# Patient Record
Sex: FEMALE | Race: OTHER | HISPANIC OR LATINO
[De-identification: names, ages, dates, MRNs, and addresses within clinical notes are randomized per-mention and may not be internally consistent; named-entity substitution may affect disease eponyms.]

---

## 2017-01-13 ENCOUNTER — TRANSCRIPTION ENCOUNTER (OUTPATIENT)
Age: 42
End: 2017-01-13

## 2017-03-01 ENCOUNTER — EMERGENCY (EMERGENCY)
Facility: HOSPITAL | Age: 42
LOS: 0 days | Discharge: HOME | End: 2017-03-02
Admitting: INTERNAL MEDICINE

## 2017-05-18 ENCOUNTER — TRANSCRIPTION ENCOUNTER (OUTPATIENT)
Age: 42
End: 2017-05-18
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
## 2017-06-27 DIAGNOSIS — Z87.891 PERSONAL HISTORY OF NICOTINE DEPENDENCE: ICD-10-CM

## 2017-06-27 DIAGNOSIS — H57.12 OCULAR PAIN, LEFT EYE: ICD-10-CM

## 2017-06-27 DIAGNOSIS — I10 ESSENTIAL (PRIMARY) HYPERTENSION: ICD-10-CM
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
## 2017-06-27 DIAGNOSIS — Z88.0 ALLERGY STATUS TO PENICILLIN: ICD-10-CM

## 2017-06-27 DIAGNOSIS — Z98.890 OTHER SPECIFIED POSTPROCEDURAL STATES: ICD-10-CM

## 2017-06-27 DIAGNOSIS — S05.02XA INJURY OF CONJUNCTIVA AND CORNEAL ABRASION WITHOUT FOREIGN BODY, LEFT EYE, INITIAL ENCOUNTER: ICD-10-CM

## 2017-10-17 ENCOUNTER — TRANSCRIPTION ENCOUNTER (OUTPATIENT)
Age: 42
End: 2017-10-17

## 2018-03-14 ENCOUNTER — APPOINTMENT (OUTPATIENT)
Dept: OBGYN | Facility: CLINIC | Age: 43
End: 2018-03-14

## 2018-03-14 PROBLEM — Z00.00 ENCOUNTER FOR PREVENTIVE HEALTH EXAMINATION: Status: ACTIVE | Noted: 2018-03-14

## 2018-06-09 ENCOUNTER — TRANSCRIPTION ENCOUNTER (OUTPATIENT)
Age: 43
End: 2018-06-09

## 2018-06-18 ENCOUNTER — OUTPATIENT (OUTPATIENT)
Dept: OUTPATIENT SERVICES | Facility: HOSPITAL | Age: 43
LOS: 1 days | Discharge: HOME | End: 2018-06-18

## 2018-06-18 DIAGNOSIS — G47.33 OBSTRUCTIVE SLEEP APNEA (ADULT) (PEDIATRIC): ICD-10-CM

## 2018-06-18 DIAGNOSIS — E66.01 MORBID (SEVERE) OBESITY DUE TO EXCESS CALORIES: ICD-10-CM

## 2019-02-24 ENCOUNTER — TRANSCRIPTION ENCOUNTER (OUTPATIENT)
Age: 44
End: 2019-02-24

## 2019-04-25 ENCOUNTER — TRANSCRIPTION ENCOUNTER (OUTPATIENT)
Age: 44
End: 2019-04-25

## 2019-11-12 ENCOUNTER — TRANSCRIPTION ENCOUNTER (OUTPATIENT)
Age: 44
End: 2019-11-12

## 2020-04-22 ENCOUNTER — TRANSCRIPTION ENCOUNTER (OUTPATIENT)
Age: 45
End: 2020-04-22

## 2020-08-19 ENCOUNTER — TRANSCRIPTION ENCOUNTER (OUTPATIENT)
Age: 45
End: 2020-08-19

## 2020-09-27 ENCOUNTER — EMERGENCY (EMERGENCY)
Facility: HOSPITAL | Age: 45
LOS: 0 days | Discharge: HOME | End: 2020-09-27
Attending: STUDENT IN AN ORGANIZED HEALTH CARE EDUCATION/TRAINING PROGRAM | Admitting: STUDENT IN AN ORGANIZED HEALTH CARE EDUCATION/TRAINING PROGRAM
Payer: COMMERCIAL

## 2020-09-27 VITALS
WEIGHT: 141.98 LBS | TEMPERATURE: 99 F | DIASTOLIC BLOOD PRESSURE: 76 MMHG | HEART RATE: 115 BPM | SYSTOLIC BLOOD PRESSURE: 133 MMHG | RESPIRATION RATE: 20 BRPM | OXYGEN SATURATION: 96 %

## 2020-09-27 DIAGNOSIS — M54.2 CERVICALGIA: ICD-10-CM

## 2020-09-27 DIAGNOSIS — M54.6 PAIN IN THORACIC SPINE: ICD-10-CM

## 2020-09-27 DIAGNOSIS — F07.81 POSTCONCUSSIONAL SYNDROME: ICD-10-CM

## 2020-09-27 PROCEDURE — 72125 CT NECK SPINE W/O DYE: CPT | Mod: 26

## 2020-09-27 PROCEDURE — 70450 CT HEAD/BRAIN W/O DYE: CPT | Mod: 26

## 2020-09-27 PROCEDURE — 99285 EMERGENCY DEPT VISIT HI MDM: CPT

## 2020-09-27 RX ORDER — OXYCODONE AND ACETAMINOPHEN 5; 325 MG/1; MG/1
1 TABLET ORAL ONCE
Refills: 0 | Status: DISCONTINUED | OUTPATIENT
Start: 2020-09-27 | End: 2020-09-27

## 2020-09-27 RX ORDER — TIZANIDINE 4 MG/1
1 TABLET ORAL
Qty: 15 | Refills: 0
Start: 2020-09-27 | End: 2020-10-01

## 2020-09-27 RX ADMIN — OXYCODONE AND ACETAMINOPHEN 1 TABLET(S): 5; 325 TABLET ORAL at 13:14

## 2020-09-27 NOTE — ED PROVIDER NOTE - PHYSICAL EXAMINATION
CONST: Well appearing in NAD  EYES: PERRL, EOMI, Sclera and conjunctiva clear.   ENT: TM's clear B/L without drainage. Oropharynx normal appearing, no erythema or exudates. Uvula midline.  NECK: Non-tender, no meningeal signs  CARD: Normal S1 S2; Normal rate and rhythm  RESP: Equal BS B/L, No wheezes, rhonchi or rales. No distress  GI: Soft, non-tender, non-distended.  MS: Normal ROM in all extremities. No midline spinal tenderness.  SKIN: Warm, dry, no acute rashes. Good turgor. Stapled lac to right lower occipital scalp with 16 staples in place. No infx. Edges approximated well  NEURO: A&Ox3, No focal deficits. Strength 5/5 with no sensory deficits. Steady gait

## 2020-09-27 NOTE — ED PROVIDER NOTE - CARE PROVIDER_API CALL
Kierra Burrows  [facility] PROF-OFFICE STAFF  [address] [place] [address]  Phone: [phone]  Fax: [phone]  Follow Up Time: 1-3 Days

## 2020-09-27 NOTE — ED PROVIDER NOTE - OBJECTIVE STATEMENT
Pt had mechanical fall 2 days ago. Slipped on wet floor. Struck back of head and sustained scalp lac. ?LOC. Went to  and was stapled. Since then she has NA, neck pain, dizziness. Denies vomit, blood thinner use, weakness, numbness, visual changes. Pt wants 2nd opinion on the wound care

## 2020-09-27 NOTE — ED PROVIDER NOTE - CLINICAL SUMMARY MEDICAL DECISION MAKING FREE TEXT BOX
cth and ctcs negative. staples appropriate. likely post concussive syndrome. discussed outpatient management options, f/u and return precautions.

## 2020-09-27 NOTE — ED ADULT NURSE NOTE - OBJECTIVE STATEMENT
Slipped on wet floor. Struck back of head and sustained scalp lac. ?LOC. Went to  and was stapled. Since then she has NA, neck pain, dizziness. Denies vomit, blood thinner use, weakness, numbness, visual changes.

## 2020-09-27 NOTE — ED PROVIDER NOTE - NS ED ROS FT
CONST: No fever, chills or bodyaches  EYES: No pain, redness, drainage or visual changes.  ENT: No ear pain or discharge, nasal discharge or congestion. No sore throat  CARD: No chest pain, palpitations  RESP: No SOB, cough, hemoptysis. No hx of asthma or COPD  GI: No abdominal pain, N/V/D  MS: No joint pain, back pain or extremity pain/injury  SKIN: No rashes  NEURO: No, paresthesias or LOC

## 2020-09-27 NOTE — ED PROVIDER NOTE - PATIENT PORTAL LINK FT
You can access the FollowMyHealth Patient Portal offered by [facility] by registering at the following website: [email or web address]. By joining [employer]’s FollowMyHealth portal, you will also be able to view your health information using other applications (apps) compatible with our system.

## 2020-09-27 NOTE — ED PROVIDER NOTE - ATTENDING CONTRIBUTION TO CARE
44 f  pt had slip and fall 2days ago. pt had lac to back of head. pt was unsure if she had loc. pt states she went to  for eval and had staples placed. since then, pt has had mild headache and dizziness. no numbness/weakness. no AC or antiplt    vss  gen- NAD, aaox3  HCAT- 2cm occipital lac, c/d/i  card-rrr  lungs-ctab, no wheezing or rhonchi  abd-sntnd, no guarding or rebound  neuro- full str/sensation, cn ii-xii grossly intact, normal coordination and gait  Spine- paracerivcal tenderness on R, no midline ttp    given loc and sx, will get cth, ctcs, supportive care

## 2020-10-07 ENCOUNTER — TRANSCRIPTION ENCOUNTER (OUTPATIENT)
Age: 45
End: 2020-10-07

## 2020-12-09 ENCOUNTER — TRANSCRIPTION ENCOUNTER (OUTPATIENT)
Age: 45
End: 2020-12-09

## 2020-12-13 ENCOUNTER — TRANSCRIPTION ENCOUNTER (OUTPATIENT)
Age: 45
End: 2020-12-13

## 2020-12-20 ENCOUNTER — TRANSCRIPTION ENCOUNTER (OUTPATIENT)
Age: 45
End: 2020-12-20

## 2021-06-02 ENCOUNTER — TRANSCRIPTION ENCOUNTER (OUTPATIENT)
Age: 46
End: 2021-06-02

## 2021-06-04 ENCOUNTER — TRANSCRIPTION ENCOUNTER (OUTPATIENT)
Age: 46
End: 2021-06-04

## 2021-09-01 ENCOUNTER — EMERGENCY (EMERGENCY)
Facility: HOSPITAL | Age: 46
LOS: 0 days | Discharge: HOME | End: 2021-09-01
Attending: EMERGENCY MEDICINE | Admitting: EMERGENCY MEDICINE
Payer: COMMERCIAL

## 2021-09-01 VITALS
WEIGHT: 141.98 LBS | RESPIRATION RATE: 16 BRPM | HEART RATE: 86 BPM | SYSTOLIC BLOOD PRESSURE: 143 MMHG | TEMPERATURE: 97 F | OXYGEN SATURATION: 97 % | DIASTOLIC BLOOD PRESSURE: 88 MMHG | HEIGHT: 66 IN

## 2021-09-01 VITALS
OXYGEN SATURATION: 99 % | RESPIRATION RATE: 18 BRPM | DIASTOLIC BLOOD PRESSURE: 74 MMHG | HEART RATE: 100 BPM | TEMPERATURE: 96 F | SYSTOLIC BLOOD PRESSURE: 123 MMHG

## 2021-09-01 DIAGNOSIS — F32.9 MAJOR DEPRESSIVE DISORDER, SINGLE EPISODE, UNSPECIFIED: ICD-10-CM

## 2021-09-01 DIAGNOSIS — Z79.899 OTHER LONG TERM (CURRENT) DRUG THERAPY: ICD-10-CM

## 2021-09-01 DIAGNOSIS — F41.9 ANXIETY DISORDER, UNSPECIFIED: ICD-10-CM

## 2021-09-01 DIAGNOSIS — Z88.0 ALLERGY STATUS TO PENICILLIN: ICD-10-CM

## 2021-09-01 LAB
ALBUMIN SERPL ELPH-MCNC: 4.5 G/DL — SIGNIFICANT CHANGE UP (ref 3.5–5.2)
ALP SERPL-CCNC: 86 U/L — SIGNIFICANT CHANGE UP (ref 30–115)
ALT FLD-CCNC: 12 U/L — SIGNIFICANT CHANGE UP (ref 0–41)
ANION GAP SERPL CALC-SCNC: 13 MMOL/L — SIGNIFICANT CHANGE UP (ref 7–14)
APAP SERPL-MCNC: <5 UG/ML — LOW (ref 10–30)
AST SERPL-CCNC: 24 U/L — SIGNIFICANT CHANGE UP (ref 0–41)
BASOPHILS # BLD AUTO: 0.05 K/UL — SIGNIFICANT CHANGE UP (ref 0–0.2)
BASOPHILS NFR BLD AUTO: 0.7 % — SIGNIFICANT CHANGE UP (ref 0–1)
BILIRUB SERPL-MCNC: 0.4 MG/DL — SIGNIFICANT CHANGE UP (ref 0.2–1.2)
BUN SERPL-MCNC: 11 MG/DL — SIGNIFICANT CHANGE UP (ref 10–20)
CALCIUM SERPL-MCNC: 9.3 MG/DL — SIGNIFICANT CHANGE UP (ref 8.5–10.1)
CHLORIDE SERPL-SCNC: 103 MMOL/L — SIGNIFICANT CHANGE UP (ref 98–110)
CO2 SERPL-SCNC: 26 MMOL/L — SIGNIFICANT CHANGE UP (ref 17–32)
CREAT SERPL-MCNC: 0.7 MG/DL — SIGNIFICANT CHANGE UP (ref 0.7–1.5)
EOSINOPHIL # BLD AUTO: 0.08 K/UL — SIGNIFICANT CHANGE UP (ref 0–0.7)
EOSINOPHIL NFR BLD AUTO: 1.1 % — SIGNIFICANT CHANGE UP (ref 0–8)
ETHANOL SERPL-MCNC: <10 MG/DL — SIGNIFICANT CHANGE UP
GLUCOSE SERPL-MCNC: 81 MG/DL — SIGNIFICANT CHANGE UP (ref 70–99)
HCG SERPL-ACNC: 0.7 MIU/ML — SIGNIFICANT CHANGE UP
HCT VFR BLD CALC: 38.5 % — SIGNIFICANT CHANGE UP (ref 37–47)
HGB BLD-MCNC: 12.1 G/DL — SIGNIFICANT CHANGE UP (ref 12–16)
IMM GRANULOCYTES NFR BLD AUTO: 0.3 % — SIGNIFICANT CHANGE UP (ref 0.1–0.3)
LYMPHOCYTES # BLD AUTO: 2.06 K/UL — SIGNIFICANT CHANGE UP (ref 1.2–3.4)
LYMPHOCYTES # BLD AUTO: 29.5 % — SIGNIFICANT CHANGE UP (ref 20.5–51.1)
MCHC RBC-ENTMCNC: 27.6 PG — SIGNIFICANT CHANGE UP (ref 27–31)
MCHC RBC-ENTMCNC: 31.4 G/DL — LOW (ref 32–37)
MCV RBC AUTO: 87.7 FL — SIGNIFICANT CHANGE UP (ref 81–99)
MONOCYTES # BLD AUTO: 0.48 K/UL — SIGNIFICANT CHANGE UP (ref 0.1–0.6)
MONOCYTES NFR BLD AUTO: 6.9 % — SIGNIFICANT CHANGE UP (ref 1.7–9.3)
NEUTROPHILS # BLD AUTO: 4.3 K/UL — SIGNIFICANT CHANGE UP (ref 1.4–6.5)
NEUTROPHILS NFR BLD AUTO: 61.5 % — SIGNIFICANT CHANGE UP (ref 42.2–75.2)
NRBC # BLD: 0 /100 WBCS — SIGNIFICANT CHANGE UP (ref 0–0)
PLATELET # BLD AUTO: 258 K/UL — SIGNIFICANT CHANGE UP (ref 130–400)
POTASSIUM SERPL-MCNC: 3.9 MMOL/L — SIGNIFICANT CHANGE UP (ref 3.5–5)
POTASSIUM SERPL-SCNC: 3.9 MMOL/L — SIGNIFICANT CHANGE UP (ref 3.5–5)
PROT SERPL-MCNC: 6.9 G/DL — SIGNIFICANT CHANGE UP (ref 6–8)
RBC # BLD: 4.39 M/UL — SIGNIFICANT CHANGE UP (ref 4.2–5.4)
RBC # FLD: 16.8 % — HIGH (ref 11.5–14.5)
SALICYLATES SERPL-MCNC: <0.3 MG/DL — LOW (ref 4–30)
SODIUM SERPL-SCNC: 142 MMOL/L — SIGNIFICANT CHANGE UP (ref 135–146)
WBC # BLD: 6.99 K/UL — SIGNIFICANT CHANGE UP (ref 4.8–10.8)
WBC # FLD AUTO: 6.99 K/UL — SIGNIFICANT CHANGE UP (ref 4.8–10.8)

## 2021-09-01 PROCEDURE — 93010 ELECTROCARDIOGRAM REPORT: CPT

## 2021-09-01 PROCEDURE — 90792 PSYCH DIAG EVAL W/MED SRVCS: CPT | Mod: 95

## 2021-09-01 PROCEDURE — 99284 EMERGENCY DEPT VISIT MOD MDM: CPT

## 2021-09-01 RX ADMIN — Medication 1 MILLIGRAM(S): at 14:53

## 2021-09-01 NOTE — ED BEHAVIORAL HEALTH ASSESSMENT NOTE - SUMMARY
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  pt presents with depression and anxiety in the context of family losses in the past year, work stress, possible mild etoh abuse, and nonrepsonse to medication management by pmd. she is not suicidal in any way, and collateral has no safety concerns. she is appropriate for discharge and referral to [facility] psychiatric [facility], referral made.

## 2021-09-01 NOTE — ED BEHAVIORAL HEALTH ASSESSMENT NOTE - HPI (INCLUDE ILLNESS QUALITY, SEVERITY, DURATION, TIMING, CONTEXT, MODIFYING FACTORS, ASSOCIATED SIGNS AND SYMPTOMS)
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  pt reports she lost her mother and uncle this past year, and brother was diagnosed with cancer. she is working sixteen hour shifts at her job, and feels stressed out, with increased general anxiety, checking to see if things are on/off. she reports poor sleep, depressed mood, feelings of guilt/inadequacy. she has had "repetitive" thoughts which she explains are that "its your fault." she often cries and feels overwhelmed. today she spoke to her sister and "finally decided" she needed some help.   she has had no suicidal ideation whatsoever, and denies any suicidal ideation currently. is future oriented.   pt was started on zoloft 25mg 6 weeks ago by her pmd, she feels it hasn't helped and if anything has made her more anxious. she also is prescribed prn xanax but she says she is not taking it.   pt drinks twice a week and openly admits to self medicating her depression in this regard, has two beers and two shots (four drinks total) during these two sessions. smokes a pack a day, denies other subst use.   denies hi/ah/vh, denies manic or psychotic sx's.   pt is interested in oupatient referral to see psychiatrist. I spoke with pt's sister ron via [employer] on pt's phone (per their shared preference) who confirmed the above, and said she wanted her sister to get help but had no safety concerns or concerns about suciidality.

## 2021-09-01 NOTE — ED PROVIDER NOTE - CARE PROVIDER_API CALL
Napoleon Castellanos)  Psychiatry  [address] [place] [address]  Phone: [phone]  Fax: [phone]  Follow Up Time:     Dennis Mishra)  Psychiatry  [address] [place] [address]  Phone: [phone]  Fax: [phone]  Follow Up Time:

## 2021-09-01 NOTE — ED PROVIDER NOTE - CLINICAL SUMMARY MEDICAL DECISION MAKING FREE TEXT BOX
patient presents with increasing depression and anxiety she denies any suicidal plan, denies any homicidal ideations   we obtained labs ekg and have consulted telepsych who deems patient safe for discharge I will discharge at this time

## 2021-09-01 NOTE — ED ADULT NURSE NOTE - NSIMPLEMENTINTERV_GEN_ALL_ED
Implemented All Universal Safety Interventions:  [place] to call system. Call bell, personal items and telephone within reach. Instruct patient to call for assistance. Room bathroom lighting operational. Non-slip footwear when patient is off stretcher. Physically safe environment: no spills, clutter or unnecessary equipment. Stretcher in lowest position, wheels locked, appropriate side rails in place.

## 2021-09-01 NOTE — ED PROVIDER NOTE - PATIENT PORTAL LINK FT
You can access the FollowMyHealth Patient Portal offered by [facility] by registering at the following website: [email or web address]. By joining [facility]’s FollowMyHealth portal, you will also be able to view your health information using other applications (apps) compatible with our system. You can access the FollowMyHealth Patient Portal offered by [facility] by registering at the following website: [email or web address]. By joining [employer]’s FollowMyHealth portal, you will also be able to view your health information using other applications (apps) compatible with our system.

## 2021-09-01 NOTE — ED ADULT TRIAGE NOTE - CHIEF COMPLAINT QUOTE
Pt. state: "I feel hopeless. I am not suicidal. There's just a lot of things going on. I lost a couple of family members. I just want to talk to a psychiatrist."

## 2021-09-01 NOTE — ED BEHAVIORAL HEALTH ASSESSMENT NOTE - DESCRIPTION
none pt was cooperative and compliant. she initally was to be discharged, then became upset she had not received a referral, received ativan 1mg po, after which she was cooperative and calm, no agitation or lability whatsoever.     covid screener (pt/shirley): + vacc; no known travel/exp/ab test/pos ag     ISTOP:  08/14/2021	08/15/2021	alprazolam 0.5 mg tablet	120	30	Dangelo Merida MD	[id number]	[facility] #[id number] see hpi

## 2021-09-01 NOTE — ED BEHAVIORAL HEALTH ASSESSMENT NOTE - RISK ASSESSMENT
Low Acute Suicide Risk denies suicidal ideation, is future oriented, has support of family, cites responsibility to family as protective factor, is employed, help seeking, high functioning.

## 2021-09-01 NOTE — ED PROVIDER NOTE - NSFOLLOWUPINSTRUCTIONS_ED_ALL_ED_FT
- If you feel overwhelming hopelessness with thoughts of hurting yourself or others, please call 9-1-1 or go the ER immediately  - Please take your medication as prescribed  - Please follow up with your primary care provider

## 2021-09-01 NOTE — ED ADULT NURSE NOTE - NS_BH TRG QUESTION7_ED_ALL_ED
slipped out of chair sleeping  on xarelto  c/o hitting head Depression (without Suicidality or Psychosis)

## 2021-09-01 NOTE — ED PROVIDER NOTE - ATTENDING CONTRIBUTION TO CARE
I was present for and supervised the key and critical aspects of the procedures performed during the care of the patient.  Patient presents for evaluation of worsening depression which has been occurring over the past several weeks noting that her job and the death of juveple family members have been contributing she denies any specific homicidal or suicidal plan. but has been drinking more   on exam she is visible anxious, nc/at perrla eomi oropharynx clear cta b/l, rrr s1s2 noted abd-soft ext from with a normal neuro exam    a/p- we obtained labs ekg and have consulted telepsych I will continue to monitor at this time

## 2021-09-01 NOTE — ED ADULT TRIAGE NOTE - NS ED NURSE BANDS TYPE
HISTORY OF PRESENT ILLNESS: Mai Chowdhury is a 27 year old female who comes in today complaining of URI (cough, nasal drainage, pulled ribbed muscle with coughing, fever x 2 months  Room 9)  .  Patient resents with several week history of some cough congestion nasal discharge postnasal drainage. She also is noticed over the past week some discomfort in the left lower rib cage and chest wall. No injuries falls trauma. She trims it to coughing. No skin rashes. No abdominal complaints no urinary complaints. She is a nonsmoker.  I have reviewed the patient's medications and allergies, past medical, surgical, social and family history, updating these as appropriate.  See Histories section of the EMR for a display of this information...       REVIEW OF SYSTEMS:   Otherwise negative    PHYSICAL EXAMINATION:  Vitals:    03/27/17 1526   BP: 108/76   Pulse: 78   Resp: 12   Temp: 98.6 °F (37 °C)     Some nasal congestion no direct sinus sinus mucous members moist TMs are within normal limits lungs show some scattered rhonchi no wheezes rales or otherwise noted she does have some mild tennis palpation of the left lower lateral rib cage. Abdomen is otherwise soft and nontender. Back shows no CVA tenderness.  No results found for any previous visit.        ASSESSMENT/PLAN:  1. Bronchitis  Other symptomatically treatment recheck as needed  - cefdinir (OMNICEF) 300 MG capsule; Take 1 capsule by mouth 2 times daily.  Dispense: 20 capsule; Refill: 0    2. Sprain of ribs, initial encounter    - naproxen (NAPROSYN) 500 MG tablet; Take 1 tablet by mouth 2 times daily (with meals).  Dispense: 30 tablet; Refill: 0  - traMADOL (ULTRAM) 50 MG tablet; Take 1 tablet by mouth every 6 hours as needed for Pain.  Dispense: 30 tablet; Refill: 0        
Name band;

## 2021-09-02 LAB
COVID-19 SPIKE DOMAIN AB INTERP: POSITIVE
COVID-19 SPIKE DOMAIN ANTIBODY RESULT: 4.19 U/ML — HIGH
SARS-COV-2 IGG+IGM SERPL QL IA: 4.19 U/ML — HIGH
SARS-COV-2 IGG+IGM SERPL QL IA: POSITIVE

## 2021-09-16 PROBLEM — F32.9 MAJOR DEPRESSIVE DISORDER, SINGLE EPISODE, UNSPECIFIED: Chronic | Status: ACTIVE | Noted: 2021-09-01

## 2021-09-21 ENCOUNTER — NON-APPOINTMENT (OUTPATIENT)
Age: 46
End: 2021-09-21

## 2021-09-22 ENCOUNTER — APPOINTMENT (OUTPATIENT)
Dept: PSYCHIATRY | Facility: CLINIC | Age: 46
End: 2021-09-22

## 2021-09-22 ENCOUNTER — OUTPATIENT (OUTPATIENT)
Dept: OUTPATIENT SERVICES | Facility: HOSPITAL | Age: 46
LOS: 1 days | Discharge: HOME | End: 2021-09-22

## 2021-09-22 DIAGNOSIS — F41.1 GENERALIZED ANXIETY DISORDER: ICD-10-CM

## 2021-09-22 DIAGNOSIS — F32.9 MAJOR DEPRESSIVE DISORDER, SINGLE EPISODE, UNSPECIFIED: ICD-10-CM

## 2021-09-29 ENCOUNTER — APPOINTMENT (OUTPATIENT)
Dept: PSYCHIATRY | Facility: CLINIC | Age: 46
End: 2021-09-29

## 2021-09-29 ENCOUNTER — NON-APPOINTMENT (OUTPATIENT)
Age: 46
End: 2021-09-29

## 2021-09-29 ENCOUNTER — OUTPATIENT (OUTPATIENT)
Dept: OUTPATIENT SERVICES | Facility: HOSPITAL | Age: 46
LOS: 1 days | Discharge: HOME | End: 2021-09-29

## 2021-09-29 DIAGNOSIS — F41.1 GENERALIZED ANXIETY DISORDER: ICD-10-CM

## 2021-09-29 DIAGNOSIS — F32.9 MAJOR DEPRESSIVE DISORDER, SINGLE EPISODE, UNSPECIFIED: ICD-10-CM

## 2021-10-04 ENCOUNTER — OUTPATIENT (OUTPATIENT)
Dept: OUTPATIENT SERVICES | Facility: HOSPITAL | Age: 46
LOS: 1 days | Discharge: HOME | End: 2021-10-04

## 2021-10-04 ENCOUNTER — APPOINTMENT (OUTPATIENT)
Dept: PSYCHIATRY | Facility: CLINIC | Age: 46
End: 2021-10-04

## 2021-10-04 DIAGNOSIS — F32.9 MAJOR DEPRESSIVE DISORDER, SINGLE EPISODE, UNSPECIFIED: ICD-10-CM

## 2021-10-04 DIAGNOSIS — F41.1 GENERALIZED ANXIETY DISORDER: ICD-10-CM

## 2021-10-06 ENCOUNTER — APPOINTMENT (OUTPATIENT)
Dept: PSYCHIATRY | Facility: CLINIC | Age: 46
End: 2021-10-06

## 2021-10-13 ENCOUNTER — APPOINTMENT (OUTPATIENT)
Dept: PSYCHIATRY | Facility: CLINIC | Age: 46
End: 2021-10-13

## 2021-10-13 ENCOUNTER — OUTPATIENT (OUTPATIENT)
Dept: OUTPATIENT SERVICES | Facility: HOSPITAL | Age: 46
LOS: 1 days | Discharge: HOME | End: 2021-10-13

## 2021-10-13 ENCOUNTER — APPOINTMENT (OUTPATIENT)
Dept: PSYCHIATRY | Facility: CLINIC | Age: 46
End: 2021-10-13
Payer: COMMERCIAL

## 2021-10-13 DIAGNOSIS — F32.9 MAJOR DEPRESSIVE DISORDER, SINGLE EPISODE, UNSPECIFIED: ICD-10-CM

## 2021-10-13 DIAGNOSIS — F41.1 GENERALIZED ANXIETY DISORDER: ICD-10-CM

## 2021-10-13 PROCEDURE — 90792 PSYCH DIAG EVAL W/MED SRVCS: CPT | Mod: 95

## 2021-10-20 ENCOUNTER — APPOINTMENT (OUTPATIENT)
Dept: PSYCHIATRY | Facility: CLINIC | Age: 46
End: 2021-10-20

## 2021-10-20 ENCOUNTER — OUTPATIENT (OUTPATIENT)
Dept: OUTPATIENT SERVICES | Facility: HOSPITAL | Age: 46
LOS: 1 days | Discharge: HOME | End: 2021-10-20

## 2021-10-20 DIAGNOSIS — F41.1 GENERALIZED ANXIETY DISORDER: ICD-10-CM

## 2021-10-20 DIAGNOSIS — F32.9 MAJOR DEPRESSIVE DISORDER, SINGLE EPISODE, UNSPECIFIED: ICD-10-CM

## 2021-10-25 ENCOUNTER — APPOINTMENT (OUTPATIENT)
Dept: PSYCHIATRY | Facility: CLINIC | Age: 46
End: 2021-10-25

## 2021-10-25 ENCOUNTER — OUTPATIENT (OUTPATIENT)
Dept: OUTPATIENT SERVICES | Facility: HOSPITAL | Age: 46
LOS: 1 days | Discharge: HOME | End: 2021-10-25

## 2021-10-25 DIAGNOSIS — F32.9 MAJOR DEPRESSIVE DISORDER, SINGLE EPISODE, UNSPECIFIED: ICD-10-CM

## 2021-10-25 DIAGNOSIS — F41.1 GENERALIZED ANXIETY DISORDER: ICD-10-CM

## 2021-10-27 ENCOUNTER — APPOINTMENT (OUTPATIENT)
Dept: PSYCHIATRY | Facility: CLINIC | Age: 46
End: 2021-10-27

## 2021-10-27 ENCOUNTER — OUTPATIENT (OUTPATIENT)
Dept: OUTPATIENT SERVICES | Facility: HOSPITAL | Age: 46
LOS: 1 days | Discharge: HOME | End: 2021-10-27

## 2021-10-27 DIAGNOSIS — F41.1 GENERALIZED ANXIETY DISORDER: ICD-10-CM

## 2021-10-27 DIAGNOSIS — F32.9 MAJOR DEPRESSIVE DISORDER, SINGLE EPISODE, UNSPECIFIED: ICD-10-CM

## 2021-10-28 ENCOUNTER — EMERGENCY (EMERGENCY)
Facility: HOSPITAL | Age: 46
LOS: 0 days | Discharge: HOME | End: 2021-10-28
Attending: EMERGENCY MEDICINE | Admitting: EMERGENCY MEDICINE
Payer: OTHER MISCELLANEOUS

## 2021-10-28 VITALS
HEIGHT: 66 IN | RESPIRATION RATE: 17 BRPM | SYSTOLIC BLOOD PRESSURE: 114 MMHG | WEIGHT: 141.1 LBS | TEMPERATURE: 97 F | HEART RATE: 117 BPM | DIASTOLIC BLOOD PRESSURE: 69 MMHG | OXYGEN SATURATION: 100 %

## 2021-10-28 VITALS
OXYGEN SATURATION: 100 % | TEMPERATURE: 96 F | RESPIRATION RATE: 17 BRPM | SYSTOLIC BLOOD PRESSURE: 113 MMHG | HEART RATE: 100 BPM | DIASTOLIC BLOOD PRESSURE: 65 MMHG

## 2021-10-28 DIAGNOSIS — S09.93XA UNSPECIFIED INJURY OF FACE, INITIAL ENCOUNTER: ICD-10-CM

## 2021-10-28 DIAGNOSIS — R04.0 EPISTAXIS: ICD-10-CM

## 2021-10-28 DIAGNOSIS — S09.90XA UNSPECIFIED INJURY OF HEAD, INITIAL ENCOUNTER: ICD-10-CM

## 2021-10-28 DIAGNOSIS — F31.9 BIPOLAR DISORDER, UNSPECIFIED: ICD-10-CM
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
## 2021-10-28 DIAGNOSIS — Z88.0 ALLERGY STATUS TO PENICILLIN: ICD-10-CM
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
## 2021-10-28 DIAGNOSIS — F17.200 NICOTINE DEPENDENCE, UNSPECIFIED, UNCOMPLICATED: ICD-10-CM

## 2021-10-28 PROCEDURE — 99285 EMERGENCY DEPT VISIT HI MDM: CPT

## 2021-10-28 PROCEDURE — 70450 CT HEAD/BRAIN W/O DYE: CPT | Mod: 26,MA

## 2021-10-28 PROCEDURE — 70486 CT MAXILLOFACIAL W/O DYE: CPT | Mod: 26,MA

## 2021-10-28 PROCEDURE — 72125 CT NECK SPINE W/O DYE: CPT | Mod: 26,MA

## 2021-10-28 RX ORDER — IBUPROFEN 200 MG
1 TABLET ORAL
Qty: 21 | Refills: 0
Start: 2021-10-28 | End: 2021-11-03

## 2021-10-28 RX ORDER — ACETAMINOPHEN 500 MG
975 TABLET ORAL ONCE
Refills: 0 | Status: COMPLETED | OUTPATIENT
Start: 2021-10-28 | End: 2021-10-28

## 2021-10-28 RX ADMIN — Medication 975 MILLIGRAM(S): at 17:13
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
## 2021-10-28 NOTE — ED PROVIDER NOTE - ENMT, MLM
Airway patent, Nasal mucosa clear without any septal hematoma. Mouth with normal mucosa. Throat has no vesicles, no oropharyngeal exudates and uvula is midline.
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
## 2021-10-28 NOTE — ED PROVIDER NOTE - PATIENT PORTAL LINK FT
You can access the FollowMyHealth Patient Portal offered by [facility] by registering at the following website: [email or web address]. By joining [email or web address]’s FollowMyHealth portal, you will also be able to view your health information using other applications (apps) compatible with our system.
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
## 2021-10-28 NOTE — ED PROVIDER NOTE - NSFOLLOWUPINSTRUCTIONS_ED_ALL_ED_FT
Head Injury    WHAT YOU NEED TO KNOW:    A head injury can include your scalp, face, skull, or brain and range from mild to severe. Effects can appear immediately after the injury or develop later. The effects may last a short time or be permanent. Healthcare providers may want to check your recovery over time. Treatment may change as you recover or develop new health problems from the head injury.    DISCHARGE INSTRUCTIONS:    Call your local emergency number (911 in the ), or have someone else call if:   •You cannot be woken.      •You have a seizure.      •You stop responding to others or you faint.      •You have blurry or double vision.      •Your speech becomes slurred or confused.      •You have arm or leg weakness, loss of feeling, or new problems with coordination.      •Your pupils are larger than usual, or one pupil is a different size than the other.      •You have blood or clear fluid coming out of your ears or nose.      Seek care immediately if:   •You have repeated or forceful vomiting.      •You feel confused.      •Your headache gets worse or becomes severe.      •You or someone caring for you notices that you are harder to wake than usual.      Call your doctor if:   •Your symptoms last longer than 6 weeks after the injury.      •You have questions or concerns about your condition or care.      Medicines:   •Acetaminophen decreases pain and fever. It is available without a doctor's order. Ask how much to take and how often to take it. Follow directions. Read the labels of all other medicines you are using to see if they also contain acetaminophen, or ask your doctor or pharmacist. Acetaminophen can cause liver damage if not taken correctly. Do not use more than 4 grams (4,000 milligrams) total of acetaminophen in one day.       •Take your medicine as directed. Contact your healthcare provider if you think your medicine is not helping or if you have side effects. Tell him or her if you are allergic to any medicine. Keep a list of the medicines, vitamins, and herbs you take. Include the amounts, and when and why you take them. Bring the list or the pill bottles to follow-up visits. Carry your medicine list with you in case of an emergency.      Self-care:   •Rest or do quiet activities. Limit your time watching TV, using the computer, or doing tasks that require a lot of thinking. Slowly return to your normal activities as directed. Do not play sports or do activities that may cause you to get hit in the head. Ask your healthcare provider when you can return to sports.      •Apply ice on your head for 15 to 20 minutes every hour or as directed. Use an ice pack, or put crushed ice in a plastic bag. Cover it with a towel before you apply it to your skin. Ice helps prevent tissue damage and decreases swelling and pain.      •Have someone stay with you for 24 hours , or as directed. This person can monitor you for problems and call for help if needed. When you are awake, the person should ask you a few questions every few hours to see if you are thinking clearly. An example is to ask your name or address.      Prevent another head injury:   •Wear a helmet that fits properly. Do this when you play sports, or ride a bike, scooter, or skateboard. Helmets help decrease your risk for a serious head injury. Talk to your healthcare provider about other ways you can protect yourself if you play sports.      •Wear your seatbelt every time you are in a car. This helps lower your risk for a head injury if you are in a car accident.      Follow up with your doctor as directed: Write down your questions so you remember to ask them during your visits.       © Copyright [employer] 2021           back to top                          © Copyright [employer] 2021

## 2021-10-29 ENCOUNTER — INPATIENT (INPATIENT)
Facility: HOSPITAL | Age: 46
LOS: 6 days | Discharge: HOME | End: 2021-11-05
Attending: PSYCHIATRY & NEUROLOGY | Admitting: PSYCHIATRY & NEUROLOGY
Payer: COMMERCIAL

## 2021-10-29 VITALS
SYSTOLIC BLOOD PRESSURE: 122 MMHG | DIASTOLIC BLOOD PRESSURE: 68 MMHG | TEMPERATURE: 99 F | HEART RATE: 97 BPM | OXYGEN SATURATION: 98 % | RESPIRATION RATE: 18 BRPM

## 2021-10-29 DIAGNOSIS — F32.9 MAJOR DEPRESSIVE DISORDER, SINGLE EPISODE, UNSPECIFIED: ICD-10-CM

## 2021-10-29 DIAGNOSIS — F10.20 ALCOHOL DEPENDENCE, UNCOMPLICATED: ICD-10-CM

## 2021-10-29 LAB
ALBUMIN SERPL ELPH-MCNC: 4.6 G/DL — SIGNIFICANT CHANGE UP (ref 3.5–5.2)
ALP SERPL-CCNC: 95 U/L — SIGNIFICANT CHANGE UP (ref 30–115)
ALT FLD-CCNC: 10 U/L — SIGNIFICANT CHANGE UP (ref 0–41)
ANION GAP SERPL CALC-SCNC: 16 MMOL/L — HIGH (ref 7–14)
APAP SERPL-MCNC: <5 UG/ML — LOW (ref 10–30)
AST SERPL-CCNC: 21 U/L — SIGNIFICANT CHANGE UP (ref 0–41)
BASOPHILS # BLD AUTO: 0.05 K/UL — SIGNIFICANT CHANGE UP (ref 0–0.2)
BASOPHILS NFR BLD AUTO: 0.6 % — SIGNIFICANT CHANGE UP (ref 0–1)
BILIRUB SERPL-MCNC: 0.3 MG/DL — SIGNIFICANT CHANGE UP (ref 0.2–1.2)
BUN SERPL-MCNC: 7 MG/DL — LOW (ref 10–20)
CALCIUM SERPL-MCNC: 9.4 MG/DL — SIGNIFICANT CHANGE UP (ref 8.5–10.1)
CHLORIDE SERPL-SCNC: 102 MMOL/L — SIGNIFICANT CHANGE UP (ref 98–110)
CO2 SERPL-SCNC: 20 MMOL/L — SIGNIFICANT CHANGE UP (ref 17–32)
CREAT SERPL-MCNC: 0.8 MG/DL — SIGNIFICANT CHANGE UP (ref 0.7–1.5)
EOSINOPHIL # BLD AUTO: 0.11 K/UL — SIGNIFICANT CHANGE UP (ref 0–0.7)
EOSINOPHIL NFR BLD AUTO: 1.4 % — SIGNIFICANT CHANGE UP (ref 0–8)
ETHANOL SERPL-MCNC: 108 MG/DL — HIGH
GLUCOSE SERPL-MCNC: 67 MG/DL — LOW (ref 70–99)
HCG SERPL QL: NEGATIVE — SIGNIFICANT CHANGE UP
HCT VFR BLD CALC: 38.3 % — SIGNIFICANT CHANGE UP (ref 37–47)
HGB BLD-MCNC: 12.3 G/DL — SIGNIFICANT CHANGE UP (ref 12–16)
IMM GRANULOCYTES NFR BLD AUTO: 0.1 % — SIGNIFICANT CHANGE UP (ref 0.1–0.3)
LYMPHOCYTES # BLD AUTO: 2.54 K/UL — SIGNIFICANT CHANGE UP (ref 1.2–3.4)
LYMPHOCYTES # BLD AUTO: 32.5 % — SIGNIFICANT CHANGE UP (ref 20.5–51.1)
MCHC RBC-ENTMCNC: 26.7 PG — LOW (ref 27–31)
MCHC RBC-ENTMCNC: 32.1 G/DL — SIGNIFICANT CHANGE UP (ref 32–37)
MCV RBC AUTO: 83.1 FL — SIGNIFICANT CHANGE UP (ref 81–99)
MONOCYTES # BLD AUTO: 0.54 K/UL — SIGNIFICANT CHANGE UP (ref 0.1–0.6)
MONOCYTES NFR BLD AUTO: 6.9 % — SIGNIFICANT CHANGE UP (ref 1.7–9.3)
NEUTROPHILS # BLD AUTO: 4.57 K/UL — SIGNIFICANT CHANGE UP (ref 1.4–6.5)
NEUTROPHILS NFR BLD AUTO: 58.5 % — SIGNIFICANT CHANGE UP (ref 42.2–75.2)
NRBC # BLD: 0 /100 WBCS — SIGNIFICANT CHANGE UP (ref 0–0)
PLATELET # BLD AUTO: 267 K/UL — SIGNIFICANT CHANGE UP (ref 130–400)
POTASSIUM SERPL-MCNC: 3.7 MMOL/L — SIGNIFICANT CHANGE UP (ref 3.5–5)
POTASSIUM SERPL-SCNC: 3.7 MMOL/L — SIGNIFICANT CHANGE UP (ref 3.5–5)
PROT SERPL-MCNC: 7.1 G/DL — SIGNIFICANT CHANGE UP (ref 6–8)
RBC # BLD: 4.61 M/UL — SIGNIFICANT CHANGE UP (ref 4.2–5.4)
RBC # FLD: 16.5 % — HIGH (ref 11.5–14.5)
SALICYLATES SERPL-MCNC: <0.3 MG/DL — LOW (ref 4–30)
SARS-COV-2 RNA SPEC QL NAA+PROBE: SIGNIFICANT CHANGE UP
SODIUM SERPL-SCNC: 138 MMOL/L — SIGNIFICANT CHANGE UP (ref 135–146)
WBC # BLD: 7.82 K/UL — SIGNIFICANT CHANGE UP (ref 4.8–10.8)
WBC # FLD AUTO: 7.82 K/UL — SIGNIFICANT CHANGE UP (ref 4.8–10.8)

## 2021-10-29 PROCEDURE — 93010 ELECTROCARDIOGRAM REPORT: CPT

## 2021-10-29 PROCEDURE — 99285 EMERGENCY DEPT VISIT HI MDM: CPT

## 2021-10-29 RX ORDER — HYDROXYZINE HCL 10 MG
25 TABLET ORAL EVERY 6 HOURS
Refills: 0 | Status: DISCONTINUED | OUTPATIENT
Start: 2021-10-30 | End: 2021-11-02

## 2021-10-29 RX ORDER — CLONAZEPAM 1 MG
1 TABLET ORAL
Refills: 0 | Status: DISCONTINUED | OUTPATIENT
Start: 2021-10-30 | End: 2021-11-04

## 2021-10-29 RX ORDER — SERTRALINE 25 MG/1
50 TABLET, FILM COATED ORAL DAILY
Refills: 0 | Status: DISCONTINUED | OUTPATIENT
Start: 2021-10-30 | End: 2021-10-31

## 2021-10-29 RX ORDER — NICOTINE POLACRILEX 2 MG
1 GUM BUCCAL DAILY
Refills: 0 | Status: DISCONTINUED | OUTPATIENT
Start: 2021-10-30 | End: 2021-11-05

## 2021-10-29 RX ORDER — HALOPERIDOL DECANOATE 100 MG/ML
2 INJECTION INTRAMUSCULAR EVERY 6 HOURS
Refills: 0 | Status: DISCONTINUED | OUTPATIENT
Start: 2021-10-30 | End: 2021-11-05

## 2021-10-29 RX ORDER — QUETIAPINE FUMARATE 200 MG/1
50 TABLET, FILM COATED ORAL
Refills: 0 | Status: DISCONTINUED | OUTPATIENT
Start: 2021-10-30 | End: 2021-11-02

## 2021-10-29 RX ORDER — HALOPERIDOL DECANOATE 100 MG/ML
5 INJECTION INTRAMUSCULAR ONCE
Refills: 0 | Status: COMPLETED | OUTPATIENT
Start: 2021-10-29 | End: 2021-10-29

## 2021-10-29 RX ORDER — NICOTINE POLACRILEX 2 MG
4 GUM BUCCAL ONCE
Refills: 0 | Status: COMPLETED | OUTPATIENT
Start: 2021-10-29 | End: 2021-10-29

## 2021-10-29 RX ADMIN — Medication 4 MILLIGRAM(S): at 20:38

## 2021-10-29 RX ADMIN — HALOPERIDOL DECANOATE 5 MILLIGRAM(S): 100 INJECTION INTRAMUSCULAR at 18:58
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
## 2021-10-29 NOTE — ED ADULT NURSE NOTE - NSIMPLEMENTINTERV_GEN_ALL_ED
Implemented All Fall with Harm Risk Interventions:  [place] to call system. Call bell, personal items and telephone within reach. Instruct patient to call for assistance. Room bathroom lighting operational. Non-slip footwear when patient is off stretcher. Physically safe environment: no spills, clutter or unnecessary equipment. Stretcher in lowest position, wheels locked, appropriate side rails in place. Provide visual cue, wrist band, yellow gown, etc. Monitor gait and stability. Monitor for mental status changes and reorient to person, place, and time. Review medications for side effects contributing to fall risk. Reinforce activity limits and safety measures with patient and family. Provide visual clues: red socks.

## 2021-10-29 NOTE — ED BEHAVIORAL HEALTH ASSESSMENT NOTE - SUMMARY
Pt is a 44yo female with worsening symptoms of Depression, likely suicidal attempts (DUI and drug overdose) though pt in denial, worsening alcohol abuse. Her family members concerned about pt lying and trying to go home and repeating her suicidal behaviors. Pt works in mental health field, and has hx of benzo abuse. Worsening depression and alcohol abuse since COVID-19 pandemic in the context of loss of family members.

## 2021-10-29 NOTE — ED BEHAVIORAL HEALTH ASSESSMENT NOTE - DESCRIPTION
lives alone, works full time at [facility] [facility] [employer] residential group [place] uncooperative, denying her drinking problems and lying about her DUI, refusing to provide collateral information, insisting to leave.

## 2021-10-29 NOTE — ED BEHAVIORAL HEALTH ASSESSMENT NOTE - CURRENT PLAN:
PT ACUTE  Treatment Session          Pt seen on 12[facility] nursing unit.                                                Frequency Comments: IF: M-F    RECOMMENDATIONS FOR DISCHARGE:  Recommendation for Discharge: PT: Home;Home therapy (02/22/19 0730)                                                                                                                 Admitting complaint: Hypotension, unspecified hypotension type [I95.9]  Acute renal failure, unspecified acute renal failure type (CMS/HCC) [N17.9]                                     Precautions  Other Precautions: Fall risk (02/22/19 0730)  Precautions Comments: Fall risk (02/22/19 0730)    SUBJECTIVE: Patient's Personal Goal: get stronger (02/22/19 0730)  Subjective: Patient agreeable for therapy today.  Patient stated \"I feel good.\" (02/22/19 0730)  Subjective/Objective Comments: RN Chasity aware of therapy session.  Patient started session in bed and finished session in bed with alarm (02/22/19 0730)    OBJECTIVE:  Basic Lines: Capped IV;Telemetry (02/22/19 0730)  Safety Measures: Alarms (02/22/19 0730)    RN reported Mi Fall Scale Score: 60    ASSESSMENT:   Patient seen today for PT treatment session. Patient is progressing  towards functional mobility goals. Patient presents at supervision for bed mobility, close supervision with sit to stand transfers with ww, pivot transfers with ww with light Min Assist and ambulation with ww 350 feet with light Min Assist. Patient session limited due to generalized weakness and decreased activity tolerance. Recommendations for discharge home with home therapy. Patient benefited from skilled PT services to address balance deficits, safety deficits and generalized weakness.         Other Therapeutic Intervention: Collaborated with patient on importance of skilled PT services to progress bed mobility, balance, transfers and ambulation with ww (02/22/19 0730)     EDUCATION:   On this date, the patient was educated on  importance of skilled PT services to address balance deficits, safety deficits and generalized weakness.    The response to education was: Demonstrates understanding    PT Identified Barriers to Discharge: need for assist     PLAN:   Continue skilled PT, including the following Treatment/Interventions: Functional transfer training;Strengthening;Endurance training;Bed mobility;Gait training;Stairs retraining;Safety Education;Neuromuscular re-education (02/22/19 0730)   Frequency Comments: IF: M-F (02/22/19 0730)    Treatment Plan for Next Session: progress bed mobility, transfers, ambulation at WW, standing balance and exercise  Additional Plan Considerations: need to clarify/obtain more detailed PLOF and update goals as able.        RECOMMENDATIONS FOR DISCHARGE:  Recommendation for Discharge: PT: Home;Home therapy (02/22/19 0730)    PT/OT Mobility Equipment for Discharge: will monitor needs as D/C approaches.   (02/22/19 0730)  PT/OT ADL Equipment for Discharge: continue to assess (02/22/19 0730)     Assistance needed when returning home:   Not discussed at this time due to discharge plan/needs not established.  Will continue to address as hospital stay progresses.       ICU Mobility Assesment (PERME):       Last 24 hours of Functional Data  Bed Mobility   Bed Mobility  Rolling to the Right: Supervision (Supv) (02/22/19 0730)  Rolling to the Left: Supervision (Supv) (02/22/19 0730)  Supine to Sit: Supervision (Supv) (02/22/19 0730)  Sit to Supine: Supervision (Supv) (02/22/19 0730)  Bed Mobility Comments: Patient required supervision with bed mobility with cueing with direction and sequencing (02/22/19 0730)    Transfers  Transfers  Sit to Stand: Supervision (Supv) (02/22/19 0730)  Stand to Sit: Supervision (Supv) (02/22/19 0730)  Stand Pivot Transfers: Minimal Assist (Min) (02/22/19 0730)  Assistive Device/: 2-wheeled walker;1 Person;Gait Belt (02/22/19 0730)  Transfer Comments 1: Patient required  light Min Assist with pivot transfers with ww with cueing with safety and balance (02/22/19 0730)      Gait  Gait  Gait Assistance: Minimal Assist (Min) (02/22/19 0730)  Assistive Device/: 2-wheeled walker;1 Person;Gait Belt (02/22/19 0730)  Ambulation Distance (Feet): 350 Feet (02/22/19 0730)  Pattern: Shuffle (02/22/19 0730)  Ambulation Surface: Tile (02/22/19 0730)  Gait Comments 1: Patient required light Min Assist with ambulation with ww with cueing with erect posture and increased step length (02/22/19 0730),      Stairs  Stairs Mobility  Stairs Mobility Comments: deferred secondary to fatigue (02/22/19 0730)       Neuromuscular Re-education  Neuromuscular Re-education  Neuromuscular Re-education 1: Patient required light Min Assist with standing dynamic reaching balance tasks with ww with clothing management with cueing with safety and balance (02/22/19 0730)    Balance  Balance  Sitting - Static: Supversion (Supv) (02/22/19 0730)  Sitting - Dynamic: Supervision (Supv) (02/22/19 0730)  Standing - Static: Minimal Assist (Min) (02/22/19 0730)  Standing - Dynamic (eyes open): Minimal Assist (Min) (02/22/19 0730)  Balance Comments #1: Patient required light Min Assist with standing balance tasks with ww with cueing with safety and balance (02/22/19 0730)    Wheelchair Mobility       Patient's Personal Goal: get stronger (02/22/19 0730)    Therapy Goals:    Goals  Short Term Goals to Be Reviewed On: 02/20/19 (02/22/19 0730)  Short Term Goals = Discharge Goals: No (02/22/19 0730)  Goal Agreement: Patient agrees with goals and treatment plan (02/22/19 0730)  Bed Mobility Short Term Goal: sit<>supine min assist on flat bed.   (02/22/19 0730)  Bed Mobility Discharge Goal: sit<>supine modified independent on flat bed (02/22/19 0730)  Bed Mobility Discharge Goal Progress: Outcome not met, continue to monitor (02/22/19 0730)  Transfer Short Term Goal: sit<>stand, pivot with ww and mod assist (02/22/19  0730)  Transfer Discharge Goal: sit<>stand, pivot with A/D prn, modified independent (02/22/19 0730)  Transfer Discharge Goal Progress: Outcome not met, continue to monitor (02/22/19 0730)  Ambulation Short Term Goal: 50' with ww min assist (02/22/19 0730)  Ambulation Discharge Goal: 150' with A/D prn, modified independent (02/22/19 0730)  Ambulation Discharge Goal Progress: Outcome not met, continue to monitor (02/22/19 0730)  Stairs Discharge Goal: 7 steps with rail, modified independent.  (02/22/19 0730)        PT Time Spent: 39 minutes (02/22/19 0730)    See PT flowsheet for full details regarding the PT therapy provided.       None known

## 2021-10-29 NOTE — ED PROVIDER NOTE - OBJECTIVE STATEMENT
45 year old female with pmhx of bipolar disease and anxiety comes in for suicidal attempt. As per sister, pt recently received bad news regarding death of two family members and has not been herself since. Pt called out to sister and best friend regarding wanting to end her life and was found drinking alcohol and ingesting ativan. Pt left a suicidal note for sister. Here in the ED, pt is alert and orientedX3 and states that she is currently suicidal but wants to leave and smoke a cigarette. Pt has no other complaints at this time and denies fevers, chills, nausea, vomiting, diarrhea. Pt has no other complaints at this time

## 2021-10-29 NOTE — ED BEHAVIORAL HEALTH ASSESSMENT NOTE - RISK ASSESSMENT
High Acute Suicide Risk Pt lives alone, displaying withdrawn behaviors and suicidal behaviors with suicidal note. Pt presents high suicidal risk.

## 2021-10-29 NOTE — ED ADULT NURSE NOTE - OBJECTIVE STATEMENT
46 y/o female BIBA for suicidal ideation was arrested yesterday for DUI. Pt reportedly wrote suicide note as per EMS. Pt took 6 ativan pills and drank. denies HI/AH/VH. 1:1 IN PLACE.
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
## 2021-10-29 NOTE — CONSULT NOTE ADULT - ASSESSMENT
44 yo female with pmhx anxiety and BPD presenting s/p ingestion of lorazepam and ethanol. Suggestive of BZD/ethanol ingestion, currently no sedative/hypnotic toxidrome presentation noted in ED.    Recommend supportive care, and psych evaluation when available  Obtain EKG to assess Qtc prior to giving additional haloperidol for sedation if needed  Monitor for signs of ethanol/BZD withdrawal (tachycardia, tremors, hallucinations)    Marta Lombardi MD  Toxicology Fellow 44 yo female with pmhx anxiety and BPD presenting s/p ingestion of lorazepam and ethanol. Suggestive of BZD/ethanol ingestion, currently no sedative/hypnotic toxidrome presentation noted in ED.    Recommend supportive care, and psych evaluation when available  Obtain EKG to assess Qtc prior to giving additional haloperidol for sedation if needed  Monitor for signs of ethanol/BZD withdrawal (tachycardia, tremors, hallucinations)    Marta Lombardi MD  Toxicology Fellow    I personally discussed with ED team. I reviewed the med tox fellow’s note (as assigned above), and agree with the findings and plan except as documented in my note.  Ingestion of lorazepam and ethanol.  However, no sedative hypnotic toxidrome and patient actually required haloperidol for sedation to avoid eloping prior to completion of her eval.      -  Send tox labs to r/o co-ingestion  -  EKG to ensure normal QTC prior to further use of haloperidol  -  Once back to baseline, medically stable from med tox standpoint.    -- Please call with any further questions    Doe    [phone] (pager)

## 2021-10-29 NOTE — ED BEHAVIORAL HEALTH ASSESSMENT NOTE - REASON FOR REFERRAL
Refilled per protocol.     psychiatric evaluation for suicidal attempt referred by family members and close friends

## 2021-10-29 NOTE — ED BEHAVIORAL HEALTH ASSESSMENT NOTE - NSPRESENTSXS_PSY_ALL_CORE
refuses to engage in treatment/Depressed mood/Anhedonia/Impulsivity/Hopelessness or despair/Refusal or inability to complete safety plan

## 2021-10-29 NOTE — ED ADULT TRIAGE NOTE - CHIEF COMPLAINT QUOTE
pt here for suicidal ideation was arrested yesterday for DUI. Pt reportedly wrote suicide note as per EMS. Pt took 6 ativan pills and drank. denies HI/AH/VH. 1:1 IN PLACE.

## 2021-10-30 DIAGNOSIS — F10.20 ALCOHOL DEPENDENCE, UNCOMPLICATED: ICD-10-CM

## 2021-10-30 DIAGNOSIS — F32.2 MAJOR DEPRESSIVE DISORDER, SINGLE EPISODE, SEVERE WITHOUT PSYCHOTIC FEATURES: ICD-10-CM

## 2021-10-30 RX ORDER — INFLUENZA VIRUS VACCINE 15; 15; 15; 15 UG/.5ML; UG/.5ML; UG/.5ML; UG/.5ML
0.5 SUSPENSION INTRAMUSCULAR ONCE
Refills: 0 | Status: DISCONTINUED | OUTPATIENT
Start: 2021-10-30 | End: 2021-11-05

## 2021-10-30 RX ADMIN — Medication 1 MILLIGRAM(S): at 08:20

## 2021-10-30 RX ADMIN — SERTRALINE 50 MILLIGRAM(S): 25 TABLET, FILM COATED ORAL at 08:20

## 2021-10-30 RX ADMIN — Medication 1 MILLIGRAM(S): at 20:43

## 2021-10-30 RX ADMIN — QUETIAPINE FUMARATE 50 MILLIGRAM(S): 200 TABLET, FILM COATED ORAL at 08:20

## 2021-10-30 RX ADMIN — QUETIAPINE FUMARATE 50 MILLIGRAM(S): 200 TABLET, FILM COATED ORAL at 20:43

## 2021-10-30 NOTE — PATIENT PROFILE BEHAVIORAL HEALTH - NSDYSPHAGSECTTHREE_PSY_ALL_CORE
----- Message from Aurelio Pink sent at 3/12/2021  6:18 PM EST -----  Subject: Message to Provider    QUESTIONS  Information for Provider? Patient's daughter is Gabrielle Don and   would like a note from the doctor saying the Patient needs help because   she is consistently providing care for her. She has jury duty and they   require a note for her saying she takes care of her mother. ---------------------------------------------------------------------------  --------------  Latha Can INFO  What is the best way for the office to contact you? OK to leave message on   voicemail  Preferred Call Back Phone Number? [phone]  ---------------------------------------------------------------------------  --------------  SCRIPT ANSWERS  Relationship to Patient? Other  Representative Name? Daughter (Gabrielle Don)  Is the Representative on the appropriate HIPAA document in Epic?  Yes N/A

## 2021-10-30 NOTE — H&P ADULT - NSHPLABSRESULTS_GEN_ALL_CORE
12.3   7.82  )-----------( 267      ( 29 Oct 2021 18:10 )             38.3       10-29    138  |  102  |  7<L>  ----------------------------<  67<L>  3.7   |  20  |  0.8    Ca    9.4      29 Oct 2021 18:10    TPro  7.1  /  Alb  4.6  /  TBili  0.3  /  DBili  x   /  AST  21  /  ALT  10  /  AlkPhos  95  10-29        < from: CT Cervical Spine No Cont (10.28.21 @ 16:51) >    IMPRESSION:    CT HEAD:  No acute intracranial pathology.    CT FACIAL BONES:  No acute maxillofacial fracture or subluxation.    CT CERVICAL SPINE:  No acute cervical fracture or dislocation.    < end of copied text >                  Lactate Trend            CAPILLARY BLOOD GLUCOSE

## 2021-10-30 NOTE — PROGRESS NOTE BEHAVIORAL HEALTH - NSBHADDHXPSYCHFT_PSY_A_CORE
She endorsed current engagement in treatment at [facility] with Nikki Mathur LCSW (therapist) and psychiatrist whose name she could not recall (chart review from [facility] shows that she last saw psychiatrist Dr. Murdock on 10.13 and was prescribed wellbutrin 150 XL, Ativan 0.5mg PO BID PRN, and hydroxzyine 25mg to 50mg PO QHS PRN).
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
## 2021-10-30 NOTE — H&P ADULT - ASSESSMENT
45 YEAR OLD FEMALE ADMIT TO IPP UNIT FOR SUICIDAL IDEATION AND BIPOLAR DISORDER .  PLAN 1- SUICIDAL IDEATION PLAN BY DR HUI BURROWS            2- BIPOLAR DISORDER PLAN BY DR HUI BURROWS            3- SMOKING CESSATION CONTINUE NICOTINE PATCH
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
## 2021-10-30 NOTE — PROGRESS NOTE BEHAVIORAL HEALTH - NSBHADDHXSUBSTFT_PSY_A_CORE
Per collateral on intake she has concerning history of alcohol and bzd use prior to admission which was minimized on todays evaluation.

## 2021-10-30 NOTE — H&P ADULT - HISTORY OF PRESENT ILLNESS
45 YEAR OLD FEMALE WITH PMH OF BIPOLAR DISORDER AND ANXIETY COME TO THE ER FOR SUICIDAL ATTEMPT. AS PER PATIENT SISTER , THE PATIENT RECEIVED   BAD NEWS ABOUT DEATH IN THE FAMILY AND HAS NOT BEEN HER SELF SINCE THEN .PATIENT CALLED HER SISTER AND HER FRIEND REGARDING HER DESIRE TO END HER LIFE AND HER SISTER FOUND HER AT HOME DRINKING ALCOHOL AND TAKING ATIVAN AND BESIDE HER A SUICIDAL NOTE . PATIENT DENIES FEVER, CHILLS , N/V/D   OR ABDOMINAL PAIN .

## 2021-10-31 PROCEDURE — 99232 SBSQ HOSP IP/OBS MODERATE 35: CPT | Mod: GC

## 2021-10-31 RX ORDER — ESCITALOPRAM OXALATE 10 MG/1
5 TABLET, FILM COATED ORAL DAILY
Refills: 0 | Status: DISCONTINUED | OUTPATIENT
Start: 2021-11-01 | End: 2021-11-02

## 2021-10-31 RX ORDER — ACETAMINOPHEN 500 MG
650 TABLET ORAL EVERY 6 HOURS
Refills: 0 | Status: COMPLETED | OUTPATIENT
Start: 2021-10-31 | End: 2021-11-01

## 2021-10-31 RX ORDER — METHOCARBAMOL 500 MG/1
500 TABLET, FILM COATED ORAL
Refills: 0 | Status: COMPLETED | OUTPATIENT
Start: 2021-10-31 | End: 2021-11-01

## 2021-10-31 RX ADMIN — Medication 650 MILLIGRAM(S): at 23:42

## 2021-10-31 RX ADMIN — Medication 650 MILLIGRAM(S): at 20:00

## 2021-10-31 RX ADMIN — QUETIAPINE FUMARATE 50 MILLIGRAM(S): 200 TABLET, FILM COATED ORAL at 07:57

## 2021-10-31 RX ADMIN — Medication 30 MILLILITER(S): at 20:00

## 2021-10-31 RX ADMIN — SERTRALINE 50 MILLIGRAM(S): 25 TABLET, FILM COATED ORAL at 07:57

## 2021-10-31 RX ADMIN — Medication 1 MILLIGRAM(S): at 20:02

## 2021-10-31 RX ADMIN — Medication 1 PATCH: at 20:03

## 2021-10-31 RX ADMIN — QUETIAPINE FUMARATE 50 MILLIGRAM(S): 200 TABLET, FILM COATED ORAL at 20:03

## 2021-10-31 RX ADMIN — Medication 25 MILLIGRAM(S): at 20:02

## 2021-10-31 RX ADMIN — Medication 1 PATCH: at 08:00

## 2021-10-31 RX ADMIN — METHOCARBAMOL 500 MILLIGRAM(S): 500 TABLET, FILM COATED ORAL at 23:40

## 2021-10-31 RX ADMIN — Medication 1 MILLIGRAM(S): at 07:57

## 2021-10-31 NOTE — PROGRESS NOTE BEHAVIORAL HEALTH - NSBHFUPINTERVALHXFT_PSY_A_CORE
Pt seen and examined. Chart reviewed. Pt reports "I just want to smoke" stating that she wishes she could go outside for a cigarette and that the patch is not the same. She continues to report feeling depressed but denies any current SI/HI. States that she realizes that her family supports her more than she expected but still feels like she is a burden on them. States that she does not wish to be on zoloft as this has made her feel the "opposite of sad" in the past and would be willing to try a different SSRI. Discussed starting lexapro 5mg Qdaily. She is agreeable. She denies any alcohol withdrawal symptoms stating that she is not a heavy drinker.

## 2021-11-01 ENCOUNTER — APPOINTMENT (OUTPATIENT)
Dept: PSYCHIATRY | Facility: CLINIC | Age: 46
End: 2021-11-01

## 2021-11-01 LAB
BARBITURATES UR SCN-MCNC: NEGATIVE — SIGNIFICANT CHANGE UP
DRUG SCREEN 1, URINE RESULT: SIGNIFICANT CHANGE UP
METHADONE UR-MCNC: NEGATIVE — SIGNIFICANT CHANGE UP
PCP UR-MCNC: NEGATIVE — SIGNIFICANT CHANGE UP
PROPOXYPHENE QUALITATIVE URINE RESULT: NEGATIVE — SIGNIFICANT CHANGE UP
SARS-COV-2 RNA SPEC QL NAA+PROBE: SIGNIFICANT CHANGE UP
THC UR QL: NEGATIVE — SIGNIFICANT CHANGE UP

## 2021-11-01 PROCEDURE — 99451 NTRPROF PH1/NTRNET/EHR 5/>: CPT

## 2021-11-01 PROCEDURE — 99232 SBSQ HOSP IP/OBS MODERATE 35: CPT

## 2021-11-01 RX ORDER — BENZTROPINE MESYLATE 1 MG
1 TABLET ORAL EVERY 12 HOURS
Refills: 0 | Status: DISCONTINUED | OUTPATIENT
Start: 2021-11-01 | End: 2021-11-05

## 2021-11-01 RX ORDER — NICOTINE POLACRILEX 2 MG
2 GUM BUCCAL
Refills: 0 | Status: DISCONTINUED | OUTPATIENT
Start: 2021-11-01 | End: 2021-11-05

## 2021-11-01 RX ADMIN — METHOCARBAMOL 500 MILLIGRAM(S): 500 TABLET, FILM COATED ORAL at 20:04

## 2021-11-01 RX ADMIN — QUETIAPINE FUMARATE 50 MILLIGRAM(S): 200 TABLET, FILM COATED ORAL at 08:08

## 2021-11-01 RX ADMIN — Medication 1 PATCH: at 07:49

## 2021-11-01 RX ADMIN — Medication 1 MILLIGRAM(S): at 20:05

## 2021-11-01 RX ADMIN — Medication 1 PATCH: at 20:03

## 2021-11-01 RX ADMIN — Medication 1 MILLIGRAM(S): at 03:36

## 2021-11-01 RX ADMIN — Medication 1 MILLIGRAM(S): at 08:08

## 2021-11-01 RX ADMIN — Medication 650 MILLIGRAM(S): at 13:00

## 2021-11-01 RX ADMIN — Medication 650 MILLIGRAM(S): at 12:27

## 2021-11-01 RX ADMIN — Medication 1 MILLIGRAM(S): at 12:27

## 2021-11-01 RX ADMIN — ESCITALOPRAM OXALATE 5 MILLIGRAM(S): 10 TABLET, FILM COATED ORAL at 08:08

## 2021-11-01 RX ADMIN — QUETIAPINE FUMARATE 50 MILLIGRAM(S): 200 TABLET, FILM COATED ORAL at 20:04

## 2021-11-01 RX ADMIN — METHOCARBAMOL 500 MILLIGRAM(S): 500 TABLET, FILM COATED ORAL at 08:08

## 2021-11-01 RX ADMIN — Medication 1 PATCH: at 08:07

## 2021-11-01 NOTE — MEDICAL STUDENT PROGRESS NOTE(EDUCATION) - NS MD HP STUD ASPLAN ASSES FT
Elaine Vernon is a 45 year-old female admitted post suicidal attempt Summary: Elaine Vernon is a 45 year-old female admitted post suicidal attempt (DUI,Rx overdose) Summary: Elaine Vernon is a 45 year-old female admitted post suicidal attempt (DUI,Rx overdose) with progressively worsening symptoms of depression over the past few months with one prior ED visit for depression on 9/1/2021 was admitted to IPP for suicidal ideation on 10/30.     On evaluation, patient lacked insight regarding suicide attempt and endorsed feelings of apathy. At present patient is expressing desire to leave before her [birth year] and expresses a wish to get a cigarette. Denies hallucinations, si/hi. Due to recent suicide attempt, pt will be continued on ipp for further assessment and treatment.

## 2021-11-01 NOTE — MEDICAL STUDENT PROGRESS NOTE(EDUCATION) - NS MD HP STUD ASPLAN PLAN FT
Plan  - continue [id number] q daily.  - tylenol as needed for soreness and headache  - nicotine patches as needed
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
## 2021-11-02 LAB
A1C WITH ESTIMATED AVERAGE GLUCOSE RESULT: 5.4 % — SIGNIFICANT CHANGE UP (ref 4–5.6)
CHOLEST SERPL-MCNC: 171 MG/DL — SIGNIFICANT CHANGE UP
ESTIMATED AVERAGE GLUCOSE: 108 MG/DL — SIGNIFICANT CHANGE UP (ref 68–114)
HDLC SERPL-MCNC: 83 MG/DL — SIGNIFICANT CHANGE UP
LIPID PNL WITH DIRECT LDL SERPL: 79 MG/DL — SIGNIFICANT CHANGE UP
NON HDL CHOLESTEROL: 88 MG/DL — SIGNIFICANT CHANGE UP
TRIGL SERPL-MCNC: 69 MG/DL — SIGNIFICANT CHANGE UP
TSH SERPL-MCNC: 1.28 UIU/ML — SIGNIFICANT CHANGE UP (ref 0.27–4.2)

## 2021-11-02 PROCEDURE — 99231 SBSQ HOSP IP/OBS SF/LOW 25: CPT

## 2021-11-02 RX ORDER — ACETAMINOPHEN 500 MG
650 TABLET ORAL EVERY 6 HOURS
Refills: 0 | Status: DISCONTINUED | OUTPATIENT
Start: 2021-11-02 | End: 2021-11-05

## 2021-11-02 RX ORDER — HYDROXYZINE HCL 10 MG
50 TABLET ORAL EVERY 6 HOURS
Refills: 0 | Status: DISCONTINUED | OUTPATIENT
Start: 2021-11-02 | End: 2021-11-05

## 2021-11-02 RX ORDER — ESCITALOPRAM OXALATE 10 MG/1
10 TABLET, FILM COATED ORAL DAILY
Refills: 0 | Status: DISCONTINUED | OUTPATIENT
Start: 2021-11-02 | End: 2021-11-05

## 2021-11-02 RX ADMIN — Medication 1 MILLIGRAM(S): at 20:03

## 2021-11-02 RX ADMIN — Medication 2 MILLIGRAM(S): at 12:45

## 2021-11-02 RX ADMIN — Medication 25 MILLIGRAM(S): at 03:18

## 2021-11-02 RX ADMIN — Medication 1 PATCH: at 06:27

## 2021-11-02 RX ADMIN — Medication 50 MILLIGRAM(S): at 14:22

## 2021-11-02 RX ADMIN — ESCITALOPRAM OXALATE 5 MILLIGRAM(S): 10 TABLET, FILM COATED ORAL at 08:06

## 2021-11-02 RX ADMIN — Medication 1 MILLIGRAM(S): at 08:06

## 2021-11-02 RX ADMIN — Medication 650 MILLIGRAM(S): at 13:56

## 2021-11-02 RX ADMIN — QUETIAPINE FUMARATE 50 MILLIGRAM(S): 200 TABLET, FILM COATED ORAL at 08:06

## 2021-11-02 NOTE — PROGRESS NOTE BEHAVIORAL HEALTH - NSBHFUPINTERVALHXFT_PSY_A_CORE
Chart reviewed, patient seen and evaluated at bedside. No acute overnight events reported.     Encounter found patient to be sitting comfortably in bed, no acute distress. Patient reporting this morning that she is okay, reporting that last night she woke up at 3 am, took atarax but was unable to successfully go back to sleep. She states her mind was racing at the time about everything that has been going on but indicated it was not significantly distressful. Patient reported she feels minimally change since admission, reporting that she has been speaking to family and reports she is currently upset with them. Patient reported that her friends and family are blaming alcohol for her situation but patient reported she does not have an alcohol use problem as "I went months without drinking". Patient otherwise reports no acute distress, denied current SI/ HI, denied auditory or visual hallucinations.

## 2021-11-02 NOTE — MEDICAL STUDENT PROGRESS NOTE(EDUCATION) - SUBJECTIVE AND OBJECTIVE BOX
Elaine is 45 year old female on hospital day 3 currently feeling "tired" due to not having slept well overnight. On encounter, appeared sitting up comfortably in bed, not in any acute distress. Pt states last night her mind was racing and that she could not fall asleep and that the medication (atarax) was not helping her, although it did help the night before.     Patient states she spoke with her friend and her sister, both of whom she is annoyed due to their perception of her alcohol use. Pt states she does not "have an alcohol problem" and repeated that she goes months without drinking but admits that "when [she] drinks, [she] drinks" a lot, and that she may have been "self-medicating [her] feelings with alcohol." Despite their concern she does not believe she has been abusing alcohol.     Pt was recently started on lexapro 5mg, denies abdominal discomfort, changes in bowel movement.    Elaine is 45 year old female on hospital day 3 currently feeling "tired" due to not having slept well overnight. On encounter, appeared sitting up comfortably in bed, not in any acute distress. Pt states last night her mind was racing and that she could not fall asleep and that the medication (atarax) was not helping her, although it did help the night before.     Patient states she spoke with her friend and her sister, both of whom she is annoyed due to their perception of her alcohol use. Pt states she does not "have an alcohol problem" and repeated that she goes months without drinking but admits that "when [she] drinks, [she] drinks" a lot, and that she may have been "using alcohol as a band-aid." Despite their concern she does not believe she has been abusing alcohol. She also stated that a different male friend of hers had called asking about her which made her feel "loved" and "cared for" and that made her happy. She again expressed concern about still being admitted to [facility] on [facility] next [facility] and inquired about signing herself out. She also expressed a strong desire for a cigarette.     Pt was recently started on lexapro 5mg, denies abdominal discomfort, changes in bowel movement.    Elaine is 45 year old female on hospital day 3 currently feeling "tired" due to not having slept well overnight. On encounter, appeared sitting up comfortably in bed, not in any acute distress. Pt states last night her mind was racing and that she could not fall asleep and that the medication (atarax) was not helping her, although it did help the night before.     Patient states she spoke with her friend and her sister, both of whom she is annoyed due to their perception of her alcohol use. Pt states she does not "have an alcohol problem" and repeated that she goes months without drinking but admits that "when [she] drinks, [she] drinks" a lot, and that she may have been "using alcohol as a band-aid." Despite their concern she does not believe she has been abusing alcohol. She also stated that a different male friend of hers had called asking about her which made her feel "loved" and "cared for" and that made her happy. She again expressed concern about still being admitted to [facility] on her [facility] next Tuesday and inquired about signing herself out. She also expressed a strong desire for a cigarette.     In the interim, pt has been attending group sessions, one of which was regarding alcohol abuse. She stated she "got nothing out of" it as it "does not pertain to" her. She also expressed discontent regarding treatment here stating her stay here is "unhelpful." However, she expressed that she looks forward to seeing her dog again who is currently under the care of her niece, and is like a son to her.     Pt was recently started on lexapro 5mg, denies abdominal discomfort, changes in bowel movement.    Elaien is 45 year old female on hospital day 3 currently feeling "tired" due to not having slept well overnight. On encounter, appeared sitting up comfortably in bed, not in any acute distress. Pt states last night her mind was racing and that she could not fall asleep and that the medication (atarax) was not helping her, although it did help the night before.     Patient states she spoke with her friend and her sister, both of whom she is annoyed due to their perception of her alcohol use. Pt states she does not "have an alcohol problem" and repeated that she goes months without drinking but admits that "when [she] drinks, [she] drinks" a lot, and that she may have been "using alcohol as a band-aid." Despite their concern she does not believe she has been abusing alcohol. She also stated that a different male friend of hers had called asking about her which made her feel "loved" and "cared for" and that made her happy. In the interim, pt has been attending group sessions, one of which was regarding alcohol abuse. She stated she "got nothing out of" it as it "does not pertain to" her. She also expressed discontent regarding treatment here stating her stay here is "unhelpful." However, she expressed that she looks forward to seeing her dog again who is currently under the care of her niece, and is like a son to her.     She again expressed concern about still being admitted to [facility] on her birthday next Tuesday and inquired about signing herself out. She also expressed a strong desire for a cigarette.     Pt was recently started on lexapro 5mg, denies abdominal discomfort, changes in bowel movement.    Elaine is 45 year old female on hospital day 5 currently feeling "tired" due to not having slept well overnight. On encounter, appeared sitting up comfortably in bed, not in any acute distress. Pt states last night her mind was racing and that she could not fall asleep and that the medication (atarax) was not helping her, although it did help the night before.     Patient states she spoke with her friend and her sister, both of whom she is annoyed due to their perception of her alcohol use. Pt states she does not "have an alcohol problem" and repeated that she goes months without drinking but admits that "when [she] drinks, [she] drinks" a lot, and that she may have been "using alcohol as a band-aid." Despite their concern she does not believe she has been abusing alcohol. She also stated that a different male friend of hers had called asking about her which made her feel "loved" and "cared for" and that made her happy. In the interim, pt has been attending group sessions, one of which was regarding alcohol abuse. She stated she "got nothing out of" it as it "does not pertain to" her. She also expressed discontent regarding treatment here stating her stay here is "unhelpful." However, she expressed that she looks forward to seeing her dog again who is currently under the care of her niece, and is like a son to her.     She again expressed concern about still being admitted to [facility] on her [facility] next [facility] and inquired about signing herself out. She also expressed a strong desire for a cigarette.     Pt was recently started on lexapro 5mg, denies abdominal discomfort, changes in bowel movement.

## 2021-11-02 NOTE — MEDICAL STUDENT PROGRESS NOTE(EDUCATION) - NS MD HP STUD ASPLAN ASSES FT
Summary: Elaine Vernon is a 45 year-old female HD3 with worsening symptons of depression admitted for suicidal attempt (DUI, drug overdose) to [facility]. Family members express continued concern regarding potential alcohol abuse, worsening depression.    Pt has continued poor insight regarding suicidal attempt. Summary: Elaine Vernon is a 45 year-old female HD3 with worsening symptoms of depression admitted for suicidal attempt (DUI, drug overdose) to [facility]. Family members express continued concern regarding potential alcohol abuse, worsening depression.    Pt has continued poor insight regarding suicidal attempt stating that she did not understand why she was being kept here but stated that she now understands why she was admitted.

## 2021-11-02 NOTE — PROGRESS NOTE BEHAVIORAL HEALTH - NSBHCHARTREVIEWLAB_PSY_A_CORE FT
Lipid Profile in AM (11.02.21 @ 07:11)    Cholesterol, Serum: 171 mg/dL    Triglycerides, Serum: 69 mg/dL    HDL Cholesterol, Serum: 83 mg/dL    Non HDL Cholesterol: 88: Patients Atherosclerotic Cardiovascular Disease (ASCVD) Risk  Optimal Level (mg/dL)  LDL Cholesterol (LDL-C)  All Patients                                < 100  ASCVD at Very High Risk{1}    < 70  Non-HDL Cholesterol (Non-HDL-C)  All Patients                       < 130  ASCVD at Very High Risk{1}   < 100  Non-HDL-Cholesterol (Non-HDL-C) is also a key target for cardiovascular  risk reduction.  Consider Familial Hypercholesterolemia when: LDL-C > 190 mg/dL or  Non-HDL-C > 220 mg/dL.  LDL-C calculation using the Friedewald equation is not provided when  triglycerides > 400 mg/dL, in which case we recommend repeating the test  after fasting, if it was not done before.  When triglycerides >150 mg/dL, calculated LDL-C is provided but maystill  be inaccurate (particularly when LDL-C < 70 mg/dL). It can be  recalculated off-line using other equations (e.g. Thaddeus SS, Brianda MJ,  Zoltan GREEN, et al.DAYDAY 2013;310:2061 - 8).  {1} Stephane Clayton.,et al. "2019 AHA/ACC. . . guideline on the  management of blood cholesterol: a report of the American College of  Cardiology/American Heart Association Task Force on Clinical Practice  Guidelines." Circulation;139:e1082 - e1143.  These values apply only to persons 20 years and older.  Lipid Panel updated with new test, reference ranges and interpretive  comments effective 10-. mg/dL    LDL Cholesterol Calculated: 79 mg/dL

## 2021-11-03 PROCEDURE — 99231 SBSQ HOSP IP/OBS SF/LOW 25: CPT

## 2021-11-03 RX ADMIN — Medication 650 MILLIGRAM(S): at 01:49

## 2021-11-03 RX ADMIN — Medication 650 MILLIGRAM(S): at 10:33

## 2021-11-03 RX ADMIN — Medication 50 MILLIGRAM(S): at 20:59

## 2021-11-03 RX ADMIN — Medication 650 MILLIGRAM(S): at 11:06

## 2021-11-03 RX ADMIN — ESCITALOPRAM OXALATE 10 MILLIGRAM(S): 10 TABLET, FILM COATED ORAL at 08:09

## 2021-11-03 RX ADMIN — Medication 50 MILLIGRAM(S): at 01:49

## 2021-11-03 RX ADMIN — Medication 1 MILLIGRAM(S): at 08:09

## 2021-11-03 RX ADMIN — Medication 1 MILLIGRAM(S): at 20:05

## 2021-11-03 NOTE — PROGRESS NOTE BEHAVIORAL HEALTH - NSBHFUPINTERVALHXFT_PSY_A_CORE
Chart reviewed, patient seen and evaluated at bedside. No acute overnight events reported.     Encounter found patient to be clam, cooperative, no acute distress, more pleasant to speak to. Patient reporting that she got all her crying out yesterday and now feels an improvement in mood. She reports that she was able to sleep well last night too, which she reports also contribute to improved presentation. Patient reported that in her current state she needs to feel supported and not judged, therefore, she reports that at the present time she will try to build some distance between her and her sister as "my sister thinks that all her opinions are facts when that is not the case". Patient reports she loves her sister but at this present time, patient reports that the sister does not understand the whole story. Patient reported she wants to do things right this time (get the treatment she needs) and apologized "for yesterday". At this time patient denied si/hi, denied auditory or visual hallucinations. Chart reviewed, patient seen and evaluated at bedside. No acute overnight events reported.     Encounter found patient to be clam, cooperative, no acute distress, more pleasant to speak to. Patient reporting that she got all her crying out yesterday and now feels an improvement in mood. She reports that she was able to sleep well last night too, which she reports also contribute to improved presentation. Patient reported that in her current state she needs to feel supported and not judged, therefore, she reports that at the present time she will try to build some distance between her and her sister as "my sister thinks that all her opinions are facts when that is not the case". Patient reports she loves her sister but at this present time, patient reports that the sister does not understand the whole story. Patient reported she wants to do things right this time (get the treatment she needs) and apologized "for yesterday" when she feels she was rude to staff.  . At this time patient denied si/hi, denied auditory or visual hallucinations.

## 2021-11-03 NOTE — MEDICAL STUDENT PROGRESS NOTE(EDUCATION) - NS MD HP STUD ASPLAN PLAN FT
c/q lexapro 10mg  c/w atarax 50pm po q6 prn anxiety/insomnia  continued admission to [facility] for further risk assessment, safety, and stabilization

## 2021-11-03 NOTE — MEDICAL STUDENT PROGRESS NOTE(EDUCATION) - SUBJECTIVE AND OBJECTIVE BOX
Elaine Vernon is a 45 year old female on hospital day 6 admitted after suicide attempt currently "feeling better today" and reports that she slept much better last night. On encounter she appeared comfortably sitting up in bed, not in any acute distress. Her chief concern today is her sister, stating she is a source of stress for her at this time and "would like her removed from the contact list." The pt stated that her sister, despite being her "ride or die" currently made the patient feel frustrated in that her sister is "tough" on her.    Otherwise she stated she would like to "do this right" and do what she can to get out of here and to not have to come back.   Elaine Vernon is a 45 year old female on hospital day 6 admitted after suicide attempt currently "feeling much better today," that she "got all the crying out yesterday" and reports that she slept much better last night. On encounter she appeared comfortably sitting up in bed, not in any acute distress. Her chief concern today is her sister, stating she is a source of stress for her at this time and "would like her removed from the contact list." The pt stated that her sister, despite being her "ride or die" currently made the patient feel frustrated in that her sister is "tough" on her. Later in the day when asked if she would like visitors, she requested that her sister come and explained that despite her earlier request she would still like to see her sister and this "back and forth bickering" is normal for them.     Patient displayed significant improvement with insight regarding suicidal attempt and depressive symptoms stating that she has felt like this before and has been able to get herself out "out of this mindset" by engaging in arts and crafts, decorations, and making gifts for family members. However a few months ago, she stated she began to feel feelings of hopelessness and worthlessness, coupled with several significant life stressors including the loss of her uncle, her mother, the anniversary of her father's death, and her brother's cancer diagnosis. 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 She understood that she had limited choice in the matter but still explained that she felt very guilty about this.     Otherwise she stated she would like to "do this right" and do what she can to get out of here and to not have to come back. She stated today that she looks forward to getting her nails done when she gets discharged and looks forward to having a coffee the morning of her birthday with her dog at her parents grave site since they liked coffee a lot. 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

## 2021-11-03 NOTE — MEDICAL STUDENT PROGRESS NOTE(EDUCATION) - NS MD HP STUD ASPLAN ASSES FT
Elaine Vernon is a 45 year old female admitted to IPP post suicidal attempt(s) (DUI, drug overdose), initially apathetic and in denial with worsening alcohol abuse. She was admitted due to family member expressed concern regarding repeated suicidal behaviors. Pt has a past history of alcohol abuse, depression, and benzo abuse, worsened recently in the context of loss of several close family members.     On evaluation today, pt reported significant improvement of mood, sleep and displayed improvement in insight, is looking forward to several aspects of her life on discharge. Pt is also more willing to work with IPP team.

## 2021-11-04 PROBLEM — F31.9 BIPOLAR DISORDER, UNSPECIFIED: Chronic | Status: ACTIVE | Noted: 2021-10-30

## 2021-11-04 LAB — SARS-COV-2 RNA SPEC QL NAA+PROBE: SIGNIFICANT CHANGE UP

## 2021-11-04 PROCEDURE — 99231 SBSQ HOSP IP/OBS SF/LOW 25: CPT

## 2021-11-04 RX ORDER — SERTRALINE 25 MG/1
0 TABLET, FILM COATED ORAL
Qty: 0 | Refills: 0 | DISCHARGE

## 2021-11-04 RX ORDER — CLONAZEPAM 1 MG
1 TABLET ORAL
Refills: 0 | Status: DISCONTINUED | OUTPATIENT
Start: 2021-11-04 | End: 2021-11-05

## 2021-11-04 RX ADMIN — Medication 650 MILLIGRAM(S): at 09:40

## 2021-11-04 RX ADMIN — Medication 650 MILLIGRAM(S): at 22:00

## 2021-11-04 RX ADMIN — Medication 50 MILLIGRAM(S): at 21:00

## 2021-11-04 RX ADMIN — Medication 1 MILLIGRAM(S): at 08:05

## 2021-11-04 RX ADMIN — Medication 650 MILLIGRAM(S): at 21:00

## 2021-11-04 RX ADMIN — ESCITALOPRAM OXALATE 10 MILLIGRAM(S): 10 TABLET, FILM COATED ORAL at 08:05

## 2021-11-04 RX ADMIN — Medication 1 MILLIGRAM(S): at 20:16

## 2021-11-04 RX ADMIN — Medication 50 MILLIGRAM(S): at 03:29

## 2021-11-04 NOTE — DISCHARGE NOTE BEHAVIORAL HEALTH - NSBHDCAGENCY2FT_PSY_A_CORE
[facility] Outpatient Mental Health Department [facility]

## 2021-11-04 NOTE — PROGRESS NOTE BEHAVIORAL HEALTH - NSBHADMITDANGERSELF_PSY_A_CORE
suicidal behavior
unable to care for self
unable to care for self
suicidal behavior
unable to care for self
unable to care for self

## 2021-11-04 NOTE — PROGRESS NOTE BEHAVIORAL HEALTH - ABNORMAL MOVEMENTS
No abnormal movements/Other
No abnormal movements
No abnormal movements/Other

## 2021-11-04 NOTE — MEDICAL STUDENT PROGRESS NOTE(EDUCATION) - NS MD HP STUD ASPLAN PLAN FT
pt showing strong social support, help seeking and future oriented behaviors, and demonstrating significant improvement and is scheduled for discharged tomorrow 11/5/21.    continue lexapro 10mg qdaily  continue with klonopin 1mg po daily  prn atarax pt showing strong social support, help seeking and future oriented behaviors, and demonstrating significant improvement and is scheduled for discharged tomorrow 11/5/21.    continue lexapro 10mg qdaily  continue with klonopin 1mg po daily  c/w atarax 50mg po q6 prn anxiety/insomnia

## 2021-11-04 NOTE — DISCHARGE NOTE BEHAVIORAL HEALTH - MEDICATION SUMMARY - MEDICATIONS TO STOP TAKING
I will STOP taking the medications listed below when I get home from the hospital:    Zoloft    ibuprofen 800 mg oral tablet  -- 1 tab(s) by mouth every 8 hours, As Needed   -- Do not take this drug if you are pregnant.  It is very important that you take or use this exactly as directed.  Do not skip doses or discontinue unless directed by your doctor.  May cause drowsiness or dizziness.  Obtain medical advice before taking any non-prescription drugs as some may affect the action of this medication.  Take with food or milk.    Wellbutrin  mg/24 hours oral tablet, extended release  -- 1 tab(s) by mouth every 24 hours    Ativan 0.5 mg oral tablet  -- 1 tab(s) by mouth 2 times a day, As Needed for anxiety

## 2021-11-04 NOTE — PROGRESS NOTE BEHAVIORAL HEALTH - AXIS III
buises from [facility]

## 2021-11-04 NOTE — MEDICAL STUDENT PROGRESS NOTE(EDUCATION) - NS MD HP STUD ASPLAN ASSES FT
Elaine Vernon is a 45 year old female on hospital day 6 admitted to IPP after suicidal attemp(s) (DUI, drug overdose) initially apathetic with worsening depressive symptoms, episodes of alcohol abuse, and history of benzo abuse, admitted after family and friends expressed concern regarding repeated suicidal behaviors.     On evaluation today, pt reported significant improvement of mood and symptoms, displayed improvement in insight, and is looking forward to several aspects of her life on discharge. Elaine Vernon is a 45 year old female on hospital day 6 admitted to IPP after suicidal attemp(s) (DUI, drug overdose) initially apathetic with worsening depressive symptoms, episodes of alcohol abuse, and history of benzo abuse, admitted after family and friends expressed concern regarding repeated suicidal behaviors.     On evaluation today, pt reported significant improvement of mood and symptoms, displayed improvement in insight, and is looking forward to several aspects of her life on discharge/is future oriented

## 2021-11-04 NOTE — PROGRESS NOTE BEHAVIORAL HEALTH - LANGUAGE
No abnormalities noted
No abnormalities noted
Impaired naming
No abnormalities noted
No abnormalities noted
Impaired naming

## 2021-11-04 NOTE — PROGRESS NOTE BEHAVIORAL HEALTH - SUMMARY
Elaine Vernon is a 45-year-old, female, with worsening symptoms of Depression, likely suicidal attempts (DUI and drug overdose) though pt in denial, worsening alcohol abuse. She was admitted to IPP on 9.39 status after her family members expressed concerned that if discharged from ED she would try to go home and repeating her suicidal/dangerous behaviors. She has a history of benzo abuse and current worsening depression and alcohol abuse since COVID-19 pandemic in the context of the loss of family members.    On evaluation today, pt reporting improvement in mood and improvement in sleep with questionable improvement in insight. Patient currently attempting to create some distance between herself and one of major support characters (sister). However, patient more willing to work with team. Given limited insight, recent SA, attempts at limiting support and impulsive behaviors, patient warrants continued ipp for safety and stabilization.     Plan:   - c/w Lexapro 10mg Qdaily   - c/w Klonopin 1mg PO BID.   - c/w Atarax 50mg po q6 prn anxiety/ insomnia   - for acute agitation not amenable to de-escalation or verbal redirection, Haldol 2mg po q6 prn with escalation to IM if patient an immanent danger to self or others.
Elaine Vernon is a 45-year-old, female, with worsening symptoms of Depression, likely suicidal attempts (DUI and drug overdose) though pt in denial, worsening alcohol abuse. She was admitted to [facility] on 9.39 status after her family members expressed concerned that if discharged from ED she would try to go home and repeating her suicidal/dangerous behaviors. She has a history of benzo abuse and current worsening depression and alcohol abuse since COVID-19 pandemic in the context of the loss of family members.    On evaluation today, pt continues to endorse feeling depressed as well as excessive guilt of being a burden on her family. Denies current SI or HI.     Plan:   ***Continue Quetiapine 50mg PO BID for mood stabilization   ***Discontinue Sertraline 50mg PO Qdaily - was taking bupropion at home (seizure risk?)- Pt states she did not like the way zoloft has made her feel in the past but does not elaborate. She is willing to start lexapro 5mg Qdaily. risks vs benefits explained.   ***Continue Klonopin 1mg PO BID
Elaine Vernon is a 45-year-old, female, with worsening symptoms of Depression, likely suicidal attempts (DUI and drug overdose) though pt in denial, worsening alcohol abuse. She was admitted to IPP on 9.39 status after her family members expressed concerned that if discharged from ED she would try to go home and repeating her suicidal/dangerous behaviors. She has a history of benzo abuse and current worsening depression and alcohol abuse since COVID-19 pandemic in the context of the loss of family members.    On evaluation today, pt reporting improvement in mood, indicating improved insight, future oriented and able to safety plan and safety contract. Patient requested to see sister in person now that visiting hours have opened, indicating help seeking and social support. Given recent SA, impulsive behaviors, patient warrants continued ipp for safety and stabilization, however, patient demonstrating significant improvement and is scheduled for discharge tomorrow 11/5/21.     Plan:   - c/w Lexapro 10mg Qdaily   - c/w Klonopin 1mg PO BID.   - c/w Atarax 50mg po q6 prn anxiety/ insomnia   - for acute agitation not amenable to de-escalation or verbal redirection, Haldol 2mg po q6 prn with escalation to IM if patient an immanent danger to self or others.
Elaine Vernon is a 45-year-old, female, with worsening symptoms of Depression, likely suicidal attempts (DUI and drug overdose) though pt in denial, worsening alcohol abuse. She was admitted to IPP on 9.39 status after her family members expressed concerned that if discharged from ED she would try to go home and repeating her suicidal/dangerous behaviors. She has a history of benzo abuse and current worsening depression and alcohol abuse since COVID-19 pandemic in the context of the loss of family members.    On evaluation today, pt continues to be with limited to poor insight as to other contributing factors that may have lead to current presentation. Patient reporting no significant improvement since stay and has not yet demonstrated improvement in function. Given limited insight, recent SA and impulsive behaviors, patient warrants continued ipp for safety and stabilization.     Plan:   - d/c Quetiapine due to limited use at this time  - increase Lexapro to Lexapro 10mg Qdaily   - Continue Klonopin 1mg PO BID.   - c/w Atarax 50mg po q6 prn anxiety/ insomnia   - for acute agitation not amenable to de-escalation or verbal redirection, Haldol 2mg po q6 prn with escalation to IM if patient an immanent danger to self or others.
Elaine Vernon is a 45-year-old, female, with worsening symptoms of Depression, likely suicidal attempts (DUI and drug overdose) though pt in denial, worsening alcohol abuse. She was admitted to IPP on 9.39 status after her family members expressed concerned that if discharged from ED she would try to go home and repeating her suicidal/dangerous behaviors. She has a history of benzo abuse and current worsening depression and alcohol abuse since COVID-19 pandemic in the context of the loss of family members.    On evaluation today, pt minimizing situation that led to presentation and with poor insight, reporting that she was not suicidal and simply "didn't care" if she woke up or not. Currently patient is requesting expedition of her stay and denied si/hi, denied auditory or visual hallucinations. Given her acute SA, persistent stressors and impulsivity, patient warrants continued ipp for safety and stabilization.     Plan:   - c/w Quetiapine 50mg PO BID for mood stabilization   - c/w lexapro 5mg Qdaily   - Continue Klonopin 1mg PO BID.   - for acute agitation not amenable to de-escalation or verbal redirection, Haldol 2mg po q6 prn along with ativan 1mg po q6 prn with escalation to IM if patient an immanent danger to self or others.
Elaine Vernon is a 45-year-old, female, with worsening symptoms of Depression, likely suicidal attempts (DUI and drug overdose) though pt in denial, worsening alcohol abuse. She was admitted to [facility] on 9.39 status after her family members expressed concerned that if discharged from ED she would try to go home and repeating her suicidal/dangerous behaviors. She has a history of benzo abuse and current worsening depression and alcohol abuse since COVID-19 pandemic in the context of the loss of family members.      On evaluation today she appeared irritable, though more linear and organized than noted by admitting psychiatrist. She does not express or have objective current signs of alcohol/benzodiazapine withdrawal, with a current CIWA of 2.     Plan:   ***Continue Quetiapine 50mg PO BID for mood stabilization   ***Continue Sertraline 50mg PO Qdaily - was taking bupropion at home (seizure risk?)   ***Continue Klonopin 1mg PO BID

## 2021-11-04 NOTE — DISCHARGE NOTE BEHAVIORAL HEALTH - MEDICATION SUMMARY - MEDICATIONS TO TAKE
I will START or STAY ON the medications listed below when I get home from the hospital:    clonazePAM 1 mg oral tablet  -- 1 tab(s) by mouth 2 times a day x 7 days MDD:Max daily dose until told otherwise by MD  -- Indication: For Anxiety    escitalopram 10 mg oral tablet  -- 1 tab(s) by mouth once a day, until stopped by MD  -- Indication: For MDD (major depressive disorder)    hydrOXYzine hydrochloride 50 mg oral tablet  -- 1 tab(s) by mouth 1 to 2 times a day x 14 days, As Needed  for anxiety or insomnia   -- Indication: For Anxiety    nicotine 21 mg/24 hr transdermal film, extended release  -- 1 film(s) by transdermal patch once a day  -- Indication: For nicotine cessation

## 2021-11-04 NOTE — PROGRESS NOTE BEHAVIORAL HEALTH - CASE SUMMARY
Saw pt with the resident. As per nurses, pt continues to present depressed, with excessive apologizing to staff. Pt presents resting in bed, engaged in interview, asking about discharge and c/o nicotine craving. She still reports depressed mood and "feeling like a burden" to her family. She denies active SI/Hi but admits that she "did not care if she is dead or alive" PTA. Agree with resident's assessment and plan. Switch from Zoloft to Lexapro as per pt's preference.
as noted
as noted
No s/h ideation at this time.  As per chart pt has tended to minimize alcohol and this event leading to admission.  Will monitor
as noted

## 2021-11-04 NOTE — PROGRESS NOTE BEHAVIORAL HEALTH - PRN MEDS
ativan and atarax for anxiety
atarax 25mg po x1 @ 3am insomnia
atarax for sleep
atarax intermittently for anxiety

## 2021-11-04 NOTE — PROGRESS NOTE BEHAVIORAL HEALTH - NSBHCHARTREVIEWVS_PSY_A_CORE FT
Vital Signs Last 24 Hrs  T(C): 36.1 (01 Nov 2021 10:19), Max: 36.3 (01 Nov 2021 06:02)  T(F): 96.9 (01 Nov 2021 10:19), Max: 97.3 (01 Nov 2021 06:02)  HR: 72 (01 Nov 2021 10:19) (61 - 75)  BP: 118/81 (01 Nov 2021 10:19) (104/78 - 118/81)  BP(mean): --  RR: 18 (01 Nov 2021 10:19) (16 - 18)  SpO2: --
Vital Signs Last 24 Hrs  T(C): 35.4 (30 Oct 2021 02:52), Max: 37.1 (29 Oct 2021 16:21)  T(F): 95.8 (30 Oct 2021 02:52), Max: 98.7 (29 Oct 2021 16:21)  HR: 93 (30 Oct 2021 02:52) (77 - 97)  BP: 116/76 (30 Oct 2021 02:52) (100/66 - 122/68)  BP(mean): --  RR: 18 (30 Oct 2021 02:52) (18 - 18)  SpO2: 98% (29 Oct 2021 22:16) (98% - 98%)
ICU Vital Signs Last 24 Hrs  T(C): 36 (31 Oct 2021 09:48), Max: 36.2 (30 Oct 2021 16:00)  T(F): 96.8 (31 Oct 2021 09:48), Max: 97.1 (30 Oct 2021 16:00)  HR: 88 (31 Oct 2021 09:48) (78 - 93)  BP: 120/78 (31 Oct 2021 09:48) (97/67 - 120/78)  BP(mean): --  ABP: --  ABP(mean): --  RR: 18 (31 Oct 2021 09:48) (18 - 18)  SpO2: --
Vital Signs Last 24 Hrs  T(C): 35.7 (02 Nov 2021 06:11), Max: 35.7 (02 Nov 2021 06:11)  T(F): 96.2 (02 Nov 2021 06:11), Max: 96.2 (02 Nov 2021 06:11)  HR: 82 (02 Nov 2021 06:11) (59 - 82)  BP: 99/67 (02 Nov 2021 06:11) (99/67 - 104/72)  BP(mean): --  RR: 18 (02 Nov 2021 06:11) (18 - 18)  SpO2: --
Vital Signs Last 24 Hrs  T(C): 36.1 (03 Nov 2021 08:53), Max: 36.2 (02 Nov 2021 15:03)  T(F): 96.9 (03 Nov 2021 08:53), Max: 97.2 (02 Nov 2021 15:03)  HR: 97 (03 Nov 2021 08:53) (65 - 97)  BP: 116/78 (03 Nov 2021 08:53) (99/70 - 116/78)  BP(mean): --  RR: 16 (03 Nov 2021 08:53) (16 - 18)  SpO2: --
Vital Signs Last 24 Hrs  T(C): 35.7 (04 Nov 2021 07:26), Max: 35.7 (04 Nov 2021 05:53)  T(F): 96.3 (04 Nov 2021 07:26), Max: 96.3 (04 Nov 2021 05:53)  HR: 64 (04 Nov 2021 07:26) (54 - 64)  BP: 99/58 (04 Nov 2021 07:26) (92/57 - 107/58)  BP(mean): --  RR: 16 (04 Nov 2021 07:26) (16 - 18)  SpO2: --

## 2021-11-04 NOTE — PROGRESS NOTE BEHAVIORAL HEALTH - NSBHPTASSESSDT_PSY_A_CORE
04-Nov-2021 10:02
02-Nov-2021 10:17
30-Oct-2021 12:01
31-Oct-2021 11:31
03-Nov-2021 09:17
01-Nov-2021 13:03

## 2021-11-04 NOTE — DISCHARGE NOTE BEHAVIORAL HEALTH - NSBHDCMEDSFT_PSY_A_CORE
on admission, patient was started on sertraline for depression, Klonopin for anxiety and quetiapine for mood stabilization. Patient requested to change sertraline and medication was switched to lexapro. Given unclear benefits of quetiapine, quetiapine stopped. Lexapro was titrated to 10mg daily and klonopin remained at 1mg po BID. With this regiment patient reported improvement in mood, improvement in sleep and alleviation of passive suicidal ideations as well as significant improvement in anxiety.

## 2021-11-04 NOTE — DISCHARGE NOTE BEHAVIORAL HEALTH - MEDICATION SUMMARY - MEDICATIONS TO CHANGE
I will SWITCH the dose or number of times a day I take the medications listed below when I get home from the hospital:    hydrOXYzine hydrochloride 25 mg oral tablet  -- 1 tab(s) by mouth once a day (at bedtime), As Needed for anxiety or insomnia

## 2021-11-04 NOTE — PROGRESS NOTE BEHAVIORAL HEALTH - SECONDARY DX1
Alcohol dependence
Alcohol dependence
Uncomplicated alcohol dependence
Alcohol dependence
Alcohol dependence
Uncomplicated alcohol dependence

## 2021-11-04 NOTE — DISCHARGE NOTE BEHAVIORAL HEALTH - NSBHDCSWCOMMENTSFT_PSY_A_CORE
Discharge summary faxed to [facility] [place] [phone] on Discharge summary faxed to [facility] [place] [phone] on 11/5 at 12pm

## 2021-11-04 NOTE — BH SAFETY PLAN - LOCAL URGENT CARE ADDRESS
[facility] [[email or web address]] Comprehensive Psych[facility] Emergency Program (C[facility]P)

## 2021-11-04 NOTE — DISCHARGE NOTE BEHAVIORAL HEALTH - NSBHDCSUICFCTRMIT_PSY_A_CORE
patient previously without psychiatric care but now has started care. Patient agreeable to continue care and to continue utalizing external support.

## 2021-11-04 NOTE — PROGRESS NOTE BEHAVIORAL HEALTH - RISK ASSESSMENT
hx substance use, acute stressors, now with hx suicide attempt leave patient at high risk
hx substance use, acute stressors, now with hx suicide attempt leave patient at high risk.
Current risk factors of unsupervised living environment, withdrawn behaviors and suicidal behaviors with suicidal note on admission, current irritability, depressive symptoms warrant continued IPP treatment on 9.39 status.
hx of SA put patient at elevated chronic risk, however, strong family support, help seeking, medication compliance and future orientation put patient at low acute risk.
Current risk factors of unsupervised living environment, withdrawn behaviors and suicidal behaviors with suicidal note on admission, current irritability, depressive symptoms warrant continued IPP treatment on 9.39 status.
hx substance use, acute stressors, now with hx suicide attempt leave patient at high risk.

## 2021-11-04 NOTE — DISCHARGE NOTE BEHAVIORAL HEALTH - NSTOBACCOREFERRAL_GEN_A_NCS
Yes Patient registered and referred to the [place] QU[facility] program. Phone appointment scheduled for 11/8/21 at 0900./Yes

## 2021-11-04 NOTE — PROGRESS NOTE BEHAVIORAL HEALTH - NSBHFUPINTERVALHXFT_PSY_A_CORE
Chart reviewed, patient seen and evaluated at bedside. No acute overnight events reported.    encounter found patient to be sitting comfortably in bed, no acute distress. Patient reported that she is well and reports she is hoping to go home soon. Patient reported that she is looking forward to seeing her puppy once more and going back to her crafts and projects. Patient asked about Chart reviewed, patient seen and evaluated at bedside. No acute overnight events reported.    encounter found patient to be sitting comfortably in bed, no acute distress. Patient reported that she is well and reports she is hoping to go home soon. Patient reported that she is looking forward to seeing her puppy once more and going back to her crafts and projects. Patient expressed than since her admission she had finally received some psychiatric care (which she reports she had been attempting to find for a number of months with no success) but patient reports she had also re-established and solidified some of her familial ties after isolating herself for a number of months. She reports that on discharge she will keep in close contact with family but she reports that she knows her family and friends will also look out for her constantly and support her to continue to do well. Patient reported that she emotionally better, reports intermittent sadness but denied suicidal ideations. Patient denied homicidal ideations, denied auditory or visual hallucinations.

## 2021-11-04 NOTE — DISCHARGE NOTE BEHAVIORAL HEALTH - PAST PSYCHIATRIC HISTORY
no formal psych hx prior to this year but patient reported dealing with years of depression. Patient recently started outpatient psychiatry and therapy
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
## 2021-11-04 NOTE — DISCHARGE NOTE BEHAVIORAL HEALTH - NSBHDCSUBSTHXFT_PSY_A_CORE
unclear, patient reported previous alcohol use and numerous months without alcohol use until her recent DUI.

## 2021-11-04 NOTE — MEDICAL STUDENT PROGRESS NOTE(EDUCATION) - SUBJECTIVE AND OBJECTIVE BOX
Elaine Vernon is a 45 year old female on hospital day 7 admitted after a suicide attempt currently feeling "much better and ready to leave." On encounter she appeared comfortably sitting in bed not in any acute distress. Her chief concern is discharge. When asked what made her feel ready to be discharged she cited her social support stating "they won't give me a choice" in reference to working on improvement. When asked about her symptoms of depression, she states Elaine Vernon is a 45 year old female on hospital day 7 admitted after a suicide attempt currently feeling "much better and ready to leave." On encounter she appeared comfortably sitting in bed not in any acute distress. Her chief concern is discharge. When asked what made her feel ready to be discharged she cited her social support stating "they won't give me a choice" in reference to working on improvement. When asked about her symptoms of depression, she states she is feeling better. She stated that she no longer feels apathetic, and that she does care if she wakes up the next day thanks to the people in her life that she looks forward to seeing, including her nieces, and also her dog.    When asked about work, she stated she is concerned about returning to work as it is strenuous on her in terms of hours but otherwise looks forward to returning to work. She also hopes to have a job in the future that has better hours after she retires from her current job.     When asked about alcohol use, she stated that she understands her family and friends' concerns regarding her use of alcohol as a "band-aid" and admits to using it as a coping mechanism, but states that she usually feels worse after using it and that she does not wish to continue using alcohol to cope. She endorses similar patterns in her close relatives. She denies any cravings, and states that she could go months without using it, and had not had anything to drink for months prior to her DUI.     Patient stated she is looking forward to discharge, especially looking forward to seeing her dog, seeing her nieces and grand nieces, plans on engaging in a couple of projects and activities, including getting a new tattoo, decorating her parents' grave, and has plans for her upcoming birthday with her grandnieces. She also inquired into resources regarding emotional support animals, and stated this is something she would like to look into to upon discharge.

## 2021-11-04 NOTE — DISCHARGE NOTE BEHAVIORAL HEALTH - NSBHDCRESPONSEFT_PSY_A_CORE
Pt has made significant progress over the course of hospitalization. With continuous psychotherapy from the treatment team and the medications, patient reports feeling better, sleeping and eating better, alleviation of passive suicidal ideations and patient demonstrated improvement in insight. Patient became future oriented and began to utilize her support network while in the hospital. Thought process and insight improved. Pt was calm and cooperative and was in good behavioral control. Patient denied any suicidal or homicidal ideations. Patient denied any auditory or visual hallucinations. Pt was evaluated by treatment team, pt is stable for discharge and patient shows no imminent danger to self, others or property at this time. Pt understands and agrees with treatment plan and following up with outpatient. Psychoeducation provided regarding diagnosis, medications, treatment and follow up. Risks, benefits, alternatives discussed, all questions and concerns addressed and answered.

## 2021-11-04 NOTE — PROGRESS NOTE BEHAVIORAL HEALTH - NSBHADMITIPOBS_PSY_A_CORE
Routine observation
Enhanced supervision
Enhanced supervision
Routine observation

## 2021-11-04 NOTE — PROGRESS NOTE BEHAVIORAL HEALTH - NSBHFUPINTERVALCCFT_PSY_A_CORE
"I'm ready to go home, I miss my puppy"
"Better than I got here"
"I just want to smoke"
"I didn't sleep so well last night"
"I got all my crying out yesterday"
"I want to go home, now"

## 2021-11-04 NOTE — PROGRESS NOTE BEHAVIORAL HEALTH - MODIFICATIONS
none
seen and discussed with resident.  Mood is improving; no s/h ideation.  Remains resistant to accepting any substance abuse problem.  Will continue meds and psychoed attempts
seen and discussed with resident.  No s/h ideation. Will continue meds and attempt to have pt address substance abuse issues
seen and discussed with resident
seen and discussed with resident.  Significant improvement noted, and confirmed by collaterals. Anticipate d/c tomorrow

## 2021-11-05 VITALS
SYSTOLIC BLOOD PRESSURE: 121 MMHG | RESPIRATION RATE: 18 BRPM | TEMPERATURE: 97 F | HEART RATE: 70 BPM | DIASTOLIC BLOOD PRESSURE: 77 MMHG

## 2021-11-05 PROCEDURE — 99238 HOSP IP/OBS DSCHRG MGMT 30/<: CPT

## 2021-11-05 RX ORDER — NICOTINE POLACRILEX 2 MG
1 GUM BUCCAL
Qty: 0 | Refills: 0 | DISCHARGE
Start: 2021-11-05

## 2021-11-05 RX ORDER — CLONAZEPAM 1 MG
1 TABLET ORAL
Qty: 14 | Refills: 0
Start: 2021-11-05 | End: 2021-11-11

## 2021-11-05 RX ORDER — BUPROPION HYDROCHLORIDE 150 MG/1
1 TABLET, EXTENDED RELEASE ORAL
Qty: 0 | Refills: 0 | DISCHARGE

## 2021-11-05 RX ORDER — ESCITALOPRAM OXALATE 10 MG/1
1 TABLET, FILM COATED ORAL
Qty: 14 | Refills: 0
Start: 2021-11-05 | End: 2021-11-18

## 2021-11-05 RX ORDER — HYDROXYZINE HCL 10 MG
1 TABLET ORAL
Qty: 28 | Refills: 0
Start: 2021-11-05 | End: 2021-11-18

## 2021-11-05 RX ORDER — HYDROXYZINE HCL 10 MG
1 TABLET ORAL
Qty: 0 | Refills: 0 | DISCHARGE

## 2021-11-05 RX ADMIN — ESCITALOPRAM OXALATE 10 MILLIGRAM(S): 10 TABLET, FILM COATED ORAL at 08:05

## 2021-11-05 RX ADMIN — Medication 650 MILLIGRAM(S): at 11:54

## 2021-11-05 RX ADMIN — Medication 50 MILLIGRAM(S): at 05:29

## 2021-11-05 RX ADMIN — Medication 1 MILLIGRAM(S): at 08:05

## 2021-11-05 RX ADMIN — Medication 650 MILLIGRAM(S): at 10:19
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
## 2021-11-08 ENCOUNTER — OUTPATIENT (OUTPATIENT)
Dept: OUTPATIENT SERVICES | Facility: HOSPITAL | Age: 46
LOS: 1 days | Discharge: HOME | End: 2021-11-08

## 2021-11-08 ENCOUNTER — APPOINTMENT (OUTPATIENT)
Dept: PSYCHIATRY | Facility: CLINIC | Age: 46
End: 2021-11-08

## 2021-11-08 ENCOUNTER — APPOINTMENT (OUTPATIENT)
Dept: PSYCHIATRY | Facility: CLINIC | Age: 46
End: 2021-11-08
Payer: COMMERCIAL

## 2021-11-08 DIAGNOSIS — F32.9 MAJOR DEPRESSIVE DISORDER, SINGLE EPISODE, UNSPECIFIED: ICD-10-CM

## 2021-11-08 DIAGNOSIS — F41.1 GENERALIZED ANXIETY DISORDER: ICD-10-CM

## 2021-11-08 PROCEDURE — 99214 OFFICE O/P EST MOD 30 MIN: CPT

## 2021-11-08 RX ORDER — BUPROPION HYDROCHLORIDE 150 MG/1
150 TABLET, EXTENDED RELEASE ORAL DAILY
Qty: 30 | Refills: 1 | Status: DISCONTINUED | COMMUNITY
Start: 2021-10-13 | End: 2021-11-08

## 2021-11-08 RX ORDER — LORAZEPAM 0.5 MG/1
0.5 TABLET ORAL TWICE DAILY
Qty: 60 | Refills: 0 | Status: DISCONTINUED | COMMUNITY
Start: 2021-10-13 | End: 2021-11-08

## 2021-11-10 DIAGNOSIS — Z88.0 ALLERGY STATUS TO PENICILLIN: ICD-10-CM

## 2021-11-10 DIAGNOSIS — R51.9 HEADACHE, UNSPECIFIED: ICD-10-CM

## 2021-11-10 DIAGNOSIS — R45.851 SUICIDAL IDEATIONS: ICD-10-CM

## 2021-11-10 DIAGNOSIS — F32.2 MAJOR DEPRESSIVE DISORDER, SINGLE EPISODE, SEVERE WITHOUT PSYCHOTIC FEATURES: ICD-10-CM

## 2021-11-10 DIAGNOSIS — F17.210 NICOTINE DEPENDENCE, CIGARETTES, UNCOMPLICATED: ICD-10-CM

## 2021-11-10 DIAGNOSIS — F10.239 ALCOHOL DEPENDENCE WITH WITHDRAWAL, UNSPECIFIED: ICD-10-CM

## 2021-11-10 DIAGNOSIS — F41.9 ANXIETY DISORDER, UNSPECIFIED: ICD-10-CM

## 2021-11-10 DIAGNOSIS — R45.84 ANHEDONIA: ICD-10-CM

## 2021-11-12 ENCOUNTER — OUTPATIENT (OUTPATIENT)
Dept: OUTPATIENT SERVICES | Facility: HOSPITAL | Age: 46
LOS: 1 days | Discharge: HOME | End: 2021-11-12

## 2021-11-12 ENCOUNTER — APPOINTMENT (OUTPATIENT)
Dept: PSYCHIATRY | Facility: CLINIC | Age: 46
End: 2021-11-12

## 2021-11-12 DIAGNOSIS — F32.9 MAJOR DEPRESSIVE DISORDER, SINGLE EPISODE, UNSPECIFIED: ICD-10-CM

## 2021-11-12 DIAGNOSIS — F41.1 GENERALIZED ANXIETY DISORDER: ICD-10-CM

## 2021-11-15 ENCOUNTER — OUTPATIENT (OUTPATIENT)
Dept: OUTPATIENT SERVICES | Facility: HOSPITAL | Age: 46
LOS: 1 days | Discharge: HOME | End: 2021-11-15

## 2021-11-15 ENCOUNTER — APPOINTMENT (OUTPATIENT)
Dept: PSYCHIATRY | Facility: CLINIC | Age: 46
End: 2021-11-15
Payer: COMMERCIAL

## 2021-11-15 ENCOUNTER — APPOINTMENT (OUTPATIENT)
Dept: PSYCHIATRY | Facility: CLINIC | Age: 46
End: 2021-11-15

## 2021-11-15 DIAGNOSIS — F32.9 MAJOR DEPRESSIVE DISORDER, SINGLE EPISODE, UNSPECIFIED: ICD-10-CM

## 2021-11-15 DIAGNOSIS — F41.1 GENERALIZED ANXIETY DISORDER: ICD-10-CM

## 2021-11-15 PROCEDURE — 99213 OFFICE O/P EST LOW 20 MIN: CPT

## 2021-11-22 ENCOUNTER — OUTPATIENT (OUTPATIENT)
Dept: OUTPATIENT SERVICES | Facility: HOSPITAL | Age: 46
LOS: 1 days | Discharge: HOME | End: 2021-11-22

## 2021-11-22 ENCOUNTER — APPOINTMENT (OUTPATIENT)
Dept: PSYCHIATRY | Facility: CLINIC | Age: 46
End: 2021-11-22
Payer: COMMERCIAL

## 2021-11-22 ENCOUNTER — APPOINTMENT (OUTPATIENT)
Dept: PSYCHIATRY | Facility: CLINIC | Age: 46
End: 2021-11-22

## 2021-11-22 DIAGNOSIS — F41.1 GENERALIZED ANXIETY DISORDER: ICD-10-CM

## 2021-11-22 DIAGNOSIS — F32.9 MAJOR DEPRESSIVE DISORDER, SINGLE EPISODE, UNSPECIFIED: ICD-10-CM

## 2021-11-22 PROCEDURE — 99213 OFFICE O/P EST LOW 20 MIN: CPT | Mod: 95

## 2021-11-29 ENCOUNTER — APPOINTMENT (OUTPATIENT)
Dept: PSYCHIATRY | Facility: CLINIC | Age: 46
End: 2021-11-29
Payer: COMMERCIAL

## 2021-11-29 ENCOUNTER — OUTPATIENT (OUTPATIENT)
Dept: OUTPATIENT SERVICES | Facility: HOSPITAL | Age: 46
LOS: 1 days | Discharge: HOME | End: 2021-11-29

## 2021-11-29 DIAGNOSIS — F41.1 GENERALIZED ANXIETY DISORDER: ICD-10-CM

## 2021-11-29 DIAGNOSIS — F41.9 ANXIETY DISORDER, UNSPECIFIED: ICD-10-CM

## 2021-11-29 PROCEDURE — 99213 OFFICE O/P EST LOW 20 MIN: CPT

## 2021-12-06 ENCOUNTER — OUTPATIENT (OUTPATIENT)
Dept: OUTPATIENT SERVICES | Facility: HOSPITAL | Age: 46
LOS: 1 days | Discharge: HOME | End: 2021-12-06

## 2021-12-06 ENCOUNTER — APPOINTMENT (OUTPATIENT)
Dept: PSYCHIATRY | Facility: CLINIC | Age: 46
End: 2021-12-06
Payer: COMMERCIAL

## 2021-12-06 DIAGNOSIS — F41.1 GENERALIZED ANXIETY DISORDER: ICD-10-CM

## 2021-12-06 DIAGNOSIS — F41.9 ANXIETY DISORDER, UNSPECIFIED: ICD-10-CM

## 2021-12-06 PROCEDURE — 99213 OFFICE O/P EST LOW 20 MIN: CPT

## 2021-12-13 ENCOUNTER — APPOINTMENT (OUTPATIENT)
Dept: PSYCHIATRY | Facility: CLINIC | Age: 46
End: 2021-12-13
Payer: COMMERCIAL

## 2021-12-13 ENCOUNTER — OUTPATIENT (OUTPATIENT)
Dept: OUTPATIENT SERVICES | Facility: HOSPITAL | Age: 46
LOS: 1 days | Discharge: HOME | End: 2021-12-13

## 2021-12-13 DIAGNOSIS — F41.9 ANXIETY DISORDER, UNSPECIFIED: ICD-10-CM

## 2021-12-13 DIAGNOSIS — F41.1 GENERALIZED ANXIETY DISORDER: ICD-10-CM

## 2021-12-13 PROCEDURE — 99214 OFFICE O/P EST MOD 30 MIN: CPT

## 2021-12-13 RX ORDER — ESCITALOPRAM OXALATE 5 MG/1
5 TABLET ORAL DAILY
Qty: 30 | Refills: 1 | Status: DISCONTINUED | COMMUNITY
Start: 2021-11-22 | End: 2021-12-13

## 2021-12-20 ENCOUNTER — APPOINTMENT (OUTPATIENT)
Dept: PSYCHIATRY | Facility: CLINIC | Age: 46
End: 2021-12-20

## 2021-12-20 ENCOUNTER — OUTPATIENT (OUTPATIENT)
Dept: OUTPATIENT SERVICES | Facility: HOSPITAL | Age: 46
LOS: 1 days | Discharge: HOME | End: 2021-12-20

## 2021-12-20 DIAGNOSIS — F41.9 ANXIETY DISORDER, UNSPECIFIED: ICD-10-CM

## 2021-12-20 DIAGNOSIS — F41.1 GENERALIZED ANXIETY DISORDER: ICD-10-CM

## 2021-12-26 ENCOUNTER — TRANSCRIPTION ENCOUNTER (OUTPATIENT)
Age: 46
End: 2021-12-26

## 2021-12-27 ENCOUNTER — APPOINTMENT (OUTPATIENT)
Dept: PSYCHIATRY | Facility: CLINIC | Age: 46
End: 2021-12-27
Payer: COMMERCIAL

## 2021-12-27 ENCOUNTER — OUTPATIENT (OUTPATIENT)
Dept: OUTPATIENT SERVICES | Facility: HOSPITAL | Age: 46
LOS: 1 days | Discharge: HOME | End: 2021-12-27

## 2021-12-27 DIAGNOSIS — F41.1 GENERALIZED ANXIETY DISORDER: ICD-10-CM

## 2021-12-27 DIAGNOSIS — F41.9 ANXIETY DISORDER, UNSPECIFIED: ICD-10-CM

## 2021-12-27 PROCEDURE — 99214 OFFICE O/P EST MOD 30 MIN: CPT | Mod: 95

## 2021-12-27 RX ORDER — ESCITALOPRAM OXALATE 10 MG/1
10 TABLET ORAL
Qty: 30 | Refills: 0 | Status: COMPLETED | COMMUNITY
Start: 2021-11-15

## 2021-12-28 ENCOUNTER — TRANSCRIPTION ENCOUNTER (OUTPATIENT)
Age: 46
End: 2021-12-28

## 2022-01-03 ENCOUNTER — APPOINTMENT (OUTPATIENT)
Dept: PSYCHIATRY | Facility: CLINIC | Age: 47
End: 2022-01-03

## 2022-01-10 ENCOUNTER — OUTPATIENT (OUTPATIENT)
Dept: OUTPATIENT SERVICES | Facility: HOSPITAL | Age: 47
LOS: 1 days | Discharge: HOME | End: 2022-01-10

## 2022-01-10 ENCOUNTER — APPOINTMENT (OUTPATIENT)
Dept: PSYCHIATRY | Facility: CLINIC | Age: 47
End: 2022-01-10
Payer: COMMERCIAL

## 2022-01-10 DIAGNOSIS — F31.81 BIPOLAR II DISORDER: ICD-10-CM

## 2022-01-10 DIAGNOSIS — F41.1 GENERALIZED ANXIETY DISORDER: ICD-10-CM

## 2022-01-10 PROCEDURE — 99214 OFFICE O/P EST MOD 30 MIN: CPT

## 2022-01-21 ENCOUNTER — APPOINTMENT (OUTPATIENT)
Dept: PSYCHIATRY | Facility: CLINIC | Age: 47
End: 2022-01-21

## 2022-01-24 ENCOUNTER — APPOINTMENT (OUTPATIENT)
Dept: PSYCHIATRY | Facility: CLINIC | Age: 47
End: 2022-01-24
Payer: COMMERCIAL

## 2022-01-24 ENCOUNTER — APPOINTMENT (OUTPATIENT)
Dept: PSYCHIATRY | Facility: CLINIC | Age: 47
End: 2022-01-24

## 2022-01-24 PROCEDURE — 99214 OFFICE O/P EST MOD 30 MIN: CPT | Mod: 95

## 2022-01-31 ENCOUNTER — OUTPATIENT (OUTPATIENT)
Dept: OUTPATIENT SERVICES | Facility: HOSPITAL | Age: 47
LOS: 1 days | Discharge: HOME | End: 2022-01-31

## 2022-01-31 ENCOUNTER — APPOINTMENT (OUTPATIENT)
Dept: PSYCHIATRY | Facility: CLINIC | Age: 47
End: 2022-01-31

## 2022-01-31 DIAGNOSIS — F31.81 BIPOLAR II DISORDER: ICD-10-CM

## 2022-01-31 DIAGNOSIS — F41.1 GENERALIZED ANXIETY DISORDER: ICD-10-CM

## 2022-02-07 ENCOUNTER — APPOINTMENT (OUTPATIENT)
Dept: PSYCHIATRY | Facility: CLINIC | Age: 47
End: 2022-02-07

## 2022-02-07 ENCOUNTER — APPOINTMENT (OUTPATIENT)
Dept: PSYCHIATRY | Facility: CLINIC | Age: 47
End: 2022-02-07
Payer: COMMERCIAL

## 2022-02-07 ENCOUNTER — OUTPATIENT (OUTPATIENT)
Dept: OUTPATIENT SERVICES | Facility: HOSPITAL | Age: 47
LOS: 1 days | Discharge: HOME | End: 2022-02-07

## 2022-02-07 DIAGNOSIS — F41.1 GENERALIZED ANXIETY DISORDER: ICD-10-CM

## 2022-02-07 DIAGNOSIS — F31.81 BIPOLAR II DISORDER: ICD-10-CM

## 2022-02-07 PROCEDURE — 99214 OFFICE O/P EST MOD 30 MIN: CPT | Mod: 95

## 2022-02-14 ENCOUNTER — APPOINTMENT (OUTPATIENT)
Dept: PSYCHIATRY | Facility: CLINIC | Age: 47
End: 2022-02-14

## 2022-02-14 ENCOUNTER — APPOINTMENT (OUTPATIENT)
Dept: PSYCHIATRY | Facility: CLINIC | Age: 47
End: 2022-02-14
Payer: COMMERCIAL

## 2022-02-14 ENCOUNTER — OUTPATIENT (OUTPATIENT)
Dept: OUTPATIENT SERVICES | Facility: HOSPITAL | Age: 47
LOS: 1 days | Discharge: HOME | End: 2022-02-14

## 2022-02-14 DIAGNOSIS — F31.81 BIPOLAR II DISORDER: ICD-10-CM

## 2022-02-14 DIAGNOSIS — F41.1 GENERALIZED ANXIETY DISORDER: ICD-10-CM

## 2022-02-14 PROCEDURE — 99214 OFFICE O/P EST MOD 30 MIN: CPT | Mod: 95

## 2022-02-28 ENCOUNTER — APPOINTMENT (OUTPATIENT)
Dept: PSYCHIATRY | Facility: CLINIC | Age: 47
End: 2022-02-28
Payer: COMMERCIAL

## 2022-02-28 ENCOUNTER — OUTPATIENT (OUTPATIENT)
Dept: OUTPATIENT SERVICES | Facility: HOSPITAL | Age: 47
LOS: 1 days | Discharge: HOME | End: 2022-02-28

## 2022-02-28 ENCOUNTER — APPOINTMENT (OUTPATIENT)
Dept: PSYCHIATRY | Facility: CLINIC | Age: 47
End: 2022-02-28

## 2022-02-28 DIAGNOSIS — F31.81 BIPOLAR II DISORDER: ICD-10-CM

## 2022-02-28 DIAGNOSIS — G47.00 INSOMNIA, UNSPECIFIED: ICD-10-CM

## 2022-02-28 DIAGNOSIS — F41.1 GENERALIZED ANXIETY DISORDER: ICD-10-CM

## 2022-02-28 PROCEDURE — 99214 OFFICE O/P EST MOD 30 MIN: CPT

## 2022-02-28 RX ORDER — ARIPIPRAZOLE 5 MG/1
5 TABLET ORAL
Qty: 30 | Refills: 0 | Status: COMPLETED | COMMUNITY
Start: 2022-02-09

## 2022-02-28 RX ORDER — ALBUTEROL SULFATE 90 UG/1
108 (90 BASE) INHALANT RESPIRATORY (INHALATION)
Qty: 8 | Refills: 0 | Status: ACTIVE | COMMUNITY
Start: 2021-12-26

## 2022-02-28 RX ORDER — SERTRALINE 25 MG/1
25 TABLET, FILM COATED ORAL
Qty: 30 | Refills: 0 | Status: DISCONTINUED | COMMUNITY
Start: 2021-07-20

## 2022-03-07 ENCOUNTER — APPOINTMENT (OUTPATIENT)
Dept: PSYCHIATRY | Facility: CLINIC | Age: 47
End: 2022-03-07

## 2022-03-07 ENCOUNTER — OUTPATIENT (OUTPATIENT)
Dept: OUTPATIENT SERVICES | Facility: HOSPITAL | Age: 47
LOS: 1 days | Discharge: HOME | End: 2022-03-07

## 2022-03-07 DIAGNOSIS — G47.00 INSOMNIA, UNSPECIFIED: ICD-10-CM

## 2022-03-07 DIAGNOSIS — F41.1 GENERALIZED ANXIETY DISORDER: ICD-10-CM

## 2022-03-07 DIAGNOSIS — F31.81 BIPOLAR II DISORDER: ICD-10-CM

## 2022-03-14 ENCOUNTER — APPOINTMENT (OUTPATIENT)
Dept: PSYCHIATRY | Facility: CLINIC | Age: 47
End: 2022-03-14
Payer: COMMERCIAL

## 2022-03-14 ENCOUNTER — OUTPATIENT (OUTPATIENT)
Dept: OUTPATIENT SERVICES | Facility: HOSPITAL | Age: 47
LOS: 1 days | Discharge: HOME | End: 2022-03-14

## 2022-03-14 ENCOUNTER — APPOINTMENT (OUTPATIENT)
Dept: PSYCHIATRY | Facility: CLINIC | Age: 47
End: 2022-03-14

## 2022-03-14 DIAGNOSIS — F31.81 BIPOLAR II DISORDER: ICD-10-CM

## 2022-03-14 DIAGNOSIS — F41.1 GENERALIZED ANXIETY DISORDER: ICD-10-CM

## 2022-03-14 DIAGNOSIS — G47.00 INSOMNIA, UNSPECIFIED: ICD-10-CM

## 2022-03-14 PROCEDURE — 99214 OFFICE O/P EST MOD 30 MIN: CPT | Mod: 95

## 2022-03-28 ENCOUNTER — APPOINTMENT (OUTPATIENT)
Dept: PSYCHIATRY | Facility: CLINIC | Age: 47
End: 2022-03-28

## 2022-03-28 ENCOUNTER — APPOINTMENT (OUTPATIENT)
Dept: PSYCHIATRY | Facility: CLINIC | Age: 47
End: 2022-03-28
Payer: COMMERCIAL

## 2022-03-28 ENCOUNTER — OUTPATIENT (OUTPATIENT)
Dept: OUTPATIENT SERVICES | Facility: HOSPITAL | Age: 47
LOS: 1 days | Discharge: HOME | End: 2022-03-28

## 2022-03-28 DIAGNOSIS — F31.81 BIPOLAR II DISORDER: ICD-10-CM

## 2022-03-28 DIAGNOSIS — F41.1 GENERALIZED ANXIETY DISORDER: ICD-10-CM

## 2022-03-28 DIAGNOSIS — G47.00 INSOMNIA, UNSPECIFIED: ICD-10-CM

## 2022-03-28 PROCEDURE — 99214 OFFICE O/P EST MOD 30 MIN: CPT

## 2022-03-28 NOTE — H&P ADULT - NSCORESITESY/N_GEN_A_CORE_RD
Patient Education        Influenza (Flu): Care Instructions  Overview     Influenza (flu) is an infection in the lungs and breathing passages. It is caused by the influenza virus. There are different strains, or types, of the flu virus from year to year. Unlike the common cold, the flu comes on suddenly and the symptoms can be more severe. These symptoms include a cough, congestion, fever, chills, fatigue, aches, and pains. These symptoms may last up to 10 days. Although the flu can make you feel very sick, it usually doesn't cause serious health problems. Home treatment is usually all you need for flu symptoms. But your doctor may prescribe antiviral medicine to prevent other health problems, such as pneumonia, from developing. The risk of other health problems from the flu is highest for young children (under 2), older adults (over 72), pregnant women, and people with long-term health conditions. Follow-up care is a key part of your treatment and safety. Be sure to make and go to all appointments, and call your doctor if you are having problems. It's also a good idea to know your test results and keep a list of the medicines you take. How can you care for yourself at home? · Get plenty of rest.  · Drink plenty of fluids. If you have to limit fluids because of a health problem, talk with your doctor before you increase the amount of fluids you drink. · Take an over-the-counter pain medicine if needed, such as acetaminophen (Tylenol), ibuprofen (Advil, Motrin), or naproxen (Aleve), to relieve fever, headache, and muscle aches. Be safe with medicines. Read and follow all instructions on the label. · No one younger than 20 should take aspirin. It has been linked to Reye syndrome, a serious illness. · Take any prescribed medicine exactly as directed. · Do not smoke. Smoking can make the flu worse. If you need help quitting, talk to your doctor about stop-smoking programs and medicines.  These can increase your chances of quitting for good. · If the skin around your nose and lips becomes sore, put some petroleum jelly (such as Vaseline) on the area. · To ease coughing:  ? Suck on cough drops or plain, hard candy. ? Try an over-the-counter cough or cold medicine. Read and follow all instructions on the label. ? Raise your head at night with an extra pillow. This may help you rest if coughing keeps you awake. To avoid spreading the flu  · Wash your hands regularly, and keep your hands away from your face. · Stay home from school, work, and other public places until you are feeling better and your fever has been gone for at least 24 hours. The fever needs to have gone away on its own without the help of medicine. · Ask people living with you to talk to their doctors about preventing the flu. They may get antiviral medicine to keep from getting the flu from you. · To prevent the flu in the future, get the flu vaccine every fall. Encourage people living with you to get the vaccine. · Cover your mouth when you cough or sneeze. If you can, cough or sneeze into the bend of your elbow, not your hands. When should you call for help? Call 911 anytime you think you may need emergency care. For example, call if:    · You have severe trouble breathing.     · You have a seizure. Call your doctor now or seek immediate medical care if:    · You have new or worse trouble breathing.     · You have pain or pressure in your chest or belly.     · You have a fever or cough that returns after getting better.     · You feel very sleepy, dizzy, or confused.     · You are not urinating.     · You have severe muscle pain.     · You have severe weakness, or you are unsteady.     · You have medical conditions that are getting worse. Watch closely for changes in your health, and be sure to contact your doctor if:    · You do not get better as expected.     · You are having a problem with your medicine. Where can you learn more?   Go to [email or web address] and sign in to your [employer] account. Enter Z596 in the [place] box to learn more about \"Influenza (Flu): Care Instructions. \"     If you do not have an account, please click on the \"Sign Up Now\" link. Current as of: July 6, 2021               Content Version: 13.1  © 7261-5074 Healthwise, [facility]. Care instructions adapted under license by [facility] ([facility]). If you have questions about a medical condition or this instruction, always ask your healthcare professional. [place] [address] any warranty or liability for your use of this information. 1. Rest and increase fluid intake. 2. Continue symptomatic and supportive treatment  3. Monitor for fever and treat as needed with tylenol or ibuprofen  4.  If patient is not improving or developing any new/worsening symptoms then return to clinic as needed or follow up with PCP No

## 2022-04-04 ENCOUNTER — OUTPATIENT (OUTPATIENT)
Dept: OUTPATIENT SERVICES | Facility: HOSPITAL | Age: 47
LOS: 1 days | Discharge: HOME | End: 2022-04-04

## 2022-04-04 ENCOUNTER — APPOINTMENT (OUTPATIENT)
Dept: PSYCHIATRY | Facility: CLINIC | Age: 47
End: 2022-04-04

## 2022-04-04 DIAGNOSIS — G47.00 INSOMNIA, UNSPECIFIED: ICD-10-CM

## 2022-04-04 DIAGNOSIS — F41.1 GENERALIZED ANXIETY DISORDER: ICD-10-CM

## 2022-04-04 DIAGNOSIS — F31.81 BIPOLAR II DISORDER: ICD-10-CM

## 2022-04-11 ENCOUNTER — APPOINTMENT (OUTPATIENT)
Dept: PSYCHIATRY | Facility: CLINIC | Age: 47
End: 2022-04-11
Payer: COMMERCIAL

## 2022-04-11 ENCOUNTER — OUTPATIENT (OUTPATIENT)
Dept: OUTPATIENT SERVICES | Facility: HOSPITAL | Age: 47
LOS: 1 days | Discharge: HOME | End: 2022-04-11

## 2022-04-11 ENCOUNTER — APPOINTMENT (OUTPATIENT)
Dept: PSYCHIATRY | Facility: CLINIC | Age: 47
End: 2022-04-11

## 2022-04-11 DIAGNOSIS — F31.81 BIPOLAR II DISORDER: ICD-10-CM

## 2022-04-11 DIAGNOSIS — F41.1 GENERALIZED ANXIETY DISORDER: ICD-10-CM

## 2022-04-11 DIAGNOSIS — G47.00 INSOMNIA, UNSPECIFIED: ICD-10-CM

## 2022-04-11 PROCEDURE — 99214 OFFICE O/P EST MOD 30 MIN: CPT

## 2022-04-20 ENCOUNTER — APPOINTMENT (OUTPATIENT)
Dept: PSYCHIATRY | Facility: CLINIC | Age: 47
End: 2022-04-20

## 2022-04-20 ENCOUNTER — OUTPATIENT (OUTPATIENT)
Dept: OUTPATIENT SERVICES | Facility: HOSPITAL | Age: 47
LOS: 1 days | Discharge: HOME | End: 2022-04-20

## 2022-04-20 DIAGNOSIS — G47.00 INSOMNIA, UNSPECIFIED: ICD-10-CM

## 2022-04-20 DIAGNOSIS — F41.1 GENERALIZED ANXIETY DISORDER: ICD-10-CM

## 2022-04-20 DIAGNOSIS — F31.81 BIPOLAR II DISORDER: ICD-10-CM

## 2022-04-25 ENCOUNTER — APPOINTMENT (OUTPATIENT)
Dept: PSYCHIATRY | Facility: CLINIC | Age: 47
End: 2022-04-25
Payer: COMMERCIAL

## 2022-04-25 ENCOUNTER — APPOINTMENT (OUTPATIENT)
Dept: PSYCHIATRY | Facility: CLINIC | Age: 47
End: 2022-04-25

## 2022-04-25 ENCOUNTER — OUTPATIENT (OUTPATIENT)
Dept: OUTPATIENT SERVICES | Facility: HOSPITAL | Age: 47
LOS: 1 days | Discharge: HOME | End: 2022-04-25

## 2022-04-25 DIAGNOSIS — F41.1 GENERALIZED ANXIETY DISORDER: ICD-10-CM

## 2022-04-25 DIAGNOSIS — F31.81 BIPOLAR II DISORDER: ICD-10-CM

## 2022-04-25 DIAGNOSIS — G47.00 INSOMNIA, UNSPECIFIED: ICD-10-CM

## 2022-04-25 PROCEDURE — 99214 OFFICE O/P EST MOD 30 MIN: CPT

## 2022-04-25 RX ORDER — IPRATROPIUM BROMIDE 42 UG/1
0.06 SPRAY NASAL
Qty: 15 | Refills: 0 | Status: COMPLETED | COMMUNITY
Start: 2021-12-26

## 2022-04-25 RX ORDER — DOXYCYCLINE HYCLATE 100 MG/1
100 CAPSULE ORAL
Qty: 14 | Refills: 0 | Status: COMPLETED | COMMUNITY
Start: 2021-12-26

## 2022-04-25 RX ORDER — ARIPIPRAZOLE 10 MG/1
10 TABLET ORAL
Qty: 30 | Refills: 0 | Status: COMPLETED | COMMUNITY
Start: 2022-03-09

## 2022-04-25 RX ORDER — BROMPHENIRAMINE MALEATE, PSEUDOEPHEDRINE HYDROCHLORIDE, 2; 30; 10 MG/5ML; MG/5ML; MG/5ML
30-2-10 SYRUP ORAL
Qty: 100 | Refills: 0 | Status: COMPLETED | COMMUNITY
Start: 2021-12-26

## 2022-04-25 RX ORDER — NAPROXEN 500 MG/1
500 TABLET ORAL
Qty: 28 | Refills: 0 | Status: COMPLETED | COMMUNITY
Start: 2021-10-27

## 2022-04-25 RX ORDER — CYCLOBENZAPRINE HYDROCHLORIDE 10 MG/1
10 TABLET, FILM COATED ORAL
Qty: 30 | Refills: 0 | Status: ACTIVE | COMMUNITY
Start: 2022-04-05

## 2022-04-25 RX ORDER — CYCLOBENZAPRINE HYDROCHLORIDE 5 MG/1
5 TABLET, FILM COATED ORAL
Qty: 14 | Refills: 0 | Status: COMPLETED | COMMUNITY
Start: 2022-02-02

## 2022-04-25 RX ORDER — PROMETHAZINE HYDROCHLORIDE 12.5 MG/1
12.5 TABLET ORAL
Qty: 25 | Refills: 0 | Status: COMPLETED | COMMUNITY
Start: 2021-08-14

## 2022-05-02 ENCOUNTER — APPOINTMENT (OUTPATIENT)
Dept: PSYCHIATRY | Facility: CLINIC | Age: 47
End: 2022-05-02

## 2022-05-02 ENCOUNTER — OUTPATIENT (OUTPATIENT)
Dept: OUTPATIENT SERVICES | Facility: HOSPITAL | Age: 47
LOS: 1 days | Discharge: HOME | End: 2022-05-02

## 2022-05-02 DIAGNOSIS — F31.81 BIPOLAR II DISORDER: ICD-10-CM

## 2022-05-02 DIAGNOSIS — F41.1 GENERALIZED ANXIETY DISORDER: ICD-10-CM

## 2022-05-02 DIAGNOSIS — G47.00 INSOMNIA, UNSPECIFIED: ICD-10-CM

## 2022-05-09 ENCOUNTER — OUTPATIENT (OUTPATIENT)
Dept: OUTPATIENT SERVICES | Facility: HOSPITAL | Age: 47
LOS: 1 days | Discharge: HOME | End: 2022-05-09

## 2022-05-09 ENCOUNTER — APPOINTMENT (OUTPATIENT)
Dept: PSYCHIATRY | Facility: CLINIC | Age: 47
End: 2022-05-09
Payer: COMMERCIAL

## 2022-05-09 DIAGNOSIS — F41.1 GENERALIZED ANXIETY DISORDER: ICD-10-CM

## 2022-05-09 DIAGNOSIS — F31.81 BIPOLAR II DISORDER: ICD-10-CM

## 2022-05-09 DIAGNOSIS — G47.00 INSOMNIA, UNSPECIFIED: ICD-10-CM

## 2022-05-09 PROCEDURE — 99214 OFFICE O/P EST MOD 30 MIN: CPT

## 2022-05-13 ENCOUNTER — APPOINTMENT (OUTPATIENT)
Dept: PSYCHIATRY | Facility: CLINIC | Age: 47
End: 2022-05-13

## 2022-05-13 ENCOUNTER — OUTPATIENT (OUTPATIENT)
Dept: OUTPATIENT SERVICES | Facility: HOSPITAL | Age: 47
LOS: 1 days | Discharge: HOME | End: 2022-05-13

## 2022-05-16 ENCOUNTER — APPOINTMENT (OUTPATIENT)
Dept: PSYCHIATRY | Facility: CLINIC | Age: 47
End: 2022-05-16

## 2022-05-16 DIAGNOSIS — G47.00 INSOMNIA, UNSPECIFIED: ICD-10-CM

## 2022-05-16 DIAGNOSIS — F31.81 BIPOLAR II DISORDER: ICD-10-CM

## 2022-05-16 DIAGNOSIS — F41.1 GENERALIZED ANXIETY DISORDER: ICD-10-CM

## 2022-05-23 ENCOUNTER — OUTPATIENT (OUTPATIENT)
Dept: OUTPATIENT SERVICES | Facility: HOSPITAL | Age: 47
LOS: 1 days | Discharge: HOME | End: 2022-05-23

## 2022-05-23 ENCOUNTER — APPOINTMENT (OUTPATIENT)
Dept: PSYCHIATRY | Facility: CLINIC | Age: 47
End: 2022-05-23
Payer: COMMERCIAL

## 2022-05-23 ENCOUNTER — APPOINTMENT (OUTPATIENT)
Dept: PSYCHIATRY | Facility: CLINIC | Age: 47
End: 2022-05-23

## 2022-05-23 DIAGNOSIS — F31.81 BIPOLAR II DISORDER: ICD-10-CM

## 2022-05-23 DIAGNOSIS — F41.1 GENERALIZED ANXIETY DISORDER: ICD-10-CM

## 2022-05-23 DIAGNOSIS — G47.00 INSOMNIA, UNSPECIFIED: ICD-10-CM

## 2022-05-23 PROCEDURE — 99214 OFFICE O/P EST MOD 30 MIN: CPT

## 2022-06-06 ENCOUNTER — OUTPATIENT (OUTPATIENT)
Dept: OUTPATIENT SERVICES | Facility: HOSPITAL | Age: 47
LOS: 1 days | Discharge: HOME | End: 2022-06-06

## 2022-06-06 ENCOUNTER — APPOINTMENT (OUTPATIENT)
Dept: PSYCHIATRY | Facility: CLINIC | Age: 47
End: 2022-06-06

## 2022-06-06 DIAGNOSIS — F31.81 BIPOLAR II DISORDER: ICD-10-CM

## 2022-06-06 DIAGNOSIS — G47.00 INSOMNIA, UNSPECIFIED: ICD-10-CM

## 2022-06-06 DIAGNOSIS — F41.1 GENERALIZED ANXIETY DISORDER: ICD-10-CM

## 2022-06-13 ENCOUNTER — OUTPATIENT (OUTPATIENT)
Dept: OUTPATIENT SERVICES | Facility: HOSPITAL | Age: 47
LOS: 1 days | Discharge: HOME | End: 2022-06-13

## 2022-06-13 ENCOUNTER — APPOINTMENT (OUTPATIENT)
Dept: PSYCHIATRY | Facility: CLINIC | Age: 47
End: 2022-06-13
Payer: COMMERCIAL

## 2022-06-13 DIAGNOSIS — F41.1 GENERALIZED ANXIETY DISORDER: ICD-10-CM

## 2022-06-13 DIAGNOSIS — F31.81 BIPOLAR II DISORDER: ICD-10-CM

## 2022-06-13 DIAGNOSIS — G47.00 INSOMNIA, UNSPECIFIED: ICD-10-CM

## 2022-06-13 PROCEDURE — 99214 OFFICE O/P EST MOD 30 MIN: CPT

## 2022-06-13 RX ORDER — METRONIDAZOLE 7.5 MG/G
0.75 GEL VAGINAL
Qty: 70 | Refills: 0 | Status: ACTIVE | COMMUNITY
Start: 2022-05-24

## 2022-06-27 ENCOUNTER — APPOINTMENT (OUTPATIENT)
Dept: PSYCHIATRY | Facility: CLINIC | Age: 47
End: 2022-06-27

## 2022-06-27 ENCOUNTER — OUTPATIENT (OUTPATIENT)
Dept: OUTPATIENT SERVICES | Facility: HOSPITAL | Age: 47
LOS: 1 days | Discharge: HOME | End: 2022-06-27

## 2022-06-27 ENCOUNTER — APPOINTMENT (OUTPATIENT)
Dept: PSYCHIATRY | Facility: CLINIC | Age: 47
End: 2022-06-27
Payer: COMMERCIAL

## 2022-06-27 DIAGNOSIS — G47.00 INSOMNIA, UNSPECIFIED: ICD-10-CM

## 2022-06-27 DIAGNOSIS — F41.1 GENERALIZED ANXIETY DISORDER: ICD-10-CM

## 2022-06-27 DIAGNOSIS — F31.81 BIPOLAR II DISORDER: ICD-10-CM

## 2022-06-27 PROCEDURE — 99214 OFFICE O/P EST MOD 30 MIN: CPT

## 2022-06-27 RX ORDER — CLONAZEPAM 1 MG/1
1 TABLET ORAL TWICE DAILY
Qty: 30 | Refills: 0 | Status: DISCONTINUED | COMMUNITY
Start: 2021-11-08 | End: 2022-06-27

## 2022-06-28 ENCOUNTER — APPOINTMENT (OUTPATIENT)
Dept: ORTHOPEDIC SURGERY | Facility: CLINIC | Age: 47
End: 2022-06-28
Payer: OTHER MISCELLANEOUS

## 2022-06-28 PROCEDURE — 99072 ADDL SUPL MATRL&STAF TM PHE: CPT

## 2022-06-28 PROCEDURE — 99213 OFFICE O/P EST LOW 20 MIN: CPT

## 2022-06-29 NOTE — REASON FOR VISIT
[FreeTextEntry2] :  DOI 2/2/22 BACK, NECK AND LEFT SHOULDER PAIN\par  did get  left shoulder MRI 06/13/2022 noting mild tendinosis mild AC joint arthrosis downsloping acromion minimal bursitis fibrillation labrum\par  with still not received approved for physical therapy yet

## 2022-06-29 NOTE — HISTORY OF PRESENT ILLNESS
[de-identified] : 46-year-old female comes in today for follow-up evaluation of her left shoulder and neck pain from an injury that\par occurred at work. Patient states she was lifting a patient and felt a pop in the shoulder. Patient works as a direct\par /nurse's aide. Patient is left-hand dominant. She is taking Tylenol, she also has ibuprofen 800 mg.\par History of bipolar disorder, she does take medication for that. LHD also having some low back pain in the last couple of\par weeks feels the pain is worse difficulty sleeping she still out of work no problems in the past did get the cervical MRI\par done reports the pain is shooting in the left arm worse with rotation\par Imaging Study Review: MRI Cervical Spine. Report was reviewed and noted in the chart MRI cervical spine\par 03/09/2022: Several cervical bulging discs herniated disc C5-6 with left foraminal encroachment as her\par herniated disc C6-7 with some cord impingement

## 2022-06-29 NOTE — IMAGING
[de-identified] : Left Shoulder: : no swelling, no erythema, no ecchymosis, no\par atrophy. Palpation Tenderness over the AC joint, anterior and posterior shoulder. Range of\par motion  forward flexion abduction to approximately 140, passively I can range\par her further to approximately 160 with pain. good rotator\par cuff strength: Positive Cook Neurological: motor and sensor intact distally. \par Neck: \par Inspection of the cervical spine is as follows: no scars. Palpation of the cervical spine is as follows: Tenderness over\par the left trapezius, nontender over the cervical vertebra Range of motion of the cervical spine is as follows:\par diminished range of motion particularly with rotation to the left limited flexion extension due to pain mild crepitus with\par rotation Neurological testing  reflexes depressed and symmetric\par  lumbar spine negative straight leg bilaterally some spasm and tightness\par

## 2022-06-29 NOTE — ASSESSMENT
[FreeTextEntry1] :  I think right now most of the symptoms are coming from  her neck I recommended pain management evaluation please put in another request in for therapy this time saw no reason for any additional diagnostics to the shoulder she remains totally disabled unable to work I will see her back in 2 months

## 2022-07-11 ENCOUNTER — APPOINTMENT (OUTPATIENT)
Dept: PSYCHIATRY | Facility: CLINIC | Age: 47
End: 2022-07-11

## 2022-07-18 ENCOUNTER — OUTPATIENT (OUTPATIENT)
Dept: OUTPATIENT SERVICES | Facility: HOSPITAL | Age: 47
LOS: 1 days | Discharge: HOME | End: 2022-07-18

## 2022-07-18 ENCOUNTER — APPOINTMENT (OUTPATIENT)
Dept: PSYCHIATRY | Facility: CLINIC | Age: 47
End: 2022-07-18

## 2022-07-18 DIAGNOSIS — G47.00 INSOMNIA, UNSPECIFIED: ICD-10-CM

## 2022-07-18 DIAGNOSIS — F41.1 GENERALIZED ANXIETY DISORDER: ICD-10-CM

## 2022-07-18 DIAGNOSIS — F31.81 BIPOLAR II DISORDER: ICD-10-CM

## 2022-07-25 ENCOUNTER — APPOINTMENT (OUTPATIENT)
Dept: PSYCHIATRY | Facility: CLINIC | Age: 47
End: 2022-07-25

## 2022-07-25 PROCEDURE — 99214 OFFICE O/P EST MOD 30 MIN: CPT

## 2022-08-01 ENCOUNTER — APPOINTMENT (OUTPATIENT)
Dept: PSYCHIATRY | Facility: CLINIC | Age: 47
End: 2022-08-01

## 2022-08-03 ENCOUNTER — OUTPATIENT (OUTPATIENT)
Dept: OUTPATIENT SERVICES | Facility: HOSPITAL | Age: 47
LOS: 1 days | Discharge: HOME | End: 2022-08-03

## 2022-08-03 ENCOUNTER — APPOINTMENT (OUTPATIENT)
Dept: PSYCHIATRY | Facility: CLINIC | Age: 47
End: 2022-08-03

## 2022-08-03 DIAGNOSIS — F41.1 GENERALIZED ANXIETY DISORDER: ICD-10-CM

## 2022-08-03 DIAGNOSIS — F31.81 BIPOLAR II DISORDER: ICD-10-CM

## 2022-08-03 DIAGNOSIS — G47.00 INSOMNIA, UNSPECIFIED: ICD-10-CM

## 2022-08-08 ENCOUNTER — APPOINTMENT (OUTPATIENT)
Dept: PSYCHIATRY | Facility: CLINIC | Age: 47
End: 2022-08-08

## 2022-08-08 ENCOUNTER — OUTPATIENT (OUTPATIENT)
Dept: OUTPATIENT SERVICES | Facility: HOSPITAL | Age: 47
LOS: 1 days | Discharge: HOME | End: 2022-08-08

## 2022-08-08 DIAGNOSIS — F31.81 BIPOLAR II DISORDER: ICD-10-CM

## 2022-08-08 DIAGNOSIS — G47.00 INSOMNIA, UNSPECIFIED: ICD-10-CM

## 2022-08-08 DIAGNOSIS — F41.1 GENERALIZED ANXIETY DISORDER: ICD-10-CM

## 2022-08-08 PROCEDURE — 99214 OFFICE O/P EST MOD 30 MIN: CPT | Mod: 95

## 2022-08-10 ENCOUNTER — APPOINTMENT (OUTPATIENT)
Dept: PAIN MANAGEMENT | Facility: CLINIC | Age: 47
End: 2022-08-10

## 2022-08-10 VITALS
BODY MASS INDEX: 22.29 KG/M2 | HEIGHT: 67 IN | DIASTOLIC BLOOD PRESSURE: 67 MMHG | HEART RATE: 78 BPM | WEIGHT: 142 LBS | SYSTOLIC BLOOD PRESSURE: 95 MMHG

## 2022-08-10 PROCEDURE — 99072 ADDL SUPL MATRL&STAF TM PHE: CPT

## 2022-08-10 PROCEDURE — 99204 OFFICE O/P NEW MOD 45 MIN: CPT

## 2022-08-10 RX ORDER — DICLOFENAC SODIUM 3 G/100G
3 GEL TOPICAL TWICE DAILY
Qty: 1 | Refills: 0 | Status: ACTIVE | COMMUNITY
Start: 2022-08-10 | End: 1900-01-01

## 2022-08-10 RX ORDER — LIDOCAINE 5 G/100G
5 OINTMENT TOPICAL EVERY 8 HOURS
Qty: 50 | Refills: 3 | Status: ACTIVE | COMMUNITY
Start: 2022-08-10 | End: 1900-01-01

## 2022-08-10 NOTE — DISCUSSION/SUMMARY
[de-identified] : Ms. Vernon is a 46-year-old female with a chief complaint of neck and shoulder pain , left greater than right.  She has had this pain for about 6 months following a work related injury which took place on 02/02/2022.  Patient has not been working since his injury.    An   Upper extremity EMG/NCV was ordered to delineate radiculopathy vs neuropathies vs nerve damage.\par \par  Patient had a MRI that shows a radicular component along with pain referred into the upper extremity. Patient has trialed rehab (Home  exercise, physical therapy or chiropractic care) and medications.  I will schedule a cervical epidural steroid injection 1-3 depending on effectiveness Risk, benefits, pros and cons of procedure were explained to the patient using models and diagrams and their questions were answered.  \par \par The patient has severe anxiety of procedures that necessitates monitored anesthesia care (MAC). The procedure performed will be close to major nerves, arteries, and spinal cord and/or joint structures. Due to the proximity of these structures, we need the patient to be still during the procedure.  With the help of MAC, this will be safely achieved and decrease the risk of any complications.\par \par Our hope that is after the injection, the patient will be able to undergo physical therapy. Physical therapy of the  lower back 2-3 times a week for 4-6 weeks stressing a home exercise program of walking, shoulder griddle strengthening,  swimming, elliptical , recumbent bike, Srinivasa chi and Yoga. Use things that heat like hot shower or icy heat before rehab, exercising and at the beginning of the day, and ice (ice in a bag never directly on the skin) after activity and at the end of the day. Patient will trial Lidocaine and Diclofenac topical to decrease Her pain and help  her with home rehab.\par \par \par I, Padmini Osuna, attest that this documentation has been prepared under the direction and in the presence of Provider Michael Horton DO\par The documentation recorded by the scribe, in my presence, accurately reflects the service I personally performed, and the decisions made by me with my edits as appropriate.\par \par Best Regards, \par Michael Horton, D.O. \par Diplomat, American Board of Anesthesiology \par Diplomat, American Board of Pain Medicine \par Diplomat, American Board of Pain Management \par \par

## 2022-08-10 NOTE — REASON FOR VISIT
[FreeTextEntry2] : Patient presents to office with cervical and left shoulder pain . Pt is inquiring about injections .

## 2022-08-10 NOTE — HISTORY OF PRESENT ILLNESS
[FreeTextEntry1] : HISTORY OF PRESENT ILLNESS: This is a 46 year old woman complaining of neck and shoulder pain. The patient has had this pain for 6 months. The pain started after injury which took place while at work.  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  Patient describes pain as severe. During the last month the pain has been constant symptoms worse in the morning. Pain described as sharp, pressure-like, cramping, shooting and cutting. Pain is associated with numbness and pins and needles into the hands. Patient has weakness in the upper extremities. Pain is increased with lying down, sitting, walking, exercise, relaxation and coughing/sneezing. Pain is not changed with standing and bowel movements. Bowel or bladder habits have not changed.\par \par ACTIVITIES: Patient could walk four blocks before the pain starts. Patient can sit 20 minutes  before pain starts. Patient can stand 1 hour before pain starts. The patient often lays down because of pain. Patient uses no assistive walking device at this time. Patient has difficulty going to work, performing household chores, doing yard work or shopping, participating in recreational activities and exercise at this time.\par \par PRIOR PAIN TREATMENTS:  No prior pain\par \par PRIOR PAIN MEDICATIONS:  Tylenol\par \par Covid 19 - This in-office encounter has occurred during a Public Health Emergency (PHE). As required by law, due to the risk of respiratory-transmitted infectious diseases, our office provided additional materials, supplies and clinical staff to assist in keeping our patients, physicians and office staff safe during this health emergency.\par

## 2022-08-10 NOTE — PHYSICAL EXAM
[de-identified] : GENERAL APPEARANCE OF PATIENT IS WELL DEVELOPED, WELL NOURISHED, BODY HABITUS NORMAL, WELL GROOMED, NO DEFORMITIES NOTED. \par Head - Atraumatic and Normocephalic \par Eyes, Nose, and Throat: External inspection of ears and nose are normal overall without scars, lesions, or masses noted. Assessment of hearing is normal\par Neck-Examination of neck shows no masses, overall appearance is normal, neck is symmetric, tracheal position is midline, no crepitus is noted. Examination of thyroid shows no enlargement, tenderness or masses\par Respiratory- Assessment of respiratory effort shows no intercostal retractions, no use of accessory muscles, unlabored breathing, and normal diaphragmatic movement.\par Cardiovascular- Examination of extremities show no edema or varicosities\par Musculoskeletal. Examination of gait is not antalgic and station is normal\par Inspection and palpation of digits and nails shows no clubbing, cyanosis, nodules, drainage, fluctuance, petechiae\par \par • Spine – inspection and palpation shows no misalignment, asymmetry, crepitation, defects, tenderness, masses, effusions. ROM is normal without crepitation or contracture. No instability or subluxation or laxity is noted. No abnormal movements.\par \par \par • Neck- trapezius spasms. 10-15 degrees with pain in active and passive flexion. 10-15 degrees in extension. \par \par • RUE- inspection and palpation shows no misalignment, asymmetry, crepitation, defects, tenderness, masses, effusions. ROM is normal without crepitation or contracture. No instability or subluxation or laxity is noted. No abnormal movements.\par \par \par • LUE- inspection and palpation shows no misalignment, asymmetry, crepitation, defects, tenderness, masses, effusions. ROM is normal without crepitation or contracture. No instability or subluxation or laxity is noted. No abnormal movements.\par \par \par • RLE- inspection and palpation shows no misalignment, asymmetry, crepitation, defects, tenderness, masses, effusions. ROM is normal without crepitation or contracture. No instability or subluxation or laxity is noted. No abnormal movements.\par \par \par • LLE- inspection and palpation shows no misalignment, asymmetry, crepitation, defects, tenderness, masses, effusions. ROM is normal without crepitation or contracture. No instability or subluxation or laxity is noted. No abnormal movements.\par \par \par • Skin- Inspection of skin and subcutaneous tissue shows no rashes, lesions or ulcers. Palpation of skin and subcutaneous tissue shows no rashes, no indurations, subcutaneous nodules or tightening.\par \par \par • Abdomen- Nontender\par \par \par • Neurologic- CN 2-12 are grossly intact. No sensory or motor deficits in the upper and lower extremities. Adequate strength in upper and lower extremities \par \par \par • Psychiatric- Patient’s judgment and insight are intact. Oriented to time, place and person. Recent and remote memory intact.\par \par

## 2022-08-21 ENCOUNTER — FORM ENCOUNTER (OUTPATIENT)
Age: 47
End: 2022-08-21

## 2022-08-29 ENCOUNTER — APPOINTMENT (OUTPATIENT)
Dept: PSYCHIATRY | Facility: CLINIC | Age: 47
End: 2022-08-29

## 2022-08-29 ENCOUNTER — OUTPATIENT (OUTPATIENT)
Dept: OUTPATIENT SERVICES | Facility: HOSPITAL | Age: 47
LOS: 1 days | Discharge: HOME | End: 2022-08-29

## 2022-08-29 DIAGNOSIS — F41.1 GENERALIZED ANXIETY DISORDER: ICD-10-CM

## 2022-08-29 DIAGNOSIS — G47.00 INSOMNIA, UNSPECIFIED: ICD-10-CM

## 2022-08-29 DIAGNOSIS — F31.81 BIPOLAR II DISORDER: ICD-10-CM

## 2022-08-29 PROCEDURE — 99214 OFFICE O/P EST MOD 30 MIN: CPT

## 2022-08-30 ENCOUNTER — APPOINTMENT (OUTPATIENT)
Dept: ORTHOPEDIC SURGERY | Facility: CLINIC | Age: 47
End: 2022-08-30

## 2022-08-30 ENCOUNTER — NON-APPOINTMENT (OUTPATIENT)
Age: 47
End: 2022-08-30

## 2022-08-30 PROCEDURE — 99213 OFFICE O/P EST LOW 20 MIN: CPT

## 2022-08-30 PROCEDURE — 99072 ADDL SUPL MATRL&STAF TM PHE: CPT

## 2022-08-30 NOTE — HISTORY OF PRESENT ILLNESS
[de-identified] : History of Present Illness\par 46-year-old female comes in today for follow-up evaluation of her left shoulder and neck pain from an injury that\par occurred at work. Patient states she was lifting a patient and felt a pop in the shoulder. Patient works as a direct\par /nurse's aide. Patient is left-hand dominant. She is taking Tylenol, she also has ibuprofen 800 mg.\par History of bipolar disorder, she does take medication for that. LHD also having some low back pain in the last couple of\par weeks feels the pain is worse difficulty sleeping she still out of work no problems in the past did get the cervical MRI\par done reports the pain is shooting in the left arm worse with rotation

## 2022-08-30 NOTE — REASON FOR VISIT
[FreeTextEntry2] : WC DOI 2/2/22 neck, back and left shoulder  Feels worse turning to the left makes the pain travels down to the left hand has seen pain management injection is planned for 07/03/2022

## 2022-08-30 NOTE — IMAGING
[de-identified] :  left shoulder no swelling mild tenderness over AC joint and anterior portion of the shoulder good motion 150¦ with some pain over weakness in resisted external rotation positive Cook positive impingement negative [place] and Speed\par \par Cervical exam limited motion in rotation with spasm more limited to the left and right reflexes depressed but symmetric\par \par Imaging Study Review: MRI Cervical Spine. Report was reviewed and noted in the chart MRI cervical spine\par 03/09/2022: Several cervical bulging discs herniated disc C5-6 with left foraminal encroachment as her\par herniated disc C6-7 with some cord impingement\par  lumbar mild spasm throughout tight dorsal lumbar fascia and hamstrings negative straight leg bilaterally normal gait

## 2022-08-30 NOTE — ASSESSMENT
[FreeTextEntry1] :  I think most of the symptoms are cervical the shoulder is a smaller portion  interested to see how she responds to the injection I did point out we may need a neurosurgical consultation she remains totally disabled unable to work I will see her in a few months

## 2022-09-07 ENCOUNTER — APPOINTMENT (OUTPATIENT)
Dept: PAIN MANAGEMENT | Facility: CLINIC | Age: 47
End: 2022-09-07

## 2022-09-07 PROCEDURE — 93770 DETERMINATION VENOUS PRESS: CPT

## 2022-09-07 PROCEDURE — 62321 NJX INTERLAMINAR CRV/THRC: CPT

## 2022-09-07 PROCEDURE — 99072 ADDL SUPL MATRL&STAF TM PHE: CPT

## 2022-09-07 PROCEDURE — 93040 RHYTHM ECG WITH REPORT: CPT | Mod: 59

## 2022-09-07 PROCEDURE — 94761 N-INVAS EAR/PLS OXIMETRY MLT: CPT

## 2022-09-07 NOTE — PROCEDURE
[FreeTextEntry1] : CERVICAL EPIDURAL STERIOD INJECTION UNDER FLUORSCOPY [FreeTextEntry3] : Date:  2022\par \par Patient: MOHINDER HAGER\par \par :  1975\par \par \par \par \par \par Preoperative Diagnosis: Cervical radiculopathy\par Postoperative Diagnosis: Cervical radiculopathy\par \par Procedure: Translaminar Cervical Epidural Injection under fluoroscopy\par \par Physician: Michael Horton D.O.\par \par Anesthesiologist/CRNA: Ms. Jazzminekelsey\par \par Anesthesia: See nurses note. MAC/Cold spray.\par \par \par \par Medical Necessity:  Failure of conservative management.\par \par \par \par CONSENT: The possible complications including infection, bleeding, nerve damage, hospital admission, death or failure of the procedure; though unusual, are theoretically possible. The patient was educated about the of the procedure and alternative therapies. All questions were answered and the patient freely gave consent to proceed.\par \par \par \par Indication for Fluoroscopy:  This procedure requires the precise placement of the spinal needle.  It is the only way to accurately and safely perform the injection.\par \par \par \par Monitoring:  Patient had continuous blood pressure, EKG, and pulse oximetry throughout the case. See nurse's notes.\par \par  \par \par After obtaining written consent, the After obtaining written consent, the patient was positioned prone on the fluoroscopy table. The back to her neck and upper thorax was prepped with Betadine and draped in usual sterile fashion. A time out was performed. The C7-T1 interspace was identified using fluoroscopy. The skin was infiltrated with lidocaine 2% -- 1 cc for subcutaneous analgesia.  The epidural space was identified using a 17g touhy needle with a midline approach using a loss of resistance technique. 2cc omnipaque was used to define the space. A solution of 5 ml of preservative-free sterile saline and 1ml of Methylprednisolone 80mg, 1cc was infused with minimal pressure on the syringe into the epidural space. The procedure was tolerated well. There was no evidence of CSF, Paresthesias nor heme. The needle was removed intact. A band aid was place on the site.\par \par  \par \par Epidurogram:  No signs of epidural fibrosis.\par \par  \par \par Findings: Cervical spine x-ray with AP and oblique views, no degenerative changes noted.\par \par  \par \par  \par \par Complications: None. The patient tolerated the procedure well.\par \par  \par \par Disposition: I have examined the patient and there are no new physical findings since the original presentation.  Sensory and motor function were intact. The patient met discharge criteria see nurses notes. The discharge instruction sheet was reviewed and given to the patient. The patient was discharged home with a .  If patient gets sustained relief will have patient do shoulder griddle strengthening with Thera bands and walking.\par \par  \par \par Comment: 1st CRAIG today, schedule 2nd in 1-2 weeks depending of effectiveness vs follow up in office depending on the insurance. Call if any problems.\par \par  \par \par This document was electronically signed by:\par \par \par \par Michael Horton D.O.\par \par Diplomat, American Board of Anesthesiology\par \par Diplomat, American Board of Pain Medicine\par \par Diplomat, American Board of Pain Management\par \par \par \par

## 2022-09-12 ENCOUNTER — APPOINTMENT (OUTPATIENT)
Dept: PSYCHIATRY | Facility: CLINIC | Age: 47
End: 2022-09-12

## 2022-09-12 ENCOUNTER — OUTPATIENT (OUTPATIENT)
Dept: OUTPATIENT SERVICES | Facility: HOSPITAL | Age: 47
LOS: 1 days | Discharge: HOME | End: 2022-09-12

## 2022-09-12 DIAGNOSIS — F31.81 BIPOLAR II DISORDER: ICD-10-CM

## 2022-09-12 DIAGNOSIS — F41.1 GENERALIZED ANXIETY DISORDER: ICD-10-CM

## 2022-09-12 DIAGNOSIS — G47.00 INSOMNIA, UNSPECIFIED: ICD-10-CM

## 2022-09-12 PROCEDURE — 99214 OFFICE O/P EST MOD 30 MIN: CPT

## 2022-09-19 ENCOUNTER — APPOINTMENT (OUTPATIENT)
Dept: PSYCHIATRY | Facility: CLINIC | Age: 47
End: 2022-09-19

## 2022-09-26 ENCOUNTER — OUTPATIENT (OUTPATIENT)
Dept: OUTPATIENT SERVICES | Facility: HOSPITAL | Age: 47
LOS: 1 days | Discharge: HOME | End: 2022-09-26

## 2022-09-26 ENCOUNTER — APPOINTMENT (OUTPATIENT)
Dept: PSYCHIATRY | Facility: CLINIC | Age: 47
End: 2022-09-26

## 2022-09-26 DIAGNOSIS — G47.00 INSOMNIA, UNSPECIFIED: ICD-10-CM

## 2022-09-26 DIAGNOSIS — F31.81 BIPOLAR II DISORDER: ICD-10-CM

## 2022-09-26 DIAGNOSIS — F41.1 GENERALIZED ANXIETY DISORDER: ICD-10-CM

## 2022-10-01 ENCOUNTER — EMERGENCY (EMERGENCY)
Facility: HOSPITAL | Age: 47
LOS: 2 days | Discharge: PSYCHIATRIC OR SUBSTANCE USE FACILITY | End: 2022-10-04
Attending: EMERGENCY MEDICINE | Admitting: EMERGENCY MEDICINE

## 2022-10-01 VITALS
TEMPERATURE: 96 F | SYSTOLIC BLOOD PRESSURE: 108 MMHG | HEIGHT: 67 IN | WEIGHT: 139.99 LBS | DIASTOLIC BLOOD PRESSURE: 59 MMHG | OXYGEN SATURATION: 98 % | RESPIRATION RATE: 20 BRPM | HEART RATE: 89 BPM

## 2022-10-01 DIAGNOSIS — Z20.822 CONTACT WITH AND (SUSPECTED) EXPOSURE TO COVID-19: ICD-10-CM

## 2022-10-01 DIAGNOSIS — Z91.51 PERSONAL HISTORY OF SUICIDAL BEHAVIOR: ICD-10-CM

## 2022-10-01 DIAGNOSIS — F32.A DEPRESSION, UNSPECIFIED: ICD-10-CM

## 2022-10-01 DIAGNOSIS — R45.851 SUICIDAL IDEATIONS: ICD-10-CM

## 2022-10-01 DIAGNOSIS — Z91.138 PATIENT'S UNINTENTIONAL UNDERDOSING OF MEDICATION REGIMEN FOR OTHER REASON: ICD-10-CM
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
## 2022-10-01 DIAGNOSIS — T43.96XA UNDERDOSING OF UNSPECIFIED PSYCHOTROPIC DRUG, INITIAL ENCOUNTER: ICD-10-CM

## 2022-10-01 DIAGNOSIS — F41.8 OTHER SPECIFIED ANXIETY DISORDERS: ICD-10-CM
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
## 2022-10-01 DIAGNOSIS — F11.10 OPIOID ABUSE, UNCOMPLICATED: ICD-10-CM

## 2022-10-01 DIAGNOSIS — F31.81 BIPOLAR II DISORDER: ICD-10-CM

## 2022-10-01 DIAGNOSIS — F13.10 SEDATIVE, HYPNOTIC OR ANXIOLYTIC ABUSE, UNCOMPLICATED: ICD-10-CM

## 2022-10-01 LAB
ALBUMIN SERPL ELPH-MCNC: 4.5 G/DL — SIGNIFICANT CHANGE UP (ref 3.5–5.2)
ALP SERPL-CCNC: 70 U/L — SIGNIFICANT CHANGE UP (ref 30–115)
ALT FLD-CCNC: 7 U/L — SIGNIFICANT CHANGE UP (ref 0–41)
ANION GAP SERPL CALC-SCNC: 9 MMOL/L — SIGNIFICANT CHANGE UP (ref 7–14)
APAP SERPL-MCNC: <5 UG/ML — LOW (ref 10–30)
AST SERPL-CCNC: 13 U/L — SIGNIFICANT CHANGE UP (ref 0–41)
BASOPHILS # BLD AUTO: 0.05 K/UL — SIGNIFICANT CHANGE UP (ref 0–0.2)
BASOPHILS NFR BLD AUTO: 0.7 % — SIGNIFICANT CHANGE UP (ref 0–1)
BILIRUB SERPL-MCNC: <0.2 MG/DL — SIGNIFICANT CHANGE UP (ref 0.2–1.2)
BUN SERPL-MCNC: 7 MG/DL — LOW (ref 10–20)
CALCIUM SERPL-MCNC: 9.1 MG/DL — SIGNIFICANT CHANGE UP (ref 8.4–10.5)
CHLORIDE SERPL-SCNC: 104 MMOL/L — SIGNIFICANT CHANGE UP (ref 98–110)
CO2 SERPL-SCNC: 27 MMOL/L — SIGNIFICANT CHANGE UP (ref 17–32)
CREAT SERPL-MCNC: 0.6 MG/DL — LOW (ref 0.7–1.5)
EGFR: 111 ML/MIN/1.73M2 — SIGNIFICANT CHANGE UP
EOSINOPHIL # BLD AUTO: 0.13 K/UL — SIGNIFICANT CHANGE UP (ref 0–0.7)
EOSINOPHIL NFR BLD AUTO: 1.9 % — SIGNIFICANT CHANGE UP (ref 0–8)
ETHANOL SERPL-MCNC: <10 MG/DL — SIGNIFICANT CHANGE UP
GLUCOSE SERPL-MCNC: 78 MG/DL — SIGNIFICANT CHANGE UP (ref 70–99)
HCT VFR BLD CALC: 33.6 % — LOW (ref 37–47)
HGB BLD-MCNC: 10.5 G/DL — LOW (ref 12–16)
IMM GRANULOCYTES NFR BLD AUTO: 0.1 % — SIGNIFICANT CHANGE UP (ref 0.1–0.3)
LYMPHOCYTES # BLD AUTO: 2.94 K/UL — SIGNIFICANT CHANGE UP (ref 1.2–3.4)
LYMPHOCYTES # BLD AUTO: 44.1 % — SIGNIFICANT CHANGE UP (ref 20.5–51.1)
MCHC RBC-ENTMCNC: 24.7 PG — LOW (ref 27–31)
MCHC RBC-ENTMCNC: 31.3 G/DL — LOW (ref 32–37)
MCV RBC AUTO: 79.1 FL — LOW (ref 81–99)
MONOCYTES # BLD AUTO: 0.46 K/UL — SIGNIFICANT CHANGE UP (ref 0.1–0.6)
MONOCYTES NFR BLD AUTO: 6.9 % — SIGNIFICANT CHANGE UP (ref 1.7–9.3)
NEUTROPHILS # BLD AUTO: 3.08 K/UL — SIGNIFICANT CHANGE UP (ref 1.4–6.5)
NEUTROPHILS NFR BLD AUTO: 46.3 % — SIGNIFICANT CHANGE UP (ref 42.2–75.2)
NRBC # BLD: 0 /100 WBCS — SIGNIFICANT CHANGE UP (ref 0–0)
PLATELET # BLD AUTO: 251 K/UL — SIGNIFICANT CHANGE UP (ref 130–400)
POTASSIUM SERPL-MCNC: 4 MMOL/L — SIGNIFICANT CHANGE UP (ref 3.5–5)
POTASSIUM SERPL-SCNC: 4 MMOL/L — SIGNIFICANT CHANGE UP (ref 3.5–5)
PROT SERPL-MCNC: 6.4 G/DL — SIGNIFICANT CHANGE UP (ref 6–8)
RBC # BLD: 4.25 M/UL — SIGNIFICANT CHANGE UP (ref 4.2–5.4)
RBC # FLD: 18.3 % — HIGH (ref 11.5–14.5)
SALICYLATES SERPL-MCNC: <0.3 MG/DL — LOW (ref 4–30)
SARS-COV-2 RNA SPEC QL NAA+PROBE: SIGNIFICANT CHANGE UP
SODIUM SERPL-SCNC: 140 MMOL/L — SIGNIFICANT CHANGE UP (ref 135–146)
WBC # BLD: 6.67 K/UL — SIGNIFICANT CHANGE UP (ref 4.8–10.8)
WBC # FLD AUTO: 6.67 K/UL — SIGNIFICANT CHANGE UP (ref 4.8–10.8)

## 2022-10-01 PROCEDURE — 99285 EMERGENCY DEPT VISIT HI MDM: CPT

## 2022-10-01 NOTE — ED PROVIDER NOTE - NS ED ROS FT
Constitutional: no fever, chills, no recent weight loss, change in appetite or malaise  Eyes: no redness/discharge/pain/vision changes  ENT: no rhinorrhea/ear pain/sore throat  Cardiac: No chest pain, SOB or edema.  Respiratory: No cough or respiratory distress  GI: No nausea, vomiting, diarrhea or abdominal pain.  : No dysuria, frequency, urgency or hematuria  MS: no pain to back or extremities, no loss of ROM, no weakness  Neuro/psych: See HPI.  Skin: No skin rash.  Endocrine: No history of thyroid disease or diabetes.

## 2022-10-01 NOTE — ED PROVIDER NOTE - ATTENDING APP SHARED VISIT CONTRIBUTION OF CARE
46-year-old female past medical history of bipolar presents for evaluation after ingesting 8 tablets of 2 mg Klonopin today. Patient states she also drank fireball. When asked patient if she wanted to hurt herself she states "I really did not care either way." Patient states she saw her therapist earlier in the week who recommended she come to ED then after she expressed that she was thinking of jumping in front of a train. Patient states that she has been out of her medications secondary to insurance issue.  She is still taking Klonopin and Abilify. Pt states she was admitted about a year ago for same. On exam patient in NAD, AAOx3, calm and cooperative, no signs of trauma, lungs CTA B/L, no edema

## 2022-10-01 NOTE — ED PROVIDER NOTE - CLINICAL SUMMARY MEDICAL DECISION MAKING FREE TEXT BOX
Medically cleared.  Evaluated by telepsych.  Will admit to [facility] voluntarily pending bed availability. Medically cleared.  Evaluated by telepsych.  Will admit to [facility] voluntarily pending bed availability.    Patient cooperative in ED, will admit [facility]

## 2022-10-01 NOTE — ED PROVIDER NOTE - MDM PATIENT STATEMENT FOR ADDL TREATMENT
Patient called office and left a voicemail requesting to talk to Dr Armando Gutierrez to call her STAT regarding chemotherapy tx. Patient requesting call back at [phone]. Writer sent Dr Armando Gutierrez a message regarding above.  Gilford Fan Patient with one or more new problems requiring additional work-up/treatment.

## 2022-10-01 NOTE — ED ADULT NURSE NOTE - HPI (INCLUDE ILLNESS QUALITY, SEVERITY, DURATION, TIMING, CONTEXT, MODIFYING FACTORS, ASSOCIATED SIGNS AND SYMPTOMS)
"on monday I called my psychiatriast that I wanted to jump in front of a train. the doctor told me to go to the er but I didn't want to. today I took 8 klonopin - 2 mg each and washed it down with fireball. I just didn't care if I woke up or not."  pt stated she ran out of her psych medication due to insurance issues and couldn't afford her medications   pt stated "I just don't care what happens"   pt denies having a plan for suicide   pt denies HI/AVH

## 2022-10-01 NOTE — ED PROVIDER NOTE - OBJECTIVE STATEMENT
46 years old female history of bipolar, anxiety/depression present complaint of suicidal ideation and depressions.  Patient States multiple times" I do not care anymore" during evaluations.  Also admits that she took 8 2 mg Klonopin with fireball this evening so she can go to sleep and not carry anything anymore.  Also reports history had a suicidal ideation few days ago.  Plan is to jump in front of the train to end her life.  Also admits she stopped taking few of her psych meds for few weeks secondary to insurance issues.  Patient otherwise denies medical complaints.

## 2022-10-01 NOTE — ED ADULT TRIAGE NOTE - CHIEF COMPLAINT QUOTE
pt states "over the course of tonight I took 8 klonopin 2 mg and washed them down with alcohol. I did not care if I lived or . I feel numb"

## 2022-10-01 NOTE — ED PROVIDER NOTE - PROGRESS NOTE DETAILS
NC: spoke to telepsych no beds available, involuntary hold at this time Signed out to Dr. Medrano. TN  -received sign out from WOO Agarwla, pending bed placement; no events overnight, pt w/o new or worsening complaints; Pt is co anxiety  .reviewed patients  chart . reviewed  psych recommendation 2mg po ativan  orderd

## 2022-10-01 NOTE — ED PROVIDER NOTE - NS ED ATTENDING STATEMENT MOD
This was a shared visit with the DILAN. I reviewed and verified the documentation and independently performed the documented:

## 2022-10-01 NOTE — ED PROVIDER NOTE - PHYSICAL EXAMINATION
CONSTITUTIONAL: Well-appearing; well-nourished; in no apparent distress.   EYES: PERRL; EOM intact.   CARDIOVASCULAR: Normal S1, S2; no murmurs, rubs, or gallops.   RESPIRATORY: Normal chest excursion with respiration; breath sounds clear and equal bilaterally; no wheezes, rhonchi, or rales.  GI/: Normal bowel sounds; non-distended; non-tender; no palpable organomegaly.   MS: No calf swelling and tenderness.  SKIN: Normal for age and race; warm; dry; good turgor; no apparent lesions or exudate.   NEURO/PSYCH: A & O x 4; grossly unremarkable.  Depressed mood and flat affect.

## 2022-10-02 DIAGNOSIS — F31.81 BIPOLAR II DISORDER: ICD-10-CM

## 2022-10-02 LAB — HCG SERPL QL: NEGATIVE — SIGNIFICANT CHANGE UP

## 2022-10-02 PROCEDURE — 90792 PSYCH DIAG EVAL W/MED SRVCS: CPT | Mod: 95

## 2022-10-02 PROCEDURE — 93010 ELECTROCARDIOGRAM REPORT: CPT

## 2022-10-02 RX ORDER — ESCITALOPRAM OXALATE 10 MG/1
10 TABLET, FILM COATED ORAL ONCE
Refills: 0 | Status: COMPLETED | OUTPATIENT
Start: 2022-10-02 | End: 2022-10-02

## 2022-10-02 RX ORDER — BUPROPION HYDROCHLORIDE 150 MG/1
300 TABLET, EXTENDED RELEASE ORAL DAILY
Refills: 0 | Status: DISCONTINUED | OUTPATIENT
Start: 2022-10-02 | End: 2022-10-04

## 2022-10-02 RX ORDER — ONDANSETRON 8 MG/1
8 TABLET, FILM COATED ORAL ONCE
Refills: 0 | Status: COMPLETED | OUTPATIENT
Start: 2022-10-02 | End: 2022-10-02

## 2022-10-02 RX ORDER — DIAZEPAM 5 MG
10 TABLET ORAL ONCE
Refills: 0 | Status: DISCONTINUED | OUTPATIENT
Start: 2022-10-02 | End: 2022-10-02

## 2022-10-02 RX ORDER — ARIPIPRAZOLE 15 MG/1
10 TABLET ORAL ONCE
Refills: 0 | Status: COMPLETED | OUTPATIENT
Start: 2022-10-02 | End: 2022-10-02

## 2022-10-02 RX ORDER — CLONAZEPAM 1 MG
1 TABLET ORAL ONCE
Refills: 0 | Status: DISCONTINUED | OUTPATIENT
Start: 2022-10-02 | End: 2022-10-02

## 2022-10-02 RX ORDER — NICOTINE POLACRILEX 2 MG
4 GUM BUCCAL ONCE
Refills: 0 | Status: COMPLETED | OUTPATIENT
Start: 2022-10-02 | End: 2022-10-02

## 2022-10-02 RX ADMIN — ARIPIPRAZOLE 10 MILLIGRAM(S): 15 TABLET ORAL at 20:03

## 2022-10-02 RX ADMIN — Medication 1 MILLIGRAM(S): at 12:43

## 2022-10-02 RX ADMIN — ESCITALOPRAM OXALATE 10 MILLIGRAM(S): 10 TABLET, FILM COATED ORAL at 20:03

## 2022-10-02 RX ADMIN — Medication 10 MILLIGRAM(S): at 20:03

## 2022-10-02 RX ADMIN — BUPROPION HYDROCHLORIDE 300 MILLIGRAM(S): 150 TABLET, EXTENDED RELEASE ORAL at 12:43

## 2022-10-02 RX ADMIN — ONDANSETRON 8 MILLIGRAM(S): 8 TABLET, FILM COATED ORAL at 11:53

## 2022-10-02 RX ADMIN — Medication 4 MILLIGRAM(S): at 22:12
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
## 2022-10-02 NOTE — ED BEHAVIORAL HEALTH ASSESSMENT NOTE - DESCRIPTION
lives alone, works full time at [employer] residential group [place] uncooperative, denying her drinking problems and lying about her DUI, refusing to provide collateral information, insisting to leave. see bh note    COVID Exposure Screen- Patient  1. *Have you had a COVID-19 test in the last 90 days? ( ) Yes (  ) No ( x ) Unknown- Reason: _____  3. *Have you tested positive for COVID-19 antibodies? ( ) Yes (  ) No ( x ) Unknown- Reason: _____  5. *Have you received 2 doses of the COVID-19 vaccine? (x ) Yes ( ) No ( ) Unknown- Reason: _____  7. *In the past 10 days, have you been around anyone with a positive COVID-19 test?* ( ) Yes ( x ) No ( ) Unknown- Reason: ____  13. *Have you been out of [place] within the past 10 days?* ( ) Yes (x ) No (  ) Unknown- Reason: _____ hpi see hpi
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
## 2022-10-02 NOTE — ED BEHAVIORAL HEALTH ASSESSMENT NOTE - REASON FOR REFERRAL
psychiatric evaluation for suicidal attempt referred by family members and close friends overdose on Klonopin and alcohol

## 2022-10-02 NOTE — ED BEHAVIORAL HEALTH ASSESSMENT NOTE - RISK ASSESSMENT
Pt lives alone, displaying withdrawn behaviors and suicidal behaviors with suicidal note. Pt presents high suicidal risk. High Acute Suicide Risk Pt lives alone, displaying withdrawn behaviors and suicidal behaviors with recent attempt and plans. Pt presents high suicidal risk.
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
## 2022-10-02 NOTE — ED BEHAVIORAL HEALTH NOTE - BEHAVIORAL HEALTH NOTE
==================  PRE-HOSPITAL COURSE  ===================  SOURCE:  Secondhand ED documentation & ED provider report  DETAILS: Patient was BIB self due to ODing on 8pills of Klonopin 2MG with EtOH with suicidal intent.  =========  ED COURSE  =========  BEHAVIOR: Patient was described to be currently resting comfortably, expresses SI without plan, denies current HI/AVH, exhibits linear thought process and thought content WNL, is AAAOx4, remains in good behavioral control, appears to be well-groomed, presents with good hygiene.  TREATMENT:  None  VISITORS: None

## 2022-10-02 NOTE — ED BEHAVIORAL HEALTH NOTE - BEHAVIORAL HEALTH NOTE
============  ED COURSE Update: No issues during the night. The pt. has been sleeping and is cooperative and wanting to continue to hold for bed for [facility] psych. No medications have been required.    ============

## 2022-10-02 NOTE — ED BEHAVIORAL HEALTH ASSESSMENT NOTE - PSYCHIATRIC ISSUES AND PLAN (INCLUDE STANDING AND PRN MEDICATION)
hold for bed; continue home med regimen; PRNS: haldol 5mg, ativan 2mg, diphenhydramine 50mg, PO/IM, Q6H for Agitation

## 2022-10-02 NOTE — ED BEHAVIORAL HEALTH ASSESSMENT NOTE - NSPRESENTSXS_PSY_ALL_CORE
refuses to engage in treatment/Depressed mood/Anhedonia/Impulsivity/Hopelessness or despair/Refusal or inability to complete safety plan Depressed mood/Anhedonia/Impulsivity/Hopelessness or despair/Refusal or inability to complete safety plan Depressed mood/Anhedonia/Impulsivity/Hopelessness or despair
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
## 2022-10-02 NOTE — ED BEHAVIORAL HEALTH ASSESSMENT NOTE - CURRENT MEDICATION
Welbutrin XR 150mg, Hydroxyzine 25mg 1-2 tabs for insomnia, and Ativan 0.5mg bid PRN for anxiety Welbutrin XR 300mg, Klonopin 1mg BID

## 2022-10-02 NOTE — ED BEHAVIORAL HEALTH ASSESSMENT NOTE - OTHER PAST PSYCHIATRIC HISTORY (INCLUDE DETAILS REGARDING ONSET, COURSE OF ILLNESS, INPATIENT/OUTPATIENT TREATMENT)
benzo abuse in the past dx bipolar 2, anxiety, one prior adm, sees Dr Keith Murdock and Nikki Mathur at [facility]

## 2022-10-03 ENCOUNTER — APPOINTMENT (OUTPATIENT)
Dept: PSYCHIATRY | Facility: CLINIC | Age: 47
End: 2022-10-03

## 2022-10-03 ENCOUNTER — NON-APPOINTMENT (OUTPATIENT)
Age: 47
End: 2022-10-03

## 2022-10-03 LAB
AMPHET UR-MCNC: NEGATIVE — SIGNIFICANT CHANGE UP
BARBITURATES UR SCN-MCNC: NEGATIVE — SIGNIFICANT CHANGE UP
BENZODIAZ UR-MCNC: NEGATIVE — SIGNIFICANT CHANGE UP
COCAINE METAB.OTHER UR-MCNC: NEGATIVE — SIGNIFICANT CHANGE UP
DRUG SCREEN 1, URINE RESULT: SIGNIFICANT CHANGE UP
FENTANYL UR QL: NEGATIVE — SIGNIFICANT CHANGE UP
METHADONE UR-MCNC: NEGATIVE — SIGNIFICANT CHANGE UP
OPIATES UR-MCNC: NEGATIVE — SIGNIFICANT CHANGE UP
OXYCODONE UR-MCNC: NEGATIVE — SIGNIFICANT CHANGE UP
PCP UR-MCNC: NEGATIVE — SIGNIFICANT CHANGE UP
PROPOXYPHENE QUALITATIVE URINE RESULT: NEGATIVE — SIGNIFICANT CHANGE UP
THC UR QL: NEGATIVE — SIGNIFICANT CHANGE UP

## 2022-10-03 PROCEDURE — 99215 OFFICE O/P EST HI 40 MIN: CPT | Mod: 95

## 2022-10-03 RX ORDER — DIPHENHYDRAMINE HCL 50 MG
50 CAPSULE ORAL EVERY 6 HOURS
Refills: 0 | Status: DISCONTINUED | OUTPATIENT
Start: 2022-10-04 | End: 2022-10-07

## 2022-10-03 RX ORDER — ARIPIPRAZOLE 15 MG/1
10 TABLET ORAL DAILY
Refills: 0 | Status: DISCONTINUED | OUTPATIENT
Start: 2022-10-04 | End: 2022-10-07

## 2022-10-03 RX ORDER — HALOPERIDOL DECANOATE 100 MG/ML
5 INJECTION INTRAMUSCULAR EVERY 6 HOURS
Refills: 0 | Status: DISCONTINUED | OUTPATIENT
Start: 2022-10-04 | End: 2022-10-07

## 2022-10-03 RX ORDER — THIAMINE MONONITRATE (VIT B1) 100 MG
100 TABLET ORAL DAILY
Refills: 0 | Status: COMPLETED | OUTPATIENT
Start: 2022-10-04 | End: 2022-10-06

## 2022-10-03 RX ORDER — DIPHENHYDRAMINE HCL 50 MG
50 CAPSULE ORAL AT BEDTIME
Refills: 0 | Status: DISCONTINUED | OUTPATIENT
Start: 2022-10-04 | End: 2022-10-07

## 2022-10-03 RX ORDER — BUPROPION HYDROCHLORIDE 150 MG/1
300 TABLET, EXTENDED RELEASE ORAL DAILY
Refills: 0 | Status: DISCONTINUED | OUTPATIENT
Start: 2022-10-04 | End: 2022-10-07

## 2022-10-03 RX ORDER — ESCITALOPRAM OXALATE 10 MG/1
10 TABLET, FILM COATED ORAL DAILY
Refills: 0 | Status: DISCONTINUED | OUTPATIENT
Start: 2022-10-04 | End: 2022-10-07

## 2022-10-03 RX ORDER — FOLIC ACID 0.8 MG
1 TABLET ORAL DAILY
Refills: 0 | Status: DISCONTINUED | OUTPATIENT
Start: 2022-10-04 | End: 2022-10-07

## 2022-10-03 RX ORDER — CLONAZEPAM 1 MG
1 TABLET ORAL
Refills: 0 | Status: DISCONTINUED | OUTPATIENT
Start: 2022-10-04 | End: 2022-10-07

## 2022-10-03 RX ADMIN — Medication 2 MILLIGRAM(S): at 21:42

## 2022-10-03 RX ADMIN — BUPROPION HYDROCHLORIDE 300 MILLIGRAM(S): 150 TABLET, EXTENDED RELEASE ORAL at 12:32

## 2022-10-03 NOTE — PROGRESS NOTE BEHAVIORAL HEALTH - NSBHCONSULTMEDAGITATION_PSY_A_CORE FT
-Recommending Haldol 5 mg q6 PO/IM PRN agitation, Ativan 2mg q6 PO/IM PRN agitation, Benadryl 50 mg PO/IM q6 PRN agitation

## 2022-10-03 NOTE — PROGRESS NOTE BEHAVIORAL HEALTH - NSBHFUPINTERVALHXFT_PSY_A_CORE
Patient seen and examined on reassessment this morning. Chart reviewed. Case discussed with EM provider. No acute events overnight. Patient has been tolerating standing doses of her medications. Reportedly has been eating and drinking and sleeping without difficulty. This morning she says she feels "the same".  Acknowledges recent OD as a SA. This morning she denies any current suicidal ideation but continues to endorse dysphoria, irritability, and "wanting to get myself together". Denies any homicidal/aggressive ideation. Denies any symptoms concerning for sundar/hypomania. Denies AH/VH/paranoid ideation. Denies any other perceptual disturbances. No delusions elicited on exam. Denies significant anxiety symptoms. Denies panic attacks. Denies symptoms consistent with OCD. Denies trauma history or any symptoms consistent with PTSD. Denies nicotine use. Denies marijuana use. Denies any other illicit substance use. Reports EtOH use but is unable to give frequency or amount of use; denies any h/o complicated withdrawals or DTs. Denies any current physical complaints. When discussing necessity of psychiatric admission, patient becomes irritable when discussing possibility of transfer to a unit Patient seen and examined on reassessment this morning. Chart reviewed. Case discussed with EM provider. No acute events overnight. Patient has been tolerating standing doses of her medications. Reportedly has been eating and drinking and sleeping without difficulty. This morning she says she feels "the same".  Acknowledges recent OD as a SA. This morning she denies any current suicidal ideation but continues to endorse dysphoria, irritability, and "wanting to get myself together". Denies any homicidal/aggressive ideation. Denies any symptoms concerning for sundar/hypomania. Denies AH/VH/paranoid ideation. Denies any other perceptual disturbances. No delusions elicited on exam. Denies significant anxiety symptoms. Denies panic attacks. Denies symptoms consistent with OCD. Denies trauma history or any symptoms consistent with PTSD. Denies nicotine use. Denies marijuana use. Denies any other illicit substance use. Reports EtOH use but is unable to give frequency or amount of use; denies any h/o complicated withdrawals or DTs. Denies any current physical complaints. When discussing necessity of psychiatric admission, patient becomes irritable when discussing possibility of transfer to a unit outside of [facility]. No evidence of violence/aggression noted. Patient does give permission to speak to best friendTesha, for additional collateral: [phone].    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 Friend reports that because some paperwork wasn't completed and a $400 bill was not paid, patient's state plan insurance was paused, but this could be re-instated if she pays her bill. Friend expressed concern that over the last couple weeks as she has been off her medications (reportedly unable to afford them without insurance). Friend says patient has been talking to her about feeling more depressed, suicidal, and anxious over the last couple weeks. Friend is concerned for her safety at this time.

## 2022-10-03 NOTE — ED BEHAVIORAL HEALTH NOTE - BEHAVIORAL HEALTH NOTE
SOURCE: [facility] [place].   RE-ASSESS: Pt has been sleeping this morning, no issues otherwise. There were no events overnight, pt remained calm and cooperative.  Pt initially was voluntary but her legal status was changed to involuntary. Pt has been in the ED for the last two days and on one occasion there was a bed available but pt refused to go. Though, pt did not require any medication or security intervention. Pt will receive her home med Wellbutrin at 12pm. Pt currently remains asleep.

## 2022-10-03 NOTE — PROGRESS NOTE BEHAVIORAL HEALTH - RISK ASSESSMENT
Brief summary of previous assessments of danger to self/others (ED RN, ED MD,  Provider,  Allied Health, etc.):   "Pt lives alone, displaying withdrawn behaviors and suicidal behaviors with recent attempt and plans. Pt presents high suicidal risk."    Updated Acute Suicide Risk:  (  ) High   ( X ) Moderate   (  ) Low   (  ) Unable to determine   Rationale:  Modifiable risk factors: lack of adherence with medication 2/2 insurance complications; ongoing depression; recent SA by OD  Unmodifiable risk factors: history of psychiatric hospitalization; history of mood disorder; h/o SA  Protective factors: no co-morbid substance use; domiciled status; help-seeking; lack of current suicidality or homicidally; lack of urges to self-harm or engage in NSSIB; supportive friend    Elevated Chronic Risk:   (X) Yes __h/o psych hospitalization and SA  Details ___________  (  ) No   ___________

## 2022-10-03 NOTE — PROGRESS NOTE BEHAVIORAL HEALTH - NSBHCONSULTFOLLOWDETAILS_PSY_A_CORE FT
patient is note safe to leave the ED; elopement precautions; patient is for admission to inpatient psychiatry pending bed availability

## 2022-10-04 ENCOUNTER — INPATIENT (INPATIENT)
Facility: HOSPITAL | Age: 47
LOS: 2 days | Discharge: HOME | DRG: 753 | End: 2022-10-07
Attending: PSYCHIATRY & NEUROLOGY | Admitting: PSYCHIATRY & NEUROLOGY
Payer: MEDICAID

## 2022-10-04 VITALS
SYSTOLIC BLOOD PRESSURE: 100 MMHG | HEART RATE: 54 BPM | DIASTOLIC BLOOD PRESSURE: 79 MMHG | TEMPERATURE: 97 F | RESPIRATION RATE: 18 BRPM

## 2022-10-04 VITALS — TEMPERATURE: 98 F | WEIGHT: 160.06 LBS | HEART RATE: 65 BPM | RESPIRATION RATE: 18 BRPM | HEIGHT: 67 IN

## 2022-10-04 DIAGNOSIS — Z90.3 ACQUIRED ABSENCE OF STOMACH [PART OF]: Chronic | ICD-10-CM

## 2022-10-04 DIAGNOSIS — F31.5 BIPOLAR DISORDER, CURRENT EPISODE DEPRESSED, SEVERE, WITH PSYCHOTIC FEATURES: ICD-10-CM

## 2022-10-04 DIAGNOSIS — F41.9 ANXIETY DISORDER, UNSPECIFIED: ICD-10-CM

## 2022-10-04 LAB
A1C WITH ESTIMATED AVERAGE GLUCOSE RESULT: 5.2 % — SIGNIFICANT CHANGE UP (ref 4–5.6)
ESTIMATED AVERAGE GLUCOSE: 103 MG/DL — SIGNIFICANT CHANGE UP (ref 68–114)
HCG SERPL-ACNC: 2 MIU/ML — SIGNIFICANT CHANGE UP

## 2022-10-04 PROCEDURE — 84702 CHORIONIC GONADOTROPIN TEST: CPT

## 2022-10-04 PROCEDURE — 83036 HEMOGLOBIN GLYCOSYLATED A1C: CPT

## 2022-10-04 PROCEDURE — 36415 COLL VENOUS BLD VENIPUNCTURE: CPT

## 2022-10-04 PROCEDURE — 86769 SARS-COV-2 COVID-19 ANTIBODY: CPT

## 2022-10-04 PROCEDURE — 99252 IP/OBS CONSLTJ NEW/EST SF 35: CPT

## 2022-10-04 PROCEDURE — 84443 ASSAY THYROID STIM HORMONE: CPT

## 2022-10-04 PROCEDURE — 99223 1ST HOSP IP/OBS HIGH 75: CPT

## 2022-10-04 PROCEDURE — 80061 LIPID PANEL: CPT

## 2022-10-04 RX ORDER — DIPHENHYDRAMINE HCL 50 MG
50 CAPSULE ORAL ONCE
Refills: 0 | Status: COMPLETED | OUTPATIENT
Start: 2022-10-04 | End: 2022-10-04

## 2022-10-04 RX ADMIN — Medication 2 MILLIGRAM(S): at 04:49

## 2022-10-04 RX ADMIN — Medication 100 MILLIGRAM(S): at 11:25

## 2022-10-04 RX ADMIN — Medication 50 MILLIGRAM(S): at 01:22

## 2022-10-04 RX ADMIN — Medication 1 MILLIGRAM(S): at 11:25

## 2022-10-04 RX ADMIN — BUPROPION HYDROCHLORIDE 300 MILLIGRAM(S): 150 TABLET, EXTENDED RELEASE ORAL at 11:26

## 2022-10-04 RX ADMIN — ESCITALOPRAM OXALATE 10 MILLIGRAM(S): 10 TABLET, FILM COATED ORAL at 11:25

## 2022-10-04 RX ADMIN — Medication 1 MILLIGRAM(S): at 22:21

## 2022-10-04 RX ADMIN — Medication 1 MILLIGRAM(S): at 11:26

## 2022-10-04 RX ADMIN — HALOPERIDOL DECANOATE 5 MILLIGRAM(S): 100 INJECTION INTRAMUSCULAR at 04:49

## 2022-10-04 RX ADMIN — Medication 1 TABLET(S): at 11:26

## 2022-10-04 RX ADMIN — ARIPIPRAZOLE 10 MILLIGRAM(S): 15 TABLET ORAL at 11:30

## 2022-10-04 RX ADMIN — Medication 50 MILLIGRAM(S): at 04:48

## 2022-10-04 NOTE — BH INPATIENT PSYCHIATRY ASSESSMENT NOTE - NSBHCRANIAL_PSY_ALL_CORE
Recognizes 2 fingers or can read (II)/Opens mouth, sticks out tongue (V, XII)/Normal speech (IX, X, XII)/EOMI (III, IV, VI)/Shoulder shrug (XI)

## 2022-10-04 NOTE — ED ADULT NURSE REASSESSMENT NOTE - NS ED NURSE REASSESS COMMENT FT1
[facility] arrived for transport. All property and valuables given to EMS. transfer paperwork completed. New VS obtained.

## 2022-10-04 NOTE — BH INPATIENT PSYCHIATRY ASSESSMENT NOTE - DESCRIPTION (FIRST USE, LAST USE, QUANTITY, FREQUENCY, DURATION)
Discussion/Summary   Your DEXA bone scan is normal.      Dr. Rony Pope        Verified Results  Rosie Rothman [id number] 09:32AM [address] [facility]   Ordering Provider: Valente Kessler. Reason For Study: screening,screening. Test Name Result Flag Reference   RENO Breen (Report)     Accession #    [id number]    EXAM: MA DEXASCAN AXIAL    CLINICAL INDICATION: Osteoarthritis. Pain in both lower extremities. Postmenopausal.    COMPARISON: 08/26/2014. FINDINGS:    LUMBAR SPINE:  The average BMD of the lumbar spine region = 1.170 gm/cm2, T-score = 1.1, Z-score = 3.6. Findings are consistent with increased bone mineral density. Compared to the previous lumbar spine value of 1.173 gm/cm2, today's value is not significantly   changed. LEFT FEMORAL NECK:  The BMD of the femoral neck region = 0.924 gm/cm2, T-score = 0.7, Z-score = 2.8. Findings are consistent with normal bone mineral density. IMPRESSION:    No evidence of osteopenia or osteoporosis.      **** FINAL ****    Dictated By:   Brian Childress    Electronically Reviewed and Approved By:  Brian Childress One pack per day

## 2022-10-04 NOTE — ED BEHAVIORAL HEALTH NOTE - BEHAVIORAL HEALTH NOTE
Patient interviewed and part B completed at [facility]. Orders have been entered and handoff provided to inpatient RN previously by previous doctor and Inpatient MD in the morning. Full ED BH Assessment is located in [address] [facility] [id number]"

## 2022-10-04 NOTE — BH INPATIENT PSYCHIATRY ASSESSMENT NOTE - SUICIDE ATTEMPT:
Opioid Pregnancy And Lactation Text: These medications can lead to premature delivery and should be avoided during pregnancy. These medications are also present in breast milk in small amounts. Yes > 3 months ago

## 2022-10-04 NOTE — BH PATIENT PROFILE - NSSBIRTALCNOACTINTDET_GEN_A_CORE
Patient stated she only drink  alcohol socially which is rare occasions, this time I did it purposely

## 2022-10-04 NOTE — H&P ADULT - OPHTHALMOLOGIC
Problem: Suicidal Behavior  Goal: Suicidal Behavior is Absent or Managed  Outcome: Ongoing, Progressing     Problem: Activity and Energy Impairment (Depressive Signs/Symptoms)  Goal: Optimized Energy Level (Depressive Signs/Symptoms)  Outcome: Ongoing, Progressing  Intervention: Optimize Energy Level  Recent Flowsheet Documentation  Taken 2/24/2022 2100 by Mary Little RN  Activity (Behavioral Health): up ad kathleen   Goal Outcome Evaluation:    Plan of Care Reviewed With: patient     Overall Patient Progress: improving    Behavioral  Pt was visible in the milieu reported depression 5/10 and anxiety 7/10. Pt said it had been five tough days following his mom's passing. The writer provided pt with therapeutic listening and talk, and he reported feeling better. Pt denies SI or AVH. His mood was calm, and his affect blunted. Pt ate 100% of his meals. Pt requested and got an egg crate mattress topper.   Medical Alerts  He uses a walker and has R knee brace. Rates pain to R knee 5-7 and continues to have scheduled Oxycodone. HS dose for Oxycodone is now 10 mg. Pt got PRN Tylenol for pain as well.  Plan  Continue to monitor              negative
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
## 2022-10-04 NOTE — BH INPATIENT PSYCHIATRY ASSESSMENT NOTE - OTHER PAST PSYCHIATRIC HISTORY (INCLUDE DETAILS REGARDING ONSET, COURSE OF ILLNESS, INPATIENT/OUTPATIENT TREATMENT)
Dx bipolar 2, anxiety, one prior adm, sees Dr Keith Murdock and Nikki Mathur at [facility]

## 2022-10-04 NOTE — BH INPATIENT PSYCHIATRY ASSESSMENT NOTE - NSDCCRITERIA_PSY_ALL_CORE
1. Once stable and able to care for self with no SI  2. To have appropriate after care f/u on discharge for meds and continued care   3. Insurance issues to help with as per SW involvement
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
## 2022-10-04 NOTE — BH PATIENT PROFILE - NSTOBACCOCESSATIONEDU3_GEN_A_NUR
meconium stained fluid
Learning behavioral activities to cope with urges.  For example, distraction and changing routines

## 2022-10-04 NOTE — BH PATIENT PROFILE - NSDASAVERBSTAFF_PSY_ALL_CORE
Pt returned HST device. It was downloaded and forwarded data to the clinical specialist for scoring.   No

## 2022-10-04 NOTE — H&P ADULT - NSICDXFAMILYHX_GEN_ALL_CORE_FT
FAMILY HISTORY:  Father  Still living? Unknown  No pertinent family history in first degree relatives, Age at diagnosis: Age Unknown    Mother  Still living? Unknown  No pertinent family history in first degree relatives, Age at diagnosis: Age Unknown    Grandparent  Still living? Unknown  No pertinent family history in first degree relatives, Age at diagnosis: Age Unknown

## 2022-10-04 NOTE — BH SOCIAL WORK INITIAL PSYCHOSOCIAL EVALUATION - NSBHSUBSTANCEHX_PSY_ALL_CORE
Per chart, hx of abusing etoh and benzos. Current use of etoh per last progress note from [facility]./Yes...

## 2022-10-04 NOTE — BH INPATIENT PSYCHIATRY ASSESSMENT NOTE - RISK ASSESSMENT
Pt lives alone, displaying withdrawn behaviors and suicidal behaviors with recent attempt and plans. Pt presents high suicidal risk.

## 2022-10-04 NOTE — BH INPATIENT PSYCHIATRY ASSESSMENT NOTE - NSBHCHARTREVIEWVS_PSY_A_CORE FT
Vital Signs Last 24 Hrs  T(C): 36.5 (10-04-22 @ 03:20), Max: 36.9 (10-03-22 @ 18:07)  T(F): 97.7 (10-04-22 @ 03:20), Max: 98.5 (10-03-22 @ 18:07)  HR: 65 (10-04-22 @ 03:20) (54 - 71)  BP: 100/79 (10-04-22 @ 01:08) (100/79 - 122/77)  BP(mean): --  RR: 18 (10-04-22 @ 03:20) (18 - 18)  SpO2: 100% (10-03-22 @ 18:07) (100% - 100%)    Orthostatic VS  10-04-22 @ 03:20  Lying BP: 105/75 HR: 65  Sitting BP: 105/73 HR: 70  Standing BP: 100/74 HR: 87  Site: --  Mode: --

## 2022-10-04 NOTE — PSYCHIATRIC REHAB INITIAL EVALUATION - NSBHPRRECOMMEND_PSY_ALL_CORE
Patient will be encouraged to participate in 1-2 psych rehab groups daily and focus on improving coping skills.

## 2022-10-04 NOTE — H&P ADULT - NSHPADDITIONALINFOADULT_GEN_ALL_CORE
ATTENDING NOTE:      Case discussed and reviewed in detail after initial evaluation above by WOO Pepper. Please note my plan below.     45 y/o F w/ PMH of bipolar disorder, anxiety, polysubstance abuse, prior suicide attempt, p/w suicide attempt w/ overdose    *Suicide attempt / bipolar disorder / anxiety  -Management as per psych    *Anemia  -Trend H/H and if stable f/u outpatient for further work up    *DVT ppx  -Encourage ambulation

## 2022-10-04 NOTE — BH SOCIAL WORK INITIAL PSYCHOSOCIAL EVALUATION - NSBHFINANCE_PSY_ALL_CORE
Per chart, "pt's insurance is currently on pause due to not paying a medical bill. Pt is currently on disability since 2/2 due to a injury @ work". Pt currently works @ a [employer]/Social Security Disability (SSD)/Unable to pay bills

## 2022-10-04 NOTE — BH INPATIENT PSYCHIATRY ASSESSMENT NOTE - NSBHMETABOLIC_PSY_ALL_CORE_FT
BMI: BMI (kg/m2): 25.1 (10-04-22 @ 03:20)  HbA1c: A1C with Estimated Average Glucose Result: 5.2 % (10-04-22 @ 08:02)    Glucose:   BP: --  Lipid Panel: Date/Time: 11-02-21 @ 07:11  Cholesterol, Serum: 171  Direct LDL: --  HDL Cholesterol, Serum: 83  Total Cholesterol/HDL Ration Measurement: --  Triglycerides, Serum: 69
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
## 2022-10-04 NOTE — H&P ADULT - NSHPPHYSICALEXAM_GEN_ALL_CORE
Vital Signs Last 24 Hrs  T(C): 36.5 (04 Oct 2022 03:20), Max: 36.5 (04 Oct 2022 03:20)  T(F): 97.7 (04 Oct 2022 03:20), Max: 97.7 (04 Oct 2022 03:20)  HR: 65 (04 Oct 2022 03:20) (54 - 65)  BP: 100/79 (04 Oct 2022 01:08) (100/79 - 100/79)  RR: 18 (04 Oct 2022 03:20) (18 - 18)  O2 Concentration (%): 97

## 2022-10-04 NOTE — H&P ADULT - ASSESSMENT
47 y/o female with history of bipolar disorder admitted with depression and suicidal ideation.    # Bipolar disorder  # Suicidal ideation   - Management per psych     # Anemia   - Stool for occult blood  - Ferritin, Iron, TIBC, CBC  - Pt will need to have a colonoscopy outpatient after discharge     # Nicotine use   - Tobacco cessation counseling  - Nicotine replacement     DVT ppx  Patient is ambulatory, encourage ambulation    47 y/o female with history of bipolar disorder admitted with depression and suicidal ideation.    # Bipolar disorder  # Suicidal ideation   - Management per psych     # Anemia   - Stool for occult blood  - Ferritin, Iron, TIBC, CBC  - Pt will need to have a colonoscopy outpatient after discharge     # Nicotine use   - Tobacco cessation counseling  - Nicotine replacement     DVT ppx  Patient is ambulatory, encourage ambulation     D/w Dr. Pennington  45 y/o female with history of bipolar disorder admitted with depression and suicidal ideation.    # Bipolar disorder  # Suicidal ideation   - Management per psych     # Anemia   - Outpatient follow up with PMD and GI if stable      # Nicotine use   - Tobacco cessation counseling  - Nicotine replacement     DVT ppx  Patient is ambulatory, encourage ambulation     Will sign off, reconsult PRN    D/w Dr. Pennington
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
## 2022-10-04 NOTE — H&P ADULT - NSTOBACCOMED_CS_GEN_A_CORE_ALL
1) Your knee will swell over the next 48hours and you can expect pain to get a bit worse. Ice your Knee plenty, continuously if possible. Fill up a plastic bag, then wrap it in a towel or pillow case.     2) Elevate your leg above your heart with about 3 pillows when you can, when you are in bed or chair. Otherwise you should be up and about, walking as much as you can tolerate. The more you move the better you will do. Weight bearing as tolerated.    3) Expect some bloody drainage. It is normal. It may even soak through the gauze and ACE bandage and look bloody. This is mostly leftover Saline fluid coming out of your knee from surgery. Even a drop of blood can make it look very bloody. Simply place another Gauze and another ACE over it and wrap it snug but not overly tight. If it bleeds through the second bandage again, call.    4) Remove bandage in 48hrs and place waterproof band aides. You can shower in 48 hours. No bath. Pat your incisions dry. No creams, no lotions.    5) Only reason to worry would be if pain got so severe that you cannot feel or wiggle your toes, or if your foot is cold. In this case you need to call and come to the ER. But as long as you can feel and wiggle your toes, you are fine.    6) Call the office to schedule a follow up appointment to be seen in 10-14 days. Your Sutures will be removed at that point.    7) A pain Rx is in the chart; fill it on your way home. OR was sent electronically to your pharmacy, pick it up on the way home. Offered and patient accepted 1) Your knee will swell over the next 48 hours and you can expect pain to get a bit worse. Ice your Knee plenty, continuously if possible. Fill up a plastic bag, then wrap it in a towel or pillow case.     2) Elevate your leg above your heart with about 3 pillows when you can, when you are in bed or chair. Otherwise you should be up and about, walking as much as you can tolerate with a cane. The more you move the better you will do. Weight bearing as tolerated.    3) Expect some bloody drainage. It is normal. It may even soak through the gauze and ACE bandage and look bloody. This is mostly leftover Saline fluid coming out of your knee from surgery. Even a drop of blood can make it look very bloody. Simply place another Gauze and another ACE over it and wrap it snug but not overly tight. If it bleeds through the second bandage again, call.    4) Only reason to worry would be if pain got so severe that you cannot feel or wiggle your toes, or if your foot is cold. In this case you need to call and come to the ER. But as long as you can feel and wiggle your toes, you are fine.    5) Call the office to schedule a follow up appointment to be seen in 2 days. Your dressing will be changed at that point.    7) A pain Rx was sent electronically to your pharmacy, pick it up on the way home. 1) Your knee will swell over the next 48 hours and you can expect pain to get a bit worse. Ice your Knee plenty, continuously if possible. Fill up a plastic bag, then wrap it in a towel or pillow case.     2) Elevate your leg above your heart with about 3 pillows when you can, when you are in bed or chair. Otherwise you should be up and about, walking as much as you can tolerate with a cane. The more you move the better you will do. Weight bearing as tolerated.    3) Expect some bloody drainage. It is normal. It may even soak through the gauze and ACE bandage and look bloody. This is mostly leftover Saline fluid coming out of your knee from surgery. Even a drop of blood can make it look very bloody. Simply place another Gauze and another ACE over it and wrap it snug but not overly tight. If it bleeds through the second bandage again, call.    4) Only reason to worry would be if pain got so severe that you cannot feel or wiggle your toes, or if your foot is cold. In this case you need to call and come to the ER. But as long as you can feel and wiggle your toes, you are fine.    5) Call the office to schedule a follow up appointment to be seen in 2 days. Your dressing will be changed at that point.    6) A pain Rx was sent electronically to your pharmacy, pick it up on the way home.

## 2022-10-04 NOTE — BH INPATIENT PSYCHIATRY ASSESSMENT NOTE - NSBHCHARTREVIEWINVESTIGATE_PSY_A_CORE FT
< from: 12 Lead ECG (10.02.22 @ 01:00) >    Ventricular Rate 70 BPM    Atrial Rate 70 BPM    P-R Interval 140 ms    QRS Duration 82 ms    Q-T Interval 408 ms    QTC Calculation(Bazett) 440 ms    P Axis 51 degrees    R Axis 49 degrees    T Axis 28 degrees    Diagnosis Line Normal sinus rhythm  Loss of R wave v2 ? lead placement vs septal MI ? age   Abnormal ECG    Confirmed by GABO MCCABE MD ([id number]) on 10/2/2022 8:37:12 AM    < end of copied text >

## 2022-10-04 NOTE — H&P ADULT - VTE RISK ASSESSMENT
Spoke to patient to go over surgery on 11-5-20  Main OR    Lt Breast OTILIA guided Lumpectomy, Lt [place]illa [place] Node Biopsy.    Arrive    9:30  SI          11:30  Surgery  12:30    Return to see Dr VERDUZCO 11-17-20 arrive 12:45    Spoke to pt she v/u with appointment dates and  times.    Shilpi   <<--- Click to launch
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
## 2022-10-04 NOTE — BH PATIENT PROFILE - HOME MEDICATIONS
clonazePAM 1 mg oral tablet , 1 tab(s) orally 2 times a day x 7 days MDD:Max daily dose until told otherwise by MD  hydrOXYzine hydrochloride 50 mg oral tablet , 1 tab(s) orally 1 to 2 times a day x 14 days, As Needed  for anxiety or insomnia   nicotine 21 mg/24 hr transdermal film, extended release , 1 film(s) transdermal once a day  escitalopram 10 mg oral tablet , 1 tab(s) orally once a day, until stopped by MD   ARIPiprazole 10 mg oral tablet , 1 tab(s) orally once a day x 5 days   escitalopram 10 mg oral tablet , 1 tab(s) orally once a day x 5 days   buPROPion 300 mg/24 hours (XL) oral tablet, extended release , 1 tab(s) orally once a day x 5 days   clonazePAM 1 mg oral tablet , 1 tab(s) orally 2 times a day x 7 days MDD:2 mg/day
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
## 2022-10-04 NOTE — BH SOCIAL WORK INITIAL PSYCHOSOCIAL EVALUATION - NSPTSTATEDGOAL_PSY_ALL_CORE
Patient will be referred to the appropriate level of outpatient treatment and have appointments within 3-5 days of discharge.

## 2022-10-05 PROCEDURE — 99232 SBSQ HOSP IP/OBS MODERATE 35: CPT

## 2022-10-05 RX ADMIN — Medication 100 MILLIGRAM(S): at 09:25

## 2022-10-05 RX ADMIN — BUPROPION HYDROCHLORIDE 300 MILLIGRAM(S): 150 TABLET, EXTENDED RELEASE ORAL at 09:25

## 2022-10-05 RX ADMIN — Medication 1 MILLIGRAM(S): at 21:34

## 2022-10-05 RX ADMIN — Medication 1 MILLIGRAM(S): at 09:25

## 2022-10-05 RX ADMIN — ESCITALOPRAM OXALATE 10 MILLIGRAM(S): 10 TABLET, FILM COATED ORAL at 09:25

## 2022-10-05 RX ADMIN — ARIPIPRAZOLE 10 MILLIGRAM(S): 15 TABLET ORAL at 09:25

## 2022-10-05 RX ADMIN — Medication 1 TABLET(S): at 09:25

## 2022-10-06 RX ADMIN — Medication 1 MILLIGRAM(S): at 09:27

## 2022-10-06 RX ADMIN — BUPROPION HYDROCHLORIDE 300 MILLIGRAM(S): 150 TABLET, EXTENDED RELEASE ORAL at 09:26

## 2022-10-06 RX ADMIN — Medication 1 MILLIGRAM(S): at 20:34

## 2022-10-06 RX ADMIN — ESCITALOPRAM OXALATE 10 MILLIGRAM(S): 10 TABLET, FILM COATED ORAL at 09:26

## 2022-10-06 RX ADMIN — Medication 100 MILLIGRAM(S): at 09:26

## 2022-10-06 RX ADMIN — Medication 1 TABLET(S): at 09:27

## 2022-10-06 RX ADMIN — ARIPIPRAZOLE 10 MILLIGRAM(S): 15 TABLET ORAL at 09:26

## 2022-10-07 VITALS — OXYGEN SATURATION: 100 % | RESPIRATION RATE: 16 BRPM | TEMPERATURE: 98 F

## 2022-10-07 LAB
CHOLEST SERPL-MCNC: 200 MG/DL — HIGH
HDLC SERPL-MCNC: 59 MG/DL — SIGNIFICANT CHANGE UP
LIPID PNL WITH DIRECT LDL SERPL: 117 MG/DL — HIGH
NON HDL CHOLESTEROL: 141 MG/DL — HIGH
TRIGL SERPL-MCNC: 120 MG/DL — SIGNIFICANT CHANGE UP
TSH SERPL-MCNC: 1.18 UU/ML — SIGNIFICANT CHANGE UP (ref 0.34–4.82)

## 2022-10-07 PROCEDURE — 99239 HOSP IP/OBS DSCHRG MGMT >30: CPT

## 2022-10-07 RX ORDER — BUPROPION HYDROCHLORIDE 150 MG/1
1 TABLET, EXTENDED RELEASE ORAL
Qty: 5 | Refills: 0
Start: 2022-10-07 | End: 2022-10-11

## 2022-10-07 RX ORDER — ESCITALOPRAM OXALATE 10 MG/1
1 TABLET, FILM COATED ORAL
Qty: 5 | Refills: 0
Start: 2022-10-07 | End: 2022-10-11

## 2022-10-07 RX ORDER — ARIPIPRAZOLE 15 MG/1
1 TABLET ORAL
Qty: 30 | Refills: 0
Start: 2022-10-07 | End: 2022-11-05

## 2022-10-07 RX ORDER — ESCITALOPRAM OXALATE 10 MG/1
1 TABLET, FILM COATED ORAL
Qty: 30 | Refills: 0
Start: 2022-10-07 | End: 2022-11-05

## 2022-10-07 RX ORDER — BUPROPION HYDROCHLORIDE 150 MG/1
1 TABLET, EXTENDED RELEASE ORAL
Qty: 30 | Refills: 0
Start: 2022-10-07 | End: 2022-11-05

## 2022-10-07 RX ORDER — CLONAZEPAM 1 MG
1 TABLET ORAL
Qty: 14 | Refills: 0
Start: 2022-10-07 | End: 2022-10-13

## 2022-10-07 RX ORDER — ARIPIPRAZOLE 15 MG/1
1 TABLET ORAL
Qty: 5 | Refills: 0
Start: 2022-10-07 | End: 2022-10-11

## 2022-10-07 RX ADMIN — BUPROPION HYDROCHLORIDE 300 MILLIGRAM(S): 150 TABLET, EXTENDED RELEASE ORAL at 09:08

## 2022-10-07 RX ADMIN — Medication 1 MILLIGRAM(S): at 09:09

## 2022-10-07 RX ADMIN — ESCITALOPRAM OXALATE 10 MILLIGRAM(S): 10 TABLET, FILM COATED ORAL at 09:08

## 2022-10-07 RX ADMIN — ARIPIPRAZOLE 10 MILLIGRAM(S): 15 TABLET ORAL at 09:08

## 2022-10-07 RX ADMIN — Medication 1 TABLET(S): at 09:08

## 2022-10-07 NOTE — BH INPATIENT PSYCHIATRY PROGRESS NOTE - NSBHMSEGAIT_PSY_A_CORE
We are committed to providing our patients with their test results in a timely manner. If you have access to [facility], you will receive your results within 5 to 7 days. If your results are normal, a member of our office may call you or you may receive a letter in the mail within 10 to 15 days of your testing. If your results have something additional to discuss, a member of our office will contact you by phone. If at any time you have questions related your results, please feel free to call our office at [phone]  
Normal gait / station
Normal gait / station

## 2022-10-07 NOTE — BH INPATIENT PSYCHIATRY DISCHARGE NOTE - NSBHSUICIDESTATUS_PSY_ALL_CORE
Patient categorically denies that she did not try to commit suicide this time. Moreover she came 1-2 days later after she took Klonopin, which proves that she was not able to get the help she wanted especially to continue meds which helped her to stabilize
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
## 2022-10-07 NOTE — BH INPATIENT PSYCHIATRY PROGRESS NOTE - NSDCCRITERIA_PSY_ALL_CORE
1. Once stable and able to care for self with no SI  2. To have appropriate after care f/u on discharge for meds and continued care   3. Insurance issues to help with as per SW involvement
1. Once stable and able to care for self with no SI  2. To have appropriate after care f/u on discharge for meds and continued care   3. Insurance issues to help with as per SW involvement

## 2022-10-07 NOTE — BH INPATIENT PSYCHIATRY PROGRESS NOTE - NSBHCHARTREVIEWINVESTIGATE_PSY_A_CORE FT
< from: 12 Lead ECG (10.02.22 @ 01:00) >    Ventricular Rate 70 BPM    Atrial Rate 70 BPM    P-R Interval 140 ms    QRS Duration 82 ms    Q-T Interval 408 ms    QTC Calculation(Bazett) 440 ms    P Axis 51 degrees    R Axis 49 degrees    T Axis 28 degrees    Diagnosis Line Normal sinus rhythm  Loss of R wave v2 ? lead placement vs septal MI ? age   Abnormal ECG    Confirmed by GABO MCCABE MD ([id number]) on 10/2/2022 8:37:12 AM    < end of copied text >    
< from: 12 Lead ECG (10.02.22 @ 01:00) >    Ventricular Rate 70 BPM    Atrial Rate 70 BPM    P-R Interval 140 ms    QRS Duration 82 ms    Q-T Interval 408 ms    QTC Calculation(Bazett) 440 ms    P Axis 51 degrees    R Axis 49 degrees    T Axis 28 degrees    Diagnosis Line Normal sinus rhythm  Loss of R wave v2 ? lead placement vs septal MI ? age   Abnormal ECG    Confirmed by GABO MCCABE MD ([id number]) on 10/2/2022 8:37:12 AM    < end of copied text >
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
## 2022-10-07 NOTE — BH DISCHARGE NOTE NURSING/SOCIAL WORK/PSYCH REHAB - NSDCADDINFO2FT_PSY_ALL_CORE
You have an in person appt with your psychiatrist Dr. Castano on 10/13 @ 11am. If you need to reschedule, please call the number above

## 2022-10-07 NOTE — BH INPATIENT PSYCHIATRY DISCHARGE NOTE - NSDCCPCAREPLAN_GEN_ALL_CORE_FT
PRINCIPAL DISCHARGE DIAGNOSIS  Diagnosis: Bipolar II disorder  Assessment and Plan of Treatment: Abilify 10 mg; Wellbutrin  mg and Lexapro 10 mg Daily

## 2022-10-07 NOTE — BH INPATIENT PSYCHIATRY PROGRESS NOTE - NSBHCHARTREVIEWVS_PSY_A_CORE FT
Vital Signs Last 24 Hrs  T(C): 36.5 (10-05-22 @ 08:36), Max: 36.5 (10-05-22 @ 08:36)  T(F): 97.7 (10-05-22 @ 08:36), Max: 97.7 (10-05-22 @ 08:36)  HR: 72 (10-05-22 @ 06:00) (70 - 72)  BP: 108/74 (10-05-22 @ 06:00) (108/74 - 110/76)  BP(mean): --  RR: 16 (10-05-22 @ 08:36) (16 - 16)  SpO2: 100% (10-05-22 @ 08:36) (100% - 100%)    Orthostatic VS  10-05-22 @ 08:36  Lying BP: --/-- HR: --  Sitting BP: 100/67 HR: 100  Standing BP: 94/60 HR: 105  Site: upper right arm  Mode: electronic  Orthostatic VS  10-04-22 @ 03:20  Lying BP: 105/75 HR: 65  Sitting BP: 105/73 HR: 70  Standing BP: 100/74 HR: 87  Site: --  Mode: --  
Vital Signs Last 24 Hrs  T(C): 36.4 (10-07-22 @ 07:50), Max: 36.7 (10-07-22 @ 06:42)  T(F): 97.6 (10-07-22 @ 07:50), Max: 98 (10-07-22 @ 06:42)  HR: 67 (10-07-22 @ 06:42) (67 - 67)  BP: 95/62 (10-07-22 @ 06:42) (95/62 - 95/62)  BP(mean): --  RR: 16 (10-07-22 @ 07:50) (14 - 16)  SpO2: 100% (10-07-22 @ 07:50) (100% - 100%)    Orthostatic VS  10-07-22 @ 07:50  Lying BP: --/-- HR: --  Sitting BP: 100/75 HR: 72  Standing BP: 90/69 HR: 86  Site: upper right arm  Mode: electronic  Orthostatic VS  10-06-22 @ 07:25  Lying BP: --/-- HR: --  Sitting BP: 101/76 HR: 63  Standing BP: 98/63 HR: 81  Site: upper right arm  Mode: electronic

## 2022-10-07 NOTE — BH INPATIENT PSYCHIATRY DISCHARGE NOTE - NSBHMETABOLIC_PSY_ALL_CORE_FT
BMI: BMI (kg/m2): 25.1 (10-04-22 @ 03:20)  HbA1c: A1C with Estimated Average Glucose Result: 5.2 % (10-04-22 @ 08:02)    Glucose:   BP: 95/62 (10-07-22 @ 06:42) (92/73 - 110/76)  Lipid Panel: Date/Time: 11-02-21 @ 07:11  Cholesterol, Serum: 171  Direct LDL: --  HDL Cholesterol, Serum: 83  Total Cholesterol/HDL Ration Measurement: --  Triglycerides, Serum: 69   BMI: BMI (kg/m2): 25.1 (10-04-22 @ 03:20)  HbA1c: A1C with Estimated Average Glucose Result: 5.2 % (10-04-22 @ 08:02)    Glucose:   BP: 95/62 (10-07-22 @ 06:42) (92/73 - 110/76)  Lipid Panel: Date/Time: 11-02-21 @ 07:11  Cholesterol, Serum: 171  Direct LDL: --  HDL Cholesterol, Serum: 83  Triglycerides, Serum: 69

## 2022-10-07 NOTE — BH INPATIENT PSYCHIATRY PROGRESS NOTE - NSBHMETABOLIC_PSY_ALL_CORE_FT
BMI: BMI (kg/m2): 25.1 (10-04-22 @ 03:20)  HbA1c: A1C with Estimated Average Glucose Result: 5.2 % (10-04-22 @ 08:02)    Glucose:   BP: 95/62 (10-07-22 @ 06:42) (92/73 - 110/76)  Lipid Panel: Date/Time: 11-02-21 @ 07:11  Cholesterol, Serum: 171  Direct LDL: --  HDL Cholesterol, Serum: 83  Total Cholesterol/HDL Ration Measurement: --  Triglycerides, Serum: 69  
BMI: BMI (kg/m2): 25.1 (10-04-22 @ 03:20)  HbA1c: A1C with Estimated Average Glucose Result: 5.2 % (10-04-22 @ 08:02)    Glucose:   BP: 108/74 (10-05-22 @ 06:00) (108/74 - 110/76)  Lipid Panel: Date/Time: 11-02-21 @ 07:11  Cholesterol, Serum: 171  Direct LDL: --  HDL Cholesterol, Serum: 83  Total Cholesterol/HDL Ration Measurement: --  Triglycerides, Serum: 69
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
## 2022-10-07 NOTE — BH TREATMENT PLAN - NSTXDCOTHRINTERMD_PSY_ALL_CORE
Meds to be given on discharge and prescriptions to be sent as suggested by patient for continued care after discharge

## 2022-10-07 NOTE — BH DISCHARGE NOTE NURSING/SOCIAL WORK/PSYCH REHAB - NSCDUDCCRISIS_PSY_A_CORE
[facility] Well  1 [phone]) [facility]-WELL ([phone])  Text "WELL" to 36269  Website: www.[employer]/.[facility] 1 (067) 621-HOPE (2113) Website: www.safehorizon.org/.  [facility] - DASH – Crisis Care for Children, Adults and Families  [address] [place] [address]  Mobile Crisis Hotline – (346) 708-1430/.National Suicide Prevention Lifeline 0 (437) 794-0960/.  [facility]  1 (437) LIFENET (241-4166)/.  [facility]  (443) 790-8122/.  [facility] Behavioral Health Helpline / [facility] Mobile Crisis  (349) 350-AGHN (6991)/.  [facility] Response Crisis Hotline  (514) 403-5653  24 hour telephone crisis intervention and suicide prevention hotline concerned with all mental health issues/.  [facility] Behavioral [facility] Crisis [facility]  [phone] [place] [phone]   Hours:  Monday through Friday from 9 AM to 3 PM/.  [place] [facility] of  Aff[facility] - Veterans Crisis Line  8 (092) 227-9734, Option 1/Other.../.  [facility]  [phone] [place] [address] [phone]   Hours: Monday through Friday from 10 AM to 4 PM/988 Suicide and Crisis Lifeline

## 2022-10-07 NOTE — BH INPATIENT PSYCHIATRY DISCHARGE NOTE - OTHER PAST PSYCHIATRIC HISTORY (INCLUDE DETAILS REGARDING ONSET, COURSE OF ILLNESS, INPATIENT/OUTPATIENT TREATMENT)
Dx bipolar 2, anxiety, one prior adm, sees Dr Keith Murdock and Nikki Mathur at [facility]

## 2022-10-07 NOTE — BH DISCHARGE NOTE NURSING/SOCIAL WORK/PSYCH REHAB - NSBHCRISISRESOURCESOTHER_PSY_ALL_CORE_FT
24/7 Unit Phone Number: Epifanio Chase, RN Nursing Manager, [phone] 24/7 Unit Phone Number: Epifanio Armstrong, RN Nursing Manager, [phone]
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
## 2022-10-07 NOTE — BH INPATIENT PSYCHIATRY DISCHARGE NOTE - NSDCPROCEDURESFT_PSY_ALL_CORE
CBC--WNL  Comprehensive  CBC--WNL  COMP--WNL  Pregnancy Test-Normal  U-A--WNL  U-Drug--Negative  HBA1-C-5.2  COVID-19-Negative

## 2022-10-07 NOTE — BH DISCHARGE NOTE NURSING/SOCIAL WORK/PSYCH REHAB - NSDCPEEMAIL_GEN_ALL_CORE
[facility] for Tobacco Control email [email or web address]
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
## 2022-10-07 NOTE — BH INPATIENT PSYCHIATRY PROGRESS NOTE - NSTXDCOTHRGOAL_PSY_ALL_CORE
Patient will be referred to the appropriate level of outpatient treatment and have appointments within 3-5 days of discharge.
Patient will be referred to the appropriate level of outpatient treatment and have appointments within 3-5 days of discharge.

## 2022-10-07 NOTE — BH INPATIENT PSYCHIATRY DISCHARGE NOTE - NSBHDCRISKMITIGATEFT_PSY_ALL_CORE
Patient has support of her elder sister  who is always there to help her, she has dogs which also seems to help her mood and ongoing issues, a friend close to her is also there in crisis. She expressed no SI now, U-Tox was negative
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
## 2022-10-07 NOTE — BH DISCHARGE NOTE NURSING/SOCIAL WORK/PSYCH REHAB - NSDCADDINFO1FT_PSY_ALL_CORE
You have an in person appt with your therapist Nikki Minor on 10/12 @ 9:30am. If you need to reschedule, please call the number above

## 2022-10-07 NOTE — BH DISCHARGE NOTE NURSING/SOCIAL WORK/PSYCH REHAB - PATIENT PORTAL LINK FT
You can access the FollowMyHealth Patient Portal offered by [facility] by registering at the following website: [email or web address]. By joining [facility]’s FollowMyHealth portal, you will also be able to view your health information using other applications (apps) compatible with our system.

## 2022-10-07 NOTE — BH TREATMENT PLAN - NSTXDCOTHRINTERSW_PSY_ALL_CORE
Psych informed SW supervisor that pt can be d/c either by Friday or Monday. ISHA spoke with pt, pt was A&Ox4, pleasant and cooperative. Pt agrees for dc, signed consent to return to [facility] outpt clinic. ISHA spoke with [facility] outpt clinic [facility] [phone] and referral back to clinic can be sent to [phone]. ISHA awaiting callback with aftercare appt.

## 2022-10-07 NOTE — BH INPATIENT PSYCHIATRY DISCHARGE NOTE - NSDCMRMEDTOKEN_GEN_ALL_CORE_FT
ARIPiprazole 10 mg oral tablet: 1 tab(s) orally once a day x 30 days   buPROPion 300 mg/24 hours (XL) oral tablet, extended release: 1 tab(s) orally once a day x 30 days   clonazePAM 1 mg oral tablet: 1 tab(s) orally 2 times a day x 7 days MDD:2 mg/day  escitalopram 10 mg oral tablet: 1 tab(s) orally once a day x 30 days    ARIPiprazole 10 mg oral tablet: 1 tab(s) orally once a day x 5 days   buPROPion 300 mg/24 hours (XL) oral tablet, extended release: 1 tab(s) orally once a day x 5 days   clonazePAM 1 mg oral tablet: 1 tab(s) orally 2 times a day x 7 days MDD:2 mg/day  escitalopram 10 mg oral tablet: 1 tab(s) orally once a day x 5 days

## 2022-10-07 NOTE — BH DISCHARGE NOTE NURSING/SOCIAL WORK/PSYCH REHAB - NSDCPEWEB_GEN_ALL_CORE
[facility] for Tobacco Control website --- [email or web address] website --- [email or web address]

## 2022-10-07 NOTE — BH INPATIENT PSYCHIATRY DISCHARGE NOTE - HOSPITAL COURSE
Patient was admitted involuntarily from [facility] as she was depressed and took 8 2 mg Klonopin with Alcohol ( Fireball). She endorses that ot was not a SA but was upset and wanted to draw attention ds o she may get help for her ongoing Depression. She was not able to take her meds as she has no insurance and on Comp leave so her insurance was also cut off. She was on Lexapro 10 mg with Wellbutrin  mg and Abilify 10 mg and Klonopin 1 mg BID. She was given the same meds and seems to be improving fast to stability. She initially was isolative, slowly she opened up and was communicative and also stared to join unit group/activities actively and spontaneously. She was well kept, able to communicate her needs as well so she was able to continue meds as given before for rapid recovery. She denies current SI or plan, no drugs were found on urine drug test. She drinks socially now. Meds were sent to pharmacy as she requested and 5 days worth of meds were given from  to avoid gap in getting the meds from Pharmacy.  She was started on Wellbutrin  mg daily with titration to Wellbutrin  mg daily, Abilify 10 mg and Lexapro 10 mg daily with Klonopin 1mg BID. She improved rapidly and after 48 hours she was more visible in unit, was vocal about the reason for admission and her difficulty issues pertaining no insurance. She had previous SA mostly the same way she did this colton, but her lethality was considerably less and has she mentions that it was a cry for help to stabilize fast and get help for insurance coverage for her mental health.

## 2022-10-12 ENCOUNTER — APPOINTMENT (OUTPATIENT)
Dept: PSYCHIATRY | Facility: CLINIC | Age: 47
End: 2022-10-12

## 2022-10-12 ENCOUNTER — OUTPATIENT (OUTPATIENT)
Dept: OUTPATIENT SERVICES | Facility: HOSPITAL | Age: 47
LOS: 1 days | Discharge: HOME | End: 2022-10-12

## 2022-10-12 ENCOUNTER — APPOINTMENT (OUTPATIENT)
Dept: PAIN MANAGEMENT | Facility: CLINIC | Age: 47
End: 2022-10-12

## 2022-10-12 DIAGNOSIS — F41.1 GENERALIZED ANXIETY DISORDER: ICD-10-CM

## 2022-10-12 DIAGNOSIS — F17.210 NICOTINE DEPENDENCE, CIGARETTES, UNCOMPLICATED: ICD-10-CM

## 2022-10-12 DIAGNOSIS — T42.4X2A POISONING BY BENZODIAZEPINES, INTENTIONAL SELF-HARM, INITIAL ENCOUNTER: ICD-10-CM

## 2022-10-12 DIAGNOSIS — Z91.14 PATIENT'S OTHER NONCOMPLIANCE WITH MEDICATION REGIMEN: ICD-10-CM

## 2022-10-12 DIAGNOSIS — G47.00 INSOMNIA, UNSPECIFIED: ICD-10-CM

## 2022-10-12 DIAGNOSIS — F19.10 OTHER PSYCHOACTIVE SUBSTANCE ABUSE, UNCOMPLICATED: ICD-10-CM

## 2022-10-12 DIAGNOSIS — F31.81 BIPOLAR II DISORDER: ICD-10-CM

## 2022-10-12 DIAGNOSIS — R45.851 SUICIDAL IDEATIONS: ICD-10-CM

## 2022-10-12 DIAGNOSIS — D64.9 ANEMIA, UNSPECIFIED: ICD-10-CM

## 2022-10-12 DIAGNOSIS — Z88.0 ALLERGY STATUS TO PENICILLIN: ICD-10-CM

## 2022-10-12 DIAGNOSIS — Z90.3 ACQUIRED ABSENCE OF STOMACH [PART OF]: Chronic | ICD-10-CM

## 2022-10-12 DIAGNOSIS — F41.9 ANXIETY DISORDER, UNSPECIFIED: ICD-10-CM

## 2022-10-12 DIAGNOSIS — Z98.84 BARIATRIC SURGERY STATUS: ICD-10-CM

## 2022-10-12 PROCEDURE — 99214 OFFICE O/P EST MOD 30 MIN: CPT

## 2022-10-12 NOTE — RESULTS/DATA
[FreeTextEntry1] : This is a 45 year old female, , living alone. She denies any past treatment for mental Health,, She ha d been prescribed Zoloft by PCP but reports negative reaction\par  She presents with Major depressive symptoms. anhedonia, irritability, loss of interest and motivation, insomna. 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 She also reports lonliness but has a good support system. \par DX:Major depressive episode\par  OLGA\par /(Ro bi polar2) \par   Elaine requests therapy and medication evaluation

## 2022-10-12 NOTE — PHYSICAL EXAM
[None] : none [Anxious] : no anxious [Dysphoric] : dysphoric [Normal] : good [FreeTextEntry8] : ok [FreeTextEntry9] : constricted
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
## 2022-10-12 NOTE — CURRENT PSYCHIATRIC SYMPTOMS
[Depressed Mood] : depressed mood [Anhedonia] : no anhedonia [Guilt] : not feeling guilty [Decreased Concentration] : no decrease in concentrating ability [Hyperphagia] : no hyperphagia [Insomnia] : no insomnia disorder [Hypersomnia] : no ~T hypersomnia [Psychomotor Retardation] : no psychomotor retardation [Anorexia] : no anorexia [Euphoria] : no euphoria [Highly Irritable] : no high irritability [Increased Activity] : no increased in activity [Distractibility] : not distracted [Grandeur] : no feelings of grandeur [Buying Sprees] : no buying sprees [Hypersexuality] : denied hypersexuality [Dec Need For Sleep] : no decreased need for sleep [Delusions] : no ~T delusions [Hallucination Auditory] : no auditory hallucinations [Thought Disorder] : ~T a thought disorder was not noted [Excessive Worry] : excessive worry [Ruminations] : ruminations [Obsessions] : no obsessions [Re-experiencing] : no re-experiencing [Restlessness] : no restlessness [Hypochondriasis] : no hypochondriasis [Panic] : no panic disorder [Recent Onset] : no recent onset of confusion [Fluctuating Course] : no fluctuating course [Reversed sleep-wake] : no reversed sleep- wake [Inattentive] : not nattentive [Tremor] : ~T no ~M tremor was seen [Hallucination Visual] : no visual hallucinations [Hallucination Olfactory] : no olfactory hallucinations [Nausea] : no nausea [Hallucination Tactile] : no tactile hallucinations [Hallucination Gustatory] : no gustatory hallucinations [Diaphoresis] : showed no ~M diaphoresis [Vomiting] : no vomiting [Yawning] : no yawning [Mydriasis] : showed no ~M mydriasis [Gastrointestinal Sxs] : no ~T gastrointestinal symptoms [Piloerection] : the hair did not demonstrate piloerection [Rhinorrhea] : no ~M rhinorrhea discharge was seen [de-identified] : less depressed [de-identified] : at times, improved

## 2022-10-12 NOTE — PLAN
[FreeTextEntry2] : GOAL: To  manage Depression  \par Goal : manage alcohol abuse/refrain from alcohol\par OBJECTIVE: Stay in touch with 2 social supports Padmini and Tesha Garcia\par OBJECTIVE: Use emergency service if needed\par NEW GOAL: Recent DC from [facility] for alcohol abuse and suicidal ideation  NOW she looks better and denies any suicidality [Psychodynamic Therapy] : Psychodynamic Therapy  [de-identified] : PT has been in [facility] for past week and has been able to obtain her medication and was placed on emergency medicaid. She is alert, and mood is calm and focused.  She has finally received owed back pay and we discussed how to manage ongoing applications for entitlements. \daryl Henry is future focused and responsive and interactive.She is provided with support and encouragement

## 2022-10-12 NOTE — ADDENDUM
[FreeTextEntry1] : PLAN: Weekly sessions continue MONdays at 1 PM   Next Psychiatric evaluation on 10/13

## 2022-10-12 NOTE — PHYSICAL EXAM
[Cooperative] : cooperative [Euthymic] : euthymic [Constricted] : constricted [Clear] : clear [Linear/Goal Directed] : linear/goal directed [None Reported] : none reported [Average] : average [WNL] : within normal limits

## 2022-10-12 NOTE — RISK ASSESSMENT
[Yes, patient reports ideation or behavior] : Yes, patient reports ideation or behavior [No, patient denies ideation or behavior] : No, patient denies ideation or behavior [Supportive social network of family or friends] : supportive social network of family or friends [Methodist beliefs] : Restoration beliefs [Responsibility to children, family, or others] : responsibility to children, family, or others [Beloved pets] : beloved pets [FreeTextEntry8] : pt reports suicidal ideation and went voluntarily to ER on 10/1 and DC from [facility] on 10/8 [FreeTextEntry2] : PT has just discharged from [facility] where she was admitted for suicidal ideation on Saturday 10/1 and DC on 10/8.She denies any risk at this time  [FreeTextEntry9] : No risk to self or others elicited at this session and reports none since hospitalization
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
## 2022-10-13 NOTE — BH INPATIENT PSYCHIATRY PROGRESS NOTE - NSTXPROBMANIC_PSY_ALL_CORE
HPI and Plan:   See dictation    Today's clinic visit entailed:  Review of the result(s) of each unique test - Lab work  Ordering of each unique test  Prescription drug management  30 minutes spent on the date of the encounter doing chart review, history and exam, documentation and further activities per the note  Provider  Link to [facility] Help Grid     The level of medical decision making during this visit was of moderate complexity.      Orders Placed This Encounter   Procedures     Basic metabolic panel     Lipid Profile     Follow-Up with Cardiology RASHAD     Follow-Up with Cardiologist       Orders Placed This Encounter   Medications     Magnesium 400 MG CAPS     Sig: Take 400 mg by mouth daily       There are no discontinued medications.      Encounter Diagnoses   Name Primary?     Coronary artery disease of native artery of native heart with stable angina pectoris (H) Yes     Chest discomfort      Hyperlipidemia LDL goal <70      Benign essential hypertension      WILSON (dyspnea on exertion)      Long term current use of anticoagulant therapy      Impaired fasting glucose        CURRENT MEDICATIONS:  Current Outpatient Medications   Medication Sig Dispense Refill     amLODIPine (NORVASC) 2.5 MG tablet Take 1 tablet (2.5 mg) by mouth daily 90 tablet 1     aspirin EC 81 MG tablet Take 1 tablet (81 mg) by mouth daily       atenolol (TENORMIN) 25 MG tablet Take 1 tablet (25 mg) by mouth daily 90 tablet 3     Calcium Carb-Cholecalciferol (CALCIUM 600 + D PO) Take 1,200 mg by mouth daily        Cholecalciferol (VITAMIN D) 2000 UNITS tablet Take 2,000 Units by mouth daily        Coenzyme Q10 (COQ-10) 200 MG CAPS Take 400 mg by mouth daily        diclofenac (VOLTAREN) 1 % topical gel Apply 4 g topically 4 times daily 350 g 3     escitalopram (LEXAPRO) 5 MG tablet Take 5 mg by mouth daily       gatifloxacin (ZYMAXID) 0.5 % ophthalmic solution daily as needed        levothyroxine (SYNTHROID/LEVOTHROID) 100 MCG tablet TAKE 1 
TABLET DAILY 90 tablet 3     lisinopril (ZESTRIL) 20 MG tablet Take 1 tablet (20 mg) by mouth daily 90 tablet 3     Magnesium 400 MG CAPS Take 400 mg by mouth daily       Multiple Vitamin (DAILY MULTIVITAMIN PO) Take by mouth daily       nitroGLYcerin (NITROSTAT) 0.4 MG sublingual tablet Place 1 tablet (0.4 mg) under the tongue every 5 minutes as needed for chest pain 25 tablet 2     prednisoLONE acetate (PRED FORTE) 1 % ophthalmic suspension daily        rosuvastatin (CRESTOR) 40 MG tablet Take 1 tablet (40 mg) by mouth daily 90 tablet 3     vitamin B complex with vitamin C (STRESS TAB) tablet Take 1 tablet by mouth daily        vitamin C (ASCORBIC ACID) 1000 MG TABS Take 2,000 mg by mouth daily       warfarin ANTICOAGULANT (COUMADIN) 5 MG tablet TAKE ONE-HALF (1/2) TABLET DAILY EXCEPT TAKE ONE TABLET ON  FRIDAY OR AS DIRECTED BY INR CLINIC (Patient taking differently: TAKE ONE-HALF (1/2) TABLET DAILY EXCEPT TAKE ONE TABLET ON  FRIDAY OR AS DIRECTED BY INR CLINIC    5mg on Monday, 2.5 on all other days) 60 tablet 1     ketorolac (ACULAR) 0.5 % ophthalmic solution instill one drop into right eye twice a day for one week then one drop once a day for 7 more weeks; begin drops 3 days before surgery (Patient not taking: No sig reported)         ALLERGIES     Allergies   Allergen Reactions     Atorvastatin      Leg cramps     Cats Itching     Gluten      Sinuses affected by gluten     Oat      Shellfish Allergy      hives     Wheat        PAST MEDICAL HISTORY:  Past Medical History:   Diagnosis Date     CAD (coronary artery disease) 04/09/2009 4/8/2009: PTCA and two 2.5x15mm Xience stents to mid LAD lesion; Cath 12/2012- 40-50% stenosis in mid PDA, 80% on D1- medically treat , 5/28/2015 - PTCA and 2.25x8mm Promus PREMIIER NAILA to ostium of 2nd OM     Cerebral artery occlusion with cerebral infarction 2012     CVA (cerebral infarction)      DDD (degenerative disc disease)      Essential hypertension, benign      GERD 
(gastroesophageal reflux disease)      Hyperlipidemia      Hypothyroidism      Lumbar spinal stenosis      Mitral regurgitation     1+ per 2013 Echo     Vertebral artery stenosis, right        PAST SURGICAL HISTORY:  Past Surgical History:   Procedure Laterality Date     ARTHROPLASTY KNEE Left 2020    Procedure: Left total knee arthroplasty (Ward and Nephew #5 narrow femur; #3 tibia; 9 mm tibial poly and 35 mm patella);  Surgeon: Jorge Luis Cheatham MD;  Location: [facility] OR     CORONARY ANGIOGRAPHY ADULT ORDER  2012    40-50% stenosis to mid PDA, 80% in D1- medically treat     CORONARY ANGIOGRAPHY ADULT ORDER  2015    PTCA and 2.25x8mm Promus PREMIER NAILA to ostium of 2nd OM     DECOMPRESSION, FUSION LUMBAR POSTERIOR THREE + LEVELS, COMBINED  2013    Procedure: COMBINED DECOMPRESSION, FUSION LUMBAR POSTERIOR THREE + LEVELS;  Posterior Lumbar Decompression L3-S1, Fusion L4-S1;  Surgeon: Daniel Harris MD;  Location: [facility] OR     ENDOSCOPIC STRIPPING VEIN(S)       HEART CATH, ANGIOPLASTY  2009    PTCA and two 2.5x15mm Xience stents to mid LAD lesion     HYSTERECTOMY, RICKIE      Fibroids, and Menorrhagia.. Bilateral Oophrectomy     ORTHOPEDIC SURGERY       Stenting of LAD, Angioplasty  09     TONSILLECTOMY      as a child       FAMILY HISTORY:  Family History   Problem Relation Age of Onset     Neurologic Disorder Mother         Dementia, Still living     Ovarian Cancer Mother      Thyroid Disease Mother      C.A.D. Father              Diabetes Father      Coronary Artery Disease Father      Alcohol/Drug Brother      Thyroid Disease Son      Diabetes Son        SOCIAL HISTORY:  Social History     Socioeconomic History     Marital status:      Spouse name: None     Number of children: None     Years of education: None     Highest education level: None   Tobacco Use     Smoking status: Former     Packs/day: 0.10     Types: Cigarettes     Quit date: 1965     Years since quitting: 
"57.8     Smokeless tobacco: Never   Substance and Sexual Activity     Alcohol use: Yes     Alcohol/week: 0.0 standard drinks     Comment: 2 glasses wine per month, maybe     Drug use: No     Sexual activity: Not Currently   Other Topics Concern     Blood Transfusions No     Caffeine Concern Yes     Comment: 5 cups caffeine per day     Sleep Concern No     Stress Concern No     Weight Concern No     Comment: weight stable     Special Diet No     Comment: trying to eat healthier     Exercise Yes     Comment: stretching     Seat Belt Yes     Self-Exams Yes     Parent/sibling w/ CABG, MI or angioplasty before 65F 55M? 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  ENT:  Negative      Respiratory:  Positive for shortness of breath;dyspnea on exertion     Cardiovascular:    chest pain;Positive for;fatigue;lightheadedness cp 2 days ago (day of blood draw) - no handicap spots, so she has to walk further, was not a true pain, more like a tightness from exertion. Lightheaded if she moves too fast.  Gastroenterology: Negative      Genitourinary:  Negative      Musculoskeletal:  Positive for back pain    Neurologic:  Positive for headaches gets a sharp pain occ, no consistant spot on head, varies on how long the sharp pain lasts.  Psychiatric:  Positive for excessive stress worries about her  \"he's got a bad heart[facility]\" - he has a blockage that they don't want to fix because it could cause other issues/damage  Heme/Lymph/Imm:  Negative      Endocrine:  Positive for thyroid disorder      Physical Exam:  Vitals: /64 (BP Location: Left arm, Patient Position: Sitting)   Pulse 67   Ht 1.6 m (5' 3\")   Wt 62.6 kg (138 lb)   LMP  (LMP Unknown)   SpO2 93%   BMI 24.45 kg/m      Constitutional:  cooperative, alert and oriented, well developed, well "
nourished, in no acute distress        Skin:  warm and dry to the touch, no apparent skin lesions or masses noted          Head:  normocephalic, no masses or lesions        Eyes:  pupils equal and round, conjunctivae and lids unremarkable, sclera white, no xanthalasma, EOMS intact, no nystagmus        Lymph:      ENT:  no pallor or cyanosis, dentition good        Neck:  no carotid bruit        Respiratory:  normal breath sounds, clear to auscultation, normal A-P diameter, normal symmetry, normal respiratory excursion, no use of accessory muscles         Cardiac: regular rhythm;normal S1 and S2       LUSB;systolic murmur;grade 2        pulses full and equal                                   left wrist    GI:           Extremities and Muscular Skeletal:  no edema;no spinal abnormalities noted;normal muscle strength and tone              Neurological:  no gross motor deficits        Psych:  affect appropriate, oriented to time, person and place        CC  Nikolai Amaya MD  [address]  [place] [id number]              
MANIC SYMPTOMS
MANIC SYMPTOMS

## 2022-10-17 ENCOUNTER — NON-APPOINTMENT (OUTPATIENT)
Age: 47
End: 2022-10-17

## 2022-10-17 ENCOUNTER — OUTPATIENT (OUTPATIENT)
Dept: OUTPATIENT SERVICES | Facility: HOSPITAL | Age: 47
LOS: 1 days | Discharge: HOME | End: 2022-10-17

## 2022-10-17 ENCOUNTER — APPOINTMENT (OUTPATIENT)
Dept: PSYCHIATRY | Facility: CLINIC | Age: 47
End: 2022-10-17

## 2022-10-17 DIAGNOSIS — G47.00 INSOMNIA, UNSPECIFIED: ICD-10-CM

## 2022-10-17 DIAGNOSIS — Z90.3 ACQUIRED ABSENCE OF STOMACH [PART OF]: Chronic | ICD-10-CM

## 2022-10-17 DIAGNOSIS — F31.81 BIPOLAR II DISORDER: ICD-10-CM

## 2022-10-17 DIAGNOSIS — F41.1 GENERALIZED ANXIETY DISORDER: ICD-10-CM

## 2022-10-17 NOTE — RISK ASSESSMENT
[No, patient denies ideation or behavior] : No, patient denies ideation or behavior [FreeTextEntry7] :  NEGATIVE RISK ASSESSMENT

## 2022-10-17 NOTE — PHYSICAL EXAM
[Cooperative] : cooperative [Euthymic] : euthymic [Constricted] : constricted [Clear] : clear [Linear/Goal Directed] : linear/goal directed [None Reported] : none reported [Average] : average [WNL] : within normal limits [de-identified] : MOOD IS IMPROVED/DENIES SUICIDAL OR HOMOCIDAL IDEATION OR INTENT

## 2022-10-17 NOTE — PLAN
[Psychodynamic Therapy] : Psychodynamic Therapy  [de-identified] : PANCHO ENDORSES IMPROVED MOOD AN D FUNCTIONING. sHE HAS BEEN ABLE TO GET HER CAR BACK AND HAS A RESTRICTED LICENSE. sHE STATES THTA SHE HAS NOT BEEN HAVING ALCOHOL AND DOES NOT PLAN TO DO SO. sHE HAS BEEN COMPLETING TASKS AND WILL TAKE A  SAFETY COURSE ON 10/28 AS A REQUIREMENT FOR HAVING A  FULL RETURN OF HER DRIVERS LICENSE.\par IN [place] IS ALERT, ORIENTED WITH EUTHYMIC MOOD AND IS RESPONSIVE AND INTERACTIVE

## 2022-10-17 NOTE — DISCUSSION/SUMMARY
[Plan Review] : Plan Review [Able to manage surrounding demands and opportunities] : able to manage surrounding demands and opportunities [Able to exercise self-direction] : able to exercise self-direction [Able to set and pursue goals] : able to set and pursue goals [Adherent to treatment recommendations] : adherent to treatment recommendations [Articulate] : articulate [Attempting to realize their potential] : Attempting to realize their potential [Awareness of substance use issues] : awareness of substance use issues [Cognitively intact] : cognitively intact [Intelligent] : intelligent [Motivated to participate in treatment] : motivated to participate in treatment [Motivated and ready for change] : motivated and ready for change [Health literacy] : health literacy [Motivated to maintain or improve physical health] : motivated to maintain or improve physical health [In good health] : in good health [Has vocational interests or hobbies] : has vocational interests or hobbies [Part of a supportive family] : part of a supportive family [Has a supportive network] : has a supportive network [English fluency] : English fluency [Connected to healthcare] : connected to healthcare [Social supports] : social supports [Mental Health] : Mental Health [Substance Abuse] : Substance Abuse [Occupational/Educational] : Occupational/Educational [Interpersonal Relationships] : Interpersonal Relationships [Continued - Progress made] : Continued - Progress made: [___ times a week] : [unfilled] times a week [Improvement in symptoms as evidenced by:] : Improvement in symptoms as evidenced by: [Other rationale for transition/discharge:] : Other rationale for transition/discharge: [None - Reason others did not participate:] : None - Reason others did not participate:  [Yes] : Yes [Psychiatric Provider/Prescriber] : Psychiatric Provider/Prescriber [Therapist] : Therapist [FreeTextEntry2] : 11/03/2022 [FreeTextEntry3] : 10/13/2021 [FreeTextEntry5] : "I want to get my head on straight" [FreeTextEntry6] : Elaine has many strengths and has been responsive to interventions and compliant with treatment [FreeTextEntry7] : Elaine has a supportive family and network  However she is not financially stable   [FreeTextEntry8] : Continues to use alcohol. her abuse of alcohol was a factor in 2 suicide attempts in recent past [FreeTextEntry9] : Elaine is receptive to discussion of spirituality and her belief system   [de-identified] : Psychiatrist, Dr Murdock [FreeTextEntry1] : No job, financial stressors, some unhealthy relationship choices [FreeTextEntry4] : To manage mood issues, depression and have healthy relationship [de-identified] : uses CB exercises to manage impulsive behaviors [de-identified] : Elaine will gain insight into how hwe depression and loss issues may be a factor in recent suicide attempt [de-identified] : 11/03/22 [de-identified] : reductioj of depression and awareness of self value [de-identified] : Decrease alcohol use and refrain from abuse of alcohol [de-identified] : 11/03/22 [de-identified] : Elaine cntinues to use alcohol socially [de-identified] : Mood has stabilized [de-identified] : Goals have been met [de-identified] : Not clinically indicated

## 2022-10-19 ENCOUNTER — APPOINTMENT (OUTPATIENT)
Dept: PAIN MANAGEMENT | Facility: CLINIC | Age: 47
End: 2022-10-19

## 2022-10-24 ENCOUNTER — APPOINTMENT (OUTPATIENT)
Dept: PSYCHIATRY | Facility: CLINIC | Age: 47
End: 2022-10-24

## 2022-10-24 ENCOUNTER — OUTPATIENT (OUTPATIENT)
Dept: OUTPATIENT SERVICES | Facility: HOSPITAL | Age: 47
LOS: 1 days | Discharge: HOME | End: 2022-10-24

## 2022-10-24 DIAGNOSIS — Z90.3 ACQUIRED ABSENCE OF STOMACH [PART OF]: Chronic | ICD-10-CM

## 2022-10-24 DIAGNOSIS — G47.00 INSOMNIA, UNSPECIFIED: ICD-10-CM

## 2022-10-24 DIAGNOSIS — F41.1 GENERALIZED ANXIETY DISORDER: ICD-10-CM

## 2022-10-24 DIAGNOSIS — F31.81 BIPOLAR II DISORDER: ICD-10-CM

## 2022-10-24 PROCEDURE — 99214 OFFICE O/P EST MOD 30 MIN: CPT

## 2022-10-24 NOTE — DISCUSSION/SUMMARY
[Plan Review] : Plan Review [Able to manage surrounding demands and opportunities] : able to manage surrounding demands and opportunities [Able to exercise self-direction] : able to exercise self-direction [Able to set and pursue goals] : able to set and pursue goals [Adherent to treatment recommendations] : adherent to treatment recommendations [Articulate] : articulate [Attempting to realize their potential] : Attempting to realize their potential [Awareness of substance use issues] : awareness of substance use issues [Cognitively intact] : cognitively intact [Intelligent] : intelligent [Motivated to participate in treatment] : motivated to participate in treatment [Motivated and ready for change] : motivated and ready for change [Health literacy] : health literacy [Motivated to maintain or improve physical health] : motivated to maintain or improve physical health [In good health] : in good health [Has vocational interests or hobbies] : has vocational interests or hobbies [Part of a supportive family] : part of a supportive family [Has a supportive network] : has a supportive network [English fluency] : English fluency [Connected to healthcare] : connected to healthcare [Social supports] : social supports [FreeTextEntry1] : 8/3/2022 [FreeTextEntry2] : 11/03/2022 [FreeTextEntry3] : 10/13/2021 [FreeTextEntry5] : "I want to get my head on straight" [FreeTextEntry6] : Elaine has many strengths and has been responsive to interventions and compliant with treatment [FreeTextEntry7] : Elaine has a supportive family and network  However she is not financially stable   [FreeTextEntry8] : Continues to use alcohol. her abuse of alcohol was a factor in 2 suicide attempts in recent past [FreeTextEntry9] : Elaine is receptive to discussion of spirituality and her belief system   [de-identified] : Psychiatrist, Dr Murdock

## 2022-10-24 NOTE — PHYSICAL EXAM
[Cooperative] : cooperative [Euthymic] : euthymic [Constricted] : constricted [Clear] : clear [Linear/Goal Directed] : linear/goal directed [None Reported] : none reported [Average] : average [WNL] : within normal limits [de-identified] : MOOD IS IMPROVED/DENIES SUICIDAL OR HOMOCIDAL IDEATION OR INTENT
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
## 2022-10-24 NOTE — CURRENT PSYCHIATRIC SYMPTOMS
[Depressed Mood] : depressed mood [Anhedonia] : no anhedonia [Guilt] : not feeling guilty [Decreased Concentration] : no decrease in concentrating ability [Hyperphagia] : no hyperphagia [Insomnia] : no insomnia disorder [Hypersomnia] : no ~T hypersomnia [Psychomotor Retardation] : no psychomotor retardation [Anorexia] : no anorexia [Euphoria] : no euphoria [Highly Irritable] : no high irritability [Increased Activity] : no increased in activity [Distractibility] : not distracted [Grandeur] : no feelings of grandeur [Buying Sprees] : no buying sprees [Hypersexuality] : denied hypersexuality [Dec Need For Sleep] : no decreased need for sleep [Delusions] : no ~T delusions [Hallucination Auditory] : no auditory hallucinations [Thought Disorder] : ~T a thought disorder was not noted [Excessive Worry] : excessive worry [Ruminations] : ruminations [Obsessions] : no obsessions [Re-experiencing] : no re-experiencing [Restlessness] : no restlessness [Hypochondriasis] : no hypochondriasis [Panic] : no panic disorder [Recent Onset] : no recent onset of confusion [Fluctuating Course] : no fluctuating course [Reversed sleep-wake] : no reversed sleep- wake [Inattentive] : not nattentive [Tremor] : ~T no ~M tremor was seen [Hallucination Visual] : no visual hallucinations [Hallucination Olfactory] : no olfactory hallucinations [Nausea] : no nausea [Hallucination Tactile] : no tactile hallucinations [Hallucination Gustatory] : no gustatory hallucinations [Diaphoresis] : showed no ~M diaphoresis [Vomiting] : no vomiting [Yawning] : no yawning [Mydriasis] : showed no ~M mydriasis [Gastrointestinal Sxs] : no ~T gastrointestinal symptoms [Piloerection] : the hair did not demonstrate piloerection [Rhinorrhea] : no ~M rhinorrhea discharge was seen [de-identified] : less depressed [de-identified] : at times, improved

## 2022-10-24 NOTE — DISCUSSION/SUMMARY
[FreeTextEntry1] : PLAN:   Therapy amd Medication evaluation\par DX: OLGA/ BiPolar 2\par Remains symptomatic at times, encourage to abstain from alcohol and take all medication as prescribed.  Will continue medications for mood stability and anxiety.\par \par medication:\par 1. lexapro 20mg and eventually back to 30mg if needed\par 2. klonopin 1mg po BID prn for anxiety\par 3. hydroxyzine 25mg-50mg at bedtime prn insomnia\par 4.  abilify 10mg po daily\par 5. wellbutrin xl 300mg po daily (option to go back to 450mg po daily)\par \par Follow up in 1 week\par \par Patient has 2 SA, but enies any current SI, intent, or plan.  Safety plan discussed at length, she will reach out to provider or call 911 if any SI, intent, or plan.  She will see therapist weekly and writer every 1-2 weeks, earlier as needed.

## 2022-10-24 NOTE — PLAN
[FreeTextEntry2] : SEE DISCUSSION SUMMARY [Psychodynamic Therapy] : Psychodynamic Therapy  [de-identified] : MOHINDER CONTINUES TO REPORT SHE FEELS "BLAH" IN SESSION SHE PRESENTS WITH LOW ENERGY AND STATES SHE IS BACK ON HER MEDICATION AND IS NOW HEALING FROM THE RECENT HOSPITALIZATION.\par NO CONCERNS OR GOALS ARE DISCUSSED AT PTS REQUEST. \par SHE REPORTS SOME LOW KEY SOCIAL INTERACTIONS WITH FRIENDS AND FAMILY. AS SHE NOW HAS A CONDITIONAL LICENSE HER ABILITY TO DRIVE TO PLACES OTHER THAN DR APPOINTMENTS IS LIMITED. SHE IS PROVIDED WITH SUPPORT AND ENCOURAGEMENT

## 2022-10-24 NOTE — RISK ASSESSMENT
[No, patient denies ideation or behavior] : No, patient denies ideation or behavior [FreeTextEntry9] : NEGATIVE RISK TO SELF OR OTHERS
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
## 2022-10-26 ENCOUNTER — APPOINTMENT (OUTPATIENT)
Dept: PAIN MANAGEMENT | Facility: CLINIC | Age: 47
End: 2022-10-26

## 2022-10-26 VITALS
HEART RATE: 94 BPM | WEIGHT: 142 LBS | HEIGHT: 67 IN | DIASTOLIC BLOOD PRESSURE: 77 MMHG | SYSTOLIC BLOOD PRESSURE: 97 MMHG | BODY MASS INDEX: 22.29 KG/M2

## 2022-10-26 PROCEDURE — 99213 OFFICE O/P EST LOW 20 MIN: CPT

## 2022-10-26 PROCEDURE — 99072 ADDL SUPL MATRL&STAF TM PHE: CPT

## 2022-10-31 ENCOUNTER — APPOINTMENT (OUTPATIENT)
Dept: PSYCHIATRY | Facility: CLINIC | Age: 47
End: 2022-10-31

## 2022-10-31 ENCOUNTER — OUTPATIENT (OUTPATIENT)
Dept: OUTPATIENT SERVICES | Facility: HOSPITAL | Age: 47
LOS: 1 days | Discharge: HOME | End: 2022-10-31

## 2022-10-31 DIAGNOSIS — F41.1 GENERALIZED ANXIETY DISORDER: ICD-10-CM

## 2022-10-31 DIAGNOSIS — F31.81 BIPOLAR II DISORDER: ICD-10-CM

## 2022-10-31 DIAGNOSIS — G47.00 INSOMNIA, UNSPECIFIED: ICD-10-CM

## 2022-10-31 DIAGNOSIS — Z90.3 ACQUIRED ABSENCE OF STOMACH [PART OF]: Chronic | ICD-10-CM

## 2022-10-31 PROCEDURE — 99214 OFFICE O/P EST MOD 30 MIN: CPT

## 2022-10-31 RX ORDER — LORAZEPAM 2 MG/1
2 TABLET ORAL TWICE DAILY
Qty: 12 | Refills: 0 | Status: DISCONTINUED | COMMUNITY
Start: 2022-09-22 | End: 2022-10-31

## 2022-10-31 RX ORDER — ESCITALOPRAM OXALATE 20 MG/1
20 TABLET ORAL
Qty: 30 | Refills: 2 | Status: DISCONTINUED | COMMUNITY
Start: 2021-11-08 | End: 2022-10-31

## 2022-10-31 NOTE — PHYSICAL EXAM
[Cooperative] : cooperative [Anxious] : anxious [Constricted] : constricted [Clear] : clear [Linear/Goal Directed] : linear/goal directed [None Reported] : none reported [Average] : average [WNL] : within normal limits [de-identified] : MOOD IS IMPROVED/DENIES SUICIDAL OR HOMOCIDAL IDEATION OR INTENT

## 2022-10-31 NOTE — PLAN
[FreeTextEntry2] : SEE DISCUSSION SUMMARY [Psychodynamic Therapy] : Psychodynamic Therapy  [de-identified] : " IM VERY ANXIOUS"  SHE HAD RUN OUT OF A MEDICATION WHICH COULD HAVE BEEN RESPONSIBLE FOR THE ANXIETY AND SOME INTERNAL TREMOR   PSYCHIATRIST HAS MEDICATED HER FOR THE TREMOR AND SHE WILL BE ABLE TO  THE NEEDED MEDS ON THE 5TH OF THIS MONTH.\daryl SEPULVEDA IS ALERT, ORIENTED AND LOOKS FORWARD TO SPENDING HALLOWEEN WITH HER SMALL  NIECE AND NEPHEW. "THIS IS MY FAVORITE HOLIDAY." MOHINDER IS IN ANXIOUS MOOD BUT IS RESPONSIVE AND INTERACTIVE

## 2022-10-31 NOTE — DISCUSSION/SUMMARY
[de-identified] : Ms. Vernon is a 46-year-old female with a chief complaint of neck and shoulder pain , left greater than right.  She has had this pain for about 6 months following a work related injury which took place on 02/02/2022.  Patient has not been working since his injury. Patient notes pain remains unchanged in severity and distribution. She continues to experience numbness and tingling into the left arm. She is status post CRAIG on 9/17/22. She notes that this procedure provided her with 60% relief for about 2 days. This injection is therefore diagnostic of her pain and we will repeat a CRAIG w/ MAC in the therapeutic phase. \par  \par An Upper extremity EMG/NCV was ordered to delineate radiculopathy vs neuropathies vs nerve damage.\par \par Patient had a MRI that shows a radicular component along with pain referred into the upper extremity. Patient has trialed rehab (Home  exercise, physical therapy or chiropractic care) and medications.  I will schedule a cervical epidural steroid injection 1-3 depending on effectiveness Risk, benefits, pros and cons of procedure were explained to the patient using models and diagrams and their questions were answered.  \par \par The patient has severe anxiety of procedures that necessitates monitored anesthesia care (MAC). The procedure performed will be close to major nerves, arteries, and spinal cord and/or joint structures. Due to the proximity of these structures, we need the patient to be still during the procedure.  With the help of MAC, this will be safely achieved and decrease the risk of any complications.\par \par Our hope that is after the injection, the patient will be able to undergo physical therapy. Physical therapy of the  lower back 2-3 times a week for 4-6 weeks stressing a home exercise program of walking, shoulder griddle strengthening,  swimming, elliptical , recumbent bike, Srinivasa chi and Yoga. Use things that heat like hot shower or icy heat before rehab, exercising and at the beginning of the day, and ice (ice in a bag never directly on the skin) after activity and at the end of the day. \par \par \par I, Padmini Osuna, attest that this documentation has been prepared under the direction and in the presence of Provider Michael Horton, \par The documentation recorded by the scribe, in my presence, accurately reflects the service I personally performed, and the decisions made by me with my edits as appropriate.\par \par Best Regards, \par GLORIA [email or web address]. \par Diplomat, American Board of Anesthesiology \par Diplomat, American Board of Pain Medicine \par Diplomat, American Board of Pain Management \par \par

## 2022-10-31 NOTE — HISTORY OF PRESENT ILLNESS
[FreeTextEntry1] : This is a 46 year old patient who presents for medication management.  The patient reports less depressed mood, fair sleep and  appetite.  The patient denies hypomania,  denies psychosis at this time.  The patient has chronic anxiety that impairs their daily functioning at times. The patient has passive SI at times, but denies any current active suicidal ideation, homicidal ideation, no auditory or visual hallucinations, or paranoid ideation.  The patient has no medical complaints. The patient denies any drug or alcohol use, but  is smoking at this time. I requested the patient's updated physical and labs from their PCP.  Coping skills were discussed and a safety plan was provided.  The patient was educated on the risks, benefits, and alternatives of their psychiatric medications.  The patient will engage in psychotherapy at this time.  The patient consents to ongoing medication management.  Risks, benefits discussed.  Patient was hospitalized at [facility] from 10/30/21 until 11/5 after suicide attempt by car and OD.  Patient is on leave from work.  She was in hospital for 8 days in  last week.\par Safety plan discussed at length.\par Encouraged to abstain from alcohol.\par \par 2/23/22:patient called, is more anxious, maintains she is abstaining from alcohol and using klonopin as prescribed.  risks, benefits discussed, raise lexapro 30mg po qam (understands above FDA approved dose but has been effective).  Alternatives, discussed, consider d/c wellbutrin and or adding topamax and or gabapentin, and changing abilify to seroquel in the future, f/u next week Discussed risks and benefits of medication changes, SSRI benefit  better than raising klonopin (also mild depressogenic)\par \par 2/28:patient improving, will continue current medication\par \par 3/9:called feeling more depressed, anxious, RX for klonopin sent again, last filled 2/25, due for refill.  Risks, benefits discussed, will raise wellbutrin xl 300mg po qam, coping skills, safety plan discussed, will f/u next week, earlier as needed\par \par 3/14:mild improvement, intermittent symptoms, continues with therapy, would like to continue current medications\par \par 3/28/22 PHQ9 14 HAM-Anxiety 29, patient subjectively reports anxiety, due to TASK requirement, menstrual cycle, and financial issue with maryse, she prefers to continue current medications, encouraged to use klonopin twice daily\par \par 4/11/22: patient still depressed, will raise abilify, and follow up in 2 weeks\par 4/25:fairly stable, continue current medication\par 5/9/22:improved, no med changes, PHQ9=13\par 5/23/22:stable on current medication, no changes\par 6/13/22:patient presents little dysphoric and anxious about court tomorrow, but over last 3 weeks maintains she has been stable, and is not interested in medication change.  She is motivated to put legal issues behind her.  She denies any side effects and maintains she is using all medications as prescribed.\par 7/25:will titrate abilify for mood\par 8/8/22:stable, at baseline\par 8/29/22:increase Wellbutrin for mood\par 9/12: patient had only few week supply of wellbutrin and klonopin remianing.  patient was provided a safe taper schedule, and maintain she can't afford medications out of pocket.  She was encourage to go to Medicaid office or [facility] financial at [address]  Also, she was informed of [facility] in [facility] and if worsening depression, or SI to go ER or call 911.  As soon as patient obtains prescription benefits to call provider to resume medications.  She will follow up in 1 week.\par 9/22 :patient has been more anxious, used extra klonopin, will bridge patient with lorazepam 2mg po BID #12\par 10/12:patient took 8 klonopin OD and was hospitalized, she was restarted on medications below\par 10/24:was able to obtain all medications, stable at this time, continue to see weekly as available\par 10/31:more anxious, will resume gabapentin for anxiety/mood, follow up in 1 week [No] : no [TextBox_32] : Patient had recent SI, OD, but  currently denies SI, intent or plan [Yes] : yes [Mood episode] : mood episode [Agitation/ severe anxiety] : agitation/severe anxiety [Perceived burden on family or others] : perceived burden on family or others [Impulsivity] : impulsivity [History of abuse/trauma] : history of abuse/trauma [Anhedonia] : anhedonia [Global insomnia] : global insomnia [Recent loss] : recent loss [Current or pending isolation] : current or pending isolation [Responsibility to family or others] : responsibility to family or others [Identifies reasons for living] : identifies reasons for living [Future oriented] : future oriented [Engaged in work or school] : engaged in work or school [Supportive social network or family] : supportive social network or family [Ability to cope with stress] : ability to cope with stress [TextBox_52] : patient had 2 SA, but denies current SI, intent, or plan [None Known] : none known [Residential stability] : residential stability [Employment stability] : employment stability [TextBox_35] : No violence reported

## 2022-10-31 NOTE — DISCUSSION/SUMMARY
[Plan Review] : Plan Review [Able to manage surrounding demands and opportunities] : able to manage surrounding demands and opportunities [Able to exercise self-direction] : able to exercise self-direction [Able to set and pursue goals] : able to set and pursue goals [Adherent to treatment recommendations] : adherent to treatment recommendations [Articulate] : articulate [Attempting to realize their potential] : Attempting to realize their potential [Awareness of substance use issues] : awareness of substance use issues [Cognitively intact] : cognitively intact [Intelligent] : intelligent [Motivated to participate in treatment] : motivated to participate in treatment [Motivated and ready for change] : motivated and ready for change [Health literacy] : health literacy [Motivated to maintain or improve physical health] : motivated to maintain or improve physical health [In good health] : in good health [Has vocational interests or hobbies] : has vocational interests or hobbies [Part of a supportive family] : part of a supportive family [Has a supportive network] : has a supportive network [English fluency] : English fluency [Connected to healthcare] : connected to healthcare [Social supports] : social supports [FreeTextEntry2] : 11/03/2022 [FreeTextEntry3] : 10/13/2021 [FreeTextEntry5] : "I want to get my head on straight" [FreeTextEntry6] : Elaine has many strengths and has been responsive to interventions and compliant with treatment [FreeTextEntry7] : Elaine has a supportive family and network  However she is not financially stable   [FreeTextEntry8] : Continues to use alcohol. her abuse of alcohol was a factor in 2 suicide attempts in recent past [FreeTextEntry9] : Elaine is receptive to discussion of spirituality and her belief system   [de-identified] : Psychiatrist, Dr Murdock [Mental Health] : Mental Health [Substance Abuse] : Substance Abuse [Occupational/Educational] : Occupational/Educational [Interpersonal Relationships] : Interpersonal Relationships [Continued - Progress made] : Continued - Progress made: [___ times a week] : [unfilled] times a week [Improvement in symptoms as evidenced by:] : Improvement in symptoms as evidenced by: [Other rationale for transition/discharge:] : Other rationale for transition/discharge: [None - Reason others did not participate:] : None - Reason others did not participate:  [Yes] : Yes [Psychiatric Provider/Prescriber] : Psychiatric Provider/Prescriber [Therapist] : Therapist [FreeTextEntry1] : No job, financial stressors, some unhealthy relationship choices [FreeTextEntry4] : To manage mood issues, depression and have healthy relationship [de-identified] : uses CB exercises to manage impulsive behaviors [de-identified] : Elaine will gain insight into how hwe depression and loss issues may be a factor in recent suicide attempt [de-identified] : 11/03/22 [de-identified] : reductioj of depression and awareness of self value [de-identified] : Decrease alcohol use and refrain from abuse of alcohol [de-identified] : 11/03/22 [de-identified] : Elaine cntinues to use alcohol socially [de-identified] : Mood has stabilized [de-identified] : Goals have been met [de-identified] : Not clinically indicated

## 2022-10-31 NOTE — DISCUSSION/SUMMARY
[FreeTextEntry1] : PLAN:   Therapy amd Medication evaluation\par DX: [facility]/ [facility] 2\par Remains symptomatic at times, encourage to abstain from alcohol and take all medication as prescribed.  Will continue medications for mood stability and anxiety.\par \par medication:\par 1. lexapro 20mg and eventually back to 30mg if needed\par 2. klonopin 1mg po BID prn for anxiety\par 3. hydroxyzine 25mg-50mg at bedtime prn insomnia\par 4.  abilify 10mg po daily\par 5. wellbutrin xl 300mg po daily (option to go back to 450mg po daily)\par 6. add gabapentin 300mg po TID for mood/anxiety, risks, benefits discussed\par \par Follow up in 1 week\par \par Patient has 2 SA, but enies any current SI, intent, or plan.  Safety plan discussed at length, she will reach out to provider or call 911 if any SI, intent, or plan.  She will see therapist weekly and writer every 1-2 weeks, earlier as needed.

## 2022-10-31 NOTE — PHYSICAL EXAM
[de-identified] : GENERAL APPEARANCE OF PATIENT IS WELL DEVELOPED, WELL NOURISHED, BODY HABITUS NORMAL, WELL GROOMED, NO DEFORMITIES NOTED. \par Head - Atraumatic and Normocephalic \par Eyes, Nose, and Throat: External inspection of ears and nose are normal overall without scars, lesions, or masses noted. Assessment of hearing is normal\par Neck-Examination of neck shows no masses, overall appearance is normal, neck is symmetric, tracheal position is midline, no crepitus is noted. Examination of thyroid shows no enlargement, tenderness or masses\par Respiratory- Assessment of respiratory effort shows no intercostal retractions, no use of accessory muscles, unlabored breathing, and normal diaphragmatic movement.\par Cardiovascular- Examination of extremities show no edema or varicosities\par Musculoskeletal. Examination of gait is not antalgic and station is normal\par Inspection and palpation of digits and nails shows no clubbing, cyanosis, nodules, drainage, fluctuance, petechiae\par \par • Spine – inspection and palpation shows no misalignment, asymmetry, crepitation, defects, tenderness, masses, effusions. ROM is normal without crepitation or contracture. No instability or subluxation or laxity is noted. No abnormal movements.\par \par \par • Neck- trapezius spasms. 10-15 degrees with pain in active and passive flexion. 10-15 degrees in extension. \par \par • RUE- inspection and palpation shows no misalignment, asymmetry, crepitation, defects, tenderness, masses, effusions. ROM is normal without crepitation or contracture. No instability or subluxation or laxity is noted. No abnormal movements.\par \par \par • LUE- inspection and palpation shows no misalignment, asymmetry, crepitation, defects, tenderness, masses, effusions. ROM is normal without crepitation or contracture. No instability or subluxation or laxity is noted. No abnormal movements.\par \par \par • RLE- inspection and palpation shows no misalignment, asymmetry, crepitation, defects, tenderness, masses, effusions. ROM is normal without crepitation or contracture. No instability or subluxation or laxity is noted. No abnormal movements.\par \par \par • LLE- inspection and palpation shows no misalignment, asymmetry, crepitation, defects, tenderness, masses, effusions. ROM is normal without crepitation or contracture. No instability or subluxation or laxity is noted. No abnormal movements.\par \par \par • Skin- Inspection of skin and subcutaneous tissue shows no rashes, lesions or ulcers. Palpation of skin and subcutaneous tissue shows no rashes, no indurations, subcutaneous nodules or tightening.\par \par \par • Abdomen- Nontender\par \par \par • Neurologic- CN 2-12 are grossly intact. No sensory or motor deficits in the upper and lower extremities. Adequate strength in upper and lower extremities \par \par \par • Psychiatric- Patient’s judgment and insight are intact. Oriented to time, place and person. Recent and remote memory intact.\par \par

## 2022-10-31 NOTE — CURRENT PSYCHIATRIC SYMPTOMS
[Depressed Mood] : depressed mood [Anhedonia] : no anhedonia [Guilt] : not feeling guilty [Decreased Concentration] : no decrease in concentrating ability [Hyperphagia] : no hyperphagia [Insomnia] : no insomnia disorder [Hypersomnia] : no ~T hypersomnia [Psychomotor Retardation] : no psychomotor retardation [Anorexia] : no anorexia [Euphoria] : no euphoria [Highly Irritable] : no high irritability [Increased Activity] : no increased in activity [Distractibility] : not distracted [Grandeur] : no feelings of grandeur [Buying Sprees] : no buying sprees [Hypersexuality] : denied hypersexuality [Dec Need For Sleep] : no decreased need for sleep [Delusions] : no ~T delusions [Hallucination Auditory] : no auditory hallucinations [Thought Disorder] : ~T a thought disorder was not noted [Excessive Worry] : excessive worry [Ruminations] : ruminations [Obsessions] : no obsessions [Re-experiencing] : no re-experiencing [Restlessness] : no restlessness [Hypochondriasis] : no hypochondriasis [Panic] : no panic disorder [Recent Onset] : no recent onset of confusion [Fluctuating Course] : no fluctuating course [Reversed sleep-wake] : no reversed sleep- wake [Inattentive] : not nattentive [Tremor] : ~T no ~M tremor was seen [Hallucination Visual] : no visual hallucinations [Hallucination Olfactory] : no olfactory hallucinations [Nausea] : no nausea [Hallucination Tactile] : no tactile hallucinations [Hallucination Gustatory] : no gustatory hallucinations [Diaphoresis] : showed no ~M diaphoresis [Vomiting] : no vomiting [Yawning] : no yawning [Mydriasis] : showed no ~M mydriasis [Gastrointestinal Sxs] : no ~T gastrointestinal symptoms [Piloerection] : the hair did not demonstrate piloerection [Rhinorrhea] : no ~M rhinorrhea discharge was seen [de-identified] : less depressed [de-identified] : at times, improved

## 2022-10-31 NOTE — HISTORY OF PRESENT ILLNESS
[FreeTextEntry1] : HISTORY OF PRESENT ILLNESS: This is a 46 year old woman complaining of neck and shoulder pain. The patient has had this pain for 6 months. The pain started after injury which took place while at work.  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  Patient describes pain as severe. During the last month the pain has been constant symptoms worse in the morning. Pain described as sharp, pressure-like, cramping, shooting and cutting. Pain is associated with numbness and pins and needles into the hands. Patient has weakness in the upper extremities. Pain is increased with lying down, sitting, walking, exercise, relaxation and coughing/sneezing. Pain is not changed with standing and bowel movements. Bowel or bladder habits have not changed.\par \par ACTIVITIES: Patient could walk four blocks before the pain starts. Patient can sit 20 minutes  before pain starts. Patient can stand 1 hour before pain starts. The patient often lays down because of pain. Patient uses no assistive walking device at this time. Patient has difficulty going to work, performing household chores, doing yard work or shopping, participating in recreational activities and exercise at this time.\par \par PRIOR PAIN TREATMENTS:  No prior pain\par \par PRIOR PAIN MEDICATIONS:  Tylenol\par \par PRESENTING TODAY: In revisit encounter in regard to her continued neck pain which resulted from a work related injury. Patient notes pain remains unchanged in severity and distribution. She continues to experience numbness and tingling into the left arm. She is status post CRAIG on 9/17/22. She notes that this procedure provided her with 60% relief for about 2 days. \par \par Covid 19 - This in-office encounter has occurred during a Public Health Emergency (PHE). As required by law, due to the risk of respiratory-transmitted infectious diseases, our office provided additional materials, supplies and clinical staff to assist in keeping our patients, physicians and office staff safe during this health emergency.\par

## 2022-10-31 NOTE — RISK ASSESSMENT
[No, patient denies ideation or behavior] : No, patient denies ideation or behavior [FreeTextEntry9] : NEGATIVE RISK TO SELF OR OTHERS AT THIS TIME

## 2022-11-02 ENCOUNTER — NON-APPOINTMENT (OUTPATIENT)
Age: 47
End: 2022-11-02

## 2022-11-02 ENCOUNTER — APPOINTMENT (OUTPATIENT)
Dept: ORTHOPEDIC SURGERY | Facility: CLINIC | Age: 47
End: 2022-11-02

## 2022-11-02 PROCEDURE — 99072 ADDL SUPL MATRL&STAF TM PHE: CPT

## 2022-11-02 PROCEDURE — 99213 OFFICE O/P EST LOW 20 MIN: CPT

## 2022-11-02 NOTE — HISTORY OF PRESENT ILLNESS
[de-identified] : History of Present Illness\par 46-year-old female comes in today for follow-up evaluation of her left shoulder and neck pain from an injury that\par occurred at work. Patient states she was lifting a patient and felt a pop in the shoulder. Patient works as a direct\par /nurse's aide. Patient is left-hand dominant. She is taking Tylenol, she also has ibuprofen 800 mg.\par History of bipolar disorder, she does take medication for that. LHD also having some low back pain in the last couple of\par weeks feels the pain is worse difficulty sleeping she still out of work no problems in the past did get the cervical MRI\par done reports the pain is shooting in the left arm worse with rotation

## 2022-11-02 NOTE — HISTORY OF PRESENT ILLNESS
[de-identified] : History of Present Illness\par 46-year-old female comes in today for follow-up evaluation of her left shoulder and neck pain from an injury that\par occurred at work. Patient states she was lifting a patient and felt a pop in the shoulder. Patient works as a direct\par /nurse's aide. Patient is left-hand dominant. She is taking Tylenol, she also has ibuprofen 800 mg.\par History of bipolar disorder, she does take medication for that. LHD also having some low back pain in the last couple of\par weeks feels the pain is worse difficulty sleeping she still out of work no problems in the past did get the cervical MRI\par done reports the pain is shooting in the left arm worse with rotation

## 2022-11-02 NOTE — IMAGING
[de-identified] :  left shoulder no swelling mild tenderness over AC joint and anterior portion of the shoulder good motion 150¦ with some pain over weakness in resisted external rotation positive Cook positive impingement negative [place] and Speed\par \par Cervical exam limited motion in rotation with spasm more limited to the left and right reflexes depressed but symmetric\par \par Imaging Study Review: MRI Cervical Spine. Report was reviewed and noted in the chart MRI cervical spine\par 03/09/2022: Several cervical bulging discs herniated disc C5-6 with left foraminal encroachment as her\par herniated disc C6-7 with some cord impingement\par  lumbar mild spasm throughout tight dorsal lumbar fascia and hamstrings negative straight leg bilaterally normal gait

## 2022-11-02 NOTE — ASSESSMENT
[FreeTextEntry1] :  I think most of the symptoms are cervical the shoulder is a smaller portion  interested to see how she responds to the Second injection I did point out we may need a neurosurgical consultation she remains totally disabled unable to work I will see her in a few months call me to discuss the nerve tests

## 2022-11-02 NOTE — IMAGING
[de-identified] :  left shoulder no swelling mild tenderness over AC joint and anterior portion of the shoulder good motion 150¦ with some pain over weakness in resisted external rotation positive Cook positive impingement negative [place] and Speed\par \par Cervical exam limited motion in rotation with spasm more limited to the left and right reflexes depressed but symmetric\par \par Imaging Study Review: MRI Cervical Spine. Report was reviewed and noted in the chart MRI cervical spine\par 03/09/2022: Several cervical bulging discs herniated disc C5-6 with left foraminal encroachment as her\par herniated disc C6-7 with some cord impingement\par  lumbar mild spasm throughout tight dorsal lumbar fascia and hamstrings negative straight leg bilaterally normal gait

## 2022-11-07 ENCOUNTER — APPOINTMENT (OUTPATIENT)
Dept: PSYCHIATRY | Facility: CLINIC | Age: 47
End: 2022-11-07

## 2022-11-07 ENCOUNTER — OUTPATIENT (OUTPATIENT)
Dept: OUTPATIENT SERVICES | Facility: HOSPITAL | Age: 47
LOS: 1 days | Discharge: HOME | End: 2022-11-07

## 2022-11-07 DIAGNOSIS — F41.1 GENERALIZED ANXIETY DISORDER: ICD-10-CM

## 2022-11-07 DIAGNOSIS — Z90.3 ACQUIRED ABSENCE OF STOMACH [PART OF]: Chronic | ICD-10-CM

## 2022-11-07 DIAGNOSIS — F31.81 BIPOLAR II DISORDER: ICD-10-CM

## 2022-11-07 DIAGNOSIS — G47.00 INSOMNIA, UNSPECIFIED: ICD-10-CM

## 2022-11-07 PROCEDURE — 99214 OFFICE O/P EST MOD 30 MIN: CPT

## 2022-11-07 NOTE — DISCUSSION/SUMMARY
[Plan Review] : Plan Review [Able to manage surrounding demands and opportunities] : able to manage surrounding demands and opportunities [Able to exercise self-direction] : able to exercise self-direction [Able to set and pursue goals] : able to set and pursue goals [Adherent to treatment recommendations] : adherent to treatment recommendations [Articulate] : articulate [Attempting to realize their potential] : Attempting to realize their potential [Awareness of substance use issues] : awareness of substance use issues [Cognitively intact] : cognitively intact [Intelligent] : intelligent [Motivated to participate in treatment] : motivated to participate in treatment [Motivated and ready for change] : motivated and ready for change [Health literacy] : health literacy [Motivated to maintain or improve physical health] : motivated to maintain or improve physical health [In good health] : in good health [Has vocational interests or hobbies] : has vocational interests or hobbies [Part of a supportive family] : part of a supportive family [Has a supportive network] : has a supportive network [English fluency] : English fluency [Connected to healthcare] : connected to healthcare [Social supports] : social supports [FreeTextEntry2] : 11/03/2022 [FreeTextEntry3] : 10/13/2021 [FreeTextEntry5] : "I want to get my head on straight" [FreeTextEntry6] : Elaine has many strengths and has been responsive to interventions and compliant with treatment [FreeTextEntry7] : Elaine has a supportive family and network  However she is not financially stable   [FreeTextEntry8] : Continues to use alcohol. her abuse of alcohol was a factor in 2 suicide attempts in recent past [FreeTextEntry9] : Elaine is receptive to discussion of spirituality and her belief system   [de-identified] : Psychiatrist, Dr Murdock [Mental Health] : Mental Health [Substance Abuse] : Substance Abuse [Occupational/Educational] : Occupational/Educational [Interpersonal Relationships] : Interpersonal Relationships [Continued - Progress made] : Continued - Progress made: [___ times a week] : [unfilled] times a week [Improvement in symptoms as evidenced by:] : Improvement in symptoms as evidenced by: [Other rationale for transition/discharge:] : Other rationale for transition/discharge: [None - Reason others did not participate:] : None - Reason others did not participate:  [Yes] : Yes [Psychiatric Provider/Prescriber] : Psychiatric Provider/Prescriber [Therapist] : Therapist [FreeTextEntry1] : No job, financial stressors, some unhealthy relationship choices [FreeTextEntry4] : To manage mood issues, depression and have healthy relationship [de-identified] : uses CB exercises to manage impulsive behaviors [de-identified] : Elaine will gain insight into how hwe depression and loss issues may be a factor in recent suicide attempt [de-identified] : 11/03/22 [de-identified] : reductioj of depression and awareness of self value [de-identified] : Decrease alcohol use and refrain from abuse of alcohol [de-identified] : 11/03/22 [de-identified] : Elaine cntinues to use alcohol socially [de-identified] : Mood has stabilized [de-identified] : Goals have been met [de-identified] : Not clinically indicated

## 2022-11-07 NOTE — PLAN
[FreeTextEntry2] : SEE DISCUSSION SUMMARY [Supportive Therapy] : Supportive Therapy [de-identified] : MOHINDER REPORTS SHE WILL RETURN TO WORK AT THE [employer] ONE DAY PER WEEK. SHE HAD A GOOD TIME WITH HER NIECES AND NEPHEWS ON HALLOWEEN AND WILL CELEBRATE HER BIRTHDAY WITH FAMILY ON WEDNESDAY. SHE STATE S SHE HAS NO DESIRE TO DATE OR SEE PEOPLE OTHER THAN FAMILY  SHE ENDORSES SLEEP IMPROVEMENT TO THE POINT OF SOMETIMES SLEEPING ALL DAY. \par MOHINDER STATES SHE HAS BEEN FALLING LATELY AND IS ENCOURAGED TO HAVE A CHECK UP\par IN SESSION MOHINDER IS INTERACTIVE AND COOPERATIVE BUT AFFECT IS FLAT AND SHE IS HYPOALERT WITH LOW RESPONSE\par SHE VOICES NO CONCERNS AT THIS TIME

## 2022-11-07 NOTE — PHYSICAL EXAM
[Cooperative] : cooperative [Anxious] : anxious [Constricted] : constricted [Flat] : flat [Clear] : clear [Linear/Goal Directed] : linear/goal directed [None Reported] : none reported [Average] : average [WNL] : within normal limits [de-identified] :  MOHINDER CONTINUES TO ENDORSE FEELINGS OF ANXIOUSNESS AND FEELING "BLAH"

## 2022-11-07 NOTE — DISCUSSION/SUMMARY
[FreeTextEntry1] : PLAN:   Therapy amd Medication evaluation\par DX: OLGA/ BiPolar 2\par Remains symptomatic at times, encourage to abstain from alcohol and take all medication as prescribed.  Will continue medications for mood stability and anxiety.\par \par medication:\par 1. lexapro 20mg and eventually back to 30mg if needed\par 2. klonopin 1mg po BID prn for anxiety (3 day supply of lorazepam to substitute)\par 3. hydroxyzine 25mg-50mg at bedtime prn insomnia\par 4.  abilify 10mg po daily\par 5. wellbutrin xl 300mg po daily (option to go back to 450mg po daily)\par 6. gabapentin 300mg po TID for mood/anxiety, risks, benefits discussed\par \par Follow up in 1 week\par \par Patient has 2 SA, but enies any current SI, intent, or plan.  Safety plan discussed at length, she will reach out to provider or call 911 if any SI, intent, or plan.  She will see therapist weekly and writer every 1-2 weeks, earlier as needed.

## 2022-11-07 NOTE — PLAN
[FreeTextEntry2] : SEE DISCUSSION SUMMARY [Supportive Therapy] : Supportive Therapy [de-identified] : MOHINDER REPORTS SHE WILL RETURN TO WORK AT THE [employer] ONE DAY PER WEEK. SHE HAD A GOOD TIME WITH HER NIECES AND NEPHEWS ON HALLOWEEN AND WILL CELEBRATE HER BIRTHDAY WITH FAMILY ON WEDNESDAY. SHE STATE S SHE HAS NO DESIRE TO DATE OR SEE PEOPLE OTHER THAN FAMILY  SHE ENDORSES SLEEP IMPROVEMENT TO THE POINT OF SOMETIMES SLEEPING ALL DAY. \par MOHINDER STATES SHE HAS BEEN FALLING LATELY AND IS ENCOURAGED TO HAVE A CHECK UP\par IN SESSION MOHINDER IS INTERACTIVE AND COOPERATIVE BUT AFFECT IS FLAT AND SHE IS HYPOALERT WITH LOW RESPONSE\par SHE VOICES NO CONCERNS AT THIS TIME

## 2022-11-07 NOTE — CURRENT PSYCHIATRIC SYMPTOMS
[Depressed Mood] : depressed mood [Anhedonia] : no anhedonia [Guilt] : not feeling guilty [Decreased Concentration] : no decrease in concentrating ability [Hyperphagia] : no hyperphagia [Insomnia] : no insomnia disorder [Hypersomnia] : no ~T hypersomnia [Psychomotor Retardation] : no psychomotor retardation [Anorexia] : no anorexia [Euphoria] : no euphoria [Highly Irritable] : no high irritability [Increased Activity] : no increased in activity [Distractibility] : not distracted [Grandeur] : no feelings of grandeur [Buying Sprees] : no buying sprees [Hypersexuality] : denied hypersexuality [Dec Need For Sleep] : no decreased need for sleep [Delusions] : no ~T delusions [Hallucination Auditory] : no auditory hallucinations [Thought Disorder] : ~T a thought disorder was not noted [Excessive Worry] : excessive worry [Ruminations] : ruminations [Obsessions] : no obsessions [Re-experiencing] : no re-experiencing [Restlessness] : no restlessness [Hypochondriasis] : no hypochondriasis [Panic] : no panic disorder [Recent Onset] : no recent onset of confusion [Fluctuating Course] : no fluctuating course [Reversed sleep-wake] : no reversed sleep- wake [Inattentive] : not nattentive [Tremor] : ~T no ~M tremor was seen [Hallucination Visual] : no visual hallucinations [Hallucination Olfactory] : no olfactory hallucinations [Nausea] : no nausea [Hallucination Tactile] : no tactile hallucinations [Hallucination Gustatory] : no gustatory hallucinations [Diaphoresis] : showed no ~M diaphoresis [Vomiting] : no vomiting [Yawning] : no yawning [Mydriasis] : showed no ~M mydriasis [Gastrointestinal Sxs] : no ~T gastrointestinal symptoms [Piloerection] : the hair did not demonstrate piloerection [Rhinorrhea] : no ~M rhinorrhea discharge was seen [de-identified] : less depressed [de-identified] : at times, improved

## 2022-11-07 NOTE — DISCUSSION/SUMMARY
[Plan Review] : Plan Review [Able to manage surrounding demands and opportunities] : able to manage surrounding demands and opportunities [Able to exercise self-direction] : able to exercise self-direction [Able to set and pursue goals] : able to set and pursue goals [Adherent to treatment recommendations] : adherent to treatment recommendations [Articulate] : articulate [Attempting to realize their potential] : Attempting to realize their potential [Awareness of substance use issues] : awareness of substance use issues [Cognitively intact] : cognitively intact [Intelligent] : intelligent [Motivated to participate in treatment] : motivated to participate in treatment [Motivated and ready for change] : motivated and ready for change [Health literacy] : health literacy [Motivated to maintain or improve physical health] : motivated to maintain or improve physical health [In good health] : in good health [Has vocational interests or hobbies] : has vocational interests or hobbies [Part of a supportive family] : part of a supportive family [Has a supportive network] : has a supportive network [English fluency] : English fluency [Connected to healthcare] : connected to healthcare [Social supports] : social supports [FreeTextEntry2] : 11/03/2022 [FreeTextEntry3] : 10/13/2021 [FreeTextEntry5] : "I want to get my head on straight" [FreeTextEntry6] : Elaine has many strengths and has been responsive to interventions and compliant with treatment [FreeTextEntry7] : Elaine has a supportive family and network  However she is not financially stable   [FreeTextEntry8] : Continues to use alcohol. her abuse of alcohol was a factor in 2 suicide attempts in recent past [FreeTextEntry9] : Elaine is receptive to discussion of spirituality and her belief system   [de-identified] : Psychiatrist, Dr Murdock [Mental Health] : Mental Health [Substance Abuse] : Substance Abuse [Occupational/Educational] : Occupational/Educational [Interpersonal Relationships] : Interpersonal Relationships [Continued - Progress made] : Continued - Progress made: [___ times a week] : [unfilled] times a week [Improvement in symptoms as evidenced by:] : Improvement in symptoms as evidenced by: [Other rationale for transition/discharge:] : Other rationale for transition/discharge: [None - Reason others did not participate:] : None - Reason others did not participate:  [Yes] : Yes [Psychiatric Provider/Prescriber] : Psychiatric Provider/Prescriber [Therapist] : Therapist [FreeTextEntry1] : No job, financial stressors, some unhealthy relationship choices [FreeTextEntry4] : To manage mood issues, depression and have healthy relationship [de-identified] : uses CB exercises to manage impulsive behaviors [de-identified] : Elaine will gain insight into how hwe depression and loss issues may be a factor in recent suicide attempt [de-identified] : 11/03/22 [de-identified] : reductioj of depression and awareness of self value [de-identified] : Decrease alcohol use and refrain from abuse of alcohol [de-identified] : 11/03/22 [de-identified] : Elaine cntinues to use alcohol socially [de-identified] : Mood has stabilized [de-identified] : Goals have been met [de-identified] : Not clinically indicated

## 2022-11-07 NOTE — RISK ASSESSMENT
Patient: Gabe Nazario    Pre-op Dx:  Severe mitral valve regurgitation    Post-op Dx:  Same, coronary artery disease of the native coronary vessels of the native heart without angina pectoris    Procedure:  Left heart catheterization, coronary angiography, left ventriculography    Surgeon:Abdi Gutierrez MD, MD    Assistants:  Cath lab staff    Anesthesia Staff:  None    Anesthesia Type:  Local with sedation    Findings:  Significant multivessel coronary artery disease, normal left ventricular systolic function, normal systemic and left ventricular end-diastolic pressure    Estimated Blood Loss:  Less than 10 mL    Complications:  None    Specimens Removed:  None    Antiplatelet recommendations to CV Surgeon    Does the patient have ACS? No     [No, patient denies ideation or behavior] : No, patient denies ideation or behavior [FreeTextEntry9] : NEGATIVE RISK TO SELF OR OTHERS

## 2022-11-07 NOTE — HISTORY OF PRESENT ILLNESS
[FreeTextEntry1] : This is a 46 year old patient who presents for medication management.  The patient reports less depressed mood, fair sleep and  appetite.  The patient denies hypomania,  denies psychosis at this time.  The patient has chronic anxiety that impairs their daily functioning at times. The patient has passive SI at times, but denies any current active suicidal ideation, homicidal ideation, no auditory or visual hallucinations, or paranoid ideation.  The patient has no medical complaints. The patient denies any drug or alcohol use, but  is smoking at this time. I requested the patient's updated physical and labs from their PCP.  Coping skills were discussed and a safety plan was provided.  The patient was educated on the risks, benefits, and alternatives of their psychiatric medications.  The patient will engage in psychotherapy at this time.  The patient consents to ongoing medication management.  Risks, benefits discussed.  Patient was hospitalized at [facility] from 10/30/21 until 11/5 after suicide attempt by car and OD.  Patient is on leave from work.  She was in hospital for 8 days in  last week.\par Safety plan discussed at length.\par Encouraged to abstain from alcohol.\par \par 2/23/22:patient called, is more anxious, maintains she is abstaining from alcohol and using klonopin as prescribed.  risks, benefits discussed, raise lexapro 30mg po qam (understands above FDA approved dose but has been effective).  Alternatives, discussed, consider d/c wellbutrin and or adding topamax and or gabapentin, and changing abilify to seroquel in the future, f/u next week Discussed risks and benefits of medication changes, SSRI benefit  better than raising klonopin (also mild depressogenic)\par \par 2/28:patient improving, will continue current medication\par \par 3/9:called feeling more depressed, anxious, RX for klonopin sent again, last filled 2/25, due for refill.  Risks, benefits discussed, will raise wellbutrin xl 300mg po qam, coping skills, safety plan discussed, will f/u next week, earlier as needed\par \par 3/14:mild improvement, intermittent symptoms, continues with therapy, would like to continue current medications\par \par 3/28/22 PHQ9 14 HAM-Anxiety 29, patient subjectively reports anxiety, due to TASK requirement, menstrual cycle, and financial issue with [employer], she prefers to continue current medications, encouraged to use klonopin twice daily\par \par 4/11/22: patient still depressed, will raise abilify, and follow up in 2 weeks\par 4/25:fairly stable, continue current medication\par 5/9/22:improved, no med changes, PHQ9=13\par 5/23/22:stable on current medication, no changes\par 6/13/22:patient presents little dysphoric and anxious about court tomorrow, but over last 3 weeks maintains she has been stable, and is not interested in medication change.  She is motivated to put legal issues behind her.  She denies any side effects and maintains she is using all medications as prescribed.\par 7/25:will titrate abilify for mood\par 8/8/22:stable, at baseline\par 8/29/22:increase Wellbutrin for mood\par 9/12: patient had only few week supply of wellbutrin and klonopin remianing.  patient was provided a safe taper schedule, and maintain she can't afford medications out of pocket.  She was encourage to go to Medicaid office or [facility] financial at [address]  Also, she was informed of [place] walk in clinic and if worsening depression, or SI to go ER or call 911.  As soon as patient obtains prescription benefits to call provider to resume medications.  She will follow up in 1 week.\par 9/22 :patient has been more anxious, used extra klonopin, will bridge patient with lorazepam 2mg po BID #12\par 10/12:patient took 8 klonopin OD and was hospitalized, she was restarted on medications below\par 10/24:was able to obtain all medications, stable at this time, continue to see weekly as available\par 10/31:more anxious, will resume gabapentin for anxiety/mood, follow up in 1 week\par 11/7:improved, provided 3 day supply of lorazepam, then resume klonopin [No] : no [TextBox_32] : Patient had recent SI, OD, but  currently denies SI, intent or plan [Yes] : yes [Mood episode] : mood episode [Agitation/ severe anxiety] : agitation/severe anxiety [Perceived burden on family or others] : perceived burden on family or others [Impulsivity] : impulsivity [History of abuse/trauma] : history of abuse/trauma [Anhedonia] : anhedonia [Global insomnia] : global insomnia [Recent loss] : recent loss [Current or pending isolation] : current or pending isolation [Responsibility to family or others] : responsibility to family or others [Identifies reasons for living] : identifies reasons for living [Future oriented] : future oriented [Engaged in work or school] : engaged in work or school [Supportive social network or family] : supportive social network or family [Ability to cope with stress] : ability to cope with stress [TextBox_52] : patient had 2 SA, but denies current SI, intent, or plan [None Known] : none known [Residential stability] : residential stability [Employment stability] : employment stability [TextBox_35] : No violence reported

## 2022-11-07 NOTE — PHYSICAL EXAM
[Cooperative] : cooperative [Anxious] : anxious [Constricted] : constricted [Flat] : flat [Clear] : clear [Linear/Goal Directed] : linear/goal directed [None Reported] : none reported [Average] : average [WNL] : within normal limits [de-identified] :  MOHINDER CONTINUES TO ENDORSE FEELINGS OF ANXIOUSNESS AND FEELING "BLAH"

## 2022-11-14 ENCOUNTER — OUTPATIENT (OUTPATIENT)
Dept: OUTPATIENT SERVICES | Facility: HOSPITAL | Age: 47
LOS: 1 days | Discharge: HOME | End: 2022-11-14

## 2022-11-14 ENCOUNTER — APPOINTMENT (OUTPATIENT)
Dept: PSYCHIATRY | Facility: CLINIC | Age: 47
End: 2022-11-14

## 2022-11-14 DIAGNOSIS — Z90.3 ACQUIRED ABSENCE OF STOMACH [PART OF]: Chronic | ICD-10-CM

## 2022-11-14 DIAGNOSIS — F41.1 GENERALIZED ANXIETY DISORDER: ICD-10-CM

## 2022-11-14 DIAGNOSIS — F31.81 BIPOLAR II DISORDER: ICD-10-CM

## 2022-11-14 DIAGNOSIS — G47.00 INSOMNIA, UNSPECIFIED: ICD-10-CM

## 2022-11-14 PROCEDURE — 99214 OFFICE O/P EST MOD 30 MIN: CPT

## 2022-11-14 RX ORDER — ARIPIPRAZOLE 20 MG/1
20 TABLET ORAL
Qty: 30 | Refills: 0 | Status: COMPLETED | COMMUNITY
Start: 2022-07-25

## 2022-11-14 RX ORDER — ARIPIPRAZOLE 15 MG/1
15 TABLET ORAL
Qty: 30 | Refills: 0 | Status: COMPLETED | COMMUNITY
Start: 2022-05-23

## 2022-11-14 RX ORDER — DICLOFENAC SODIUM 1% 10 MG/G
1 GEL TOPICAL
Qty: 100 | Refills: 0 | Status: DISCONTINUED | COMMUNITY
Start: 2022-08-10

## 2022-11-14 NOTE — CURRENT PSYCHIATRIC SYMPTOMS
[Depressed Mood] : depressed mood [Anhedonia] : no anhedonia [Guilt] : not feeling guilty [Decreased Concentration] : no decrease in concentrating ability [Hyperphagia] : no hyperphagia [Insomnia] : no insomnia disorder [Hypersomnia] : no ~T hypersomnia [Psychomotor Retardation] : no psychomotor retardation [Anorexia] : no anorexia [Euphoria] : no euphoria [Highly Irritable] : no high irritability [Increased Activity] : no increased in activity [Distractibility] : not distracted [Grandeur] : no feelings of grandeur [Buying Sprees] : no buying sprees [Hypersexuality] : denied hypersexuality [Dec Need For Sleep] : no decreased need for sleep [Delusions] : no ~T delusions [Hallucination Auditory] : no auditory hallucinations [Thought Disorder] : ~T a thought disorder was not noted [Excessive Worry] : excessive worry [Ruminations] : ruminations [Obsessions] : no obsessions [Re-experiencing] : no re-experiencing [Restlessness] : no restlessness [Hypochondriasis] : no hypochondriasis [Panic] : no panic disorder [Recent Onset] : no recent onset of confusion [Fluctuating Course] : no fluctuating course [Reversed sleep-wake] : no reversed sleep- wake [Inattentive] : not nattentive [Tremor] : ~T no ~M tremor was seen [Hallucination Visual] : no visual hallucinations [Hallucination Olfactory] : no olfactory hallucinations [Nausea] : no nausea [Hallucination Tactile] : no tactile hallucinations [Hallucination Gustatory] : no gustatory hallucinations [Diaphoresis] : showed no ~M diaphoresis [Vomiting] : no vomiting [Yawning] : no yawning [Mydriasis] : showed no ~M mydriasis [Gastrointestinal Sxs] : no ~T gastrointestinal symptoms [Piloerection] : the hair did not demonstrate piloerection [Rhinorrhea] : no ~M rhinorrhea discharge was seen [de-identified] : less depressed [de-identified] : at times, improved

## 2022-11-14 NOTE — DISCUSSION/SUMMARY
[Plan Review] : Plan Review [Able to manage surrounding demands and opportunities] : able to manage surrounding demands and opportunities [Able to exercise self-direction] : able to exercise self-direction [Able to set and pursue goals] : able to set and pursue goals [Adherent to treatment recommendations] : adherent to treatment recommendations [Articulate] : articulate [Attempting to realize their potential] : Attempting to realize their potential [Awareness of substance use issues] : awareness of substance use issues [Cognitively intact] : cognitively intact [Intelligent] : intelligent [Motivated to participate in treatment] : motivated to participate in treatment [Motivated and ready for change] : motivated and ready for change [Health literacy] : health literacy [Motivated to maintain or improve physical health] : motivated to maintain or improve physical health [In good health] : in good health [Has vocational interests or hobbies] : has vocational interests or hobbies [Part of a supportive family] : part of a supportive family [Has a supportive network] : has a supportive network [English fluency] : English fluency [Connected to healthcare] : connected to healthcare [Social supports] : social supports [FreeTextEntry2] : 11/03/2022 [FreeTextEntry3] : 10/13/2021 [FreeTextEntry5] : "I want to get my head on straight" [FreeTextEntry6] : Elaine has many strengths and has been responsive to interventions and compliant with treatment [FreeTextEntry7] : Elaine has a supportive family and network  However she is not financially stable   [FreeTextEntry8] : Continues to use alcohol. her abuse of alcohol was a factor in 2 suicide attempts in recent past [FreeTextEntry9] : Elaine is receptive to discussion of spirituality and her belief system   [de-identified] : Psychiatrist, Dr Murdock [Mental Health] : Mental Health [Substance Abuse] : Substance Abuse [Occupational/Educational] : Occupational/Educational [Interpersonal Relationships] : Interpersonal Relationships [Continued - Progress made] : Continued - Progress made: [___ times a week] : [unfilled] times a week [Improvement in symptoms as evidenced by:] : Improvement in symptoms as evidenced by: [Other rationale for transition/discharge:] : Other rationale for transition/discharge: [None - Reason others did not participate:] : None - Reason others did not participate:  [Yes] : Yes [Psychiatric Provider/Prescriber] : Psychiatric Provider/Prescriber [Therapist] : Therapist [FreeTextEntry1] : No job, financial stressors, some unhealthy relationship choices [FreeTextEntry4] : To manage mood issues, depression and have healthy relationship [de-identified] : uses CB exercises to manage impulsive behaviors [de-identified] : Elaine will gain insight into how hwe depression and loss issues may be a factor in recent suicide attempt [de-identified] : 11/03/22 [de-identified] : reductioj of depression and awareness of self value [de-identified] : Decrease alcohol use and refrain from abuse of alcohol [de-identified] : 11/03/22 [de-identified] : Elaine cntinues to use alcohol socially [de-identified] : Mood has stabilized [de-identified] : Goals have been met [de-identified] : Not clinically indicated

## 2022-11-14 NOTE — DISCUSSION/SUMMARY
[FreeTextEntry1] : PLAN:   Therapy amd Medication evaluation\par DX: OLGA/ BiPolar 2\par Remains symptomatic at times, encourage to abstain from alcohol and take all medication as prescribed.  Will continue medications for mood stability and anxiety.\par \par medication:\par 1. lexapro 20mg and eventually back to 30mg if needed\par 2. klonopin 1mg po BID prn for anxiety \par 3. hydroxyzine 25mg-50mg at bedtime prn insomnia\par 4.  abilify 10mg po daily\par 5. wellbutrin xl 300mg po daily (option to go back to 450mg po daily)\par 6. gabapentin 300mg po TID for mood/anxiety, risks, benefits discussed\par \par Follow up in 1 week\par \par Patient has 2 SA, but enies any current SI, intent, or plan.  Safety plan discussed at length, she will reach out to provider or call 911 if any SI, intent, or plan.  She will see therapist weekly and writer every 1-2 weeks, earlier as needed.

## 2022-11-14 NOTE — PLAN
[FreeTextEntry2] : SEE DISCUSSION SUMMARY [Psychodynamic Therapy] : Psychodynamic Therapy  [de-identified] : ESTRELLA CONTINUES TO PRESENT WITH DEPRESSIVE SYMPTOMS. IN SESSION SHE PASSED HER 48 TH BIRTHDAY AND DID REFRAIN FROM ALCOHOL BUT THE DAY AFTER SHE WENT TO A BAR AND DRANK    SAYS SHE BLACKED OUT   SHE IS UPSET AT HERSELF AND HAS RECOMMITTED TO SOBRIETY   SHE CONTINUES TO DENY SHE HAS A PROBLEM WITH ALCOHOL\par ,MOHINDER DENIES SHE IS EXPERIENCING SYMPTOMS OF ANXIETY OR DEPRESSION\par     SHE FELT UPSET THAT A FAMILY MEMBER PAID FOR HER BIRTHDAY DINNER    \par IN SESSION SHE HAS LITTLE EMOTIONAL RESPONSE AND AFFECT IS FLAT

## 2022-11-14 NOTE — HISTORY OF PRESENT ILLNESS
[FreeTextEntry1] : This is a 46 year old patient who presents for medication management.  The patient reports less depressed mood, fair sleep and  appetite.  The patient denies hypomania,  denies psychosis at this time.  The patient has chronic anxiety that impairs their daily functioning at times. The patient has passive SI at times, but denies any current active suicidal ideation, homicidal ideation, no auditory or visual hallucinations, or paranoid ideation.  The patient has no medical complaints. The patient reported alcohol use, and is smoking at this time. I requested the patient's updated physical and labs from their PCP.  Coping skills were discussed and a safety plan was provided.  The patient was educated on the risks, benefits, and alternatives of their psychiatric medications.  The patient will engage in psychotherapy at this time.  The patient consents to ongoing medication management.  Risks, benefits discussed.  Patient was hospitalized at [facility] from 10/30/21 until 11/5 after suicide attempt by car and OD.  Patient is on leave from work.  She was in hospital for 8 days in  last week.\par Safety plan discussed at length.\par Encouraged to abstain from alcohol.\par \par 2/23/22:patient called, is more anxious, maintains she is abstaining from alcohol and using klonopin as prescribed.  risks, benefits discussed, raise lexapro 30mg po qam (understands above FDA approved dose but has been effective).  Alternatives, discussed, consider d/c wellbutrin and or adding topamax and or gabapentin, and changing abilify to seroquel in the future, f/u next week Discussed risks and benefits of medication changes, SSRI benefit  better than raising klonopin (also mild depressogenic)\par \par 2/28:patient improving, will continue current medication\par \par 3/9:called feeling more depressed, anxious, RX for klonopin sent again, last filled 2/25, due for refill.  Risks, benefits discussed, will raise wellbutrin xl 300mg po qam, coping skills, safety plan discussed, will f/u next week, earlier as needed\par \par 3/14:mild improvement, intermittent symptoms, continues with therapy, would like to continue current medications\par \par 3/28/22 PHQ9 14 HAM-Anxiety 29, patient subjectively reports anxiety, due to TASK requirement, menstrual cycle, and financial issue with [employer], she prefers to continue current medications, encouraged to use klonopin twice daily\par \par 4/11/22: patient still depressed, will raise abilify, and follow up in 2 weeks\par 4/25:fairly stable, continue current medication\par 5/9/22:improved, no med changes, PHQ9=13\par 5/23/22:stable on current medication, no changes\par 6/13/22:patient presents little dysphoric and anxious about court tomorrow, but over last 3 weeks maintains she has been stable, and is not interested in medication change.  She is motivated to put legal issues behind her.  She denies any side effects and maintains she is using all medications as prescribed.\par 7/25:will titrate abilify for mood\par 8/8/22:stable, at baseline\par 8/29/22:increase Wellbutrin for mood\par 9/12: patient had only few week supply of wellbutrin and klonopin remianing.  patient was provided a safe taper schedule, and maintain she can't afford medications out of pocket.  She was encourage to go to Medicaid office or [facility] financial at [address]  Also, she was informed of [place] walk in clinic and if worsening depression, or SI to go ER or call 911.  As soon as patient obtains prescription benefits to call provider to resume medications.  She will follow up in 1 week.\par 9/22 :patient has been more anxious, used extra klonopin, will bridge patient with lorazepam 2mg po BID #12\par 10/12:patient took 8 klonopin OD and was hospitalized, she was restarted on medications below\par 10/24:was able to obtain all medications, stable at this time, continue to see weekly as available\par 10/31:more anxious, will resume gabapentin for anxiety/mood, follow up in 1 week\par 11/7:improved, provided 3 day supply of lorazepam, then resume klonopin\par 11/12:educated again there is no safe use of alcohol on current medication, and may be referred to substance use program if needed, or to go to AA, otherwise, will continue current medication, had brighter affect, follow up weekly due to risk [No] : no [TextBox_32] : Patient had recent SI, OD, but  currently denies SI, intent or plan [Yes] : yes [Mood episode] : mood episode [Agitation/ severe anxiety] : agitation/severe anxiety [Perceived burden on family or others] : perceived burden on family or others [Impulsivity] : impulsivity [History of abuse/trauma] : history of abuse/trauma [Anhedonia] : anhedonia [Global insomnia] : global insomnia [Recent loss] : recent loss [Current or pending isolation] : current or pending isolation [Responsibility to family or others] : responsibility to family or others [Identifies reasons for living] : identifies reasons for living [Future oriented] : future oriented [Engaged in work or school] : engaged in work or school [Supportive social network or family] : supportive social network or family [Ability to cope with stress] : ability to cope with stress [TextBox_52] : patient had 2 SA, but denies current SI, intent, or plan [None Known] : none known [Residential stability] : residential stability [Employment stability] : employment stability [TextBox_35] : No violence reported

## 2022-11-14 NOTE — PHYSICAL EXAM
[Cooperative] : cooperative [Anxious] : anxious [Constricted] : constricted [Flat] : flat [Clear] : clear [Linear/Goal Directed] : linear/goal directed [Depressive] : depressive [None Reported] : none reported [Average] : average [WNL] : within normal limits [de-identified] :  MOHINDER CONTINUES TO ENDORSE FEELINGS OF ANXIOUSNESS AND FEELING "BLAH"

## 2022-11-16 ENCOUNTER — APPOINTMENT (OUTPATIENT)
Dept: PAIN MANAGEMENT | Facility: CLINIC | Age: 47
End: 2022-11-16

## 2022-11-16 PROCEDURE — 99072 ADDL SUPL MATRL&STAF TM PHE: CPT

## 2022-11-16 PROCEDURE — 99152Z: CUSTOM

## 2022-11-16 PROCEDURE — 93770 DETERMINATION VENOUS PRESS: CPT

## 2022-11-16 PROCEDURE — 93040 RHYTHM ECG WITH REPORT: CPT | Mod: 79

## 2022-11-16 PROCEDURE — 62321 NJX INTERLAMINAR CRV/THRC: CPT

## 2022-11-16 NOTE — PROCEDURE
[FreeTextEntry1] : CERVICAL EPIDURAL STEROID INJECTION UNDER FLUOROSCOPY  [FreeTextEntry3] : CERVICAL EPIDURAL STEROID INJECTION UNDER FLUOROSCOPY\par \par Preoperative Diagnosis: Cervical radiculopathy \par Postoperative Diagnosis: Same\par Procedure: Translaminar Cervical Epidural Injection under fluoroscopy\par Physician: Michael Horton D.O.\par \par Anesthesia: See nurses note. /Cold spray. IV sedation, Versed 4mg, Fentanyl 150 mcg\par \par Medical Necessity: Failure of conservative management.\par \par CONSENT: The possible complications including infection, bleeding, nerve damage, hospital admission, death or failure of the procedure; though unusual, are theoretically possible. The patient was educated about the of the procedure and alternative therapies. All questions were answered and the patient freely gave consent to proceed.\par Indication for Fluoroscopy: This procedure requires the precise placement of the spinal needle. It is the only way to accurately and safely perform the injection.\par Monitoring: Patient had continuous blood pressure, EKG, and pulse oximetry throughout the case. See nurse's notes.\par After obtaining written consent, the After obtaining written consent, the patient was positioned prone on the fluoroscopy table. The back to her neck and upper thorax was prepped with Betadine and draped in usual sterile fashion. A time out was performed. The C7-T1 interspace was identified using fluoroscopy. The skin was infiltrated with cold spray and lidocaine 1% -- 1 cc for subcutaneous analgesia. The epidural space was identified using a 18g touhy needle with a midline approach using a loss of resistance technique. 2cc omnipaque was used to define the space. A solution of 5 ml of preservative-free sterile saline and 1ml of Methylprednisolone 80mg, 1cc was infused with minimal pressure on the syringe into the epidural space. The procedure was tolerated well. There was no evidence of CSF, Paresthesias nor heme. The needle was removed intact. A band aid was place on the site.\par \par Epidurogram: No signs of epidural fibrosis.\par \par \par Complications: None. The patient tolerated the procedure well.\par \par Disposition: I have examined the patient and there are no new physical findings since the original presentation. Sensory and motor function were intact. The patient met discharge criteria see nurses notes. The discharge instruction sheet was reviewed and given to the patient. The patient was discharged home with a . If patient gets sustained relief will have patient do shoulder griddle strengthening with Thera bands and walking.\par \par Comment: 2nd CRAIG today, schedule 3rd in 1-2 weeks depending of effectiveness vs follow up in office depending on the insurance.  Call if any problems. \par 40% relief with 1st Craig \par \par Michael Horton D.O.\par Diplomat, American Board of Anesthesiology\par Diplomat, American Board of Pain Medicine\par Diplomat, American Board of Pain Management\par \par

## 2022-11-21 ENCOUNTER — APPOINTMENT (OUTPATIENT)
Dept: PSYCHIATRY | Facility: CLINIC | Age: 47
End: 2022-11-21

## 2022-11-21 ENCOUNTER — OUTPATIENT (OUTPATIENT)
Dept: OUTPATIENT SERVICES | Facility: HOSPITAL | Age: 47
LOS: 1 days | Discharge: HOME | End: 2022-11-21

## 2022-11-21 DIAGNOSIS — Z90.3 ACQUIRED ABSENCE OF STOMACH [PART OF]: Chronic | ICD-10-CM

## 2022-11-21 DIAGNOSIS — F41.1 GENERALIZED ANXIETY DISORDER: ICD-10-CM

## 2022-11-21 DIAGNOSIS — G47.00 INSOMNIA, UNSPECIFIED: ICD-10-CM

## 2022-11-21 DIAGNOSIS — F31.81 BIPOLAR II DISORDER: ICD-10-CM

## 2022-11-21 PROCEDURE — 99214 OFFICE O/P EST MOD 30 MIN: CPT

## 2022-11-21 NOTE — REASON FOR VISIT
[FreeTextEntry1] : "I am ok, have family to spend the holiday with; I didn't drink at all, doing well, to see therapist Wednesday."

## 2022-11-21 NOTE — HISTORY OF PRESENT ILLNESS
[FreeTextEntry1] : This is a 46 year old patient who presents for medication management.  The patient reports less depressed mood, fair sleep and  appetite.  The patient denies hypomania,  denies psychosis at this time.  The patient has chronic anxiety that impairs their daily functioning at times. The patient has passive SI at times, but denies any current active suicidal ideation, homicidal ideation, no auditory or visual hallucinations, or paranoid ideation.  The patient has no medical complaints. The patient reported alcohol use, and is smoking at this time. I requested the patient's updated physical and labs from their PCP.  Coping skills were discussed and a safety plan was provided.  The patient was educated on the risks, benefits, and alternatives of their psychiatric medications.  The patient will engage in psychotherapy at this time.  The patient consents to ongoing medication management.  Risks, benefits discussed.  Patient was hospitalized at [facility] from 10/30/21 until 11/5 after suicide attempt by car and OD.  Patient is on leave from work.  She was in hospital for 8 days in  last week.\par Safety plan discussed at length.\par Encouraged to abstain from alcohol.\par \par 2/23/22:patient called, is more anxious, maintains she is abstaining from alcohol and using klonopin as prescribed.  risks, benefits discussed, raise lexapro 30mg po qam (understands above FDA approved dose but has been effective).  Alternatives, discussed, consider d/c wellbutrin and or adding topamax and or gabapentin, and changing abilify to seroquel in the future, f/u next week Discussed risks and benefits of medication changes, SSRI benefit  better than raising klonopin (also mild depressogenic)\par \par 2/28:patient improving, will continue current medication\par \par 3/9:called feeling more depressed, anxious, RX for klonopin sent again, last filled 2/25, due for refill.  Risks, benefits discussed, will raise wellbutrin xl 300mg po qam, coping skills, safety plan discussed, will f/u next week, earlier as needed\par \par 3/14:mild improvement, intermittent symptoms, continues with therapy, would like to continue current medications\par \par 3/28/22 PHQ9 14 HAM-Anxiety 29, patient subjectively reports anxiety, due to TASK requirement, menstrual cycle, and financial issue with [employer], she prefers to continue current medications, encouraged to use klonopin twice daily\par \par 4/11/22: patient still depressed, will raise abilify, and follow up in 2 weeks\par 4/25:fairly stable, continue current medication\par 5/9/22:improved, no med changes, PHQ9=13\par 5/23/22:stable on current medication, no changes\par 6/13/22:patient presents little dysphoric and anxious about court tomorrow, but over last 3 weeks maintains she has been stable, and is not interested in medication change.  She is motivated to put legal issues behind her.  She denies any side effects and maintains she is using all medications as prescribed.\par 7/25:will titrate abilify for mood\par 8/8/22:stable, at baseline\par 8/29/22:increase Wellbutrin for mood\par 9/12: patient had only few week supply of wellbutrin and klonopin remianing.  patient was provided a safe taper schedule, and maintain she can't afford medications out of pocket.  She was encourage to go to Medicaid office or [facility] financial at [address]  Also, she was informed of [place] walk in clinic and if worsening depression, or SI to go ER or call 911.  As soon as patient obtains prescription benefits to call provider to resume medications.  She will follow up in 1 week.\par 9/22 :patient has been more anxious, used extra klonopin, will bridge patient with lorazepam 2mg po BID #12\par 10/12:patient took 8 klonopin OD and was hospitalized, she was restarted on medications below\par 10/24:was able to obtain all medications, stable at this time, continue to see weekly as available\par 10/31:more anxious, will resume gabapentin for anxiety/mood, follow up in 1 week\par 11/7:improved, provided 3 day supply of lorazepam, then resume klonopin\par 11/12:educated again there is no safe use of alcohol on current medication, and may be referred to substance use program if needed, or to go to AA, otherwise, will continue current medication, had brighter affect, follow up weekly due to risk\par 11/21:stable on medications, follow up weekly for now [No] : no [TextBox_32] : Patient had recent SI, OD, but  currently denies SI, intent or plan [Yes] : yes [Mood episode] : mood episode [Agitation/ severe anxiety] : agitation/severe anxiety [Perceived burden on family or others] : perceived burden on family or others [Impulsivity] : impulsivity [History of abuse/trauma] : history of abuse/trauma [Anhedonia] : anhedonia [Global insomnia] : global insomnia [Recent loss] : recent loss [Current or pending isolation] : current or pending isolation [Responsibility to family or others] : responsibility to family or others [Identifies reasons for living] : identifies reasons for living [Future oriented] : future oriented [Engaged in work or school] : engaged in work or school [Supportive social network or family] : supportive social network or family [Ability to cope with stress] : ability to cope with stress [TextBox_52] : patient had 2 SA, but denies current SI, intent, or plan [None Known] : none known [Residential stability] : residential stability [Employment stability] : employment stability [TextBox_35] : No violence reported

## 2022-11-21 NOTE — RESULTS/DATA
Received labs from [address] [place] [address] dated 10/01/21. Placed on providers desk for review.
[FreeTextEntry1] : This is a 45 year old female, , living alone. She denies any past treatment for mental Health,, She ha d been prescribed Zoloft by PCP but reports negative reaction\par  She presents with Major depressive symptoms. anhedonia, irritability, loss of interest and motivation, insomna. 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 She also reports lonliness but has a good support system. \par DX:Major depressive episode\par  OLGA\par /(Ro bi polar2) \par   Elaine requests therapy and medication evaluation

## 2022-11-21 NOTE — CURRENT PSYCHIATRIC SYMPTOMS
[Depressed Mood] : depressed mood [Anhedonia] : no anhedonia [Guilt] : not feeling guilty [Decreased Concentration] : no decrease in concentrating ability [Hyperphagia] : no hyperphagia [Insomnia] : no insomnia disorder [Hypersomnia] : no ~T hypersomnia [Psychomotor Retardation] : no psychomotor retardation [Anorexia] : no anorexia [Euphoria] : no euphoria [Highly Irritable] : no high irritability [Increased Activity] : no increased in activity [Distractibility] : not distracted [Grandeur] : no feelings of grandeur [Buying Sprees] : no buying sprees [Hypersexuality] : denied hypersexuality [Dec Need For Sleep] : no decreased need for sleep [Delusions] : no ~T delusions [Hallucination Auditory] : no auditory hallucinations [Thought Disorder] : ~T a thought disorder was not noted [Excessive Worry] : excessive worry [Ruminations] : ruminations [Obsessions] : no obsessions [Re-experiencing] : no re-experiencing [Restlessness] : no restlessness [Hypochondriasis] : no hypochondriasis [Panic] : no panic disorder [Recent Onset] : no recent onset of confusion [Fluctuating Course] : no fluctuating course [Reversed sleep-wake] : no reversed sleep- wake [Inattentive] : not nattentive [Tremor] : ~T no ~M tremor was seen [Hallucination Visual] : no visual hallucinations [Hallucination Olfactory] : no olfactory hallucinations [Nausea] : no nausea [Hallucination Tactile] : no tactile hallucinations [Hallucination Gustatory] : no gustatory hallucinations [Diaphoresis] : showed no ~M diaphoresis [Vomiting] : no vomiting [Yawning] : no yawning [Mydriasis] : showed no ~M mydriasis [Gastrointestinal Sxs] : no ~T gastrointestinal symptoms [Piloerection] : the hair did not demonstrate piloerection [Rhinorrhea] : no ~M rhinorrhea discharge was seen [de-identified] : less depressed [de-identified] : at times, improved

## 2022-11-23 ENCOUNTER — APPOINTMENT (OUTPATIENT)
Dept: PSYCHIATRY | Facility: CLINIC | Age: 47
End: 2022-11-23

## 2022-11-28 ENCOUNTER — APPOINTMENT (OUTPATIENT)
Dept: PSYCHIATRY | Facility: CLINIC | Age: 47
End: 2022-11-28

## 2022-11-28 ENCOUNTER — OUTPATIENT (OUTPATIENT)
Dept: OUTPATIENT SERVICES | Facility: HOSPITAL | Age: 47
LOS: 1 days | Discharge: HOME | End: 2022-11-28

## 2022-11-28 DIAGNOSIS — G47.00 INSOMNIA, UNSPECIFIED: ICD-10-CM

## 2022-11-28 DIAGNOSIS — F41.1 GENERALIZED ANXIETY DISORDER: ICD-10-CM

## 2022-11-28 DIAGNOSIS — F31.81 BIPOLAR II DISORDER: ICD-10-CM

## 2022-11-28 DIAGNOSIS — Z90.3 ACQUIRED ABSENCE OF STOMACH [PART OF]: Chronic | ICD-10-CM

## 2022-11-28 PROCEDURE — 99214 OFFICE O/P EST MOD 30 MIN: CPT

## 2022-11-28 NOTE — DISCUSSION/SUMMARY
[FreeTextEntry1] : PLAN:   Therapy amd Medication evaluation\par DX: OLGA/ BiPolar 2\par Remains symptomatic at times, encourage to abstain from alcohol and take all medication as prescribed.  Will continue medications for mood stability and anxiety.\par \par medication:\par 1. lexapro 20mg and eventually back to 30mg if needed\par 2. klonopin 1mg po BID prn for anxiety \par 3. hydroxyzine 25mg-50mg at bedtime prn insomnia\par 4.  abilify 10mg po daily\par 5. wellbutrin xl 300mg po daily (option to go back to 450mg po daily)\par 6. gabapentin 300mg po TID for mood/anxiety, risks, benefits discussed\par \par Follow up in 1 week, appears brighter, at baseline\par \par Patient has 2 SA, but enies any current SI, intent, or plan.  Safety plan discussed at length, she will reach out to provider or call 911 if any SI, intent, or plan.  She will see therapist weekly and writer every 1-2 weeks, earlier as needed.

## 2022-11-28 NOTE — PLAN
[FreeTextEntry2] : SEE DISCUSSION SUMMARY [Supportive Therapy] : Supportive Therapy [de-identified] : MOHINDER ENDORSES IMPROVED MOOD AND IS MORE ALERT IN SESSION SHE IS BACK TO WORK WITH NO COMPLAINTS. SHE IS RESPONSIVE AND INTERACTIVE.\par MOHINDER REPORTS SHE DRANK OVER THIS PAST WEEKEND AND WAS "MAD AY MYSELF" WE CONTINUE TO EXPLORE THE SIGNS  OF ALCOHOL ABUSE AND MOHINDER DENIES SHE HAS A PROBLEM    SHE IS PROVIDED WITH SUPPORT AND IS ENCOURAGED TO BE WATCHFUL

## 2022-11-28 NOTE — RISK ASSESSMENT
[No, patient denies ideation or behavior] : No, patient denies ideation or behavior [FreeTextEntry9] : nO RISK TO SELF OR OTHERS ELICITED

## 2022-11-28 NOTE — CURRENT PSYCHIATRIC SYMPTOMS
[Depressed Mood] : depressed mood [Anhedonia] : no anhedonia [Guilt] : not feeling guilty [Decreased Concentration] : no decrease in concentrating ability [Hyperphagia] : no hyperphagia [Insomnia] : no insomnia disorder [Hypersomnia] : no ~T hypersomnia [Psychomotor Retardation] : no psychomotor retardation [Anorexia] : no anorexia [Euphoria] : no euphoria [Highly Irritable] : no high irritability [Increased Activity] : no increased in activity [Distractibility] : not distracted [Grandeur] : no feelings of grandeur [Buying Sprees] : no buying sprees [Hypersexuality] : denied hypersexuality [Dec Need For Sleep] : no decreased need for sleep [Delusions] : no ~T delusions [Hallucination Auditory] : no auditory hallucinations [Thought Disorder] : ~T a thought disorder was not noted [Excessive Worry] : excessive worry [Ruminations] : ruminations [Obsessions] : no obsessions [Re-experiencing] : no re-experiencing [Restlessness] : no restlessness [Hypochondriasis] : no hypochondriasis [Panic] : no panic disorder [Recent Onset] : no recent onset of confusion [Fluctuating Course] : no fluctuating course [Reversed sleep-wake] : no reversed sleep- wake [Inattentive] : not nattentive [Tremor] : ~T no ~M tremor was seen [Hallucination Visual] : no visual hallucinations [Hallucination Olfactory] : no olfactory hallucinations [Nausea] : no nausea [Hallucination Tactile] : no tactile hallucinations [Hallucination Gustatory] : no gustatory hallucinations [Diaphoresis] : showed no ~M diaphoresis [Vomiting] : no vomiting [Yawning] : no yawning [Mydriasis] : showed no ~M mydriasis [Gastrointestinal Sxs] : no ~T gastrointestinal symptoms [Piloerection] : the hair did not demonstrate piloerection [Rhinorrhea] : no ~M rhinorrhea discharge was seen [de-identified] : less depressed [de-identified] : at times, improved

## 2022-11-28 NOTE — DISCUSSION/SUMMARY
[Plan Review] : Plan Review [Able to manage surrounding demands and opportunities] : able to manage surrounding demands and opportunities [Able to exercise self-direction] : able to exercise self-direction [Able to set and pursue goals] : able to set and pursue goals [Adherent to treatment recommendations] : adherent to treatment recommendations [Articulate] : articulate [Attempting to realize their potential] : Attempting to realize their potential [Awareness of substance use issues] : awareness of substance use issues [Cognitively intact] : cognitively intact [Intelligent] : intelligent [Motivated to participate in treatment] : motivated to participate in treatment [Motivated and ready for change] : motivated and ready for change [Health literacy] : health literacy [Motivated to maintain or improve physical health] : motivated to maintain or improve physical health [In good health] : in good health [Has vocational interests or hobbies] : has vocational interests or hobbies [Part of a supportive family] : part of a supportive family [Has a supportive network] : has a supportive network [English fluency] : English fluency [Connected to healthcare] : connected to healthcare [Social supports] : social supports [FreeTextEntry2] : 11/03/2022 [FreeTextEntry3] : 10/13/2021 [FreeTextEntry5] : "I want to get my head on straight" [FreeTextEntry6] : Elaine has many strengths and has been responsive to interventions and compliant with treatment [FreeTextEntry7] : Elaine has a supportive family and network  However she is not financially stable   [FreeTextEntry8] : Continues to use alcohol. her abuse of alcohol was a factor in 2 suicide attempts in recent past [FreeTextEntry9] : Elaine is receptive to discussion of spirituality and her belief system   [de-identified] : Psychiatrist, Dr Murdock [Mental Health] : Mental Health [Substance Abuse] : Substance Abuse [Occupational/Educational] : Occupational/Educational [Interpersonal Relationships] : Interpersonal Relationships [Continued - Progress made] : Continued - Progress made: [___ times a week] : [unfilled] times a week [Improvement in symptoms as evidenced by:] : Improvement in symptoms as evidenced by: [Other rationale for transition/discharge:] : Other rationale for transition/discharge: [None - Reason others did not participate:] : None - Reason others did not participate:  [Yes] : Yes [Psychiatric Provider/Prescriber] : Psychiatric Provider/Prescriber [Therapist] : Therapist [FreeTextEntry1] : No job, financial stressors, some unhealthy relationship choices [FreeTextEntry4] : To manage mood issues, depression and have healthy relationship [de-identified] : uses CB exercises to manage impulsive behaviors [de-identified] : Elaine will gain insight into how hwe depression and loss issues may be a factor in recent suicide attempt [de-identified] : 11/03/22 [de-identified] : reductioj of depression and awareness of self value [de-identified] : Decrease alcohol use and refrain from abuse of alcohol [de-identified] : 11/03/22 [de-identified] : Elaine cntinues to use alcohol socially [de-identified] : Mood has stabilized [de-identified] : Goals have been met [de-identified] : Not clinically indicated

## 2022-11-28 NOTE — HISTORY OF PRESENT ILLNESS
[FreeTextEntry1] : This is a 46 year old patient who presents for medication management.  The patient reports less depressed mood, fair sleep and  appetite.  The patient denies hypomania,  denies psychosis at this time.  The patient has chronic anxiety that impairs their daily functioning at times. The patient has passive SI at times, but denies any current active suicidal ideation, homicidal ideation, no auditory or visual hallucinations, or paranoid ideation.  The patient has no medical complaints. The patient reported alcohol use, and is smoking at this time. I requested the patient's updated physical and labs from their PCP.  Coping skills were discussed and a safety plan was provided.  The patient was educated on the risks, benefits, and alternatives of their psychiatric medications.  The patient will engage in psychotherapy at this time.  The patient consents to ongoing medication management.  Risks, benefits discussed.  Patient was hospitalized at [facility] from 10/30/21 until 11/5 after suicide attempt by car and OD.  Patient is on leave from work.  She was in hospital for 8 days in  last week.\par Safety plan discussed at length.\par Encouraged to abstain from alcohol.\par \par 2/23/22:patient called, is more anxious, maintains she is abstaining from alcohol and using klonopin as prescribed.  risks, benefits discussed, raise lexapro 30mg po qam (understands above FDA approved dose but has been effective).  Alternatives, discussed, consider d/c wellbutrin and or adding topamax and or gabapentin, and changing abilify to seroquel in the future, f/u next week Discussed risks and benefits of medication changes, SSRI benefit  better than raising klonopin (also mild depressogenic)\par \par 2/28:patient improving, will continue current medication\par \par 3/9:called feeling more depressed, anxious, RX for klonopin sent again, last filled 2/25, due for refill.  Risks, benefits discussed, will raise wellbutrin xl 300mg po qam, coping skills, safety plan discussed, will f/u next week, earlier as needed\par \par 3/14:mild improvement, intermittent symptoms, continues with therapy, would like to continue current medications\par \par 3/28/22 PHQ9 14 HAM-Anxiety 29, patient subjectively reports anxiety, due to TASK requirement, menstrual cycle, and financial issue with [employer], she prefers to continue current medications, encouraged to use klonopin twice daily\par \par 4/11/22: patient still depressed, will raise abilify, and follow up in 2 weeks\par 4/25:fairly stable, continue current medication\par 5/9/22:improved, no med changes, PHQ9=13\par 5/23/22:stable on current medication, no changes\par 6/13/22:patient presents little dysphoric and anxious about court tomorrow, but over last 3 weeks maintains she has been stable, and is not interested in medication change.  She is motivated to put legal issues behind her.  She denies any side effects and maintains she is using all medications as prescribed.\par 7/25:will titrate abilify for mood\par 8/8/22:stable, at baseline\par 8/29/22:increase Wellbutrin for mood\par 9/12: patient had only few week supply of wellbutrin and klonopin remianing.  patient was provided a safe taper schedule, and maintain she can't afford medications out of pocket.  She was encourage to go to Medicaid office or [facility] financial at [address]  Also, she was informed of [place] walk in clinic and if worsening depression, or SI to go ER or call 911.  As soon as patient obtains prescription benefits to call provider to resume medications.  She will follow up in 1 week.\par 9/22 :patient has been more anxious, used extra klonopin, will bridge patient with lorazepam 2mg po BID #12\par 10/12:patient took 8 klonopin OD and was hospitalized, she was restarted on medications below\par 10/24:was able to obtain all medications, stable at this time, continue to see weekly as available\par 10/31:more anxious, will resume gabapentin for anxiety/mood, follow up in 1 week\par 11/7:improved, provided 3 day supply of lorazepam, then resume klonopin\par 11/12:educated again there is no safe use of alcohol on current medication, and may be referred to substance use program if needed, or to go to AA, otherwise, will continue current medication, had brighter affect, follow up weekly due to risk\par 11/21:stable on medications, follow up weekly for now\par 11/28:educated again on risks of alcohol use with current medication, encouraged again to abstain and seek out AA if needed, she maintains she will not drink this week [No] : no [TextBox_32] : Patient had recent SI, OD, but  currently denies SI, intent or plan [Yes] : yes [Mood episode] : mood episode [Agitation/ severe anxiety] : agitation/severe anxiety [Perceived burden on family or others] : perceived burden on family or others [Impulsivity] : impulsivity [History of abuse/trauma] : history of abuse/trauma [Anhedonia] : anhedonia [Global insomnia] : global insomnia [Recent loss] : recent loss [Current or pending isolation] : current or pending isolation [Responsibility to family or others] : responsibility to family or others [Identifies reasons for living] : identifies reasons for living [Future oriented] : future oriented [Engaged in work or school] : engaged in work or school [Supportive social network or family] : supportive social network or family [Ability to cope with stress] : ability to cope with stress [TextBox_52] : patient had 2 SA, but denies current SI, intent, or plan [None Known] : none known [Residential stability] : residential stability [Employment stability] : employment stability [TextBox_35] : No violence reported

## 2022-11-28 NOTE — REASON FOR VISIT
[Patient] : Patient [FreeTextEntry1] : Please see medication management not, this was enetered in error

## 2022-11-28 NOTE — HISTORY OF PRESENT ILLNESS
[FreeTextEntry1] : This is a 46 year old patient who presents for medication management.  The patient reports less depressed mood, fair sleep and  appetite.  The patient denies hypomania,  denies psychosis at this time.  The patient has chronic anxiety that impairs their daily functioning at times. The patient has passive SI at times, but denies any current active suicidal ideation, homicidal ideation, no auditory or visual hallucinations, or paranoid ideation.  The patient has no medical complaints. The patient reported alcohol use, and is smoking at this time. I requested the patient's updated physical and labs from their PCP.  Coping skills were discussed and a safety plan was provided.  The patient was educated on the risks, benefits, and alternatives of their psychiatric medications.  The patient will engage in psychotherapy at this time.  The patient consents to ongoing medication management.  Risks, benefits discussed.  Patient was hospitalized at [facility] from 10/30/21 until 11/5 after suicide attempt by car and OD.  Patient is on leave from work.  She was in hospital for 8 days in  last week.\par Safety plan discussed at length.\par Encouraged to abstain from alcohol.\par \par 2/23/22:patient called, is more anxious, maintains she is abstaining from alcohol and using klonopin as prescribed.  risks, benefits discussed, raise lexapro 30mg po qam (understands above FDA approved dose but has been effective).  Alternatives, discussed, consider d/c wellbutrin and or adding topamax and or gabapentin, and changing abilify to seroquel in the future, f/u next week Discussed risks and benefits of medication changes, SSRI benefit  better than raising klonopin (also mild depressogenic)\par \par 2/28:patient improving, will continue current medication\par \par 3/9:called feeling more depressed, anxious, RX for klonopin sent again, last filled 2/25, due for refill.  Risks, benefits discussed, will raise wellbutrin xl 300mg po qam, coping skills, safety plan discussed, will f/u next week, earlier as needed\par \par 3/14:mild improvement, intermittent symptoms, continues with therapy, would like to continue current medications\par \par 3/28/22 PHQ9 14 HAM-Anxiety 29, patient subjectively reports anxiety, due to TASK requirement, menstrual cycle, and financial issue with [employer], she prefers to continue current medications, encouraged to use klonopin twice daily\par \par 4/11/22: patient still depressed, will raise abilify, and follow up in 2 weeks\par 4/25:fairly stable, continue current medication\par 5/9/22:improved, no med changes, PHQ9=13\par 5/23/22:stable on current medication, no changes\par 6/13/22:patient presents little dysphoric and anxious about court tomorrow, but over last 3 weeks maintains she has been stable, and is not interested in medication change.  She is motivated to put legal issues behind her.  She denies any side effects and maintains she is using all medications as prescribed.\par 7/25:will titrate abilify for mood\par 8/8/22:stable, at baseline\par 8/29/22:increase Wellbutrin for mood\par 9/12: patient had only few week supply of wellbutrin and klonopin remianing.  patient was provided a safe taper schedule, and maintain she can't afford medications out of pocket.  She was encourage to go to Medicaid office or [facility] financial at [address]  Also, she was informed of [place] walk in clinic and if worsening depression, or SI to go ER or call 911.  As soon as patient obtains prescription benefits to call provider to resume medications.  She will follow up in 1 week.\par 9/22 :patient has been more anxious, used extra klonopin, will bridge patient with lorazepam 2mg po BID #12\par 10/12:patient took 8 klonopin OD and was hospitalized, she was restarted on medications below\par 10/24:was able to obtain all medications, stable at this time, continue to see weekly as available\par 10/31:more anxious, will resume gabapentin for anxiety/mood, follow up in 1 week\par 11/7:improved, provided 3 day supply of lorazepam, then resume klonopin\par 11/12:educated again there is no safe use of alcohol on current medication, and may be referred to substance use program if needed, or to go to AA, otherwise, will continue current medication, had brighter affect, follow up weekly due to risk\par 11/21:stable on medications, follow up weekly for now\par 11/28:educated again on risks of alcohol use with current medication, encouraged again to abstain and seek out AA if needed, she maintains she will not drink this week [No] : no [TextBox_32] : Patient had recent SI, OD, but  currently denies SI, intent or plan [Yes] : yes [Mood episode] : mood episode [Agitation/ severe anxiety] : agitation/severe anxiety [Perceived burden on family or others] : perceived burden on family or others [Impulsivity] : impulsivity [History of abuse/trauma] : history of abuse/trauma [Anhedonia] : anhedonia [Global insomnia] : global insomnia [Recent loss] : recent loss [Current or pending isolation] : current or pending isolation [Responsibility to family or others] : responsibility to family or others [Identifies reasons for living] : identifies reasons for living [Future oriented] : future oriented [Engaged in work or school] : engaged in work or school [Supportive social network or family] : supportive social network or family [Ability to cope with stress] : ability to cope with stress [TextBox_52] : patient had 2 SA, but denies current SI, intent, or plan [None Known] : none known [Residential stability] : residential stability [Employment stability] : employment stability [TextBox_35] : No violence reported

## 2022-11-30 ENCOUNTER — APPOINTMENT (OUTPATIENT)
Dept: PAIN MANAGEMENT | Facility: CLINIC | Age: 47
End: 2022-11-30

## 2022-11-30 VITALS — WEIGHT: 142 LBS | BODY MASS INDEX: 22.29 KG/M2 | HEIGHT: 67 IN

## 2022-11-30 PROCEDURE — 99072 ADDL SUPL MATRL&STAF TM PHE: CPT

## 2022-11-30 PROCEDURE — 99214 OFFICE O/P EST MOD 30 MIN: CPT

## 2022-12-01 NOTE — HISTORY OF PRESENT ILLNESS
[FreeTextEntry1] : HISTORY OF PRESENT ILLNESS: This is a 46 year old woman complaining of neck and shoulder pain. The patient has had this pain for 6 months. The pain started after injury which took place while at work.  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  Patient describes pain as severe. During the last month the pain has been constant symptoms worse in the morning. Pain described as sharp, pressure-like, cramping, shooting and cutting. Pain is associated with numbness and pins and needles into the hands. Patient has weakness in the upper extremities. Pain is increased with lying down, sitting, walking, exercise, relaxation and coughing/sneezing. Pain is not changed with standing and bowel movements. Bowel or bladder habits have not changed.\par \par ACTIVITIES: Patient could walk four blocks before the pain starts. Patient can sit 20 minutes  before pain starts. Patient can stand 1 hour before pain starts. The patient often lays down because of pain. Patient uses no assistive walking device at this time. Patient has difficulty going to work, performing household chores, doing yard work or shopping, participating in recreational activities and exercise at this time.\par \par PRIOR PAIN TREATMENTS:  No prior pain\par \par PRIOR PAIN MEDICATIONS:  Tylenol\par \par PRESENTING TODAY: In revisit encounter in regard to her continued neck pain which resulted from a work related injury. Patient notes pain remains unchanged in severity and distribution. She continues to experience numbness and tingling into the left arm. She is status post CRAIG on 9/17/22. She notes that this procedure provided her with 60% relief for about 2 days. She then underwent a second CRAIG on 11/16/22 which provided her with no sustained relief. \par \par Covid 19 - This in-office encounter has occurred during a Public Health Emergency (PHE). As required by law, due to the risk of respiratory-transmitted infectious diseases, our office provided additional materials, supplies and clinical staff to assist in keeping our patients, physicians and office staff safe during this health emergency.\par

## 2022-12-01 NOTE — PHYSICAL EXAM
[de-identified] : GENERAL APPEARANCE OF PATIENT IS WELL DEVELOPED, WELL NOURISHED, BODY HABITUS NORMAL, WELL GROOMED, NO DEFORMITIES NOTED. \par Head - Atraumatic and Normocephalic \par Eyes, Nose, and Throat: External inspection of ears and nose are normal overall without scars, lesions, or masses noted. Assessment of hearing is normal\par Neck-Examination of neck shows no masses, overall appearance is normal, neck is symmetric, tracheal position is midline, no crepitus is noted. Examination of thyroid shows no enlargement, tenderness or masses\par Respiratory- Assessment of respiratory effort shows no intercostal retractions, no use of accessory muscles, unlabored breathing, and normal diaphragmatic movement.\par Cardiovascular- Examination of extremities show no edema or varicosities\par Musculoskeletal. Examination of gait is not antalgic and station is normal\par Inspection and palpation of digits and nails shows no clubbing, cyanosis, nodules, drainage, fluctuance, petechiae\par \par • Spine – inspection and palpation shows no misalignment, asymmetry, crepitation, defects, tenderness, masses, effusions. ROM is normal without crepitation or contracture. No instability or subluxation or laxity is noted. No abnormal movements.\par \par \par • Neck- trapezius spasms. 10-15 degrees with pain in active and passive flexion. 10-15 degrees in extension. \par \par • RUE- inspection and palpation shows no misalignment, asymmetry, crepitation, defects, tenderness, masses, effusions. ROM is normal without crepitation or contracture. No instability or subluxation or laxity is noted. No abnormal movements.\par \par \par • LUE- inspection and palpation shows no misalignment, asymmetry, crepitation, defects, tenderness, masses, effusions. ROM is normal without crepitation or contracture. No instability or subluxation or laxity is noted. No abnormal movements.\par \par \par • RLE- inspection and palpation shows no misalignment, asymmetry, crepitation, defects, tenderness, masses, effusions. ROM is normal without crepitation or contracture. No instability or subluxation or laxity is noted. No abnormal movements.\par \par \par • LLE- inspection and palpation shows no misalignment, asymmetry, crepitation, defects, tenderness, masses, effusions. ROM is normal without crepitation or contracture. No instability or subluxation or laxity is noted. No abnormal movements.\par \par \par • Skin- Inspection of skin and subcutaneous tissue shows no rashes, lesions or ulcers. Palpation of skin and subcutaneous tissue shows no rashes, no indurations, subcutaneous nodules or tightening.\par \par \par • Abdomen- Nontender\par \par \par • Neurologic- CN 2-12 are grossly intact. No sensory or motor deficits in the upper and lower extremities. Adequate strength in upper and lower extremities \par \par \par • Psychiatric- Patient’s judgment and insight are intact. Oriented to time, place and person. Recent and remote memory intact.\par \par

## 2022-12-01 NOTE — DISCUSSION/SUMMARY
[de-identified] : Ms. Vernon is a 46-year-old female with a chief complaint of neck and shoulder pain , left greater than right.  She has had this pain for about 6 months following a work related injury which took place on 02/02/2022. She is unable to undergo physical therapy because it is too painful. Patient has not been working since his injury. Patient notes pain remains unchanged in severity and distribution. She continues to experience numbness and tingling into the left arm. She is status post CRAIG on 9/17/22. She notes that this procedure provided her with 60% relief for about 2 days. She then underwent a second CRAIG on 11/16/22 which provided her with no sustained relief. She will continue to manage her pain medically with gabapentin 300mg TID. She notes the medication helps with pain and enables her to sleep better at night. At this time, we will refer the patient to neurosurgery to discuss a possible surgical option for her pain. The patient will consult with Dr. Reyes. She will follow up in 4-6 weeks after seeing the neurosurgeon. \par \par Disability status: total temporary disability at this time and unable to work. \par \par I, Padmini Osuna, attest that this documentation has been prepared under the direction and in the presence of Provider Ashley Dong The documentation recorded by the scribe, in my presence, accurately reflects the service I personally performed, and the decisions made by me with my edits as appropriate.\par \par Best Regards, \par Michael Horton D.CHEYENNE. \par Diplomat, American Board of Anesthesiology \par Diplomat, American Board of Pain Medicine \par Diplomat, American Board of Pain Management \par \par

## 2022-12-05 ENCOUNTER — OUTPATIENT (OUTPATIENT)
Dept: OUTPATIENT SERVICES | Facility: HOSPITAL | Age: 47
LOS: 1 days | Discharge: HOME | End: 2022-12-05

## 2022-12-05 ENCOUNTER — APPOINTMENT (OUTPATIENT)
Dept: PSYCHIATRY | Facility: CLINIC | Age: 47
End: 2022-12-05

## 2022-12-05 ENCOUNTER — NON-APPOINTMENT (OUTPATIENT)
Age: 47
End: 2022-12-05

## 2022-12-05 DIAGNOSIS — F41.1 GENERALIZED ANXIETY DISORDER: ICD-10-CM

## 2022-12-05 DIAGNOSIS — G47.00 INSOMNIA, UNSPECIFIED: ICD-10-CM

## 2022-12-05 DIAGNOSIS — F31.81 BIPOLAR II DISORDER: ICD-10-CM

## 2022-12-05 DIAGNOSIS — Z90.3 ACQUIRED ABSENCE OF STOMACH [PART OF]: Chronic | ICD-10-CM

## 2022-12-05 PROCEDURE — 99214 OFFICE O/P EST MOD 30 MIN: CPT

## 2022-12-05 RX ORDER — LORAZEPAM 2 MG/1
2 TABLET ORAL TWICE DAILY
Qty: 6 | Refills: 0 | Status: DISCONTINUED | COMMUNITY
Start: 2022-11-07 | End: 2022-12-05

## 2022-12-05 NOTE — PLAN
[FreeTextEntry2] : SEE DISCUSSION SUMMARY [Psychodynamic Therapy] : Psychodynamic Therapy  [Skills training (all types)] : Skills training (all types)  [de-identified] : Georgina endorses improved energy and is now dating again. She admits to feeling "better" and functioning well. Elaine is having difficulty with her medication again and is advised to contact the appropriate parties (workmans comp and medicaid)  to determine if her benefits have changed\par Elaine is alert, oriented and responsive. She denies alcohol use at this time. [FreeTextEntry1] : NEXT SESSION SCHEDULED FOR 1 WEEK, 12/12

## 2022-12-05 NOTE — DISCUSSION/SUMMARY
[FreeTextEntry1] : PLAN:   Therapy amd Medication evaluation\par DX: OLGA/ BiPolar 2\par Remains symptomatic at times, encourage to abstain from alcohol and take all medication as prescribed.  Will continue medications for mood stability and anxiety.\par \par medication:\par 1. lexapro 20mg and eventually back to 30mg if needed\par 2. klonopin 1mg po BID prn for anxiety \par 3. hydroxyzine 25mg-50mg at bedtime prn insomnia\par 4.  abilify 10mg po daily\par 5. wellbutrin xl 300mg po daily (option to go back to 450mg po daily)\par 6. gabapentin 300mg po TID (now being managed by pain management)\par \par Follow up in 1 week, appears brighter, at baseline\par \par Patient has 2 SA, but enies any current SI, intent, or plan.  Safety plan discussed at length, she will reach out to provider or call 911 if any SI, intent, or plan.  She will see therapist weekly and writer every 1-2 weeks, earlier as needed.

## 2022-12-05 NOTE — RISK ASSESSMENT
[No, patient denies ideation or behavior] : No, patient denies ideation or behavior [FreeTextEntry9] : no risk to self or others

## 2022-12-05 NOTE — DISCUSSION/SUMMARY
[Plan Review] : Plan Review [Able to manage surrounding demands and opportunities] : able to manage surrounding demands and opportunities [Able to exercise self-direction] : able to exercise self-direction [Able to set and pursue goals] : able to set and pursue goals [Adherent to treatment recommendations] : adherent to treatment recommendations [Articulate] : articulate [Attempting to realize their potential] : Attempting to realize their potential [Awareness of substance use issues] : awareness of substance use issues [Cognitively intact] : cognitively intact [Intelligent] : intelligent [Motivated to participate in treatment] : motivated to participate in treatment [Motivated and ready for change] : motivated and ready for change [Health literacy] : health literacy [Motivated to maintain or improve physical health] : motivated to maintain or improve physical health [In good health] : in good health [Has vocational interests or hobbies] : has vocational interests or hobbies [Part of a supportive family] : part of a supportive family [Has a supportive network] : has a supportive network [English fluency] : English fluency [Connected to healthcare] : connected to healthcare [Social supports] : social supports [FreeTextEntry1] : 8/3/2022 [FreeTextEntry2] : 11/03/2022 [FreeTextEntry3] : 10/13/2021 [FreeTextEntry5] : "I want to get my head on straight" [FreeTextEntry6] : Elaine has many strengths and has been responsive to interventions and compliant with treatment [FreeTextEntry7] : Elaine has a supportive family and network  However she is not financially stable   [FreeTextEntry8] : Continues to use alcohol. her abuse of alcohol was a factor in 2 suicide attempts in recent past [FreeTextEntry9] : Elaine is receptive to discussion of spirituality and her belief system   [de-identified] : Psychiatrist, Dr Murdock

## 2022-12-05 NOTE — REASON FOR VISIT
[Patient] : Patient [Supervisor present for visit (for trainees)] : Supervisor present for visit (for trainees). [FreeTextEntry1] : psychotherapy follow up

## 2022-12-05 NOTE — PHYSICAL EXAM
[Cooperative] : cooperative [Anxious] : anxious [Constricted] : constricted [Flat] : flat [Clear] : clear [Linear/Goal Directed] : linear/goal directed [Depressive] : depressive [None Reported] : none reported [Average] : average [WNL] : within normal limits [Mild] : mild [de-identified] :  MOHINDER CONTINUES TO ENDORSE FEELINGS OF ANXIOUSNESS AND FEELING "BLAH"

## 2022-12-05 NOTE — HISTORY OF PRESENT ILLNESS
[FreeTextEntry1] : This is a 46 year old patient who presents for medication management.  The patient reports less depressed mood, fair sleep and  appetite.  The patient denies hypomania,  denies psychosis at this time.  The patient has chronic anxiety that impairs their daily functioning at times. The patient has passive SI at times, but denies any current active suicidal ideation, homicidal ideation, no auditory or visual hallucinations, or paranoid ideation.  The patient has no medical complaints. The patient reported alcohol use, and is smoking at this time. I requested the patient's updated physical and labs from their PCP.  Coping skills were discussed and a safety plan was provided.  The patient was educated on the risks, benefits, and alternatives of their psychiatric medications.  The patient will engage in psychotherapy at this time.  The patient consents to ongoing medication management.  Risks, benefits discussed.  Patient was hospitalized at [facility] from 10/30/21 until 11/5 after suicide attempt by car and OD.  Patient is on leave from work.  She was in hospital for 8 days in  last week.\par Safety plan discussed at length.\par Encouraged to abstain from alcohol.\par \par 2/23/22:patient called, is more anxious, maintains she is abstaining from alcohol and using klonopin as prescribed.  risks, benefits discussed, raise lexapro 30mg po qam (understands above FDA approved dose but has been effective).  Alternatives, discussed, consider d/c wellbutrin and or adding topamax and or gabapentin, and changing abilify to seroquel in the future, f/u next week Discussed risks and benefits of medication changes, SSRI benefit  better than raising klonopin (also mild depressogenic)\par \par 2/28:patient improving, will continue current medication\par \par 3/9:called feeling more depressed, anxious, RX for klonopin sent again, last filled 2/25, due for refill.  Risks, benefits discussed, will raise wellbutrin xl 300mg po qam, coping skills, safety plan discussed, will f/u next week, earlier as needed\par \par 3/14:mild improvement, intermittent symptoms, continues with therapy, would like to continue current medications\par \par 3/28/22 PHQ9 14 HAM-Anxiety 29, patient subjectively reports anxiety, due to TASK requirement, menstrual cycle, and financial issue with [email or web address], she prefers to continue current medications, encouraged to use klonopin twice daily\par \par 4/11/22: patient still depressed, will raise abilify, and follow up in 2 weeks\par 4/25:fairly stable, continue current medication\par 5/9/22:improved, no med changes, PHQ9=13\par 5/23/22:stable on current medication, no changes\par 6/13/22:patient presents little dysphoric and anxious about court tomorrow, but over last 3 weeks maintains she has been stable, and is not interested in medication change.  She is motivated to put legal issues behind her.  She denies any side effects and maintains she is using all medications as prescribed.\par 7/25:will titrate abilify for mood\par 8/8/22:stable, at baseline\par 8/29/22:increase Wellbutrin for mood\par 9/12: patient had only few week supply of wellbutrin and klonopin remianing.  patient was provided a safe taper schedule, and maintain she can't afford medications out of pocket.  She was encourage to go to Medicaid office or [facility] financial at [address]  Also, she was informed of [place] walk in clinic and if worsening depression, or SI to go ER or call 911.  As soon as patient obtains prescription benefits to call provider to resume medications.  She will follow up in 1 week.\par 9/22 :patient has been more anxious, used extra klonopin, will bridge patient with lorazepam 2mg po BID #12\par 10/12:patient took 8 klonopin OD and was hospitalized, she was restarted on medications below\par 10/24:was able to obtain all medications, stable at this time, continue to see weekly as available\par 10/31:more anxious, will resume gabapentin for anxiety/mood, follow up in 1 week\par 11/7:improved, provided 3 day supply of lorazepam, then resume klonopin\par 11/12:educated again there is no safe use of alcohol on current medication, and may be referred to substance use program if needed, or to go to AA, otherwise, will continue current medication, had brighter affect, follow up weekly due to risk\par 11/21:stable on medications, follow up weekly for now\par 11/28:educated again on risks of alcohol use with current medication, encouraged again to abstain and seek out AA if needed, she maintains she will not drink this week\par 12/5:doing well, no changes, at baseline [No] : no [TextBox_32] : Patient had recent SI, OD, but  currently denies SI, intent or plan [Yes] : yes [Mood episode] : mood episode [Agitation/ severe anxiety] : agitation/severe anxiety [Perceived burden on family or others] : perceived burden on family or others [Impulsivity] : impulsivity [History of abuse/trauma] : history of abuse/trauma [Anhedonia] : anhedonia [Global insomnia] : global insomnia [Recent loss] : recent loss [Current or pending isolation] : current or pending isolation [Responsibility to family or others] : responsibility to family or others [Identifies reasons for living] : identifies reasons for living [Future oriented] : future oriented [Engaged in work or school] : engaged in work or school [Supportive social network or family] : supportive social network or family [Ability to cope with stress] : ability to cope with stress [TextBox_52] : patient had 2 SA, but denies current SI, intent, or plan [None Known] : none known [Residential stability] : residential stability [Employment stability] : employment stability [TextBox_35] : No violence reported

## 2022-12-05 NOTE — CURRENT PSYCHIATRIC SYMPTOMS
[Depressed Mood] : depressed mood [Anhedonia] : no anhedonia [Guilt] : not feeling guilty [Decreased Concentration] : no decrease in concentrating ability [Hyperphagia] : no hyperphagia [Insomnia] : no insomnia disorder [Hypersomnia] : no ~T hypersomnia [Psychomotor Retardation] : no psychomotor retardation [Anorexia] : no anorexia [Euphoria] : no euphoria [Highly Irritable] : no high irritability [Increased Activity] : no increased in activity [Distractibility] : not distracted [Grandeur] : no feelings of grandeur [Buying Sprees] : no buying sprees [Hypersexuality] : denied hypersexuality [Dec Need For Sleep] : no decreased need for sleep [Delusions] : no ~T delusions [Hallucination Auditory] : no auditory hallucinations [Thought Disorder] : ~T a thought disorder was not noted [Excessive Worry] : excessive worry [Ruminations] : ruminations [Obsessions] : no obsessions [Re-experiencing] : no re-experiencing [Restlessness] : no restlessness [Hypochondriasis] : no hypochondriasis [Panic] : no panic disorder [Recent Onset] : no recent onset of confusion [Fluctuating Course] : no fluctuating course [Reversed sleep-wake] : no reversed sleep- wake [Inattentive] : not nattentive [Tremor] : ~T no ~M tremor was seen [Hallucination Visual] : no visual hallucinations [Hallucination Olfactory] : no olfactory hallucinations [Nausea] : no nausea [Hallucination Tactile] : no tactile hallucinations [Hallucination Gustatory] : no gustatory hallucinations [Diaphoresis] : showed no ~M diaphoresis [Vomiting] : no vomiting [Yawning] : no yawning [Mydriasis] : showed no ~M mydriasis [Gastrointestinal Sxs] : no ~T gastrointestinal symptoms [Piloerection] : the hair did not demonstrate piloerection [Rhinorrhea] : no ~M rhinorrhea discharge was seen [de-identified] : less depressed [de-identified] : at times, improved

## 2022-12-12 ENCOUNTER — OUTPATIENT (OUTPATIENT)
Dept: OUTPATIENT SERVICES | Facility: HOSPITAL | Age: 47
LOS: 1 days | Discharge: HOME | End: 2022-12-12

## 2022-12-12 ENCOUNTER — APPOINTMENT (OUTPATIENT)
Dept: PSYCHIATRY | Facility: CLINIC | Age: 47
End: 2022-12-12

## 2022-12-12 DIAGNOSIS — Z90.3 ACQUIRED ABSENCE OF STOMACH [PART OF]: Chronic | ICD-10-CM

## 2022-12-12 DIAGNOSIS — F41.1 GENERALIZED ANXIETY DISORDER: ICD-10-CM

## 2022-12-12 DIAGNOSIS — G47.00 INSOMNIA, UNSPECIFIED: ICD-10-CM

## 2022-12-12 DIAGNOSIS — F31.81 BIPOLAR II DISORDER: ICD-10-CM

## 2022-12-12 PROCEDURE — 99214 OFFICE O/P EST MOD 30 MIN: CPT

## 2022-12-12 NOTE — RESULTS/DATA
no fever, no sweats and no chills.
[FreeTextEntry1] : This is a 45 year old female, , living alone. She denies any past treatment for mental Health,, She ha d been prescribed Zoloft by PCP but reports negative reaction\par  She presents with Major depressive symptoms. anhedonia, irritability, loss of interest and motivation, insomna. 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 She also reports lonliness but has a good support system. \par DX:Major depressive episode\par  OLGA\par /(Ro bi polar2) \par   Elaine requests therapy and medication evaluation

## 2022-12-12 NOTE — HISTORY OF PRESENT ILLNESS
[FreeTextEntry1] : This is a 46 year old patient who presents for medication management.  The patient reports less depressed mood, fair sleep and  appetite.  The patient denies hypomania,  denies psychosis at this time.  The patient has chronic anxiety that impairs their daily functioning at times. The patient has passive SI at times, but denies any current active suicidal ideation, homicidal ideation, no auditory or visual hallucinations, or paranoid ideation.  The patient has no medical complaints. The patient reported alcohol use, and is smoking at this time. I requested the patient's updated physical and labs from their PCP.  Coping skills were discussed and a safety plan was provided.  The patient was educated on the risks, benefits, and alternatives of their psychiatric medications.  The patient will engage in psychotherapy at this time.  The patient consents to ongoing medication management.  Risks, benefits discussed.  Patient was hospitalized at [facility] from 10/30/21 until 11/5 after suicide attempt by car and OD.  Patient is on leave from work.  She was in hospital for 8 days in  last week.\par Safety plan discussed at length.\par Encouraged to abstain from alcohol.\par \par 2/23/22:patient called, is more anxious, maintains she is abstaining from alcohol and using klonopin as prescribed.  risks, benefits discussed, raise lexapro 30mg po qam (understands above FDA approved dose but has been effective).  Alternatives, discussed, consider d/c wellbutrin and or adding topamax and or gabapentin, and changing abilify to seroquel in the future, f/u next week Discussed risks and benefits of medication changes, SSRI benefit  better than raising klonopin (also mild depressogenic)\par \par 2/28:patient improving, will continue current medication\par \par 3/9:called feeling more depressed, anxious, RX for klonopin sent again, last filled 2/25, due for refill.  Risks, benefits discussed, will raise wellbutrin xl 300mg po qam, coping skills, safety plan discussed, will f/u next week, earlier as needed\par \par 3/14:mild improvement, intermittent symptoms, continues with therapy, would like to continue current medications\par \par 3/28/22 PHQ9 14 HAM-Anxiety 29, patient subjectively reports anxiety, due to TASK requirement, menstrual cycle, and financial issue with [employer], she prefers to continue current medications, encouraged to use klonopin twice daily\par \par 4/11/22: patient still depressed, will raise abilify, and follow up in 2 weeks\par 4/25:fairly stable, continue current medication\par 5/9/22:improved, no med changes, PHQ9=13\par 5/23/22:stable on current medication, no changes\par 6/13/22:patient presents little dysphoric and anxious about court tomorrow, but over last 3 weeks maintains she has been stable, and is not interested in medication change.  She is motivated to put legal issues behind her.  She denies any side effects and maintains she is using all medications as prescribed.\par 7/25:will titrate abilify for mood\par 8/8/22:stable, at baseline\par 8/29/22:increase Wellbutrin for mood\par 9/12: patient had only few week supply of wellbutrin and klonopin remianing.  patient was provided a safe taper schedule, and maintain she can't afford medications out of pocket.  She was encourage to go to Medicaid office or [facility] financial at [address]  Also, she was informed of [place] walk in clinic and if worsening depression, or SI to go ER or call 911.  As soon as patient obtains prescription benefits to call provider to resume medications.  She will follow up in 1 week.\par 9/22 :patient has been more anxious, used extra klonopin, will bridge patient with lorazepam 2mg po BID #12\par 10/12:patient took 8 klonopin OD and was hospitalized, she was restarted on medications below\par 10/24:was able to obtain all medications, stable at this time, continue to see weekly as available\par 10/31:more anxious, will resume gabapentin for anxiety/mood, follow up in 1 week\par 11/7:improved, provided 3 day supply of lorazepam, then resume klonopin\par 11/12:educated again there is no safe use of alcohol on current medication, and may be referred to substance use program if needed, or to go to AA, otherwise, will continue current medication, had brighter affect, follow up weekly due to risk\par 11/21:stable on medications, follow up weekly for now\par 11/28:educated again on risks of alcohol use with current medication, encouraged again to abstain and seek out AA if needed, she maintains she will not drink this week\par 12/5:doing well, no changes, at baseline\par 12/12:no complaints, at baseline, RX [facility] [No] : no [TextBox_32] : Patient had recent SI, OD, but  currently denies SI, intent or plan [Yes] : yes [Mood episode] : mood episode [Agitation/ severe anxiety] : agitation/severe anxiety [Perceived burden on family or others] : perceived burden on family or others [Impulsivity] : impulsivity [History of abuse/trauma] : history of abuse/trauma [Anhedonia] : anhedonia [Global insomnia] : global insomnia [Recent loss] : recent loss [Current or pending isolation] : current or pending isolation [Responsibility to family or others] : responsibility to family or others [Identifies reasons for living] : identifies reasons for living [Future oriented] : future oriented [Engaged in work or school] : engaged in work or school [Supportive social network or family] : supportive social network or family [Ability to cope with stress] : ability to cope with stress [TextBox_52] : patient had 2 SA, but denies current SI, intent, or plan [None Known] : none known [Residential stability] : residential stability [Employment stability] : employment stability [TextBox_35] : No violence reported

## 2022-12-12 NOTE — PHYSICAL EXAM
[Cooperative] : cooperative [Anxious] : anxious [Constricted] : constricted [Flat] : flat [Clear] : clear [Linear/Goal Directed] : linear/goal directed [Depressive] : depressive [None Reported] : none reported [Average] : average [WNL] : within normal limits [Mild] : mild

## 2022-12-12 NOTE — DISCUSSION/SUMMARY
[Plan Review] : Plan Review [Able to manage surrounding demands and opportunities] : able to manage surrounding demands and opportunities [Able to exercise self-direction] : able to exercise self-direction [Able to set and pursue goals] : able to set and pursue goals [Adherent to treatment recommendations] : adherent to treatment recommendations [Articulate] : articulate [Attempting to realize their potential] : Attempting to realize their potential [Awareness of substance use issues] : awareness of substance use issues [Cognitively intact] : cognitively intact [Intelligent] : intelligent [Motivated to participate in treatment] : motivated to participate in treatment [Motivated and ready for change] : motivated and ready for change [Health literacy] : health literacy [Motivated to maintain or improve physical health] : motivated to maintain or improve physical health [In good health] : in good health [Has vocational interests or hobbies] : has vocational interests or hobbies [Part of a supportive family] : part of a supportive family [Has a supportive network] : has a supportive network [English fluency] : English fluency [Connected to healthcare] : connected to healthcare [Social supports] : social supports [FreeTextEntry1] : 8/3/2022 [FreeTextEntry2] : 11/03/2022 [FreeTextEntry3] : 10/13/2021 [FreeTextEntry5] : "I want to get my head on straight" [FreeTextEntry6] : Elaine has many strengths and has been responsive to interventions and compliant with treatment [FreeTextEntry7] : Elaine has a supportive family and network  However she is not financially stable   [FreeTextEntry8] : Continues to use alcohol. her abuse of alcohol was a factor in 2 suicide attempts in recent past [FreeTextEntry9] : Elaine is receptive to discussion of spirituality and her belief system   [de-identified] : Psychiatrist, Dr Murdock

## 2022-12-12 NOTE — PLAN
[FreeTextEntry2] : SEE DISCUSSION SUMMARY [Psychodynamic Therapy] : Psychodynamic Therapy  [Supportive Therapy] : Supportive Therapy [de-identified] : MOHINDER HAS AGAIN HAD AN ISSUE WITH HER INSURANCE COMPANY AND HA SNOT BEEN ABLE TO FILL A PRESCRIPTION hOWEVER SH HAS CONTACTED HER INSURANCE CO AND HAS HAD THE MATTER RECTIFIED\par MOHINDER CONTINUES TO STRUGGLE FINANCIALLY BUT HAS NOT HAD ANY ALCOHOL AND IS MANAGING TO FUNCTION WELL. SOME ANXIETY IS NOTED BUT SHE IS ALERT, ORIENTED AND RESPONSIVE \par PT IS PROVIDED WITH SUPPORT [FreeTextEntry1] : plan: WEEKLY THERAPY ONGOING

## 2022-12-12 NOTE — CURRENT PSYCHIATRIC SYMPTOMS
[Depressed Mood] : depressed mood [Anhedonia] : no anhedonia [Guilt] : not feeling guilty [Hyperphagia] : no hyperphagia [Decreased Concentration] : no decrease in concentrating ability [Insomnia] : no insomnia disorder [Hypersomnia] : no ~T hypersomnia [Psychomotor Retardation] : no psychomotor retardation [Anorexia] : no anorexia [Euphoria] : no euphoria [Highly Irritable] : no high irritability [Increased Activity] : no increased in activity [Distractibility] : not distracted [Grandeur] : no feelings of grandeur [Buying Sprees] : no buying sprees [Hypersexuality] : denied hypersexuality [Dec Need For Sleep] : no decreased need for sleep [Delusions] : no ~T delusions [Hallucination Auditory] : no auditory hallucinations [Thought Disorder] : ~T a thought disorder was not noted [Excessive Worry] : excessive worry [Ruminations] : ruminations [Obsessions] : no obsessions [Re-experiencing] : no re-experiencing [Restlessness] : no restlessness [Hypochondriasis] : no hypochondriasis [Panic] : no panic disorder [Recent Onset] : no recent onset of confusion [Fluctuating Course] : no fluctuating course [Reversed sleep-wake] : no reversed sleep- wake [Inattentive] : not nattentive [Tremor] : ~T no ~M tremor was seen [Hallucination Visual] : no visual hallucinations [Hallucination Olfactory] : no olfactory hallucinations [Nausea] : no nausea [Hallucination Tactile] : no tactile hallucinations [Hallucination Gustatory] : no gustatory hallucinations [Diaphoresis] : showed no ~M diaphoresis [Vomiting] : no vomiting [Yawning] : no yawning [Mydriasis] : showed no ~M mydriasis [Gastrointestinal Sxs] : no ~T gastrointestinal symptoms [Piloerection] : the hair did not demonstrate piloerection [Rhinorrhea] : no ~M rhinorrhea discharge was seen [de-identified] : less depressed [de-identified] : at times, improved

## 2022-12-13 ENCOUNTER — APPOINTMENT (OUTPATIENT)
Dept: NEUROSURGERY | Facility: CLINIC | Age: 47
End: 2022-12-13

## 2022-12-13 VITALS — WEIGHT: 132 LBS | HEIGHT: 66 IN | BODY MASS INDEX: 21.21 KG/M2

## 2022-12-13 DIAGNOSIS — F17.200 NICOTINE DEPENDENCE, UNSPECIFIED, UNCOMPLICATED: ICD-10-CM

## 2022-12-13 DIAGNOSIS — Z86.59 PERSONAL HISTORY OF OTHER MENTAL AND BEHAVIORAL DISORDERS: ICD-10-CM

## 2022-12-13 DIAGNOSIS — Z91.51 PERSONAL HISTORY OF SUICIDAL BEHAVIOR: ICD-10-CM

## 2022-12-13 PROCEDURE — 99204 OFFICE O/P NEW MOD 45 MIN: CPT

## 2022-12-13 PROCEDURE — 99072 ADDL SUPL MATRL&STAF TM PHE: CPT

## 2022-12-13 RX ORDER — CLONAZEPAM 1 MG/1
1 TABLET ORAL TWICE DAILY
Qty: 60 | Refills: 0 | Status: DISCONTINUED | COMMUNITY
Start: 2022-06-27 | End: 2022-12-13

## 2022-12-13 NOTE — END OF VISIT
[FreeTextEntry3] : ATTENDING ATTESTATION\par Mrs Vernon presents for evaluation of her cervical radiculopathy and axial neck pain secondary to a small paracentral disc herniation at C5-6.  Her symptoms do not directly correlate with her imaging findings therefore I am recommending an EMG/NCS for electrophysiologic correlation.  If she does have a positive EMG then we will consider a left-sided C5-6 cervical foraminotomy to alleviate her radicular symptoms.  Indication risk benefits and alternatives were discussed with her and she verbalized understanding. [Time Spent: ___ minutes] : I have spent [unfilled] minutes of time on the encounter.

## 2022-12-13 NOTE — ASSESSMENT
[FreeTextEntry1] : We have had a thorough discussion regarding her current condition, findings, and treatment options. Ms. HAGER will proceed with an EMG of the bilateral upper extremities.  She will continue conservative management in the interim.  I will see her back after the EMG is completed.\par \par Daxa Bullard, MS DONNA\par Dre Reyes MD\par

## 2022-12-13 NOTE — HISTORY OF PRESENT ILLNESS
[de-identified] : Ms. HAGER developed acute neck pain with radiculopathy into the left arm after a work related injury on 2/2/22. 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 Since that time she has persistent neck pain with radiation into the left shoulder and down her arm. She denies right sided symptoms. No bowel / bladder dysfunction. She has had physical therapy and CRAIG x 2 without improvement. She is under the care of Dr. Horton for pain management. An EMG has been requested by [employer] however authorization is still pending. \par \par We have reviewed an MRI of the cervical spine from stand-up, 3/8/2022.  She is found to have a small left C5-6 foraminal disc herniation.  Mild spondylosis.  No cord compression.\par \par PHYSICAL EXAM: \par Constitutional: Well appearing, no distress\par HEENT: Normocephalic Atraumatic\par Psychiatric: Alert and oriented to all spheres, normal mood\par Pulmonary: No respiratory distress\par Neurologic: \par CN II-XII grossly intact\par Palpation: + left trapezial spasms. \par Strength: Full strength in all major muscle groups\par Sensation: Full sensation to light touch in all extremities\par Reflexes: \par  2+ biceps\par  2+ triceps\par  No Mace's\par Mild restriction in ROM cervical spine. \par Gait: steady, walking w/o assistance.

## 2022-12-19 ENCOUNTER — APPOINTMENT (OUTPATIENT)
Dept: PSYCHIATRY | Facility: CLINIC | Age: 47
End: 2022-12-19

## 2022-12-19 ENCOUNTER — OUTPATIENT (OUTPATIENT)
Dept: OUTPATIENT SERVICES | Facility: HOSPITAL | Age: 47
LOS: 1 days | Discharge: HOME | End: 2022-12-19

## 2022-12-19 DIAGNOSIS — Z90.3 ACQUIRED ABSENCE OF STOMACH [PART OF]: Chronic | ICD-10-CM

## 2022-12-19 DIAGNOSIS — F41.1 GENERALIZED ANXIETY DISORDER: ICD-10-CM

## 2022-12-19 DIAGNOSIS — F31.81 BIPOLAR II DISORDER: ICD-10-CM

## 2022-12-19 DIAGNOSIS — G47.00 INSOMNIA, UNSPECIFIED: ICD-10-CM

## 2022-12-19 PROCEDURE — 99214 OFFICE O/P EST MOD 30 MIN: CPT

## 2022-12-19 NOTE — DISCUSSION/SUMMARY
[Plan Review] : Plan Review [Able to manage surrounding demands and opportunities] : able to manage surrounding demands and opportunities [Able to exercise self-direction] : able to exercise self-direction [Able to set and pursue goals] : able to set and pursue goals [Adherent to treatment recommendations] : adherent to treatment recommendations [Articulate] : articulate [Attempting to realize their potential] : Attempting to realize their potential [Awareness of substance use issues] : awareness of substance use issues [Cognitively intact] : cognitively intact [Intelligent] : intelligent [Motivated to participate in treatment] : motivated to participate in treatment [Motivated and ready for change] : motivated and ready for change [Health literacy] : health literacy [Motivated to maintain or improve physical health] : motivated to maintain or improve physical health [In good health] : in good health [Has vocational interests or hobbies] : has vocational interests or hobbies [Part of a supportive family] : part of a supportive family [Has a supportive network] : has a supportive network [English fluency] : English fluency [Connected to healthcare] : connected to healthcare [Social supports] : social supports [FreeTextEntry1] : 8/3/2022 [FreeTextEntry2] : 11/03/2022 [FreeTextEntry3] : 10/13/2021 [FreeTextEntry5] : "I want to get my head on straight" [FreeTextEntry6] : Elaine has many strengths and has been responsive to interventions and compliant with treatment [FreeTextEntry7] : Elaine has a supportive family and network  However she is not financially stable   [FreeTextEntry8] : Continues to use alcohol. her abuse of alcohol was a factor in 2 suicide attempts in recent past [FreeTextEntry9] : Elaine is receptive to discussion of spirituality and her belief system   [de-identified] : Psychiatrist, Dr Murdock

## 2022-12-19 NOTE — PHYSICAL EXAM
[Cooperative] : cooperative [Euthymic] : euthymic [Anxious] : anxious [Constricted] : constricted [Flat] : flat [Clear] : clear [Linear/Goal Directed] : linear/goal directed [Depressive] : depressive [None Reported] : none reported [Average] : average [WNL] : within normal limits [Mild] : mild

## 2022-12-19 NOTE — HISTORY OF PRESENT ILLNESS
[FreeTextEntry1] : This is a 46 year old patient who presents for medication management.  The patient reports less depressed mood, fair sleep and  appetite.  The patient denies hypomania,  denies psychosis at this time.  The patient has chronic anxiety that impairs their daily functioning at times. The patient has passive SI at times, but denies any current active suicidal ideation, homicidal ideation, no auditory or visual hallucinations, or paranoid ideation.  The patient has no medical complaints. The patient reported alcohol use, and is smoking at this time. I requested the patient's updated physical and labs from their PCP.  Coping skills were discussed and a safety plan was provided.  The patient was educated on the risks, benefits, and alternatives of their psychiatric medications.  The patient will engage in psychotherapy at this time.  The patient consents to ongoing medication management.  Risks, benefits discussed.  Patient was hospitalized at [facility] from 10/30/21 until 11/5 after suicide attempt by car and OD.  Patient is on leave from work.  She was in hospital for 8 days in  last week.\par Safety plan discussed at length.\par Encouraged to abstain from alcohol.\par \par 2/23/22:patient called, is more anxious, maintains she is abstaining from alcohol and using klonopin as prescribed.  risks, benefits discussed, raise lexapro 30mg po qam (understands above FDA approved dose but has been effective).  Alternatives, discussed, consider d/c wellbutrin and or adding topamax and or gabapentin, and changing abilify to seroquel in the future, f/u next week Discussed risks and benefits of medication changes, SSRI benefit  better than raising klonopin (also mild depressogenic)\par \par 2/28:patient improving, will continue current medication\par \par 3/9:called feeling more depressed, anxious, RX for klonopin sent again, last filled 2/25, due for refill.  Risks, benefits discussed, will raise wellbutrin xl 300mg po qam, coping skills, safety plan discussed, will f/u next week, earlier as needed\par \par 3/14:mild improvement, intermittent symptoms, continues with therapy, would like to continue current medications\par \par 3/28/22 PHQ9 14 HAM-Anxiety 29, patient subjectively reports anxiety, due to TASK requirement, menstrual cycle, and financial issue with [employer], she prefers to continue current medications, encouraged to use klonopin twice daily\par \par 4/11/22: patient still depressed, will raise abilify, and follow up in 2 weeks\par 4/25:fairly stable, continue current medication\par 5/9/22:improved, no med changes, PHQ9=13\par 5/23/22:stable on current medication, no changes\par 6/13/22:patient presents little dysphoric and anxious about court tomorrow, but over last 3 weeks maintains she has been stable, and is not interested in medication change.  She is motivated to put legal issues behind her.  She denies any side effects and maintains she is using all medications as prescribed.\par 7/25:will titrate abilify for mood\par 8/8/22:stable, at baseline\par 8/29/22:increase Wellbutrin for mood\par 9/12: patient had only few week supply of wellbutrin and klonopin remianing.  patient was provided a safe taper schedule, and maintain she can't afford medications out of pocket.  She was encourage to go to Medicaid office or [facility] financial at [address]  Also, she was informed of [place] walk in clinic and if worsening depression, or SI to go ER or call 911.  As soon as patient obtains prescription benefits to call provider to resume medications.  She will follow up in 1 week.\par 9/22 :patient has been more anxious, used extra klonopin, will bridge patient with lorazepam 2mg po BID #12\par 10/12:patient took 8 klonopin OD and was hospitalized, she was restarted on medications below\par 10/24:was able to obtain all medications, stable at this time, continue to see weekly as available\par 10/31:more anxious, will resume gabapentin for anxiety/mood, follow up in 1 week\par 11/7:improved, provided 3 day supply of lorazepam, then resume klonopin\par 11/12:educated again there is no safe use of alcohol on current medication, and may be referred to substance use program if needed, or to go to AA, otherwise, will continue current medication, had brighter affect, follow up weekly due to risk\par 11/21:stable on medications, follow up weekly for now\par 11/28:educated again on risks of alcohol use with current medication, encouraged again to abstain and seek out AA if needed, she maintains she will not drink this week\par \par 12/5:doing well, no changes, at baseline\par 12/12:no complaints, at baseline, [facility]\par 12/19:patient appears at baseline, no alcohol use reported, referred to Neurology [No] : no [TextBox_32] : Patient had recent SI, OD, but  currently denies SI, intent or plan [Yes] : yes [Mood episode] : mood episode [Agitation/ severe anxiety] : agitation/severe anxiety [Perceived burden on family or others] : perceived burden on family or others [Impulsivity] : impulsivity [History of abuse/trauma] : history of abuse/trauma [Anhedonia] : anhedonia [Global insomnia] : global insomnia [Recent loss] : recent loss [Current or pending isolation] : current or pending isolation [Responsibility to family or others] : responsibility to family or others [Identifies reasons for living] : identifies reasons for living [Future oriented] : future oriented [Engaged in work or school] : engaged in work or school [Supportive social network or family] : supportive social network or family [Ability to cope with stress] : ability to cope with stress [TextBox_52] : patient had 2 SA, but denies current SI, intent, or plan [None Known] : none known [Residential stability] : residential stability [Employment stability] : employment stability [TextBox_35] : No violence reported

## 2022-12-19 NOTE — PLAN
[FreeTextEntry2] : SEE DISCUSSION SUMMARY [Psychodynamic Therapy] : Psychodynamic Therapy  [Skills training (all types)] : Skills training (all types)  [de-identified] : DURING SESSION MOHINDER STATES SHE DOES NOT OPEN HER MAIL AND THEREFORE HAS NOT RECEIVED HER NEW BENEFIT CARD WHICH WOULD FACILITATE HER GETTING HER MEDICATIONS.  WE ADDED THIS AS A GOAL AS SHE DOES AGREE THAT SHE AVOIDS TASKS EVEN ONES THAT ARE IMPORTANT\par IN SESSION MOHINDER PRESENTS WITH LOW ENERGY AND FLAT RESPONSE. HOWEVER SHE AGREES TO BEGIN TO LOOK AT HER AVOIDANCE AND WORK ON THIS GOAL. \par MOHINDER PRESENT WITH DYSTHYMIC MOOD BUT IS RESPONSIVE TO ABOVE INTERVENTION [FreeTextEntry1] : PLAN: IN VIEW OF THE UPCOMING HOLIDAYS MOHINDER WILL HAVE NEXT SESSION ON 1/9..SHE IS ENCOURAGED TO CALL THE WEEK BEFORE IF SHE IS EXPERIENCING A NEED FOR A SOONER SESSION

## 2022-12-19 NOTE — CURRENT PSYCHIATRIC SYMPTOMS
[Depressed Mood] : depressed mood [Anhedonia] : no anhedonia [Guilt] : not feeling guilty [Decreased Concentration] : no decrease in concentrating ability [Hyperphagia] : no hyperphagia [Insomnia] : no insomnia disorder [Hypersomnia] : no ~T hypersomnia [Psychomotor Retardation] : no psychomotor retardation [Anorexia] : no anorexia [Euphoria] : no euphoria [Highly Irritable] : no high irritability [Increased Activity] : no increased in activity [Distractibility] : not distracted [Grandeur] : no feelings of grandeur [Buying Sprees] : no buying sprees [Hypersexuality] : denied hypersexuality [Dec Need For Sleep] : no decreased need for sleep [Delusions] : no ~T delusions [Hallucination Auditory] : no auditory hallucinations [Thought Disorder] : ~T a thought disorder was not noted [Excessive Worry] : excessive worry [Ruminations] : ruminations [Obsessions] : no obsessions [Re-experiencing] : no re-experiencing [Restlessness] : no restlessness [Hypochondriasis] : no hypochondriasis [Panic] : no panic disorder [Recent Onset] : no recent onset of confusion [Fluctuating Course] : no fluctuating course [Reversed sleep-wake] : no reversed sleep- wake [Inattentive] : not nattentive [Tremor] : ~T no ~M tremor was seen [Hallucination Visual] : no visual hallucinations [Hallucination Olfactory] : no olfactory hallucinations [Nausea] : no nausea [Hallucination Tactile] : no tactile hallucinations [Hallucination Gustatory] : no gustatory hallucinations [Diaphoresis] : showed no ~M diaphoresis [Vomiting] : no vomiting [Yawning] : no yawning [Mydriasis] : showed no ~M mydriasis [Gastrointestinal Sxs] : no ~T gastrointestinal symptoms [Piloerection] : the hair did not demonstrate piloerection [Rhinorrhea] : no ~M rhinorrhea discharge was seen [de-identified] : less depressed [de-identified] : at times, improved

## 2022-12-21 ENCOUNTER — APPOINTMENT (OUTPATIENT)
Dept: PAIN MANAGEMENT | Facility: CLINIC | Age: 47
End: 2022-12-21

## 2022-12-21 VITALS — HEIGHT: 66 IN | WEIGHT: 132 LBS | BODY MASS INDEX: 21.21 KG/M2

## 2022-12-21 DIAGNOSIS — M50.122 CERVICAL DISC DISORDER AT C5-C6 LEVEL WITH RADICULOPATHY: ICD-10-CM

## 2022-12-21 DIAGNOSIS — M50.123 CERVICAL DISC DISORDER AT C6-C7 LEVEL WITH RADICULOPATHY: ICD-10-CM

## 2022-12-21 PROCEDURE — 99214 OFFICE O/P EST MOD 30 MIN: CPT

## 2022-12-21 PROCEDURE — 99072 ADDL SUPL MATRL&STAF TM PHE: CPT

## 2022-12-21 NOTE — PHYSICAL EXAM
[de-identified] : GENERAL APPEARANCE OF PATIENT IS WELL DEVELOPED, WELL NOURISHED, BODY HABITUS NORMAL, WELL GROOMED, NO DEFORMITIES NOTED. \par Head - Atraumatic and Normocephalic \par Eyes, Nose, and Throat: External inspection of ears and nose are normal overall without scars, lesions, or masses noted. Assessment of hearing is normal\par Neck-Examination of neck shows no masses, overall appearance is normal, neck is symmetric, tracheal position is midline, no crepitus is noted. Examination of thyroid shows no enlargement, tenderness or masses\par Respiratory- Assessment of respiratory effort shows no intercostal retractions, no use of accessory muscles, unlabored breathing, and normal diaphragmatic movement.\par Cardiovascular- Examination of extremities show no edema or varicosities\par Musculoskeletal. Examination of gait is not antalgic and station is normal\par Inspection and palpation of digits and nails shows no clubbing, cyanosis, nodules, drainage, fluctuance, petechiae\par \par • Spine – inspection and palpation shows no misalignment, asymmetry, crepitation, defects, tenderness, masses, effusions. ROM is normal without crepitation or contracture. No instability or subluxation or laxity is noted. No abnormal movements.\par \par \par • Neck- trapezius spasms. 10-15 degrees with pain in active and passive flexion. 10-15 degrees in extension. \par \par • RUE- inspection and palpation shows no misalignment, asymmetry, crepitation, defects, tenderness, masses, effusions. ROM is normal without crepitation or contracture. No instability or subluxation or laxity is noted. No abnormal movements.\par \par \par • LUE- inspection and palpation shows no misalignment, asymmetry, crepitation, defects, tenderness, masses, effusions. ROM is normal without crepitation or contracture. No instability or subluxation or laxity is noted. No abnormal movements.\par \par \par • RLE- inspection and palpation shows no misalignment, asymmetry, crepitation, defects, tenderness, masses, effusions. ROM is normal without crepitation or contracture. No instability or subluxation or laxity is noted. No abnormal movements.\par \par \par • LLE- inspection and palpation shows no misalignment, asymmetry, crepitation, defects, tenderness, masses, effusions. ROM is normal without crepitation or contracture. No instability or subluxation or laxity is noted. No abnormal movements.\par \par \par • Skin- Inspection of skin and subcutaneous tissue shows no rashes, lesions or ulcers. Palpation of skin and subcutaneous tissue shows no rashes, no indurations, subcutaneous nodules or tightening.\par \par \par • Abdomen- Nontender\par \par \par • Neurologic- CN 2-12 are grossly intact. No sensory or motor deficits in the upper and lower extremities. Adequate strength in upper and lower extremities \par \par \par • Psychiatric- Patient’s judgment and insight are intact. Oriented to time, place and person. Recent and remote memory intact.\par \par

## 2022-12-21 NOTE — HISTORY OF PRESENT ILLNESS
[FreeTextEntry1] : HISTORY OF PRESENT ILLNESS: This is a 46 year old woman complaining of neck and shoulder pain. The patient has had this pain for 6 months. The pain started after injury which took place while at work.  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  Patient describes pain as severe. During the last month the pain has been constant symptoms worse in the morning. Pain described as sharp, pressure-like, cramping, shooting and cutting. Pain is associated with numbness and pins and needles into the hands. Patient has weakness in the upper extremities. Pain is increased with lying down, sitting, walking, exercise, relaxation and coughing/sneezing. Pain is not changed with standing and bowel movements. Bowel or bladder habits have not changed.\par \par ACTIVITIES: Patient could walk four blocks before the pain starts. Patient can sit 20 minutes  before pain starts. Patient can stand 1 hour before pain starts. The patient often lays down because of pain. Patient uses no assistive walking device at this time. Patient has difficulty going to work, performing household chores, doing yard work or shopping, participating in recreational activities and exercise at this time.\par \par PRIOR PAIN TREATMENTS:  No prior pain\par \par PRIOR PAIN MEDICATIONS:  Tylenol\par \par PRESENTING TODAY: In revisit encounter in regard to her continued neck pain which resulted from a work related injury. Patient notes pain remains unchanged in severity and distribution. She continues to experience numbness and tingling into the left arm. She is status post CRAIG on 9/17/22. She notes that this procedure provided her with 60% relief for about 2 days. She then underwent a second CRAIG on 11/16/22 which provided her with no sustained relief. She saw the Dr. Reyes who ordered an EMG of the upper extremities. She continues to manage her pain with Gabapentin 300 mg TID. She notes the medication provides her with 50% relief throughout the day. \par \par Covid 19 - This in-office encounter has occurred during a Public Health Emergency (PHE). As required by law, due to the risk of respiratory-transmitted infectious diseases, our office provided additional materials, supplies and clinical staff to assist in keeping our patients, physicians and office staff safe during this health emergency.\par

## 2022-12-21 NOTE — DISCUSSION/SUMMARY
[de-identified] : Ms. Vernon is a 46-year-old female with a chief complaint of neck and shoulder pain , left greater than right.  She has had this pain for about 6 months following a work related injury which took place on 02/02/2022. She is unable to undergo physical therapy because it is too painful. Patient has not been working since his injury. Patient notes pain remains unchanged in severity and distribution. She continues to experience numbness and tingling into the left arm. She is status post CRAIG on 9/17/22. She notes that this procedure provided her with 60% relief for about 2 days. She then underwent a second CRAIG on 11/16/22 which provided her with no sustained relief. She will continue to manage her pain medically with gabapentin 300mg TID. She notes the medication helps with pain and enables her to sleep better at night. She saw Dr. Reyes who ordered an UE EMG.  At this time, we will increase her dosage of gabapentin to 600 mg TID. She will follow up in 4 weeks for reevaluation. \par \par Disability status: total temporary disability at this time and unable to work. \par \par I, Padmini Osuna, attest that this documentation has been prepared under the direction and in the presence of Provider Michael Horton DO\par The documentation recorded by the scribe, in my presence, accurately reflects the service I personally performed, and the decisions made by me with my edits as appropriate.\par \par Best Regards, \par Michael Horton D.CHEYENNE. \par Diplomat, American Board of Anesthesiology \par Diplomat, American Board of Pain Medicine \par Diplomat, American Board of Pain Management \par \par

## 2022-12-22 NOTE — PROGRESS NOTE BEHAVIORAL HEALTH - PRN MEDICATIONS SINCE LAST EVAL
Hematology/Oncology Nurse Note    Nurse receive a verbal request from Dr Christianson to call Ms Steen and let her know, he has referred a second oncologist, Dr Burt, pt is aware of Dr Grewal prior to leaving clinic today, she is aware why she is being referred, [facility] is out of her network.   Pt fully understands this and has accepted and agreed to see providers with in her network.   she has been referred to Wing Burt III, MD and Jarad Grewal MD, both offices has been faxed , clinic notes and scans results.   Ms Steen admits to picking up the disc from [facility] radiology department, and knows to carry it alone to the visit to which ever oncologist calls with the sooner than later appointment. Pt verbalized full understanding, no additional questions or concern addressed prior to the call ending.     Per Dr. Christianson/mmf     no

## 2022-12-29 ENCOUNTER — NON-APPOINTMENT (OUTPATIENT)
Age: 47
End: 2022-12-29

## 2023-01-05 ENCOUNTER — NON-APPOINTMENT (OUTPATIENT)
Age: 48
End: 2023-01-05

## 2023-01-05 NOTE — DISCUSSION/SUMMARY
[FreeTextEntry1] : Phone check in:patient had a good week, no complaints, will follow up in person 1/9/23.

## 2023-01-09 ENCOUNTER — OUTPATIENT (OUTPATIENT)
Dept: OUTPATIENT SERVICES | Facility: HOSPITAL | Age: 48
LOS: 1 days | Discharge: HOME | End: 2023-01-09

## 2023-01-09 ENCOUNTER — APPOINTMENT (OUTPATIENT)
Dept: PSYCHIATRY | Facility: CLINIC | Age: 48
End: 2023-01-09
Payer: MEDICAID

## 2023-01-09 DIAGNOSIS — G47.00 INSOMNIA, UNSPECIFIED: ICD-10-CM

## 2023-01-09 DIAGNOSIS — F31.81 BIPOLAR II DISORDER: ICD-10-CM

## 2023-01-09 DIAGNOSIS — F41.1 GENERALIZED ANXIETY DISORDER: ICD-10-CM

## 2023-01-09 DIAGNOSIS — Z90.3 ACQUIRED ABSENCE OF STOMACH [PART OF]: Chronic | ICD-10-CM

## 2023-01-09 PROCEDURE — 99214 OFFICE O/P EST MOD 30 MIN: CPT

## 2023-01-09 NOTE — HISTORY OF PRESENT ILLNESS
[FreeTextEntry1] : This is a 47 year old patient who presents for medication management.  The patient reports less depressed mood, fair sleep and  appetite.  The patient denies hypomania,  denies psychosis at this time.  The patient has chronic anxiety that impairs their daily functioning at times. The patient has passive SI at times, but denies any current active suicidal ideation, homicidal ideation, no auditory or visual hallucinations, or paranoid ideation.  The patient has no medical complaints. The patient reported alcohol use, and is smoking at this time. I requested the patient's updated physical and labs from their PCP.  Coping skills were discussed and a safety plan was provided.  The patient was educated on the risks, benefits, and alternatives of their psychiatric medications.  The patient will engage in psychotherapy at this time.  The patient consents to ongoing medication management.  Risks, benefits discussed.  Patient was hospitalized at [facility] from 10/30/21 until 11/5 after suicide attempt by car and OD.  Patient is on leave from work.  She was in hospital for 8 days in  last week.\par Safety plan discussed at length.\par Encouraged to abstain from alcohol.\par \par 2/23/22:patient called, is more anxious, maintains she is abstaining from alcohol and using klonopin as prescribed.  risks, benefits discussed, raise lexapro 30mg po qam (understands above FDA approved dose but has been effective).  Alternatives, discussed, consider d/c wellbutrin and or adding topamax and or gabapentin, and changing abilify to seroquel in the future, f/u next week Discussed risks and benefits of medication changes, SSRI benefit  better than raising klonopin (also mild depressogenic)\par \par 2/28:patient improving, will continue current medication\par \par 3/9:called feeling more depressed, anxious, RX for klonopin sent again, last filled 2/25, due for refill.  Risks, benefits discussed, will raise wellbutrin xl 300mg po qam, coping skills, safety plan discussed, will f/u next week, earlier as needed\par \par 3/14:mild improvement, intermittent symptoms, continues with therapy, would like to continue current medications\par \par 3/28/22 PHQ9 14 HAM-Anxiety 29, patient subjectively reports anxiety, due to TASK requirement, menstrual cycle, and financial issue with [employer], she prefers to continue current medications, encouraged to use klonopin twice daily\par \par 4/11/22: patient still depressed, will raise abilify, and follow up in 2 weeks\par 4/25:fairly stable, continue current medication\par 5/9/22:improved, no med changes, PHQ9=13\par 5/23/22:stable on current medication, no changes\par 6/13/22:patient presents little dysphoric and anxious about court tomorrow, but over last 3 weeks maintains she has been stable, and is not interested in medication change.  She is motivated to put legal issues behind her.  She denies any side effects and maintains she is using all medications as prescribed.\par 7/25:will titrate abilify for mood\par 8/8/22:stable, at baseline\par 8/29/22:increase Wellbutrin for mood\par 9/12: patient had only few week supply of wellbutrin and klonopin remianing.  patient was provided a safe taper schedule, and maintain she can't afford medications out of pocket.  She was encourage to go to Medicaid office or [facility] financial at [address]  Also, she was informed of [place] walk in clinic and if worsening depression, or SI to go ER or call 911.  As soon as patient obtains prescription benefits to call provider to resume medications.  She will follow up in 1 week.\par 9/22 :patient has been more anxious, used extra klonopin, will bridge patient with lorazepam 2mg po BID #12\par 10/12:patient took 8 klonopin OD and was hospitalized, she was restarted on medications below\par 10/24:was able to obtain all medications, stable at this time, continue to see weekly as available\par 10/31:more anxious, will resume gabapentin for anxiety/mood, follow up in 1 week\par 11/7:improved, provided 3 day supply of lorazepam, then resume klonopin\par 11/12:educated again there is no safe use of alcohol on current medication, and may be referred to substance use program if needed, or to go to AA, otherwise, will continue current medication, had brighter affect, follow up weekly due to risk\par 11/21:stable on medications, follow up weekly for now\par 11/28:educated again on risks of alcohol use with current medication, encouraged again to abstain and seek out AA if needed, she maintains she will not drink this week\par \par 12/5:doing well, no changes, at baseline\par 12/12:no complaints, at baseline, RX sent to [facility]\par 12/19:patient appears at baseline, no alcohol use reported, referred to Neurology\par 1/9:stable on current medications, no reported alcohol use [No] : no [TextBox_32] : Patient had recent SI, OD, but  currently denies SI, intent or plan [Yes] : yes [Mood episode] : mood episode [Agitation/ severe anxiety] : agitation/severe anxiety [Perceived burden on family or others] : perceived burden on family or others [Impulsivity] : impulsivity [History of abuse/trauma] : history of abuse/trauma [Anhedonia] : anhedonia [Global insomnia] : global insomnia [Recent loss] : recent loss [Current or pending isolation] : current or pending isolation [Responsibility to family or others] : responsibility to family or others [Identifies reasons for living] : identifies reasons for living [Future oriented] : future oriented [Engaged in work or school] : engaged in work or school [Supportive social network or family] : supportive social network or family [Ability to cope with stress] : ability to cope with stress [TextBox_52] : patient had 2 SA, but denies current SI, intent, or plan [None Known] : none known [Residential stability] : residential stability [Employment stability] : employment stability [TextBox_35] : No violence reported

## 2023-01-09 NOTE — HISTORY OF PRESENT ILLNESS
[FreeTextEntry1] : This is a 47 year old patient who presents for medication management.  The patient reports less depressed mood, fair sleep and  appetite.  The patient denies hypomania,  denies psychosis at this time.  The patient has chronic anxiety that impairs their daily functioning at times. The patient has passive SI at times, but denies any current active suicidal ideation, homicidal ideation, no auditory or visual hallucinations, or paranoid ideation.  The patient has no medical complaints. The patient reported alcohol use, and is smoking at this time. I requested the patient's updated physical and labs from their PCP.  Coping skills were discussed and a safety plan was provided.  The patient was educated on the risks, benefits, and alternatives of their psychiatric medications.  The patient will engage in psychotherapy at this time.  The patient consents to ongoing medication management.  Risks, benefits discussed.  Patient was hospitalized at [facility] from 10/30/21 until 11/5 after suicide attempt by car and OD.  Patient is on leave from work.  She was in hospital for 8 days in  last week.\par Safety plan discussed at length.\par Encouraged to abstain from alcohol.\par \par 2/23/22:patient called, is more anxious, maintains she is abstaining from alcohol and using klonopin as prescribed.  risks, benefits discussed, raise lexapro 30mg po qam (understands above FDA approved dose but has been effective).  Alternatives, discussed, consider d/c wellbutrin and or adding topamax and or gabapentin, and changing abilify to seroquel in the future, f/u next week Discussed risks and benefits of medication changes, SSRI benefit  better than raising klonopin (also mild depressogenic)\par \par 2/28:patient improving, will continue current medication\par \par 3/9:called feeling more depressed, anxious, RX for klonopin sent again, last filled 2/25, due for refill.  Risks, benefits discussed, will raise wellbutrin xl 300mg po qam, coping skills, safety plan discussed, will f/u next week, earlier as needed\par \par 3/14:mild improvement, intermittent symptoms, continues with therapy, would like to continue current medications\par \par 3/28/22 PHQ9 14 HAM-Anxiety 29, patient subjectively reports anxiety, due to TASK requirement, menstrual cycle, and financial issue with [employer], she prefers to continue current medications, encouraged to use klonopin twice daily\par \par 4/11/22: patient still depressed, will raise abilify, and follow up in 2 weeks\par 4/25:fairly stable, continue current medication\par 5/9/22:improved, no med changes, PHQ9=13\par 5/23/22:stable on current medication, no changes\par 6/13/22:patient presents little dysphoric and anxious about court tomorrow, but over last 3 weeks maintains she has been stable, and is not interested in medication change.  She is motivated to put legal issues behind her.  She denies any side effects and maintains she is using all medications as prescribed.\par 7/25:will titrate abilify for mood\par 8/8/22:stable, at baseline\par 8/29/22:increase Wellbutrin for mood\par 9/12: patient had only few week supply of wellbutrin and klonopin remianing.  patient was provided a safe taper schedule, and maintain she can't afford medications out of pocket.  She was encourage to go to Medicaid office or [facility] financial at [address]  Also, she was informed of [place] walk in clinic and if worsening depression, or SI to go ER or call 911.  As soon as patient obtains prescription benefits to call provider to resume medications.  She will follow up in 1 week.\par 9/22 :patient has been more anxious, used extra klonopin, will bridge patient with lorazepam 2mg po BID #12\par 10/12:patient took 8 klonopin OD and was hospitalized, she was restarted on medications below\par 10/24:was able to obtain all medications, stable at this time, continue to see weekly as available\par 10/31:more anxious, will resume gabapentin for anxiety/mood, follow up in 1 week\par 11/7:improved, provided 3 day supply of lorazepam, then resume klonopin\par 11/12:educated again there is no safe use of alcohol on current medication, and may be referred to substance use program if needed, or to go to AA, otherwise, will continue current medication, had brighter affect, follow up weekly due to risk\par 11/21:stable on medications, follow up weekly for now\par 11/28:educated again on risks of alcohol use with current medication, encouraged again to abstain and seek out AA if needed, she maintains she will not drink this week\par \par 12/5:doing well, no changes, at baseline\par 12/12:no complaints, at baseline, [facility]\par 12/19:patient appears at baseline, no alcohol use reported, referred to Neurology\par 1/9:stable on current medications, no reported alcohol use [No] : no [TextBox_32] : Patient had recent SI, OD, but  currently denies SI, intent or plan [Yes] : yes [Mood episode] : mood episode [Agitation/ severe anxiety] : agitation/severe anxiety [Perceived burden on family or others] : perceived burden on family or others [Impulsivity] : impulsivity [History of abuse/trauma] : history of abuse/trauma [Anhedonia] : anhedonia [Global insomnia] : global insomnia [Recent loss] : recent loss [Current or pending isolation] : current or pending isolation [Responsibility to family or others] : responsibility to family or others [Identifies reasons for living] : identifies reasons for living [Future oriented] : future oriented [Engaged in work or school] : engaged in work or school [Supportive social network or family] : supportive social network or family [Ability to cope with stress] : ability to cope with stress [TextBox_52] : patient had 2 SA, but denies current SI, intent, or plan [None Known] : none known [Residential stability] : residential stability [Employment stability] : employment stability [TextBox_35] : No violence reported

## 2023-01-09 NOTE — DISCUSSION/SUMMARY
[Denied] : Denied [No] : No [Yes: Details:___] : Yes: [unfilled] [Completed and in chart] : Completed and in chart [Obtained and reviewed] : Obtained and reviewed [Yes] : Yes [From last 12 months, Score: ___] : AIMS results from last 12 months, Score: [unfilled] [Does patient use tobacco products?] : Patient uses tobacco products [Patient offered brief counseling regarding smoking cessation] : patient offered brief counseling regarding smoking cessation [Patient offered medications for tobacco cessation] : patient offered medications for tobacco cessation [Does patient use medical marijuana?] : Patient does not use medical marijuana. [Patient has been tested for HIV?] : Patient has not been tested for HIV [Patient has been tested for Hepatitis?] : Patient has not been tested for hepatitis [Patient would like to be tested/re-tested?] : patient would not like to be tested/re-tested [Current or past COVID-19 diagnosis?] : Patient had a present or past COVID-19 diagnosis [Vaccinated?] : is vaccinated [Education provided about COVID-19?] : Education was not provided about COVID-19 [de-identified] : qtc 440 [FreeTextEntry1] : PLAN:   Therapy amd Medication evaluation\par DX: OLGA/ [facility] 2\par Remains symptomatic at times, encourage to abstain from alcohol and take all medication as prescribed.  Will continue medications for mood stability and anxiety.\par \par medication:\par 1. lexapro 20mg and eventually back to 30mg if needed\par 2. klonopin 1mg po BID prn for anxiety \par 3. hydroxyzine 25mg-50mg at bedtime prn insomnia\par 4.  abilify 10mg po daily\par 5. wellbutrin xl 300mg po daily (option to go back to 450mg po daily)\par 6. gabapentin 300mg po TID (now per psychiatry again)\par \par Follow up in 2 weeks, appears brighter, at baseline, phone check in next week\par \par Patient has 2 SA, but enies any current SI, intent, or plan.  Safety plan discussed at length, she will reach out to provider or call 911 if any SI, intent, or plan.  She will see therapist weekly and writer every 1-2 weeks, earlier as needed.

## 2023-01-09 NOTE — CURRENT PSYCHIATRIC SYMPTOMS
[Depressed Mood] : depressed mood [Anhedonia] : no anhedonia [Guilt] : not feeling guilty [Decreased Concentration] : no decrease in concentrating ability [Hyperphagia] : no hyperphagia [Insomnia] : no insomnia disorder [Hypersomnia] : no ~T hypersomnia [Psychomotor Retardation] : no psychomotor retardation [Anorexia] : no anorexia [Euphoria] : no euphoria [Highly Irritable] : no high irritability [Increased Activity] : no increased in activity [Distractibility] : not distracted [Grandeur] : no feelings of grandeur [Buying Sprees] : no buying sprees [Hypersexuality] : denied hypersexuality [Dec Need For Sleep] : no decreased need for sleep [Delusions] : no ~T delusions [Hallucination Auditory] : no auditory hallucinations [Thought Disorder] : ~T a thought disorder was not noted [Excessive Worry] : excessive worry [Ruminations] : ruminations [Obsessions] : no obsessions [Re-experiencing] : no re-experiencing [Restlessness] : no restlessness [Hypochondriasis] : no hypochondriasis [Panic] : no panic disorder [Recent Onset] : no recent onset of confusion [Fluctuating Course] : no fluctuating course [Reversed sleep-wake] : no reversed sleep- wake [Inattentive] : not nattentive [Tremor] : ~T no ~M tremor was seen [Hallucination Visual] : no visual hallucinations [Hallucination Olfactory] : no olfactory hallucinations [Nausea] : no nausea [Hallucination Tactile] : no tactile hallucinations [Hallucination Gustatory] : no gustatory hallucinations [Diaphoresis] : showed no ~M diaphoresis [Vomiting] : no vomiting [Yawning] : no yawning [Mydriasis] : showed no ~M mydriasis [Gastrointestinal Sxs] : no ~T gastrointestinal symptoms [Piloerection] : the hair did not demonstrate piloerection [Rhinorrhea] : no ~M rhinorrhea discharge was seen [de-identified] : less depressed [de-identified] : at times, improved

## 2023-01-09 NOTE — PLAN
[FreeTextEntry2] : SEE DISCUSSION SUMMARY [Psychodynamic Therapy] : Psychodynamic Therapy  [de-identified] : Clare reports that she continues to feel "Blah"   She is not upset about the feeling and actually welcomes it. 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

## 2023-01-09 NOTE — CURRENT PSYCHIATRIC SYMPTOMS
[Depressed Mood] : depressed mood [Anhedonia] : no anhedonia [Guilt] : not feeling guilty [Decreased Concentration] : no decrease in concentrating ability [Hyperphagia] : no hyperphagia [Insomnia] : no insomnia disorder [Hypersomnia] : no ~T hypersomnia [Psychomotor Retardation] : no psychomotor retardation [Anorexia] : no anorexia [Euphoria] : no euphoria [Highly Irritable] : no high irritability [Increased Activity] : no increased in activity [Distractibility] : not distracted [Grandeur] : no feelings of grandeur [Buying Sprees] : no buying sprees [Hypersexuality] : denied hypersexuality [Dec Need For Sleep] : no decreased need for sleep [Delusions] : no ~T delusions [Hallucination Auditory] : no auditory hallucinations [Thought Disorder] : ~T a thought disorder was not noted [Excessive Worry] : excessive worry [Ruminations] : ruminations [Obsessions] : no obsessions [Re-experiencing] : no re-experiencing [Restlessness] : no restlessness [Hypochondriasis] : no hypochondriasis [Panic] : no panic disorder [Recent Onset] : no recent onset of confusion [Fluctuating Course] : no fluctuating course [Reversed sleep-wake] : no reversed sleep- wake [Inattentive] : not nattentive [Tremor] : ~T no ~M tremor was seen [Hallucination Visual] : no visual hallucinations [Hallucination Olfactory] : no olfactory hallucinations [Nausea] : no nausea [Hallucination Tactile] : no tactile hallucinations [Hallucination Gustatory] : no gustatory hallucinations [Diaphoresis] : showed no ~M diaphoresis [Vomiting] : no vomiting [Yawning] : no yawning [Mydriasis] : showed no ~M mydriasis [Gastrointestinal Sxs] : no ~T gastrointestinal symptoms [Piloerection] : the hair did not demonstrate piloerection [Rhinorrhea] : no ~M rhinorrhea discharge was seen [de-identified] : less depressed [de-identified] : at times, improved

## 2023-01-09 NOTE — DISCUSSION/SUMMARY
[Plan Review] : Plan Review [Able to manage surrounding demands and opportunities] : able to manage surrounding demands and opportunities [Able to exercise self-direction] : able to exercise self-direction [Able to set and pursue goals] : able to set and pursue goals [Adherent to treatment recommendations] : adherent to treatment recommendations [Articulate] : articulate [Attempting to realize their potential] : Attempting to realize their potential [Awareness of substance use issues] : awareness of substance use issues [Cognitively intact] : cognitively intact [Intelligent] : intelligent [Motivated to participate in treatment] : motivated to participate in treatment [Motivated and ready for change] : motivated and ready for change [Health literacy] : health literacy [Motivated to maintain or improve physical health] : motivated to maintain or improve physical health [In good health] : in good health [Has vocational interests or hobbies] : has vocational interests or hobbies [Part of a supportive family] : part of a supportive family [Has a supportive network] : has a supportive network [English fluency] : English fluency [Connected to healthcare] : connected to healthcare [Social supports] : social supports [FreeTextEntry1] : 8/3/2022 [FreeTextEntry2] : 11/03/2022 [FreeTextEntry3] : 10/13/2021 [FreeTextEntry5] : "I want to get my head on straight" [FreeTextEntry6] : Elaine has many strengths and has been responsive to interventions and compliant with treatment [FreeTextEntry7] : Elaine has a supportive family and network  However she is not financially stable   [FreeTextEntry8] : Continues to use alcohol. her abuse of alcohol was a factor in 2 suicide attempts in recent past [FreeTextEntry9] : Elaine is receptive to discussion of spirituality and her belief system   [de-identified] : Psychiatrist, Dr Murdock

## 2023-01-11 ENCOUNTER — APPOINTMENT (OUTPATIENT)
Dept: PAIN MANAGEMENT | Facility: CLINIC | Age: 48
End: 2023-01-11

## 2023-01-17 NOTE — BH INPATIENT PSYCHIATRY PROGRESS NOTE - NSBHMSESPEECH_PSY_A_CORE
Normal volume, rate, productivity, spontaneity and articulation
left/right
Normal volume, rate, productivity, spontaneity and articulation

## 2023-01-18 ENCOUNTER — NON-APPOINTMENT (OUTPATIENT)
Age: 48
End: 2023-01-18

## 2023-01-19 ENCOUNTER — APPOINTMENT (OUTPATIENT)
Dept: ORTHOPEDIC SURGERY | Facility: CLINIC | Age: 48
End: 2023-01-19
Payer: OTHER MISCELLANEOUS

## 2023-01-19 ENCOUNTER — NON-APPOINTMENT (OUTPATIENT)
Age: 48
End: 2023-01-19

## 2023-01-19 VITALS — BODY MASS INDEX: 21.69 KG/M2 | HEIGHT: 66 IN | WEIGHT: 135 LBS

## 2023-01-19 DIAGNOSIS — M54.12 RADICULOPATHY, CERVICAL REGION: ICD-10-CM

## 2023-01-19 DIAGNOSIS — M77.8 OTHER ENTHESOPATHIES, NOT ELSEWHERE CLASSIFIED: ICD-10-CM

## 2023-01-19 DIAGNOSIS — S16.1XXD STRAIN OF MUSCLE, FASCIA AND TENDON AT NECK LEVEL, SUBSEQUENT ENCOUNTER: ICD-10-CM

## 2023-01-19 PROCEDURE — 99213 OFFICE O/P EST LOW 20 MIN: CPT

## 2023-01-19 PROCEDURE — 99072 ADDL SUPL MATRL&STAF TM PHE: CPT

## 2023-01-19 NOTE — HISTORY OF PRESENT ILLNESS
[de-identified] : History of Present Illness\par 46-year-old female comes in today for follow-up evaluation of her left shoulder and neck pain from an injury that\par occurred at work. Patient states she was lifting a patient and felt a pop in the shoulder. Patient works as a direct\par /nurse's aide. Patient is left-hand dominant. She is taking Tylenol, she also has ibuprofen 800 mg.\par History of bipolar disorder, she does take medication for that. LHD also having some low back pain in the last couple of\par weeks feels the pain is worse difficulty sleeping she still out of work no problems in the past did get the cervical MRI\par done reports the pain is shooting in the left arm worse with rotation

## 2023-01-19 NOTE — ASSESSMENT
[FreeTextEntry1] :  I think most of the symptoms are cervical and  the shoulder is a smaller portion   she did not respond well  to the second injection I did point out we may need  to follow from the neurosurgical consultation she remains totally disabled unable to work I will see her in a few months call me to discuss the nerve tests I will see her in a couple months with clearing go back to work to beginning of February

## 2023-01-19 NOTE — REASON FOR VISIT
[FreeTextEntry2] : follow up  case 02/02/22 neck and right shoulder   did see neurosurgery as requested nerve tests still out of work not seeing pain management

## 2023-01-19 NOTE — IMAGING
[de-identified] :  left shoulder no swelling mild tenderness over AC joint and anterior portion of the shoulder good motion 150¦ with some pain over weakness in resisted external rotation positive Cook positive impingement negative [place] and Speed\par \par Cervical exam limited motion in rotation with spasm more limited to the left and right reflexes depressed but symmetric\par \par Imaging Study Review: MRI Cervical Spine. Report was reviewed and noted in the chart MRI cervical spine\par 03/09/2022: Several cervical bulging discs herniated disc C5-6 with left foraminal encroachment as her\par herniated disc C6-7 with some cord impingement\par  lumbar mild spasm throughout tight dorsal lumbar fascia and hamstrings negative straight leg bilaterally normal gait

## 2023-01-20 NOTE — DISCUSSION/SUMMARY
[Denied] : Denied [No] : No [Yes: Details:___] : Yes: [unfilled] [Completed and in chart] : Completed and in chart [Obtained and reviewed] : Obtained and reviewed [Yes] : Yes [From last 12 months, Score: ___] : AIMS results from last 12 months, Score: [unfilled] [Does patient use tobacco products?] : Patient uses tobacco products [Patient offered brief counseling regarding smoking cessation] : patient offered brief counseling regarding smoking cessation [Patient offered medications for tobacco cessation] : patient offered medications for tobacco cessation [Does patient use medical marijuana?] : Patient does not use medical marijuana. [Patient has been tested for HIV?] : Patient has not been tested for HIV [Patient has been tested for Hepatitis?] : Patient has not been tested for hepatitis [Patient would like to be tested/re-tested?] : patient would not like to be tested/re-tested [Current or past COVID-19 diagnosis?] : Patient had a present or past COVID-19 diagnosis [Vaccinated?] : is vaccinated [Education provided about COVID-19?] : Education was not provided about COVID-19 [de-identified] : qtc 440 PAST MEDICAL HISTORY:  AAA (abdominal aortic aneurysm)     Cataract     HTN (hypertension)     Hypercholesteremia     Impaired fasting glucose     Mitral regurgitation     Osteoporosis     Raynauds disease     Skin cancer      [FreeTextEntry1] : PLAN:   Therapy amd Medication evaluation\par DX: OLGA/ BiPolar 2\par Remains symptomatic at times, encourage to abstain from alcohol and take all medication as prescribed.  Will continue medications for mood stability and anxiety.\par \par medication:\par 1. lexapro 20mg and eventually back to 30mg if needed\par 2. klonopin 1mg po BID prn for anxiety \par 3. hydroxyzine 25mg-50mg at bedtime prn insomnia\par 4.  abilify 10mg po daily\par 5. wellbutrin xl 300mg po daily (option to go back to 450mg po daily)\par 6. gabapentin 300mg po TID (now per psychiatry again)\par \par Follow up in 2 weeks, appears brighter, at baseline, phone check in next week\par \par Patient has 2 SA, but enies any current SI, intent, or plan.  Safety plan discussed at length, she will reach out to provider or call 911 if any SI, intent, or plan.  She will see therapist weekly and writer every 1-2 weeks, earlier as needed.

## 2023-01-23 ENCOUNTER — APPOINTMENT (OUTPATIENT)
Dept: PSYCHIATRY | Facility: CLINIC | Age: 48
End: 2023-01-23
Payer: MEDICAID

## 2023-01-23 ENCOUNTER — OUTPATIENT (OUTPATIENT)
Dept: OUTPATIENT SERVICES | Facility: HOSPITAL | Age: 48
LOS: 1 days | Discharge: HOME | End: 2023-01-23

## 2023-01-23 DIAGNOSIS — G47.00 INSOMNIA, UNSPECIFIED: ICD-10-CM

## 2023-01-23 DIAGNOSIS — F41.1 GENERALIZED ANXIETY DISORDER: ICD-10-CM

## 2023-01-23 DIAGNOSIS — Z90.3 ACQUIRED ABSENCE OF STOMACH [PART OF]: Chronic | ICD-10-CM

## 2023-01-23 DIAGNOSIS — F31.81 BIPOLAR II DISORDER: ICD-10-CM

## 2023-01-23 PROCEDURE — 99214 OFFICE O/P EST MOD 30 MIN: CPT
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
## 2023-01-23 NOTE — DISCUSSION/SUMMARY
[FreeTextEntry1] : PLAN:   Therapy amd Medication evaluation\par DX: OLGA/ BiPolar 2\par Remains symptomatic at times, encourage to abstain from alcohol and take all medication as prescribed.  Will continue medications for mood stability and anxiety.\par \par medication:\par 1. lexapro 20mg and eventually back to 30mg if needed\par 2. klonopin 1mg po BID prn for anxiety \par 3. hydroxyzine 25mg-50mg at bedtime prn insomnia\par 4.  abilify 10mg po daily\par 5. wellbutrin xl 300mg po daily (option to go back to 450mg po daily)\par 6. gabapentin 300mg po TID (now per psychiatry again)\par \par Follow up in 2 weeks, appears brighter, at baseline, patien understands to always reach if needed\par \par Patient has 2 SA, but denies any current SI, intent, or plan.  Safety plan discussed at length, she will reach out to provider or call 911 if any SI, intent, or plan.  She will see therapist weekly and writer every 1-2 weeks, earlier as needed.

## 2023-01-23 NOTE — PLAN
[FreeTextEntry2] : SEE DISCUSSION SUMMARY [Supportive Therapy] : Supportive Therapy [de-identified] : Elaine is planning to return to work on 2/1...She appears to be accepting of this and mood is euthymic\par We discussed coping skills for anxiety and Elaine is responsive and interactive. \par PT reports sleeping well \par Elaine denies any alcohol use and seems accepting of sobriety\par She is provided with support and validation.\par medication is reported unchanged and PT reports good treatment compliance\par  [FreeTextEntry1] : PLAN: Weekly sessions continue

## 2023-01-23 NOTE — DISCUSSION/SUMMARY
[Plan Review] : Plan Review [Able to manage surrounding demands and opportunities] : able to manage surrounding demands and opportunities [Able to exercise self-direction] : able to exercise self-direction [Able to set and pursue goals] : able to set and pursue goals [Adherent to treatment recommendations] : adherent to treatment recommendations [Articulate] : articulate [Attempting to realize their potential] : Attempting to realize their potential [Awareness of substance use issues] : awareness of substance use issues [Cognitively intact] : cognitively intact [Intelligent] : intelligent [Motivated to participate in treatment] : motivated to participate in treatment [Motivated and ready for change] : motivated and ready for change [Health literacy] : health literacy [Motivated to maintain or improve physical health] : motivated to maintain or improve physical health [In good health] : in good health [Has vocational interests or hobbies] : has vocational interests or hobbies [Part of a supportive family] : part of a supportive family [Has a supportive network] : has a supportive network [English fluency] : English fluency [Connected to healthcare] : connected to healthcare [Social supports] : social supports [FreeTextEntry1] : 8/3/2022 [FreeTextEntry2] : 11/03/2022 [FreeTextEntry3] : 10/13/2021 [FreeTextEntry5] : "I want to get my head on straight" [FreeTextEntry6] : Elaine has many strengths and has been responsive to interventions and compliant with treatment [FreeTextEntry7] : Elaine has a supportive family and network  However she is not financially stable   [FreeTextEntry8] : Continues to use alcohol. her abuse of alcohol was a factor in 2 suicide attempts in recent past [FreeTextEntry9] : Elaine is receptive to discussion of spirituality and her belief system   [de-identified] : Psychiatrist, Dr Murdock

## 2023-01-23 NOTE — CURRENT PSYCHIATRIC SYMPTOMS
[Depressed Mood] : depressed mood [Anhedonia] : no anhedonia [Guilt] : not feeling guilty [Decreased Concentration] : no decrease in concentrating ability [Hyperphagia] : no hyperphagia [Insomnia] : no insomnia disorder [Hypersomnia] : no ~T hypersomnia [Psychomotor Retardation] : no psychomotor retardation [Anorexia] : no anorexia [Euphoria] : no euphoria [Highly Irritable] : no high irritability [Increased Activity] : no increased in activity [Distractibility] : not distracted [Grandeur] : no feelings of grandeur [Buying Sprees] : no buying sprees [Hypersexuality] : denied hypersexuality [Dec Need For Sleep] : no decreased need for sleep [Delusions] : no ~T delusions [Hallucination Auditory] : no auditory hallucinations [Thought Disorder] : ~T a thought disorder was not noted [Excessive Worry] : excessive worry [Ruminations] : ruminations [Obsessions] : no obsessions [Re-experiencing] : no re-experiencing [Restlessness] : no restlessness [Hypochondriasis] : no hypochondriasis [Panic] : no panic disorder [Recent Onset] : no recent onset of confusion [Fluctuating Course] : no fluctuating course [Reversed sleep-wake] : no reversed sleep- wake [Inattentive] : not nattentive [Tremor] : ~T no ~M tremor was seen [Hallucination Visual] : no visual hallucinations [Hallucination Olfactory] : no olfactory hallucinations [Nausea] : no nausea [Hallucination Tactile] : no tactile hallucinations [Hallucination Gustatory] : no gustatory hallucinations [Diaphoresis] : showed no ~M diaphoresis [Vomiting] : no vomiting [Yawning] : no yawning [Mydriasis] : showed no ~M mydriasis [Gastrointestinal Sxs] : no ~T gastrointestinal symptoms [Piloerection] : the hair did not demonstrate piloerection [Rhinorrhea] : no ~M rhinorrhea discharge was seen [de-identified] : less depressed [de-identified] : at times, improved
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
## 2023-01-25 ENCOUNTER — APPOINTMENT (OUTPATIENT)
Dept: PAIN MANAGEMENT | Facility: CLINIC | Age: 48
End: 2023-01-25

## 2023-02-06 ENCOUNTER — APPOINTMENT (OUTPATIENT)
Dept: PSYCHIATRY | Facility: CLINIC | Age: 48
End: 2023-02-06
Payer: MEDICAID

## 2023-02-06 ENCOUNTER — APPOINTMENT (OUTPATIENT)
Dept: PSYCHIATRY | Facility: CLINIC | Age: 48
End: 2023-02-06

## 2023-02-06 ENCOUNTER — OUTPATIENT (OUTPATIENT)
Dept: OUTPATIENT SERVICES | Facility: HOSPITAL | Age: 48
LOS: 1 days | End: 2023-02-06
Payer: MEDICAID

## 2023-02-06 DIAGNOSIS — F33.1 MAJOR DEPRESSIVE DISORDER, RECURRENT, MODERATE: ICD-10-CM

## 2023-02-06 DIAGNOSIS — Z90.3 ACQUIRED ABSENCE OF STOMACH [PART OF]: Chronic | ICD-10-CM

## 2023-02-06 PROCEDURE — 99214 OFFICE O/P EST MOD 30 MIN: CPT

## 2023-02-06 PROCEDURE — 90832 PSYTX W PT 30 MINUTES: CPT

## 2023-02-06 PROCEDURE — 99214 OFFICE O/P EST MOD 30 MIN: CPT | Mod: 25

## 2023-02-06 NOTE — DISCUSSION/SUMMARY
[FreeTextEntry1] : PLAN:   Therapy amd Medication evaluation\par DX: OLGA/ BiPolar 2\par Remains symptomatic at times, encourage to abstain from alcohol and take all medication as prescribed.  Will continue medications for mood stability and anxiety.\par \par medication:\par 1. lexapro 20mg and eventually back to 30mg if needed\par 2. klonopin 1mg po BID prn for anxiety \par 3. hydroxyzine 25mg-50mg at bedtime prn insomnia\par 4.  abilify 10mg po daily\par 5. wellbutrin xl 300mg po daily (option to go back to 450mg po daily)\par 6. gabapentin 300mg po TID (now per psychiatry again)\par \par Follow up in 1 week, appears brighter, at baseline, patient understands to always reach if needed\par \par Patient has 2 SA, but denies any current SI, intent, or plan.  Safety plan discussed at length, she will reach out to provider or call 911 if any SI, intent, or plan.  She will see therapist weekly and writer every 1-2 weeks, earlier as needed.

## 2023-02-06 NOTE — DISCUSSION/SUMMARY
[Plan Review] : Plan Review [Able to manage surrounding demands and opportunities] : able to manage surrounding demands and opportunities [Able to exercise self-direction] : able to exercise self-direction [Able to set and pursue goals] : able to set and pursue goals [Adherent to treatment recommendations] : adherent to treatment recommendations [Articulate] : articulate [Attempting to realize their potential] : Attempting to realize their potential [Awareness of substance use issues] : awareness of substance use issues [Cognitively intact] : cognitively intact [Intelligent] : intelligent [Motivated to participate in treatment] : motivated to participate in treatment [Motivated and ready for change] : motivated and ready for change [Health literacy] : health literacy [Motivated to maintain or improve physical health] : motivated to maintain or improve physical health [In good health] : in good health [Has vocational interests or hobbies] : has vocational interests or hobbies [Part of a supportive family] : part of a supportive family [Has a supportive network] : has a supportive network [English fluency] : English fluency [Connected to healthcare] : connected to healthcare [Social supports] : social supports [FreeTextEntry1] : 8/3/2022 [FreeTextEntry2] : 11/03/2022 [FreeTextEntry3] : 10/13/2021 [FreeTextEntry5] : "I want to get my head on straight" [FreeTextEntry6] : Elaine has many strengths and has been responsive to interventions and compliant with treatment [FreeTextEntry7] : Elaine has a supportive family and network  However she is not financially stable   [FreeTextEntry8] : Continues to use alcohol. her abuse of alcohol was a factor in 2 suicide attempts in recent past [FreeTextEntry9] : Elaine is receptive to discussion of spirituality and her belief system   [de-identified] : Psychiatrist, Dr Murdock

## 2023-02-06 NOTE — CURRENT PSYCHIATRIC SYMPTOMS
[Depressed Mood] : depressed mood [Anhedonia] : no anhedonia [Guilt] : not feeling guilty [Decreased Concentration] : no decrease in concentrating ability [Hyperphagia] : no hyperphagia [Insomnia] : no insomnia disorder [Hypersomnia] : no ~T hypersomnia [Psychomotor Retardation] : no psychomotor retardation [Anorexia] : no anorexia [Euphoria] : no euphoria [Highly Irritable] : no high irritability [Increased Activity] : no increased in activity [Distractibility] : not distracted [Grandeur] : no feelings of grandeur [Buying Sprees] : no buying sprees [Hypersexuality] : denied hypersexuality [Dec Need For Sleep] : no decreased need for sleep [Delusions] : no ~T delusions [Hallucination Auditory] : no auditory hallucinations [Thought Disorder] : ~T a thought disorder was not noted [Excessive Worry] : excessive worry [Ruminations] : ruminations [Obsessions] : no obsessions [Re-experiencing] : no re-experiencing [Restlessness] : no restlessness [Hypochondriasis] : no hypochondriasis [Panic] : no panic disorder [Recent Onset] : no recent onset of confusion [Fluctuating Course] : no fluctuating course [Reversed sleep-wake] : no reversed sleep- wake [Inattentive] : not nattentive [Tremor] : ~T no ~M tremor was seen [Hallucination Visual] : no visual hallucinations [Hallucination Olfactory] : no olfactory hallucinations [Nausea] : no nausea [Hallucination Tactile] : no tactile hallucinations [Hallucination Gustatory] : no gustatory hallucinations [Diaphoresis] : showed no ~M diaphoresis [Vomiting] : no vomiting [Yawning] : no yawning [Mydriasis] : showed no ~M mydriasis [Gastrointestinal Sxs] : no ~T gastrointestinal symptoms [Piloerection] : the hair did not demonstrate piloerection [Rhinorrhea] : no ~M rhinorrhea discharge was seen [de-identified] : less depressed [de-identified] : at times, improved
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
## 2023-02-06 NOTE — PLAN
[FreeTextEntry2] : SEE DISCUSSION SUMMARY [Psychodynamic Therapy] : Psychodynamic Therapy  [Supportive Therapy] : Supportive Therapy [de-identified] : Patricia is back to work and mood is positive, with only baseline anxiety\adryl Patricia continues to see family and occasional dates.\par patricia has been setting boundaries with family members especially sister.\daryl Patricia reports she has been abstaining from alcohol [FreeTextEntry1] : PLAN: Next session 2/13

## 2023-02-06 NOTE — PHYSICAL EXAM
[Cooperative] : cooperative [Euthymic] : euthymic [Anxious] : anxious [Constricted] : constricted [Flat] : flat [Clear] : clear [Linear/Goal Directed] : linear/goal directed [Depressive] : depressive [None Reported] : none reported [Average] : average [WNL] : within normal limits [Mild] : mild [de-identified] : PT is back to work and mood is positive

## 2023-02-13 ENCOUNTER — APPOINTMENT (OUTPATIENT)
Dept: PSYCHIATRY | Facility: CLINIC | Age: 48
End: 2023-02-13
Payer: MEDICAID

## 2023-02-13 ENCOUNTER — OUTPATIENT (OUTPATIENT)
Dept: OUTPATIENT SERVICES | Facility: HOSPITAL | Age: 48
LOS: 1 days | End: 2023-02-13
Payer: MEDICAID

## 2023-02-13 DIAGNOSIS — Z90.3 ACQUIRED ABSENCE OF STOMACH [PART OF]: Chronic | ICD-10-CM

## 2023-02-13 DIAGNOSIS — F33.1 MAJOR DEPRESSIVE DISORDER, RECURRENT, MODERATE: ICD-10-CM

## 2023-02-13 PROCEDURE — 99214 OFFICE O/P EST MOD 30 MIN: CPT

## 2023-02-13 PROCEDURE — 90832 PSYTX W PT 30 MINUTES: CPT

## 2023-02-13 PROCEDURE — 99214 OFFICE O/P EST MOD 30 MIN: CPT | Mod: 25

## 2023-02-13 NOTE — PLAN
[FreeTextEntry2] : SEE DISCUSSION SUMMARY [Supportive Therapy] : Supportive Therapy [de-identified] : Elaine reports she continues to do well and sleep and appetite are Ok.  She reports she has also been abstaining from alcohol.\par She  reports no changes or concerns. She is looking forward to going out to dinner and denies having the "Blah" feeling..\par She continues with her crafts and socializing with friends and family She also has been going to [employer]\par She reports she is mostly managing anxiety and depressive symptoms as they appear  by using refocusing and distractions which she enjoys [FreeTextEntry1] : PLAN: Next session scheduled in 2 weeks

## 2023-02-13 NOTE — PHYSICAL EXAM
[Cooperative] : cooperative [Euthymic] : euthymic [Anxious] : anxious [Constricted] : constricted [Flat] : flat [Clear] : clear [Linear/Goal Directed] : linear/goal directed [Depressive] : depressive [None Reported] : none reported [Average] : average [WNL] : within normal limits [Mild] : mild [de-identified] : PT is back to work and mood is positive

## 2023-02-13 NOTE — CURRENT PSYCHIATRIC SYMPTOMS
[Depressed Mood] : depressed mood [Anhedonia] : no anhedonia [Guilt] : not feeling guilty [Decreased Concentration] : no decrease in concentrating ability [Hyperphagia] : no hyperphagia [Insomnia] : no insomnia disorder [Hypersomnia] : no ~T hypersomnia [Psychomotor Retardation] : no psychomotor retardation [Anorexia] : no anorexia [Euphoria] : no euphoria [Highly Irritable] : no high irritability [Increased Activity] : no increased in activity [Distractibility] : not distracted [Grandeur] : no feelings of grandeur [Buying Sprees] : no buying sprees [Hypersexuality] : denied hypersexuality [Dec Need For Sleep] : no decreased need for sleep [Delusions] : no ~T delusions [Hallucination Auditory] : no auditory hallucinations [Thought Disorder] : ~T a thought disorder was not noted [Excessive Worry] : excessive worry [Ruminations] : ruminations [Obsessions] : no obsessions [Re-experiencing] : no re-experiencing [Restlessness] : no restlessness [Hypochondriasis] : no hypochondriasis [Panic] : no panic disorder [Recent Onset] : no recent onset of confusion [Fluctuating Course] : no fluctuating course [Reversed sleep-wake] : no reversed sleep- wake [Inattentive] : not nattentive [Tremor] : ~T no ~M tremor was seen [Hallucination Visual] : no visual hallucinations [Hallucination Olfactory] : no olfactory hallucinations [Nausea] : no nausea [Hallucination Tactile] : no tactile hallucinations [Hallucination Gustatory] : no gustatory hallucinations [Diaphoresis] : showed no ~M diaphoresis [Vomiting] : no vomiting [Yawning] : no yawning [Mydriasis] : showed no ~M mydriasis [Gastrointestinal Sxs] : no ~T gastrointestinal symptoms [Piloerection] : the hair did not demonstrate piloerection [Rhinorrhea] : no ~M rhinorrhea discharge was seen [de-identified] : less depressed [de-identified] : at times, improved

## 2023-02-13 NOTE — DISCUSSION/SUMMARY
[Plan Review] : Plan Review [Able to manage surrounding demands and opportunities] : able to manage surrounding demands and opportunities [Able to exercise self-direction] : able to exercise self-direction [Able to set and pursue goals] : able to set and pursue goals [Adherent to treatment recommendations] : adherent to treatment recommendations [Articulate] : articulate [Attempting to realize their potential] : Attempting to realize their potential [Awareness of substance use issues] : awareness of substance use issues [Cognitively intact] : cognitively intact [Intelligent] : intelligent [Motivated to participate in treatment] : motivated to participate in treatment [Motivated and ready for change] : motivated and ready for change [Health literacy] : health literacy [Motivated to maintain or improve physical health] : motivated to maintain or improve physical health [In good health] : in good health [Has vocational interests or hobbies] : has vocational interests or hobbies [Part of a supportive family] : part of a supportive family [Has a supportive network] : has a supportive network [English fluency] : English fluency [Connected to healthcare] : connected to healthcare [Social supports] : social supports [FreeTextEntry1] : 8/3/2022 [FreeTextEntry2] : 11/03/2022 [FreeTextEntry3] : 10/13/2021 [FreeTextEntry5] : "I want to get my head on straight" [FreeTextEntry6] : Elaine has many strengths and has been responsive to interventions and compliant with treatment [FreeTextEntry7] : Elaine has a supportive family and network  However she is not financially stable   [FreeTextEntry8] : Continues to use alcohol. her abuse of alcohol was a factor in 2 suicide attempts in recent past [FreeTextEntry9] : Elanie is receptive to discussion of spirituality and her belief system   [de-identified] : Psychiatrist, Dr Murdock

## 2023-02-13 NOTE — HISTORY OF PRESENT ILLNESS
[FreeTextEntry1] : This is a 47 year old patient who presents for medication management.  The patient reports less depressed mood, fair sleep and  appetite.  The patient denies hypomania,  denies psychosis at this time.  The patient has chronic anxiety that impairs their daily functioning at times. The patient has passive SI at times, but denies any current active suicidal ideation, homicidal ideation, no auditory or visual hallucinations, or paranoid ideation.  The patient has no medical complaints. The patient reported no alcohol use, and is smoking at this time. I requested the patient's updated physical and labs from their PCP.  Coping skills were discussed and a safety plan was provided.  The patient was educated on the risks, benefits, and alternatives of their psychiatric medications.  The patient will engage in psychotherapy at this time.  The patient consents to ongoing medication management.  Risks, benefits discussed.  Patient was hospitalized at [facility] from 10/30/21 until 11/5 after suicide attempt by car and OD.  Patient is on leave from work.  \par Safety plan discussed at length.\par Encouraged to abstain from alcohol.\par \par 2/23/22:patient called, is more anxious, maintains she is abstaining from alcohol and using klonopin as prescribed.  risks, benefits discussed, raise lexapro 30mg po qam (understands above FDA approved dose but has been effective).  Alternatives, discussed, consider d/c wellbutrin and or adding topamax and or gabapentin, and changing abilify to seroquel in the future, f/u next week Discussed risks and benefits of medication changes, SSRI benefit  better than raising klonopin (also mild depressogenic)\par \par 2/28:patient improving, will continue current medication\par \par 3/9:called feeling more depressed, anxious, RX for klonopin sent again, last filled 2/25, due for refill.  Risks, benefits discussed, will raise wellbutrin xl 300mg po qam, coping skills, safety plan discussed, will f/u next week, earlier as needed\par \par 3/14:mild improvement, intermittent symptoms, continues with therapy, would like to continue current medications\par \par 3/28/22 PHQ9 14 HAM-Anxiety 29, patient subjectively reports anxiety, due to TASK requirement, menstrual cycle, and financial issue with [facility], she prefers to continue current medications, encouraged to use klonopin twice daily\par \par 4/11/22: patient still depressed, will raise abilify, and follow up in 2 weeks\par 4/25:fairly stable, continue current medication\par 5/9/22:improved, no med changes, PHQ9=13\par 5/23/22:stable on current medication, no changes\par 6/13/22:patient presents little dysphoric and anxious about court tomorrow, but over last 3 weeks maintains she has been stable, and is not interested in medication change.  She is motivated to put legal issues behind her.  She denies any side effects and maintains she is using all medications as prescribed.\par 7/25:will titrate abilify for mood\par 8/8/22:stable, at baseline\par 8/29/22:increase Wellbutrin for mood\par 9/12: patient had only few week supply of wellbutrin and klonopin remianing.  patient was provided a safe taper schedule, and maintain she can't afford medications out of pocket.  She was encourage to go to Medicaid office or [facility] financial at [address]  Also, she was informed of [place] walk in clinic and if worsening depression, or SI to go ER or call 911.  As soon as patient obtains prescription benefits to call provider to resume medications.  She will follow up in 1 week.\par 9/22 :patient has been more anxious, used extra klonopin, will bridge patient with lorazepam 2mg po BID #12\par 10/12:patient took 8 klonopin OD and was hospitalized, she was restarted on medications below\par 10/24:was able to obtain all medications, stable at this time, continue to see weekly as available\par 10/31:more anxious, will resume gabapentin for anxiety/mood, follow up in 1 week\par 11/7:improved, provided 3 day supply of lorazepam, then resume klonopin\par 11/12:educated again there is no safe use of alcohol on current medication, and may be referred to substance use program if needed, or to go to AA, otherwise, will continue current medication, had brighter affect, follow up weekly due to risk\par 11/21:stable on medications, follow up weekly for now\par 11/28:educated again on risks of alcohol use with current medication, encouraged again to abstain and seek out AA if needed, she maintains she will not drink this week\par \par 12/5:doing well, no changes, at baseline\par 12/12:no complaints, at baseline, RX sent to [facility]\par 12/19:patient appears at baseline, no alcohol use reported, referred to Neurology\par 1/9:stable on current medications, no reported alcohol use\par 1/23:stable, no changes, follow up in 2 weeks\par 2/6:stable, no changes [No] : no [TextBox_32] : Patient had recent SI, OD, but  currently denies SI, intent or plan [Yes] : yes [Mood episode] : mood episode [Agitation/ severe anxiety] : agitation/severe anxiety [Perceived burden on family or others] : perceived burden on family or others [Impulsivity] : impulsivity [History of abuse/trauma] : history of abuse/trauma [Anhedonia] : anhedonia [Global insomnia] : global insomnia [Recent loss] : recent loss [Current or pending isolation] : current or pending isolation [Responsibility to family or others] : responsibility to family or others [Identifies reasons for living] : identifies reasons for living [Future oriented] : future oriented [Engaged in work or school] : engaged in work or school [Supportive social network or family] : supportive social network or family [Ability to cope with stress] : ability to cope with stress [TextBox_52] : patient had 2 SA, but denies current SI, intent, or plan [None Known] : none known [Residential stability] : residential stability [Employment stability] : employment stability [TextBox_35] : No violence reported

## 2023-02-27 ENCOUNTER — OUTPATIENT (OUTPATIENT)
Dept: OUTPATIENT SERVICES | Facility: HOSPITAL | Age: 48
LOS: 1 days | End: 2023-02-27
Payer: MEDICAID

## 2023-02-27 ENCOUNTER — APPOINTMENT (OUTPATIENT)
Dept: PSYCHIATRY | Facility: CLINIC | Age: 48
End: 2023-02-27
Payer: MEDICAID

## 2023-02-27 DIAGNOSIS — Z90.3 ACQUIRED ABSENCE OF STOMACH [PART OF]: Chronic | ICD-10-CM

## 2023-02-27 DIAGNOSIS — F41.1 GENERALIZED ANXIETY DISORDER: ICD-10-CM

## 2023-02-27 DIAGNOSIS — F31.81 BIPOLAR II DISORDER: ICD-10-CM

## 2023-02-27 DIAGNOSIS — F33.1 MAJOR DEPRESSIVE DISORDER, RECURRENT, MODERATE: ICD-10-CM

## 2023-02-27 DIAGNOSIS — G47.00 INSOMNIA, UNSPECIFIED: ICD-10-CM

## 2023-02-27 PROCEDURE — 99214 OFFICE O/P EST MOD 30 MIN: CPT | Mod: 25

## 2023-02-27 PROCEDURE — 99214 OFFICE O/P EST MOD 30 MIN: CPT

## 2023-02-27 PROCEDURE — 90832 PSYTX W PT 30 MINUTES: CPT

## 2023-02-27 NOTE — DISCUSSION/SUMMARY
[FreeTextEntry1] : PLAN:   Therapy amd Medication evaluation\par DX: OLGA/ BiPolar 2\par Remains symptomatic at times, encourage to abstain from alcohol and take all medication as prescribed.  Will continue medications for mood stability and anxiety.\par \par medication:\par 1. lexapro 20mg and eventually back to 30mg if needed\par 2. klonopin 1mg po BID prn for anxiety \par 3. hydroxyzine 25mg-50mg at bedtime prn insomnia\par 4.  abilify 10mg po daily\par 5. wellbutrin xl 300mg po daily (option to go back to 450mg po daily)\par 6. gabapentin 300mg po TID (now per psychiatry again)\par \par Follow up in 2 weeks, appears at baseline, patient understands to always reach if needed\par \par Patient has 2 SA, but denies any current SI, intent, or plan.  Safety plan discussed at length, she will reach out to provider or call 911 if any SI, intent, or plan.  She will see therapist weekly and writer every 1-2 weeks, earlier as needed.

## 2023-02-27 NOTE — HISTORY OF PRESENT ILLNESS
[FreeTextEntry1] : This is a 47 year old patient who presents for medication management.  The patient reports less depressed mood, fair sleep and  appetite.  The patient denies hypomania,  denies psychosis at this time.  The patient has chronic anxiety that impairs their daily functioning at times. The patient has passive SI at times, but denies any current active suicidal ideation, homicidal ideation, no auditory or visual hallucinations, or paranoid ideation.  The patient has no medical complaints. The patient reported no alcohol use, and is smoking at this time. I requested the patient's updated physical and labs from their PCP.  Coping skills were discussed and a safety plan was provided.  The patient was educated on the risks, benefits, and alternatives of their psychiatric medications.  The patient will engage in psychotherapy at this time.  The patient consents to ongoing medication management.  Risks, benefits discussed.  Patient was hospitalized at [facility] from 10/30/21 until 11/5 after suicide attempt by car and OD.  Patient is on leave from work.  \par Safety plan discussed at length.\par Encouraged to abstain from alcohol.\par \par 2/23/22:patient called, is more anxious, maintains she is abstaining from alcohol and using klonopin as prescribed.  risks, benefits discussed, raise lexapro 30mg po qam (understands above FDA approved dose but has been effective).  Alternatives, discussed, consider d/c wellbutrin and or adding topamax and or gabapentin, and changing abilify to seroquel in the future, f/u next week Discussed risks and benefits of medication changes, SSRI benefit  better than raising klonopin (also mild depressogenic)\par \par 2/28:patient improving, will continue current medication\par \par 3/9:called feeling more depressed, anxious, RX for klonopin sent again, last filled 2/25, due for refill.  Risks, benefits discussed, will raise wellbutrin xl 300mg po qam, coping skills, safety plan discussed, will f/u next week, earlier as needed\par \par 3/14:mild improvement, intermittent symptoms, continues with therapy, would like to continue current medications\par \par 3/28/22 PHQ9 14 HAM-Anxiety 29, patient subjectively reports anxiety, due to TASK requirement, menstrual cycle, and financial issue with [facility], she prefers to continue current medications, encouraged to use klonopin twice daily\par \par 4/11/22: patient still depressed, will raise abilify, and follow up in 2 weeks\par 4/25:fairly stable, continue current medication\par 5/9/22:improved, no med changes, PHQ9=13\par 5/23/22:stable on current medication, no changes\par 6/13/22:patient presents little dysphoric and anxious about court tomorrow, but over last 3 weeks maintains she has been stable, and is not interested in medication change.  She is motivated to put legal issues behind her.  She denies any side effects and maintains she is using all medications as prescribed.\par 7/25:will titrate abilify for mood\par 8/8/22:stable, at baseline\par 8/29/22:increase Wellbutrin for mood\par 9/12: patient had only few week supply of wellbutrin and klonopin remianing.  patient was provided a safe taper schedule, and maintain she can't afford medications out of pocket.  She was encourage to go to Medicaid office or [facility] financial at [address]  Also, she was informed of [place] walk in clinic and if worsening depression, or SI to go ER or call 911.  As soon as patient obtains prescription benefits to call provider to resume medications.  She will follow up in 1 week.\par 9/22 :patient has been more anxious, used extra klonopin, will bridge patient with lorazepam 2mg po BID #12\par 10/12:patient took 8 klonopin OD and was hospitalized, she was restarted on medications below\par 10/24:was able to obtain all medications, stable at this time, continue to see weekly as available\par 10/31:more anxious, will resume gabapentin for anxiety/mood, follow up in 1 week\par 11/7:improved, provided 3 day supply of lorazepam, then resume klonopin\par 11/12:educated again there is no safe use of alcohol on current medication, and may be referred to substance use program if needed, or to go to AA, otherwise, will continue current medication, had brighter affect, follow up weekly due to risk\par 11/21:stable on medications, follow up weekly for now\par 11/28:educated again on risks of alcohol use with current medication, encouraged again to abstain and seek out AA if needed, she maintains she will not drink this week\par \par 12/5:doing well, no changes, at baseline\par 12/12:no complaints, at baseline, [facility]\par 12/19:patient appears at baseline, no alcohol use reported, referred to Neurology\par 1/9:stable on current medications, no reported alcohol use\par 1/23:stable, no changes, follow up in 2 weeks\par 2/6:stable, no changes\par 2/27:stable on current medications, will begin seeing every 2 weeks, earlier as needed [No] : no [TextBox_32] : Patient had recent SI, OD, but  currently denies SI, intent or plan [Yes] : yes [Mood episode] : mood episode [Agitation/ severe anxiety] : agitation/severe anxiety [Perceived burden on family or others] : perceived burden on family or others [Impulsivity] : impulsivity [History of abuse/trauma] : history of abuse/trauma [Anhedonia] : anhedonia [Global insomnia] : global insomnia [Recent loss] : recent loss [Current or pending isolation] : current or pending isolation [Responsibility to family or others] : responsibility to family or others [Identifies reasons for living] : identifies reasons for living [Future oriented] : future oriented [Engaged in work or school] : engaged in work or school [Supportive social network or family] : supportive social network or family [Ability to cope with stress] : ability to cope with stress [TextBox_52] : patient had 2 SA, but denies current SI, intent, or plan [None Known] : none known [Residential stability] : residential stability [Employment stability] : employment stability [TextBox_35] : No violence reported

## 2023-02-27 NOTE — DISCUSSION/SUMMARY
[Plan Review] : Plan Review [Able to manage surrounding demands and opportunities] : able to manage surrounding demands and opportunities [Able to exercise self-direction] : able to exercise self-direction [Able to set and pursue goals] : able to set and pursue goals [Adherent to treatment recommendations] : adherent to treatment recommendations [Articulate] : articulate [Attempting to realize their potential] : Attempting to realize their potential [Awareness of substance use issues] : awareness of substance use issues [Cognitively intact] : cognitively intact [Intelligent] : intelligent [Motivated to participate in treatment] : motivated to participate in treatment [Motivated and ready for change] : motivated and ready for change [Health literacy] : health literacy [Motivated to maintain or improve physical health] : motivated to maintain or improve physical health [In good health] : in good health [Has vocational interests or hobbies] : has vocational interests or hobbies [Part of a supportive family] : part of a supportive family [Has a supportive network] : has a supportive network [English fluency] : English fluency [Connected to healthcare] : connected to healthcare [Social supports] : social supports [FreeTextEntry2] : 11/03/2023 [FreeTextEntry3] : 10/13/2021 [FreeTextEntry5] : "I want to get my head on straight" [FreeTextEntry6] : Elaine has many strengths and has been responsive to interventions and compliant with treatment [FreeTextEntry7] : Elaine has a supportive family and network  However she is not financially stable   [FreeTextEntry8] : Continues to use alcohol. her abuse of alcohol was a factor in 2 suicide attempts in recent past [FreeTextEntry9] : Elaine is receptive to discussion of spirituality and her belief system   [de-identified] : Psychiatrist, Dr Murdock [Mental Health] : Mental Health [Substance Abuse] : Substance Abuse [Occupational/Educational] : Occupational/Educational [Interpersonal Relationships] : Interpersonal Relationships [Continued - Progress made] : Continued - Progress made: [___ times a week] : [unfilled] times a week [Improvement in symptoms as evidenced by:] : Improvement in symptoms as evidenced by: [Other rationale for transition/discharge:] : Other rationale for transition/discharge: [None - Reason others did not participate:] : None - Reason others did not participate:  [Yes] : Yes [Psychiatric Provider/Prescriber] : Psychiatric Provider/Prescriber [Therapist] : Therapist [FreeTextEntry1] : No job, financial stressors, some unhealthy relationship choices [FreeTextEntry4] : To manage mood issues, depression and have healthy relationship [de-identified] : uses CB exercises to manage impulsive behaviors [de-identified] : Elaine will gain insight into how hwe depression and loss issues may be a factor in recent suicide attempt [de-identified] : 11/03/22 [de-identified] : reductioj of depression and awareness of self value [de-identified] : Decrease alcohol use and refrain from abuse of alcohol [de-identified] : 11/03/22 [de-identified] : Elaine cntinues to use alcohol socially [de-identified] : Mood has stabilized [de-identified] : Goals have been met [de-identified] : Not clinically indicated

## 2023-02-27 NOTE — PLAN
[FreeTextEntry2] : SEE DISCUSSION SUMMARY [Supportive Therapy] : Supportive Therapy [de-identified] : Clare states she continues to worry about finances and she agrees she does overspend \par She admits she overspends at the [employer] cent store and the things she buys are "stupid"\par She now has no $ for food but will eat at a friends home tonight.\par We discussed adding this as a new goal to work.on   \par Elaine is provided with support and agreeable to looking at this as a possible goal  [FreeTextEntry1] : PLAN: Next scheduled session in 1 week

## 2023-02-27 NOTE — CURRENT PSYCHIATRIC SYMPTOMS
[Depressed Mood] : depressed mood [Anhedonia] : no anhedonia [Guilt] : not feeling guilty [Decreased Concentration] : no decrease in concentrating ability [Hyperphagia] : no hyperphagia [Insomnia] : no insomnia disorder [Hypersomnia] : no ~T hypersomnia [Psychomotor Retardation] : no psychomotor retardation [Anorexia] : no anorexia [Euphoria] : no euphoria [Highly Irritable] : no high irritability [Increased Activity] : no increased in activity [Distractibility] : not distracted [Grandeur] : no feelings of grandeur [Buying Sprees] : no buying sprees [Hypersexuality] : denied hypersexuality [Dec Need For Sleep] : no decreased need for sleep [Delusions] : no ~T delusions [Hallucination Auditory] : no auditory hallucinations [Thought Disorder] : ~T a thought disorder was not noted [Excessive Worry] : excessive worry [Ruminations] : ruminations [Obsessions] : no obsessions [Re-experiencing] : no re-experiencing [Restlessness] : no restlessness [Hypochondriasis] : no hypochondriasis [Panic] : no panic disorder [Recent Onset] : no recent onset of confusion [Fluctuating Course] : no fluctuating course [Reversed sleep-wake] : no reversed sleep- wake [Inattentive] : not nattentive [Tremor] : ~T no ~M tremor was seen [Hallucination Visual] : no visual hallucinations [Hallucination Olfactory] : no olfactory hallucinations [Nausea] : no nausea [Hallucination Tactile] : no tactile hallucinations [Hallucination Gustatory] : no gustatory hallucinations [Diaphoresis] : showed no ~M diaphoresis [Vomiting] : no vomiting [Yawning] : no yawning [Mydriasis] : showed no ~M mydriasis [Gastrointestinal Sxs] : no ~T gastrointestinal symptoms [Piloerection] : the hair did not demonstrate piloerection [Rhinorrhea] : no ~M rhinorrhea discharge was seen [de-identified] : less depressed [de-identified] : at times, improved

## 2023-02-27 NOTE — PHYSICAL EXAM
[Well groomed] : well groomed [Cooperative] : cooperative [Euthymic] : euthymic [Anxious] : anxious [Constricted] : constricted [Flat] : flat [Clear] : clear [Linear/Goal Directed] : linear/goal directed [None Reported] : none reported [Average] : average [WNL] : within normal limits [Mild] : mild [de-identified] : PT is back to work and mood is positive

## 2023-02-28 DIAGNOSIS — F33.1 MAJOR DEPRESSIVE DISORDER, RECURRENT, MODERATE: ICD-10-CM

## 2023-03-06 ENCOUNTER — OUTPATIENT (OUTPATIENT)
Dept: OUTPATIENT SERVICES | Facility: HOSPITAL | Age: 48
LOS: 1 days | End: 2023-03-06
Payer: MEDICAID

## 2023-03-06 ENCOUNTER — APPOINTMENT (OUTPATIENT)
Dept: PSYCHIATRY | Facility: CLINIC | Age: 48
End: 2023-03-06

## 2023-03-06 DIAGNOSIS — Z90.3 ACQUIRED ABSENCE OF STOMACH [PART OF]: Chronic | ICD-10-CM

## 2023-03-06 DIAGNOSIS — G47.00 INSOMNIA, UNSPECIFIED: ICD-10-CM

## 2023-03-06 DIAGNOSIS — F31.81 BIPOLAR II DISORDER: ICD-10-CM

## 2023-03-06 DIAGNOSIS — F41.1 GENERALIZED ANXIETY DISORDER: ICD-10-CM

## 2023-03-06 PROCEDURE — 90832 PSYTX W PT 30 MINUTES: CPT

## 2023-03-06 NOTE — PHYSICAL EXAM
[Well groomed] : well groomed [Cooperative] : cooperative [Euthymic] : euthymic [Anxious] : anxious [Constricted] : constricted [Flat] : flat [Clear] : clear [Linear/Goal Directed] : linear/goal directed [None Reported] : none reported [Average] : average [WNL] : within normal limits [Mild] : mild [de-identified] : PT is back to work and mood is positive

## 2023-03-06 NOTE — DISCUSSION/SUMMARY
[Plan Review] : Plan Review [Able to manage surrounding demands and opportunities] : able to manage surrounding demands and opportunities [Able to exercise self-direction] : able to exercise self-direction [Able to set and pursue goals] : able to set and pursue goals [Adherent to treatment recommendations] : adherent to treatment recommendations [Articulate] : articulate [Attempting to realize their potential] : Attempting to realize their potential [Awareness of substance use issues] : awareness of substance use issues [Cognitively intact] : cognitively intact [Intelligent] : intelligent [Motivated to participate in treatment] : motivated to participate in treatment [Motivated and ready for change] : motivated and ready for change [Health literacy] : health literacy [Motivated to maintain or improve physical health] : motivated to maintain or improve physical health [In good health] : in good health [Has vocational interests or hobbies] : has vocational interests or hobbies [Part of a supportive family] : part of a supportive family [Has a supportive network] : has a supportive network [English fluency] : English fluency [Connected to healthcare] : connected to healthcare [Social supports] : social supports [FreeTextEntry2] : 8/3/2023 [FreeTextEntry3] : 10/13/2021 [FreeTextEntry5] : "I want to get my head on straight" [FreeTextEntry6] : Elaine has many strengths and has been responsive to interventions and compliant with treatment [FreeTextEntry7] : Elaine has a supportive family and network  However she is not financially stable   [FreeTextEntry8] : Continues to use alcohol. her abuse of alcohol was a factor in 2 suicide attempts in recent past [FreeTextEntry9] : Elaine is receptive to discussion of spirituality and her belief system   [de-identified] : Psychiatrist, Dr Murdock [Mental Health] : Mental Health [Substance Abuse] : Substance Abuse [Occupational/Educational] : Occupational/Educational [Interpersonal Relationships] : Interpersonal Relationships [Continued - Progress made] : Continued - Progress made: [___ times a week] : [unfilled] times a week [Improvement in symptoms as evidenced by:] : Improvement in symptoms as evidenced by: [Other rationale for transition/discharge:] : Other rationale for transition/discharge: [None - Reason others did not participate:] : None - Reason others did not participate:  [Yes] : Yes [Psychiatric Provider/Prescriber] : Psychiatric Provider/Prescriber [Therapist] : Therapist [FreeTextEntry1] : No job, financial stressors, some unhealthy relationship choices [FreeTextEntry4] : To manage mood issues, depression and have healthy relationship [de-identified] : uses CB exercises to manage impulsive behaviors [de-identified] : Elaine will gain insight into how hwe depression and loss issues may be a factor in recent suicide attempt [de-identified] : 8/3/2023  [de-identified] : reductioj of depression and awareness of self value [de-identified] : Decrease alcohol use and refrain from abuse of alcohol [de-identified] :  8/3/2023 [de-identified] : Elaine cntinues to use alcohol socially [de-identified] : Mood has stabilized [de-identified] : Goals have been met [de-identified] : Not clinically indicated

## 2023-03-06 NOTE — PLAN
[FreeTextEntry2] : SEE DISCUSSION SUMMARY [Cognitive and/or Behavior Therapy] : Cognitive and/or Behavior Therapy  [Psychodynamic Therapy] : Psychodynamic Therapy  [de-identified] : Elaine reports stable mood and positive thought process. We reviewed coping skills and PT is responsive\par Due to not receiving the next 4 Paychecks due to money owed to her [employer] she will be without funds and is receiving assistance  from friends/family.\par Elaine reports less anxiety and only mild depression\par Elaine is provided with encouragement and reminders of how to refocus her negative thoughts  [FreeTextEntry1] : PLAN: Next session scheduled for wed, 3/15

## 2023-03-06 NOTE — REASON FOR VISIT
[Verbal consent obtained from patient/other participant(s)] : Verbal consent for telehealth/telephonic services obtained from patient/other participant(s) [Patient] : Patient [FreeTextEntry1] : Psychotherapy follow up

## 2023-03-15 ENCOUNTER — APPOINTMENT (OUTPATIENT)
Dept: PSYCHIATRY | Facility: CLINIC | Age: 48
End: 2023-03-15

## 2023-03-22 ENCOUNTER — OUTPATIENT (OUTPATIENT)
Dept: OUTPATIENT SERVICES | Facility: HOSPITAL | Age: 48
LOS: 1 days | End: 2023-03-22
Payer: MEDICAID

## 2023-03-22 ENCOUNTER — APPOINTMENT (OUTPATIENT)
Dept: PSYCHIATRY | Facility: CLINIC | Age: 48
End: 2023-03-22

## 2023-03-22 ENCOUNTER — APPOINTMENT (OUTPATIENT)
Dept: PSYCHIATRY | Facility: CLINIC | Age: 48
End: 2023-03-22
Payer: MEDICAID

## 2023-03-22 DIAGNOSIS — F31.81 BIPOLAR II DISORDER: ICD-10-CM

## 2023-03-22 DIAGNOSIS — F33.1 MAJOR DEPRESSIVE DISORDER, RECURRENT, MODERATE: ICD-10-CM

## 2023-03-22 DIAGNOSIS — G47.00 INSOMNIA, UNSPECIFIED: ICD-10-CM

## 2023-03-22 DIAGNOSIS — F41.1 GENERALIZED ANXIETY DISORDER: ICD-10-CM

## 2023-03-22 PROCEDURE — 99214 OFFICE O/P EST MOD 30 MIN: CPT

## 2023-03-22 PROCEDURE — 90832 PSYTX W PT 30 MINUTES: CPT

## 2023-03-22 PROCEDURE — 99214 OFFICE O/P EST MOD 30 MIN: CPT | Mod: 25

## 2023-03-22 RX ORDER — GABAPENTIN 300 MG/1
300 CAPSULE ORAL
Qty: 90 | Refills: 0 | Status: DISCONTINUED | COMMUNITY
Start: 2022-08-10 | End: 2023-03-22

## 2023-03-22 NOTE — REASON FOR VISIT
[Medical Office: ([facility])___] : The provider was located at the medical office in [unfilled]. [FreeTextEntry4] : 9:30am [FreeTextEntry5] : 10 am [Patient] : Patient

## 2023-03-22 NOTE — PLAN
[FreeTextEntry2] : SEE DISCUSSION SUMMARY [Skills training (all types)] : Skills training (all types)  [Supportive Therapy] : Supportive Therapy [de-identified] : Pt has been served with eviction notice and will go to court soon,\par We discuss making goals and she reports "I want  to get things in order'  :"take care of all my financial problems  She is in debt and states she npo longer can use her credit cards as they are "maxed out"\par We will continue to discuss being honest with herself about what actually needs to be fixed and first step is communication with creditors\par She is provided with support and weekly therapy continues [FreeTextEntry1] : PLAN: Next session in 1 week

## 2023-03-22 NOTE — PHYSICAL EXAM
[Unkept] : unkept [Cooperative] : cooperative [Depressed] : depressed [Anxious] : anxious [Constricted] : constricted [Flat] : flat [Clear] : clear [Slowed thinking] : slowed thinking [None Reported] : none reported [WNL] : within normal limits [Average] : average [Mild] : mild [de-identified] : fair to poor [de-identified] : PT is back to work and is feeling positive about job

## 2023-03-22 NOTE — CURRENT PSYCHIATRIC SYMPTOMS
[Depressed Mood] : depressed mood [Anhedonia] : no anhedonia [Guilt] : not feeling guilty [Decreased Concentration] : no decrease in concentrating ability [Hyperphagia] : no hyperphagia [Insomnia] : no insomnia disorder [Hypersomnia] : no ~T hypersomnia [Psychomotor Retardation] : no psychomotor retardation [Anorexia] : no anorexia [Euphoria] : no euphoria [Highly Irritable] : no high irritability [Increased Activity] : no increased in activity [Distractibility] : not distracted [Grandeur] : no feelings of grandeur [Buying Sprees] : no buying sprees [Hypersexuality] : denied hypersexuality [Dec Need For Sleep] : no decreased need for sleep [Delusions] : no ~T delusions [Hallucination Auditory] : no auditory hallucinations [Thought Disorder] : ~T a thought disorder was not noted [Excessive Worry] : excessive worry [Ruminations] : ruminations [Obsessions] : no obsessions [Re-experiencing] : no re-experiencing [Restlessness] : no restlessness [Hypochondriasis] : no hypochondriasis [Panic] : no panic disorder [Recent Onset] : no recent onset of confusion [Fluctuating Course] : no fluctuating course [Reversed sleep-wake] : no reversed sleep- wake [Inattentive] : not nattentive [Tremor] : ~T no ~M tremor was seen [Hallucination Visual] : no visual hallucinations [Hallucination Olfactory] : no olfactory hallucinations [Nausea] : no nausea [Hallucination Tactile] : no tactile hallucinations [Hallucination Gustatory] : no gustatory hallucinations [Diaphoresis] : showed no ~M diaphoresis [Vomiting] : no vomiting [Yawning] : no yawning [Mydriasis] : showed no ~M mydriasis [Gastrointestinal Sxs] : no ~T gastrointestinal symptoms [Piloerection] : the hair did not demonstrate piloerection [Rhinorrhea] : no ~M rhinorrhea discharge was seen [de-identified] : less depressed [de-identified] : at times, improved

## 2023-03-22 NOTE — DISCUSSION/SUMMARY
[Plan Review] : Plan Review [Able to manage surrounding demands and opportunities] : able to manage surrounding demands and opportunities [Able to exercise self-direction] : able to exercise self-direction [Able to set and pursue goals] : able to set and pursue goals [Adherent to treatment recommendations] : adherent to treatment recommendations [Articulate] : articulate [Attempting to realize their potential] : Attempting to realize their potential [Awareness of substance use issues] : awareness of substance use issues [Cognitively intact] : cognitively intact [Intelligent] : intelligent [Motivated to participate in treatment] : motivated to participate in treatment [Motivated and ready for change] : motivated and ready for change [Health literacy] : health literacy [Motivated to maintain or improve physical health] : motivated to maintain or improve physical health [In good health] : in good health [Has vocational interests or hobbies] : has vocational interests or hobbies [Part of a supportive family] : part of a supportive family [Has a supportive network] : has a supportive network [English fluency] : English fluency [Connected to healthcare] : connected to healthcare [Social supports] : social supports [FreeTextEntry2] : 8/3/2023 [FreeTextEntry3] : 10/13/2021 [FreeTextEntry5] : "I want to get my head on straight" [FreeTextEntry6] : Elaine has many strengths and has been responsive to interventions and compliant with treatment [FreeTextEntry7] : Elaine has a supportive family and network  However she is not financially stable   [FreeTextEntry8] : Continues to use alcohol. her abuse of alcohol was a factor in 2 suicide attempts in recent past [FreeTextEntry9] : Elaine is receptive to discussion of spirituality and her belief system   [de-identified] : Psychiatrist, Dr Murdock [Mental Health] : Mental Health [Substance Abuse] : Substance Abuse [Occupational/Educational] : Occupational/Educational [Interpersonal Relationships] : Interpersonal Relationships [Continued - Progress made] : Continued - Progress made: [___ times a week] : [unfilled] times a week [Improvement in symptoms as evidenced by:] : Improvement in symptoms as evidenced by: [Other rationale for transition/discharge:] : Other rationale for transition/discharge: [None - Reason others did not participate:] : None - Reason others did not participate:  [Yes] : Yes [Psychiatric Provider/Prescriber] : Psychiatric Provider/Prescriber [Therapist] : Therapist [FreeTextEntry1] : No job, financial stressors, some unhealthy relationship choices [FreeTextEntry4] : To manage mood issues, depression and have healthy relationship [de-identified] : uses CB exercises to manage impulsive behaviors [de-identified] : Elaine will gain insight into how hwe depression and loss issues may be a factor in recent suicide attempt [de-identified] : 8/3/2023  [de-identified] : reductioj of depression and awareness of self value [de-identified] : Decrease alcohol use and refrain from abuse of alcohol [de-identified] :  8/3/2023 [de-identified] : Elaine cntinues to use alcohol socially [de-identified] : Mood has stabilized [de-identified] : Goals have been met [de-identified] : Not clinically indicated

## 2023-03-22 NOTE — DISCUSSION/SUMMARY
[FreeTextEntry1] : PLAN:   Therapy amd Medication evaluation\par DX: OLGA/ BiPolar 2\par Remains symptomatic at times, encourage to abstain from alcohol and take all medication as prescribed.  Will continue medications for mood stability and anxiety.\par \par medication:\par 1. lexapro 20mg and eventually back to 30mg if needed\par 2. klonopin 1mg po BID prn for anxiety \par 3. hydroxyzine 25mg-50mg at bedtime prn insomnia\par 4.  abilify 10mg po daily\par 5. wellbutrin xl 300mg po daily (option to go back to 450mg po daily)\par 6. gabapentin 300mg po TID (now per psychiatry again)\par \par Follow up in 2 weeks, appears at baseline, patient understands to always reach if needed\par \par Patient has 2 SA, but denies any current SI, intent, or plan.  Safety plan discussed at length, she will reach out to provider or call 911 if any SI, intent, or plan.  She will see therapist weekly and writer every 2 weeks, earlier as needed.

## 2023-03-22 NOTE — HISTORY OF PRESENT ILLNESS
[FreeTextEntry1] : This is a 47 year old patient who presents for medication management.  The patient reports less depressed mood, fair sleep and  appetite.  The patient denies hypomania,  denies psychosis at this time.  The patient has chronic anxiety that impairs their daily functioning at times. The patient has passive SI at times, but denies any current active suicidal ideation, homicidal ideation, no auditory or visual hallucinations, or paranoid ideation.  The patient has no medical complaints. The patient reported no alcohol use, and is smoking at this time. I requested the patient's updated physical and labs from their PCP.  Coping skills were discussed and a safety plan was provided.  The patient was educated on the risks, benefits, and alternatives of their psychiatric medications.  The patient will engage in psychotherapy at this time.  The patient consents to ongoing medication management.  Risks, benefits discussed.  Patient was hospitalized at [facility] from 10/30/21 until 11/5 after suicide attempt by car and OD.  Patient is on leave from work.  \par Safety plan discussed at length.\par Encouraged to abstain from alcohol.\par \par 2/23/22:patient called, is more anxious, maintains she is abstaining from alcohol and using klonopin as prescribed.  risks, benefits discussed, raise lexapro 30mg po qam (understands above FDA approved dose but has been effective).  Alternatives, discussed, consider d/c wellbutrin and or adding topamax and or gabapentin, and changing abilify to seroquel in the future, f/u next week Discussed risks and benefits of medication changes, SSRI benefit  better than raising klonopin (also mild depressogenic)\par \par 2/28:patient improving, will continue current medication\par \par 3/9:called feeling more depressed, anxious, RX for klonopin sent again, last filled 2/25, due for refill.  Risks, benefits discussed, will raise wellbutrin xl 300mg po qam, coping skills, safety plan discussed, will f/u next week, earlier as needed\par \par 3/14:mild improvement, intermittent symptoms, continues with therapy, would like to continue current medications\par \par 3/28/22 PHQ9 14 HAM-Anxiety 29, patient subjectively reports anxiety, due to TASK requirement, menstrual cycle, and financial issue with [facility], she prefers to continue current medications, encouraged to use klonopin twice daily\par \par 4/11/22: patient still depressed, will raise abilify, and follow up in 2 weeks\par 4/25:fairly stable, continue current medication\par 5/9/22:improved, no med changes, PHQ9=13\par 5/23/22:stable on current medication, no changes\par 6/13/22:patient presents little dysphoric and anxious about court tomorrow, but over last 3 weeks maintains she has been stable, and is not interested in medication change.  She is motivated to put legal issues behind her.  She denies any side effects and maintains she is using all medications as prescribed.\par 7/25:will titrate abilify for mood\par 8/8/22:stable, at baseline\par 8/29/22:increase Wellbutrin for mood\par 9/12: patient had only few week supply of wellbutrin and klonopin remianing.  patient was provided a safe taper schedule, and maintain she can't afford medications out of pocket.  She was encourage to go to Medicaid office or [facility] financial at [address]  Also, she was informed of [facility] walk in clinic and if worsening depression, or SI to go ER or call 911.  As soon as patient obtains prescription benefits to call provider to resume medications.  She will follow up in 1 week.\par 9/22 :patient has been more anxious, used extra klonopin, will bridge patient with lorazepam 2mg po BID #12\par 10/12:patient took 8 klonopin OD and was hospitalized, she was restarted on medications below\par 10/24:was able to obtain all medications, stable at this time, continue to see weekly as available\par 10/31:more anxious, will resume gabapentin for anxiety/mood, follow up in 1 week\par 11/7:improved, provided 3 day supply of lorazepam, then resume klonopin\par 11/12:educated again there is no safe use of alcohol on current medication, and may be referred to substance use program if needed, or to go to AA, otherwise, will continue current medication, had brighter affect, follow up weekly due to risk\par 11/21:stable on medications, follow up weekly for now\par 11/28:educated again on risks of alcohol use with current medication, encouraged again to abstain and seek out AA if needed, she maintains she will not drink this week\par \par 12/5:doing well, no changes, at baseline\par 12/12:no complaints, at baseline, RX sent to [facility]\par 12/19:patient appears at baseline, no alcohol use reported, referred to Neurology\par 1/9:stable on current medications, no reported alcohol use\par 1/23:stable, no changes, follow up in 2 weeks\par 2/6:stable, no changes\par 2/27:stable on current medications, will begin seeing every 2 weeks, earlier as needed\par 3/22:at baseline [No] : no [TextBox_32] : Patient had recent SI, OD, but  currently denies SI, intent or plan [Yes] : yes [Mood episode] : mood episode [Agitation/ severe anxiety] : agitation/severe anxiety [Perceived burden on family or others] : perceived burden on family or others [Impulsivity] : impulsivity [History of abuse/trauma] : history of abuse/trauma [Anhedonia] : anhedonia [Global insomnia] : global insomnia [Recent loss] : recent loss [Current or pending isolation] : current or pending isolation [Responsibility to family or others] : responsibility to family or others [Identifies reasons for living] : identifies reasons for living [Future oriented] : future oriented [Engaged in work or school] : engaged in work or school [Supportive social network or family] : supportive social network or family [Ability to cope with stress] : ability to cope with stress [TextBox_52] : patient had 2 SA, but denies current SI, intent, or plan [None Known] : none known [Residential stability] : residential stability [Employment stability] : employment stability [TextBox_35] : No violence reported

## 2023-03-23 DIAGNOSIS — F41.1 GENERALIZED ANXIETY DISORDER: ICD-10-CM

## 2023-03-23 DIAGNOSIS — F31.81 BIPOLAR II DISORDER: ICD-10-CM

## 2023-03-29 ENCOUNTER — OUTPATIENT (OUTPATIENT)
Dept: OUTPATIENT SERVICES | Facility: HOSPITAL | Age: 48
LOS: 1 days | End: 2023-03-29
Payer: MEDICAID

## 2023-03-29 ENCOUNTER — APPOINTMENT (OUTPATIENT)
Dept: PSYCHIATRY | Facility: CLINIC | Age: 48
End: 2023-03-29

## 2023-03-29 DIAGNOSIS — F31.81 BIPOLAR II DISORDER: ICD-10-CM

## 2023-03-29 DIAGNOSIS — Z90.3 ACQUIRED ABSENCE OF STOMACH [PART OF]: Chronic | ICD-10-CM

## 2023-03-29 DIAGNOSIS — G47.00 INSOMNIA, UNSPECIFIED: ICD-10-CM

## 2023-03-29 DIAGNOSIS — F41.1 GENERALIZED ANXIETY DISORDER: ICD-10-CM

## 2023-03-29 DIAGNOSIS — F31.9 BIPOLAR DISORDER, UNSPECIFIED: ICD-10-CM

## 2023-03-29 PROCEDURE — 90832 PSYTX W PT 30 MINUTES: CPT

## 2023-03-29 NOTE — PLAN
[FreeTextEntry2] : SEE DISCUSSION SUMMARY [Psychodynamic Therapy] : Psychodynamic Therapy  [Supportive Therapy] : Supportive Therapy [de-identified] : We reviewed treatment goals and regarding the initial goal of "Getting my head on straight"   Elaine states she had "messed up and now is paying the consequences."  Elaine has gained insight into her bipolar disorder and realized she was in a "Manic state and this may have contributed to her suicide attempt, fearing she was in too deep to rectify her life.\par Elaine is aware of her progress and is supported and validated [FreeTextEntry1] : PLAN: Next session 4/5

## 2023-03-29 NOTE — REASON FOR VISIT
[FreeTextEntry4] : 11:30 am [FreeTextEntry5] : 12 pm [Patient] : Patient [FreeTextEntry1] : Psychotherapy follow up

## 2023-03-29 NOTE — DISCUSSION/SUMMARY
[Plan Review] : Plan Review [Able to manage surrounding demands and opportunities] : able to manage surrounding demands and opportunities [Able to exercise self-direction] : able to exercise self-direction [Able to set and pursue goals] : able to set and pursue goals [Adherent to treatment recommendations] : adherent to treatment recommendations [Articulate] : articulate [Attempting to realize their potential] : Attempting to realize their potential [Awareness of substance use issues] : awareness of substance use issues [Cognitively intact] : cognitively intact [Intelligent] : intelligent [Motivated to participate in treatment] : motivated to participate in treatment [Motivated and ready for change] : motivated and ready for change [Health literacy] : health literacy [Motivated to maintain or improve physical health] : motivated to maintain or improve physical health [In good health] : in good health [Has vocational interests or hobbies] : has vocational interests or hobbies [Part of a supportive family] : part of a supportive family [Has a supportive network] : has a supportive network [English fluency] : English fluency [Connected to healthcare] : connected to healthcare [Social supports] : social supports [FreeTextEntry2] : 8/3/2023 [FreeTextEntry3] : 10/13/2021 [FreeTextEntry5] : "I want to get my head on straight" [FreeTextEntry6] : Elaine has many strengths and has been responsive to interventions and compliant with treatment [FreeTextEntry7] : Elaine has a supportive family and network  However she is not financially stable   [FreeTextEntry8] : Continues to use alcohol. her abuse of alcohol was a factor in 2 suicide attempts in recent past [FreeTextEntry9] : Elaine is receptive to discussion of spirituality and her belief system   [de-identified] : Psychiatrist, Dr Murdock [Mental Health] : Mental Health [Substance Abuse] : Substance Abuse [Occupational/Educational] : Occupational/Educational [Interpersonal Relationships] : Interpersonal Relationships [Continued - Progress made] : Continued - Progress made: [___ times a week] : [unfilled] times a week [Improvement in symptoms as evidenced by:] : Improvement in symptoms as evidenced by: [Other rationale for transition/discharge:] : Other rationale for transition/discharge: [None - Reason others did not participate:] : None - Reason others did not participate:  [Yes] : Yes [Psychiatric Provider/Prescriber] : Psychiatric Provider/Prescriber [Therapist] : Therapist [FreeTextEntry1] : No job, financial stressors, some unhealthy relationship choices [FreeTextEntry4] : To manage mood issues, depression and have healthy relationship [de-identified] : uses CB exercises to manage impulsive behaviors [de-identified] : Elaine will gain insight into how hwe depression and loss issues may be a factor in recent suicide attempt [de-identified] : 8/3/2023  [de-identified] : reductioj of depression and awareness of self value [de-identified] : Decrease alcohol use and refrain from abuse of alcohol [de-identified] :  8/3/2023 [de-identified] : Elaine cntinues to use alcohol socially [de-identified] : Mood has stabilized [de-identified] : Goals have been met [de-identified] : Not clinically indicated

## 2023-04-05 ENCOUNTER — APPOINTMENT (OUTPATIENT)
Dept: PSYCHIATRY | Facility: CLINIC | Age: 48
End: 2023-04-05
Payer: MEDICAID

## 2023-04-05 ENCOUNTER — OUTPATIENT (OUTPATIENT)
Dept: OUTPATIENT SERVICES | Facility: HOSPITAL | Age: 48
LOS: 1 days | End: 2023-04-05
Payer: MEDICAID

## 2023-04-05 DIAGNOSIS — Z90.3 ACQUIRED ABSENCE OF STOMACH [PART OF]: Chronic | ICD-10-CM

## 2023-04-05 DIAGNOSIS — F33.1 MAJOR DEPRESSIVE DISORDER, RECURRENT, MODERATE: ICD-10-CM

## 2023-04-05 DIAGNOSIS — F31.81 BIPOLAR II DISORDER: ICD-10-CM

## 2023-04-05 PROCEDURE — 99214 OFFICE O/P EST MOD 30 MIN: CPT

## 2023-04-05 PROCEDURE — 90832 PSYTX W PT 30 MINUTES: CPT

## 2023-04-05 NOTE — PLAN
[FreeTextEntry2] :  Elaine has significantly decreased alcohol use, maintains sobriety most of the time\par Elaine has gained insight and is in process of accepting responsibility for her behaviors [Skills training (all types)] : Skills training (all types)  [Supportive Therapy] : Supportive Therapy [de-identified] : In session Elaine appears worried as she will go to court for possible eviction on 4/14  She denies any anxiety or depression and reports stable mood and good functioning\par We continue to work on improving insight and communication..Clare is responsive to session intervention [FreeTextEntry1] : PLAN: Next session 4/12

## 2023-04-05 NOTE — CURRENT PSYCHIATRIC SYMPTOMS
[Depressed Mood] : depressed mood [Anhedonia] : no anhedonia [Guilt] : not feeling guilty [Decreased Concentration] : no decrease in concentrating ability [Hyperphagia] : no hyperphagia [Insomnia] : no insomnia disorder [Hypersomnia] : no ~T hypersomnia [Psychomotor Retardation] : no psychomotor retardation [Anorexia] : no anorexia [Euphoria] : no euphoria [Highly Irritable] : no high irritability [Increased Activity] : no increased in activity [Distractibility] : not distracted [Grandeur] : no feelings of grandeur [Buying Sprees] : no buying sprees [Hypersexuality] : denied hypersexuality [Dec Need For Sleep] : no decreased need for sleep [Delusions] : no ~T delusions [Hallucination Auditory] : no auditory hallucinations [Thought Disorder] : ~T a thought disorder was not noted [Excessive Worry] : excessive worry [Ruminations] : ruminations [Obsessions] : no obsessions [Re-experiencing] : no re-experiencing [Restlessness] : no restlessness [Hypochondriasis] : no hypochondriasis [Panic] : no panic disorder [Recent Onset] : no recent onset of confusion [Fluctuating Course] : no fluctuating course [Reversed sleep-wake] : no reversed sleep- wake [Inattentive] : not nattentive [Tremor] : ~T no ~M tremor was seen [Hallucination Visual] : no visual hallucinations [Hallucination Olfactory] : no olfactory hallucinations [Nausea] : no nausea [Hallucination Tactile] : no tactile hallucinations [Hallucination Gustatory] : no gustatory hallucinations [Diaphoresis] : showed no ~M diaphoresis [Vomiting] : no vomiting [Yawning] : no yawning [Mydriasis] : showed no ~M mydriasis [Gastrointestinal Sxs] : no ~T gastrointestinal symptoms [Piloerection] : the hair did not demonstrate piloerection [Rhinorrhea] : no ~M rhinorrhea discharge was seen [de-identified] : less depressed [de-identified] : at times, improved

## 2023-04-05 NOTE — PHYSICAL EXAM
[Unkept] : unkept [Cooperative] : cooperative [Depressed] : depressed [Anxious] : anxious [Constricted] : constricted [Flat] : flat [Clear] : clear [Slowed thinking] : slowed thinking [None Reported] : none reported [WNL] : within normal limits [Average] : average [Mild] : mild [de-identified] : some improvement in insight [de-identified] : PT is back to work and is feeling positive about job

## 2023-04-05 NOTE — HISTORY OF PRESENT ILLNESS
[FreeTextEntry1] : This is a 47 year old patient who presents for medication management.  The patient reports less depressed mood, fair sleep and  appetite.  The patient denies hypomania,  denies psychosis at this time.  The patient has chronic anxiety that impairs their daily functioning at times. The patient has passive SI at times, but denies any current active suicidal ideation, homicidal ideation, no auditory or visual hallucinations, or paranoid ideation.  The patient has no medical complaints. The patient reported no alcohol use, and is smoking at this time. I requested the patient's updated physical and labs from their PCP.  Coping skills were discussed and a safety plan was provided.  The patient was educated on the risks, benefits, and alternatives of their psychiatric medications.  The patient will engage in psychotherapy at this time.  The patient consents to ongoing medication management.  Risks, benefits discussed.  Patient was hospitalized at [facility] from 10/30/21 until 11/5 after suicide attempt by car and OD.  Patient is on leave from work.  \par Safety plan discussed at length.\par Encouraged to abstain from alcohol.\par \par 2/23/22:patient called, is more anxious, maintains she is abstaining from alcohol and using klonopin as prescribed.  risks, benefits discussed, raise lexapro 30mg po qam (understands above FDA approved dose but has been effective).  Alternatives, discussed, consider d/c wellbutrin and or adding topamax and or gabapentin, and changing abilify to seroquel in the future, f/u next week Discussed risks and benefits of medication changes, SSRI benefit  better than raising klonopin (also mild depressogenic)\par \par 2/28:patient improving, will continue current medication\par \par 3/9:called feeling more depressed, anxious, RX for klonopin sent again, last filled 2/25, due for refill.  Risks, benefits discussed, will raise wellbutrin xl 300mg po qam, coping skills, safety plan discussed, will f/u next week, earlier as needed\par \par 3/14:mild improvement, intermittent symptoms, continues with therapy, would like to continue current medications\par \par 3/28/22 PHQ9 14 HAM-Anxiety 29, patient subjectively reports anxiety, due to TASK requirement, menstrual cycle, and financial issue with [facility], she prefers to continue current medications, encouraged to use klonopin twice daily\par \par 4/11/22: patient still depressed, will raise abilify, and follow up in 2 weeks\par 4/25:fairly stable, continue current medication\par 5/9/22:improved, no med changes, PHQ9=13\par 5/23/22:stable on current medication, no changes\par 6/13/22:patient presents little dysphoric and anxious about court tomorrow, but over last 3 weeks maintains she has been stable, and is not interested in medication change.  She is motivated to put legal issues behind her.  She denies any side effects and maintains she is using all medications as prescribed.\par 7/25:will titrate abilify for mood\par 8/8/22:stable, at baseline\par 8/29/22:increase Wellbutrin for mood\par 9/12: patient had only few week supply of wellbutrin and klonopin remianing.  patient was provided a safe taper schedule, and maintain she can't afford medications out of pocket.  She was encourage to go to Medicaid office or [facility] financial at [address]  Also, she was informed of [facility] walk in clinic and if worsening depression, or SI to go ER or call 911.  As soon as patient obtains prescription benefits to call provider to resume medications.  She will follow up in 1 week.\par 9/22 :patient has been more anxious, used extra klonopin, will bridge patient with lorazepam 2mg po BID #12\par 10/12:patient took 8 klonopin OD and was hospitalized, she was restarted on medications below\par 10/24:was able to obtain all medications, stable at this time, continue to see weekly as available\par 10/31:more anxious, will resume gabapentin for anxiety/mood, follow up in 1 week\par 11/7:improved, provided 3 day supply of lorazepam, then resume klonopin\par 11/12:educated again there is no safe use of alcohol on current medication, and may be referred to substance use program if needed, or to go to AA, otherwise, will continue current medication, had brighter affect, follow up weekly due to risk\par 11/21:stable on medications, follow up weekly for now\par 11/28:educated again on risks of alcohol use with current medication, encouraged again to abstain and seek out AA if needed, she maintains she will not drink this week\par \par 12/5:doing well, no changes, at baseline\par 12/12:no complaints, at baseline, [facility]\par 12/19:patient appears at baseline, no alcohol use reported, referred to Neurology\par 1/9:stable on current medications, no reported alcohol use\par 1/23:stable, no changes, follow up in 2 weeks\par 2/6:stable, no changes\par 2/27:stable on current medications, will begin seeing every 2 weeks, earlier as needed\par 3/22:at baseline\par 4/5:stable, no changes, at baseline [No] : no [TextBox_32] : Patient had recent SI, OD, but  currently denies SI, intent or plan [Yes] : yes [Mood episode] : mood episode [Agitation/ severe anxiety] : agitation/severe anxiety [Perceived burden on family or others] : perceived burden on family or others [Impulsivity] : impulsivity [History of abuse/trauma] : history of abuse/trauma [Anhedonia] : anhedonia [Global insomnia] : global insomnia [Recent loss] : recent loss [Current or pending isolation] : current or pending isolation [Responsibility to family or others] : responsibility to family or others [Identifies reasons for living] : identifies reasons for living [Future oriented] : future oriented [Engaged in work or school] : engaged in work or school [Supportive social network or family] : supportive social network or family [Ability to cope with stress] : ability to cope with stress [TextBox_52] : patient had 2 SA, but denies current SI, intent, or plan [None Known] : none known [Residential stability] : residential stability [Employment stability] : employment stability [TextBox_35] : No violence reported

## 2023-04-05 NOTE — DISCUSSION/SUMMARY
[Plan Review] : Plan Review [Able to manage surrounding demands and opportunities] : able to manage surrounding demands and opportunities [Able to exercise self-direction] : able to exercise self-direction [Able to set and pursue goals] : able to set and pursue goals [Adherent to treatment recommendations] : adherent to treatment recommendations [Articulate] : articulate [Attempting to realize their potential] : Attempting to realize their potential [Awareness of substance use issues] : awareness of substance use issues [Cognitively intact] : cognitively intact [Intelligent] : intelligent [Motivated to participate in treatment] : motivated to participate in treatment [Motivated and ready for change] : motivated and ready for change [Health literacy] : health literacy [Motivated to maintain or improve physical health] : motivated to maintain or improve physical health [In good health] : in good health [Has vocational interests or hobbies] : has vocational interests or hobbies [Part of a supportive family] : part of a supportive family [Has a supportive network] : has a supportive network [English fluency] : English fluency [Connected to healthcare] : connected to healthcare [Social supports] : social supports [Mental Health] : Mental Health [Substance Abuse] : Substance Abuse [Occupational/Educational] : Occupational/Educational [Interpersonal Relationships] : Interpersonal Relationships [Continued - Progress made] : Continued - Progress made: [___ times a week] : [unfilled] times a week [Improvement in symptoms as evidenced by:] : Improvement in symptoms as evidenced by: [Other rationale for transition/discharge:] : Other rationale for transition/discharge: [None - Reason others did not participate:] : None - Reason others did not participate:  [Yes] : Yes [Psychiatric Provider/Prescriber] : Psychiatric Provider/Prescriber [Therapist] : Therapist [FreeTextEntry2] : 8/3/2023 [FreeTextEntry3] : 10/13/2021 [FreeTextEntry5] : "I want to get my head on straight" Pt is gaining insight into her behaviors and beginning to take responsibility [FreeTextEntry6] : Elaine has many strengths and has been responsive to interventions and compliant with treatment [FreeTextEntry7] : Elaine has a supportive family and network  However she is not financially stable   [FreeTextEntry8] : Continues to use alcohol. her abuse of alcohol was a factor in 2 suicide attempts in recent past [FreeTextEntry9] : Elaine is receptive to discussion of spirituality and her belief system   [de-identified] : Psychiatrist, Dr Murdock [FreeTextEntry1] : No job, financial stressors, some unhealthy relationship choices [FreeTextEntry4] : To manage mood issues, depression and have healthy relationship [de-identified] : uses CB exercises to manage impulsive behaviors [de-identified] : Elaine will gain insight into how hwe depression and loss issues may be a factor in recent suicide attempt [de-identified] : 8/3/2023  [de-identified] : reductioj of depression and awareness of self value [de-identified] : Decrease alcohol use and refrain from abuse of alcohol [de-identified] :  8/3/2023 [de-identified] : Elaine has significantly decreased her alcohol use and is maintaining sobriety most of the time 4/5/23 [de-identified] : Mood has stabilized [de-identified] : Goals have been met [de-identified] : Not clinically indicated

## 2023-04-06 DIAGNOSIS — F33.1 MAJOR DEPRESSIVE DISORDER, RECURRENT, MODERATE: ICD-10-CM

## 2023-04-06 DIAGNOSIS — F31.81 BIPOLAR II DISORDER: ICD-10-CM

## 2023-04-12 ENCOUNTER — OUTPATIENT (OUTPATIENT)
Dept: OUTPATIENT SERVICES | Facility: HOSPITAL | Age: 48
LOS: 1 days | End: 2023-04-12
Payer: MEDICAID

## 2023-04-12 ENCOUNTER — APPOINTMENT (OUTPATIENT)
Dept: PSYCHIATRY | Facility: CLINIC | Age: 48
End: 2023-04-12

## 2023-04-12 DIAGNOSIS — F31.81 BIPOLAR II DISORDER: ICD-10-CM

## 2023-04-12 DIAGNOSIS — F33.1 MAJOR DEPRESSIVE DISORDER, RECURRENT, MODERATE: ICD-10-CM

## 2023-04-12 PROCEDURE — 90832 PSYTX W PT 30 MINUTES: CPT

## 2023-04-12 NOTE — PLAN
[FreeTextEntry2] :  Elaine has significantly decreased alcohol use, maintains sobriety most of the time\par Elaine has gained insight and is in process of accepting responsibility for her behaviors [Supportive Therapy] : Supportive Therapy [de-identified] : Elaine reports no change or new concerns. She continues to work FT and will  be finally receiving a paycheck in 1 week which should help in paying her debts.\par She continues to adopt a one day at a time attitude and reports less anxiety in general.\par Elaine reports no medication changes and states her medication is taken as prescribed\par She is provided with support and continues to be seen weekly [FreeTextEntry1] : PLAN: Next session 4/19

## 2023-04-12 NOTE — REASON FOR VISIT
[FreeTextEntry4] : 10:30 am [FreeTextEntry5] : 11:00 am [Patient] : Patient [FreeTextEntry1] : Psychotherapy follow up

## 2023-04-12 NOTE — DISCUSSION/SUMMARY
[Plan Review] : Plan Review [Able to manage surrounding demands and opportunities] : able to manage surrounding demands and opportunities [Able to exercise self-direction] : able to exercise self-direction [Able to set and pursue goals] : able to set and pursue goals [Adherent to treatment recommendations] : adherent to treatment recommendations [Articulate] : articulate [Attempting to realize their potential] : Attempting to realize their potential [Awareness of substance use issues] : awareness of substance use issues [Cognitively intact] : cognitively intact [Intelligent] : intelligent [Motivated to participate in treatment] : motivated to participate in treatment [Motivated and ready for change] : motivated and ready for change [Health literacy] : health literacy [Motivated to maintain or improve physical health] : motivated to maintain or improve physical health [In good health] : in good health [Has vocational interests or hobbies] : has vocational interests or hobbies [Part of a supportive family] : part of a supportive family [Has a supportive network] : has a supportive network [English fluency] : English fluency [Connected to healthcare] : connected to healthcare [Social supports] : social supports [FreeTextEntry1] : 8/3/2022 [FreeTextEntry2] : 8/3/2023 [FreeTextEntry3] : 10/13/2021 [FreeTextEntry5] : "I want to get my head on straight" [FreeTextEntry6] : Elaine has many strengths and has been responsive to interventions and compliant with treatment [FreeTextEntry7] : Elaine has a supportive family and network  However she is not financially stable   [FreeTextEntry8] : Continues to use alcohol. her abuse of alcohol was a factor in 2 suicide attempts in recent past [FreeTextEntry9] : Elaine is receptive to discussion of spirituality and her belief system   [de-identified] : Psychiatrist, Dr Murdock

## 2023-04-17 ENCOUNTER — NON-APPOINTMENT (OUTPATIENT)
Age: 48
End: 2023-04-17

## 2023-04-19 ENCOUNTER — APPOINTMENT (OUTPATIENT)
Dept: PSYCHIATRY | Facility: CLINIC | Age: 48
End: 2023-04-19
Payer: MEDICAID

## 2023-04-19 ENCOUNTER — APPOINTMENT (OUTPATIENT)
Dept: ORTHOPEDIC SURGERY | Facility: CLINIC | Age: 48
End: 2023-04-19

## 2023-04-19 ENCOUNTER — OUTPATIENT (OUTPATIENT)
Dept: OUTPATIENT SERVICES | Facility: HOSPITAL | Age: 48
LOS: 1 days | End: 2023-04-19
Payer: MEDICAID

## 2023-04-19 DIAGNOSIS — F33.1 MAJOR DEPRESSIVE DISORDER, RECURRENT, MODERATE: ICD-10-CM

## 2023-04-19 DIAGNOSIS — F31.81 BIPOLAR II DISORDER: ICD-10-CM

## 2023-04-19 DIAGNOSIS — Z90.3 ACQUIRED ABSENCE OF STOMACH [PART OF]: Chronic | ICD-10-CM

## 2023-04-19 PROCEDURE — 99214 OFFICE O/P EST MOD 30 MIN: CPT

## 2023-04-19 PROCEDURE — 90832 PSYTX W PT 30 MINUTES: CPT

## 2023-04-19 PROCEDURE — 99214 OFFICE O/P EST MOD 30 MIN: CPT | Mod: 25

## 2023-04-19 NOTE — PLAN
[FreeTextEntry2] :  Elaine has significantly decreased alcohol use, maintains sobriety most of the time\par Elaine has gained insight and is in process of accepting responsibility for her behaviors [Supportive Therapy] : Supportive Therapy [de-identified] : Rachel reports she went to court for an eviction process and will obtain a "ONE SHOT DEAL" to pay back rent... She continues toclear up the issues resulting from he r past manic behaviors and is commended  for this.. IN vanessa Henry is alert, oriented and interactive with good response to session [FreeTextEntry1] : PLAN: Weekly therapy continues at this time

## 2023-04-19 NOTE — DISCUSSION/SUMMARY
[Plan Review] : Plan Review [Able to manage surrounding demands and opportunities] : able to manage surrounding demands and opportunities [Able to exercise self-direction] : able to exercise self-direction [Able to set and pursue goals] : able to set and pursue goals [Adherent to treatment recommendations] : adherent to treatment recommendations [Articulate] : articulate [Attempting to realize their potential] : Attempting to realize their potential [Awareness of substance use issues] : awareness of substance use issues [Cognitively intact] : cognitively intact [Intelligent] : intelligent [Motivated to participate in treatment] : motivated to participate in treatment [Motivated and ready for change] : motivated and ready for change [Health literacy] : health literacy [Motivated to maintain or improve physical health] : motivated to maintain or improve physical health [In good health] : in good health [Has vocational interests or hobbies] : has vocational interests or hobbies [Part of a supportive family] : part of a supportive family [Has a supportive network] : has a supportive network [English fluency] : English fluency [Connected to healthcare] : connected to healthcare [Social supports] : social supports [FreeTextEntry2] : 8/3/2023 [FreeTextEntry3] : 10/13/2021 [FreeTextEntry5] : "I want to get my head on straight" [FreeTextEntry6] : Elaine has many strengths and has been responsive to interventions and compliant with treatment [FreeTextEntry7] : Elaine has a supportive family and network  However she is not financially stable   [FreeTextEntry8] : Continues to use alcohol. her abuse of alcohol was a factor in 2 suicide attempts in recent past [FreeTextEntry9] : Elaine is receptive to discussion of spirituality and her belief system   [de-identified] : Psychiatrist, Dr Murdock [Mental Health] : Mental Health [Substance Abuse] : Substance Abuse [Occupational/Educational] : Occupational/Educational [Interpersonal Relationships] : Interpersonal Relationships [Continued - Progress made] : Continued - Progress made: [___ times a week] : [unfilled] times a week [Improvement in symptoms as evidenced by:] : Improvement in symptoms as evidenced by: [Other rationale for transition/discharge:] : Other rationale for transition/discharge: [None - Reason others did not participate:] : None - Reason others did not participate:  [Yes] : Yes [Psychiatric Provider/Prescriber] : Psychiatric Provider/Prescriber [Therapist] : Therapist [FreeTextEntry1] : No job, financial stressors, some unhealthy relationship choices [FreeTextEntry4] : To manage mood issues, depression and have healthy relationship [de-identified] : uses CB exercises to manage impulsive behaviors [de-identified] : Elaine will gain insight into how hwe depression and loss issues may be a factor in recent suicide attempt [de-identified] : 8/3/2023  [de-identified] : reductioj of depression and awareness of self value [de-identified] : Decrease alcohol use and refrain from abuse of alcohol [de-identified] :  8/3/2023 [de-identified] : Elaine cntinues to use alcohol socially [de-identified] : Mood has stabilized [de-identified] : Goals have been met [de-identified] : Not clinically indicated

## 2023-04-19 NOTE — REASON FOR VISIT
[FreeTextEntry4] : 11 am [FreeTextEntry5] : 11:30 am [Patient] : Patient [FreeTextEntry1] : Psychotherapy follow up

## 2023-04-19 NOTE — PHYSICAL EXAM
[Cooperative] : cooperative [Anxious] : anxious [Constricted] : constricted [Flat] : flat [Clear] : clear [Slowed thinking] : slowed thinking [None Reported] : none reported [WNL] : within normal limits [Average] : average [Mild] : mild [de-identified] : some improvement in insight [de-identified] : PT is back to work and is feeling positive about job

## 2023-04-20 ENCOUNTER — NON-APPOINTMENT (OUTPATIENT)
Age: 48
End: 2023-04-20

## 2023-04-20 DIAGNOSIS — F31.81 BIPOLAR II DISORDER: ICD-10-CM

## 2023-04-20 DIAGNOSIS — F33.1 MAJOR DEPRESSIVE DISORDER, RECURRENT, MODERATE: ICD-10-CM

## 2023-04-26 ENCOUNTER — APPOINTMENT (OUTPATIENT)
Dept: PSYCHIATRY | Facility: CLINIC | Age: 48
End: 2023-04-26

## 2023-04-26 ENCOUNTER — OUTPATIENT (OUTPATIENT)
Dept: OUTPATIENT SERVICES | Facility: HOSPITAL | Age: 48
LOS: 1 days | End: 2023-04-26
Payer: MEDICAID

## 2023-04-26 DIAGNOSIS — F31.81 BIPOLAR II DISORDER: ICD-10-CM

## 2023-04-26 DIAGNOSIS — Z90.3 ACQUIRED ABSENCE OF STOMACH [PART OF]: Chronic | ICD-10-CM

## 2023-04-26 PROCEDURE — 90832 PSYTX W PT 30 MINUTES: CPT

## 2023-04-26 NOTE — DISCUSSION/SUMMARY
[Plan Review] : Plan Review [Able to manage surrounding demands and opportunities] : able to manage surrounding demands and opportunities [Able to exercise self-direction] : able to exercise self-direction [Able to set and pursue goals] : able to set and pursue goals [Adherent to treatment recommendations] : adherent to treatment recommendations [Articulate] : articulate [Attempting to realize their potential] : Attempting to realize their potential [Awareness of substance use issues] : awareness of substance use issues [Cognitively intact] : cognitively intact [Intelligent] : intelligent [Motivated to participate in treatment] : motivated to participate in treatment [Motivated and ready for change] : motivated and ready for change [Health literacy] : health literacy [Motivated to maintain or improve physical health] : motivated to maintain or improve physical health [In good health] : in good health [Has vocational interests or hobbies] : has vocational interests or hobbies [Part of a supportive family] : part of a supportive family [Has a supportive network] : has a supportive network [English fluency] : English fluency [Connected to healthcare] : connected to healthcare [Social supports] : social supports [FreeTextEntry2] : 8/3/2023 [FreeTextEntry3] : 10/13/2021 [FreeTextEntry5] : "I want to get my head on straight" [FreeTextEntry6] : Elaine has many strengths and has been responsive to interventions and compliant with treatment [FreeTextEntry7] : Elaine has a supportive family and network  However she is not financially stable   [FreeTextEntry8] : Continues to use alcohol. her abuse of alcohol was a factor in 2 suicide attempts in recent past [FreeTextEntry9] : Elaine is receptive to discussion of spirituality and her belief system   [de-identified] : Psychiatrist, Dr Murdock [Mental Health] : Mental Health [Substance Abuse] : Substance Abuse [Occupational/Educational] : Occupational/Educational [Interpersonal Relationships] : Interpersonal Relationships [Continued - Progress made] : Continued - Progress made: [___ times a week] : [unfilled] times a week [Improvement in symptoms as evidenced by:] : Improvement in symptoms as evidenced by: [Other rationale for transition/discharge:] : Other rationale for transition/discharge: [None - Reason others did not participate:] : None - Reason others did not participate:  [Yes] : Yes [Psychiatric Provider/Prescriber] : Psychiatric Provider/Prescriber [Therapist] : Therapist [FreeTextEntry1] : No job, financial stressors, some unhealthy relationship choices [FreeTextEntry4] : To manage mood issues, depression and have healthy relationship [de-identified] : uses CB exercises to manage impulsive behaviors [de-identified] : Elaine will gain insight into how hwe depression and loss issues may be a factor in recent suicide attempt [de-identified] : 8/3/2023  [de-identified] : reductioj of depression and awareness of self value [de-identified] : Decrease alcohol use and refrain from abuse of alcohol [de-identified] :  8/3/2023 [de-identified] : Elaine cntinues to use alcohol socially [de-identified] : Mood has stabilized [de-identified] : Goals have been met [de-identified] : Not clinically indicated

## 2023-04-26 NOTE — PLAN
[FreeTextEntry2] :  Elaine has significantly decreased alcohol use, maintains sobriety most of the time\par Elaine has gained insight and is in process of accepting responsibility for her behaviors [Psychodynamic Therapy] : Psychodynamic Therapy  [de-identified] : Elaine announces she is getting  in June to someone who has been in love with he r for 10 years   She agrees that she is getting  for security and to help her stay stable.\par She is provided with support and wished well in her choice   \par Elaine states she she is not able to continue living the way she has  been She is comfortable and happy with her decision\par In session she is alert, responsive and focused [FreeTextEntry1] : PLAN: Elaine is informed that due to the restructuring of the clinic she will soon be assigned to another therapist. She is upset but is agreeable to continue to work with this therapist for next 2 months to process Next session Monday 5/1

## 2023-04-26 NOTE — PHYSICAL EXAM
[Cooperative] : cooperative [Anxious] : anxious [Constricted] : constricted [Flat] : flat [Clear] : clear [Slowed thinking] : slowed thinking [None Reported] : none reported [WNL] : within normal limits [Average] : average [Mild] : mild [de-identified] : some improvement in insight [de-identified] : PT is back to work and is feeling positive about job

## 2023-04-26 NOTE — REASON FOR VISIT
[FreeTextEntry4] : 11 am [Patient] : Patient [FreeTextEntry5] : 11:30 am [FreeTextEntry1] : Psychotherapy follow up

## 2023-04-27 DIAGNOSIS — F31.81 BIPOLAR II DISORDER: ICD-10-CM

## 2023-04-28 NOTE — ED PROVIDER NOTE - PROGRESS NOTE DETAILS
pt requesting to leave, sts she is not suicidal/homicidal, rather f/u as an outpatient, resources given pt had outburst in ED upon dc, agreed to psych eval, seen by psych recommends outpatient f/u Secondary Intention Text (Leave Blank If You Do Not Want): Due to the superficial nature of the defect and/or patient preference, the wound was allowed to heal by secondary intention.

## 2023-05-10 ENCOUNTER — APPOINTMENT (OUTPATIENT)
Dept: PSYCHIATRY | Facility: CLINIC | Age: 48
End: 2023-05-10

## 2023-05-11 NOTE — DISCUSSION/SUMMARY
[FreeTextEntry1] : PLAN:   Therapy amd Medication evaluation\par DX: OLGA/ BiPolar 2\par Remains symptomatic at times, encourage to abstain from alcohol and take all medication as prescribed.  Will continue medications for mood stability and anxiety.\par \par medication:\par 1. lexapro 20mg and eventually back to 30mg if needed\par 2. klonopin 1mg po BID prn for anxiety \par 3. hydroxyzine 25mg-50mg at bedtime prn insomnia\par 4. abilify 10mg po daily\par 5. wellbutrin xl 300mg po daily (option to go back to 450mg po daily)\par 6. gabapentin 300mg po TID (now per psychiatry again)\par \par Follow up in 4 weeks, appears at baseline, patient understands to always reach if needed\par \par Patient has 2 SA, but denies any current SI, intent, or plan.  Safety plan discussed at length, she will reach out to provider or call 911 if any SI, intent, or plan.  She will see therapist weekly and writer every 2 weeks, earlier as needed.

## 2023-05-11 NOTE — PHYSICAL EXAM
[None] : none [Dysphoric] : dysphoric [Normal] : good [Anxious] : no anxious [FreeTextEntry8] : ok [FreeTextEntry9] : constricted
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
## 2023-05-11 NOTE — CURRENT PSYCHIATRIC SYMPTOMS
[Depressed Mood] : depressed mood [Excessive Worry] : excessive worry [Ruminations] : ruminations [Anhedonia] : no anhedonia [Guilt] : not feeling guilty [Decreased Concentration] : no decrease in concentrating ability [Hyperphagia] : no hyperphagia [Insomnia] : no insomnia disorder [Hypersomnia] : no ~T hypersomnia [Psychomotor Retardation] : no psychomotor retardation [Anorexia] : no anorexia [Euphoria] : no euphoria [Highly Irritable] : no high irritability [Increased Activity] : no increased in activity [Distractibility] : not distracted [Grandeur] : no feelings of grandeur [Buying Sprees] : no buying sprees [Hypersexuality] : denied hypersexuality [Dec Need For Sleep] : no decreased need for sleep [Delusions] : no ~T delusions [Hallucination Auditory] : no auditory hallucinations [Thought Disorder] : ~T a thought disorder was not noted [Obsessions] : no obsessions [Re-experiencing] : no re-experiencing [Restlessness] : no restlessness [Hypochondriasis] : no hypochondriasis [Panic] : no panic disorder [Recent Onset] : no recent onset of confusion [Fluctuating Course] : no fluctuating course [Reversed sleep-wake] : no reversed sleep- wake [Inattentive] : not nattentive [Tremor] : ~T no ~M tremor was seen [Hallucination Visual] : no visual hallucinations [Hallucination Olfactory] : no olfactory hallucinations [Nausea] : no nausea [Hallucination Tactile] : no tactile hallucinations [Hallucination Gustatory] : no gustatory hallucinations [Diaphoresis] : showed no ~M diaphoresis [Vomiting] : no vomiting [Yawning] : no yawning [Mydriasis] : showed no ~M mydriasis [Gastrointestinal Sxs] : no ~T gastrointestinal symptoms [Piloerection] : the hair did not demonstrate piloerection [Rhinorrhea] : no ~M rhinorrhea discharge was seen [de-identified] : less depressed [de-identified] : at times, improved

## 2023-05-17 ENCOUNTER — OUTPATIENT (OUTPATIENT)
Dept: OUTPATIENT SERVICES | Facility: HOSPITAL | Age: 48
LOS: 1 days | End: 2023-05-17
Payer: COMMERCIAL

## 2023-05-17 ENCOUNTER — APPOINTMENT (OUTPATIENT)
Dept: PSYCHIATRY | Facility: CLINIC | Age: 48
End: 2023-05-17
Payer: COMMERCIAL

## 2023-05-17 ENCOUNTER — APPOINTMENT (OUTPATIENT)
Dept: PSYCHIATRY | Facility: CLINIC | Age: 48
End: 2023-05-17

## 2023-05-17 DIAGNOSIS — F31.81 BIPOLAR II DISORDER: ICD-10-CM

## 2023-05-17 DIAGNOSIS — F33.1 MAJOR DEPRESSIVE DISORDER, RECURRENT, MODERATE: ICD-10-CM

## 2023-05-17 DIAGNOSIS — Z90.3 ACQUIRED ABSENCE OF STOMACH [PART OF]: Chronic | ICD-10-CM

## 2023-05-17 DIAGNOSIS — F41.1 GENERALIZED ANXIETY DISORDER: ICD-10-CM

## 2023-05-17 PROCEDURE — 99214 OFFICE O/P EST MOD 30 MIN: CPT

## 2023-05-17 PROCEDURE — 99214 OFFICE O/P EST MOD 30 MIN: CPT | Mod: 25

## 2023-05-17 PROCEDURE — 90832 PSYTX W PT 30 MINUTES: CPT
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
## 2023-05-17 NOTE — DISCUSSION/SUMMARY
[Date of Last Physical Exam: _____] : Date of Last Physical Exam: [unfilled] [Date of Last Annual Labs: _____] : Date of Last Annual Labs: [unfilled] [Annual Review of Systems Completed?] : Annual Review of Systems Completed: Yes [Tobacco Screening Completed?] : Tobacco Screening Completed: Yes [Date of Last AIMS: _____] : Date of Last AIMS: [unfilled] [Date of Last HbgA1c: _____] : Date of Last HbgA1c: [unfilled] [Date of Last Lipid Profile: _____] : Date of Last Lipid Profile: [unfilled] [Potential impact of patient’s physical health conditions on psychiatric care?] : Potential impact of patient’s physical health conditions on psychiatric care: No [Does patient require any additional health services or referrals?] : Does patient require any additional health services or referrals: No [FreeTextEntry1] : PLAN:   Therapy amd Medication evaluation\par DX: OLGA/ BiPolar 2\par Remains symptomatic at times, encourage to abstain from alcohol and take all medication as prescribed.  Will continue medications for mood stability and anxiety.\par \par medication:\par 1. lexapro 20mg and eventually back to 30mg if needed\par 2. klonopin 1mg po BID prn for anxiety \par 3. hydroxyzine 25mg-50mg at bedtime prn insomnia\par 4. abilify 10mg po daily\par 5. wellbutrin xl 300mg po daily (option to go back to 450mg po daily)\par 6. gabapentin 300mg po TID (now per psychiatry again)\par \par Follow up in2 weeks, appears at baseline, patient understands to always reach if needed\par \par Patient has 2 SA, but denies any current SI, intent, or plan.  Safety plan discussed at length, she will reach out to provider or call 911 if any SI, intent, or plan.  She will see therapist weekly and writer every 2-4 weeks, earlier as needed.

## 2023-05-17 NOTE — PLAN
[FreeTextEntry2] :  Elaine has significantly decreased alcohol use, maintains sobriety most of the time\par Elaine has gained insight and is in process of accepting responsibility for her behaviors\par 5/17/23: Elaine is informed of the fact that as per change in Administrative policy she will soon be transferred  to another therapist We will begin termination/transition process with goal of 6 or 8 weeks\par Elaine chooses to remain with this therapist for as long as possible [Psychodynamic Therapy] : Psychodynamic Therapy  [de-identified] : Elaine will get  on June 23 and reports she feels positive about this change. \par In session Elaine presents with low mood and reports she is very tired in spite of sleeping well\par She reports no changes or new concerns and is responsive to supportive sesison [FreeTextEntry1] : PLAN: Next session scheduled for 5/31

## 2023-05-17 NOTE — PHYSICAL EXAM
[Unkept] : unkept [Cooperative] : cooperative [Depressed] : depressed [Anxious] : anxious [Constricted] : constricted [Flat] : flat [Underproductive] : underproductive [Blocked] : blocked [Slowed thinking] : slowed thinking [None Reported] : none reported [WNL] : within normal limits [Average] : average [Mild] : mild [de-identified] : unable to assess

## 2023-05-17 NOTE — REASON FOR VISIT
[FreeTextEntry4] : 11:00 am [FreeTextEntry5] : 11:30 am [Patient] : Patient [FreeTextEntry1] : Psychotherapy follow up

## 2023-05-17 NOTE — RISK ASSESSMENT
[No, patient denies ideation or behavior] : No, patient denies ideation or behavior [FreeTextEntry8] : patient has been free of SI, back to work, stable, maintains sobriety

## 2023-05-17 NOTE — HISTORY OF PRESENT ILLNESS
[FreeTextEntry1] : This is a 47 year old patient who presents for medication management.  The patient reports less depressed mood, fair sleep and  appetite.  The patient denies hypomania,  denies psychosis at this time.  The patient has chronic anxiety that impairs their daily functioning at times. The patient has passive SI at times, but denies any current active suicidal ideation, homicidal ideation, no auditory or visual hallucinations, or paranoid ideation.  The patient has no medical complaints. The patient reported no alcohol use, and is smoking at this time. I requested the patient's updated physical and labs from their PCP.  Coping skills were discussed and a safety plan was provided.  The patient was educated on the risks, benefits, and alternatives of their psychiatric medications.  The patient will engage in psychotherapy at this time.  The patient consents to ongoing medication management.  Risks, benefits discussed.  Patient was hospitalized at [facility] from 10/30/21 until 11/5 after suicide attempt by car and OD.  Patient is on leave from work.  \par Safety plan discussed at length.\par Encouraged to abstain from alcohol.\par \par 2/23/22:patient called, is more anxious, maintains she is abstaining from alcohol and using klonopin as prescribed.  risks, benefits discussed, raise lexapro 30mg po qam (understands above FDA approved dose but has been effective).  Alternatives, discussed, consider d/c wellbutrin and or adding topamax and or gabapentin, and changing abilify to seroquel in the future, f/u next week Discussed risks and benefits of medication changes, SSRI benefit  better than raising klonopin (also mild depressogenic)\par \par 2/28:patient improving, will continue current medication\par \par 3/9:called feeling more depressed, anxious, RX for klonopin sent again, last filled 2/25, due for refill.  Risks, benefits discussed, will raise wellbutrin xl 300mg po qam, coping skills, safety plan discussed, will f/u next week, earlier as needed\par \par 3/14:mild improvement, intermittent symptoms, continues with therapy, would like to continue current medications\par \par 3/28/22 PHQ9 14 HAM-Anxiety 29, patient subjectively reports anxiety, due to TASK requirement, menstrual cycle, and financial issue with [facility], she prefers to continue current medications, encouraged to use klonopin twice daily\par \par 4/11/22: patient still depressed, will raise abilify, and follow up in 2 weeks\par 4/25:fairly stable, continue current medication\par 5/9/22:improved, no med changes, PHQ9=13\par 5/23/22:stable on current medication, no changes\par 6/13/22:patient presents little dysphoric and anxious about court tomorrow, but over last 3 weeks maintains she has been stable, and is not interested in medication change.  She is motivated to put legal issues behind her.  She denies any side effects and maintains she is using all medications as prescribed.\par 7/25:will titrate abilify for mood\par 8/8/22:stable, at baseline\par 8/29/22:increase Wellbutrin for mood\par 9/12: patient had only few week supply of wellbutrin and klonopin remianing.  patient was provided a safe taper schedule, and maintain she can't afford medications out of pocket.  She was encourage to go to Medicaid office or [facility] financial at [address]  Also, she was informed of [facility] in [facility] and if worsening depression, or SI to go ER or call 911.  As soon as patient obtains prescription benefits to call provider to resume medications.  She will follow up in 1 week.\par 9/22 :patient has been more anxious, used extra klonopin, will bridge patient with lorazepam 2mg po BID #12\par 10/12:patient took 8 klonopin OD and was hospitalized, she was restarted on medications below\par 10/24:was able to obtain all medications, stable at this time, continue to see weekly as available\par 10/31:more anxious, will resume gabapentin for anxiety/mood, follow up in 1 week\par 11/7:improved, provided 3 day supply of lorazepam, then resume klonopin\par 11/12:educated again there is no safe use of alcohol on current medication, and may be referred to substance use program if needed, or to go to AA, otherwise, will continue current medication, had brighter affect, follow up weekly due to risk\par 11/21:stable on medications, follow up weekly for now\par 11/28:educated again on risks of alcohol use with current medication, encouraged again to abstain and seek out AA if needed, she maintains she will not drink this week\par \par 12/5:doing well, no changes, at baseline\par 12/12:no complaints, at baseline, [facility]\par 12/19:patient appears at baseline, no alcohol use reported, referred to Neurology\par 1/9:stable on current medications, no reported alcohol use\par 1/23:stable, no changes, follow up in 2 weeks\par 2/6:stable, no changes\par 2/27:stable on current medications, will begin seeing every 2 weeks, earlier as needed\par 3/22:at baseline\par 4/5:stable, no changes, at baseline\par 4/19: PA initiated for clonazepam\par 5/17:stable, at baseline, no changes, will be assigned new therapist, provide lab slip for updated metabolic profile [No] : no [TextBox_32] : Patient had recent SI, OD, but  currently denies SI, intent or plan [Yes] : yes [Mood episode] : mood episode [Agitation/ severe anxiety] : agitation/severe anxiety [Perceived burden on family or others] : perceived burden on family or others [Impulsivity] : impulsivity [Anhedonia] : anhedonia [History of abuse/trauma] : history of abuse/trauma [Global insomnia] : global insomnia [Recent loss] : recent loss [Current or pending isolation] : current or pending isolation [Responsibility to family or others] : responsibility to family or others [Identifies reasons for living] : identifies reasons for living [Future oriented] : future oriented [Engaged in work or school] : engaged in work or school [Supportive social network or family] : supportive social network or family [Ability to cope with stress] : ability to cope with stress [TextBox_52] : patient had 2 SA, but denies current SI, intent, or plan [None Known] : none known [Residential stability] : residential stability [Employment stability] : employment stability [TextBox_35] : No violence reported

## 2023-05-17 NOTE — CURRENT PSYCHIATRIC SYMPTOMS
[Depressed Mood] : depressed mood [Anhedonia] : no anhedonia [Guilt] : not feeling guilty [Decreased Concentration] : no decrease in concentrating ability [Hyperphagia] : no hyperphagia [Insomnia] : no insomnia disorder [Hypersomnia] : no ~T hypersomnia [Psychomotor Retardation] : no psychomotor retardation [Anorexia] : no anorexia [Euphoria] : no euphoria [Highly Irritable] : no high irritability [Increased Activity] : no increased in activity [Distractibility] : not distracted [Grandeur] : no feelings of grandeur [Buying Sprees] : no buying sprees [Hypersexuality] : denied hypersexuality [Dec Need For Sleep] : no decreased need for sleep [Delusions] : no ~T delusions [Hallucination Auditory] : no auditory hallucinations [Thought Disorder] : ~T a thought disorder was not noted [Excessive Worry] : excessive worry [Ruminations] : ruminations [Obsessions] : no obsessions [Re-experiencing] : no re-experiencing [Restlessness] : no restlessness [Hypochondriasis] : no hypochondriasis [Panic] : no panic disorder [Recent Onset] : no recent onset of confusion [Fluctuating Course] : no fluctuating course [Reversed sleep-wake] : no reversed sleep- wake [Inattentive] : not nattentive [Tremor] : ~T no ~M tremor was seen [Hallucination Visual] : no visual hallucinations [Hallucination Olfactory] : no olfactory hallucinations [Nausea] : no nausea [Hallucination Tactile] : no tactile hallucinations [Hallucination Gustatory] : no gustatory hallucinations [Diaphoresis] : showed no ~M diaphoresis [Vomiting] : no vomiting [Yawning] : no yawning [Mydriasis] : showed no ~M mydriasis [Gastrointestinal Sxs] : no ~T gastrointestinal symptoms [Piloerection] : the hair did not demonstrate piloerection [Rhinorrhea] : no ~M rhinorrhea discharge was seen [de-identified] : less depressed [de-identified] : at times, improved

## 2023-05-18 DIAGNOSIS — F31.81 BIPOLAR II DISORDER: ICD-10-CM

## 2023-05-18 DIAGNOSIS — F41.1 GENERALIZED ANXIETY DISORDER: ICD-10-CM

## 2023-06-01 ENCOUNTER — NON-APPOINTMENT (OUTPATIENT)
Age: 48
End: 2023-06-01

## 2023-06-01 ENCOUNTER — APPOINTMENT (OUTPATIENT)
Dept: ORTHOPEDIC SURGERY | Facility: CLINIC | Age: 48
End: 2023-06-01
Payer: OTHER MISCELLANEOUS

## 2023-06-01 PROCEDURE — 99213 OFFICE O/P EST LOW 20 MIN: CPT

## 2023-06-01 RX ORDER — IBUPROFEN 800 MG/1
800 TABLET, FILM COATED ORAL 3 TIMES DAILY
Qty: 90 | Refills: 1 | Status: ACTIVE | COMMUNITY
Start: 2021-11-08 | End: 1900-01-01

## 2023-06-01 NOTE — REASON FOR VISIT
[FreeTextEntry2] : work injury of 2/2/22 to her shoulders   less pain in the neck today shoulders R>>L low back is better she is  working she had 2 MRIs ( C-spine and left shoulder) did not get the nerve test done  does report some numbness pins and needles occasionally the left hand

## 2023-06-01 NOTE — IMAGING
[de-identified] :  left shoulder no swelling mild tenderness over AC joint and anterior portion of the shoulder good motion 150¦ with some pain over weakness in resisted external rotation positive Cook positive impingement negative Elfrida and Speed\par  right shoulder crepitus impingement limited elevation  and rotation\par \par Cervical exam limited motion in rotation with spasm more limited to the left and right reflexes depressed but symmetric\par \par Imaging Study Review: MRI Cervical Spine. Report was reviewed and noted in the chart MRI cervical spine\par 03/09/2022: Several cervical bulging discs herniated disc C5-6 with left foraminal encroachment as her\par herniated disc C6-7 with some cord impingement\par  lumbar mild spasm throughout tight dorsal lumbar fascia and hamstrings negative straight leg bilaterally normal gait

## 2023-06-01 NOTE — ASSESSMENT
[FreeTextEntry1] :    she did not respond well  to the second  injection I did point out we may need  to follow up with the neurosurgical team I will see her in a few months call me to discuss the nerve tests I will see her in a couple months

## 2023-06-01 NOTE — HISTORY OF PRESENT ILLNESS
[de-identified] : History of Present Illness\par 46-year-old female comes in today for follow-up evaluation of her left shoulder and neck pain from an injury that\par occurred at work. Patient states she was lifting a patient and felt a pop in the shoulder. Patient works as a direct\par /nurse's aide. Patient is left-hand dominant. She is taking Tylenol, she also has ibuprofen 800 mg.\par History of bipolar disorder, she does take medication for that. LHD also having some low back pain in the last couple of\par weeks feels the pain is worse difficulty sleeping she still out of work no problems in the past did get the cervical MRI\par done reports the pain is shooting in the left arm worse with rotation

## 2023-06-07 ENCOUNTER — APPOINTMENT (OUTPATIENT)
Dept: PSYCHIATRY | Facility: CLINIC | Age: 48
End: 2023-06-07
Payer: COMMERCIAL

## 2023-06-07 ENCOUNTER — OUTPATIENT (OUTPATIENT)
Dept: OUTPATIENT SERVICES | Facility: HOSPITAL | Age: 48
LOS: 1 days | End: 2023-06-07
Payer: COMMERCIAL

## 2023-06-07 ENCOUNTER — APPOINTMENT (OUTPATIENT)
Dept: PSYCHIATRY | Facility: CLINIC | Age: 48
End: 2023-06-07

## 2023-06-07 DIAGNOSIS — Z90.3 ACQUIRED ABSENCE OF STOMACH [PART OF]: Chronic | ICD-10-CM

## 2023-06-07 DIAGNOSIS — F41.1 GENERALIZED ANXIETY DISORDER: ICD-10-CM

## 2023-06-07 DIAGNOSIS — F31.81 BIPOLAR II DISORDER: ICD-10-CM

## 2023-06-07 DIAGNOSIS — F33.1 MAJOR DEPRESSIVE DISORDER, RECURRENT, MODERATE: ICD-10-CM

## 2023-06-07 DIAGNOSIS — G47.00 INSOMNIA, UNSPECIFIED: ICD-10-CM

## 2023-06-07 PROCEDURE — 99214 OFFICE O/P EST MOD 30 MIN: CPT

## 2023-06-07 PROCEDURE — 90832 PSYTX W PT 30 MINUTES: CPT

## 2023-06-07 PROCEDURE — 99214 OFFICE O/P EST MOD 30 MIN: CPT | Mod: 25

## 2023-06-07 NOTE — REASON FOR VISIT
[FreeTextEntry2] : 06/23 ISTOP checked [FreeTextEntry1] : "I am ok, would like to raise gabapentin."

## 2023-06-07 NOTE — DISCUSSION/SUMMARY
[Plan Review] : Plan Review [Able to manage surrounding demands and opportunities] : able to manage surrounding demands and opportunities [Able to exercise self-direction] : able to exercise self-direction [Able to set and pursue goals] : able to set and pursue goals [Adherent to treatment recommendations] : adherent to treatment recommendations [Articulate] : articulate [Attempting to realize their potential] : Attempting to realize their potential [Awareness of substance use issues] : awareness of substance use issues [Cognitively intact] : cognitively intact [Intelligent] : intelligent [Motivated to participate in treatment] : motivated to participate in treatment [Motivated and ready for change] : motivated and ready for change [Health literacy] : health literacy [Motivated to maintain or improve physical health] : motivated to maintain or improve physical health [In good health] : in good health [Has vocational interests or hobbies] : has vocational interests or hobbies [Part of a supportive family] : part of a supportive family [Has a supportive network] : has a supportive network [English fluency] : English fluency [Connected to healthcare] : connected to healthcare [Social supports] : social supports [Mental Health] : Mental Health [Substance Abuse] : Substance Abuse [Occupational/Educational] : Occupational/Educational [Interpersonal Relationships] : Interpersonal Relationships [Continued - Progress made] : Continued - Progress made: [___ times a week] : [unfilled] times a week [Improvement in symptoms as evidenced by:] : Improvement in symptoms as evidenced by: [Other rationale for transition/discharge:] : Other rationale for transition/discharge: [None - Reason others did not participate:] : None - Reason others did not participate:  [Yes] : Yes [Psychiatric Provider/Prescriber] : Psychiatric Provider/Prescriber [Therapist] : Therapist [FreeTextEntry2] : 8/3/2023 [FreeTextEntry3] : 10/13/2021 [FreeTextEntry5] : "I want to get my head on straight" [FreeTextEntry6] : Elaine has many strengths and has been responsive to interventions and compliant with treatment [FreeTextEntry7] : Elaine has a supportive family and network  However she is not financially stable   [FreeTextEntry8] : Continues to use alcohol. her abuse of alcohol was a factor in 2 suicide attempts in recent past [FreeTextEntry9] : Elaine is receptive to discussion of spirituality and her belief system   [de-identified] : Psychiatrist, Dr Murdock [FreeTextEntry1] : No job, financial stressors, some unhealthy relationship choices [FreeTextEntry4] : To manage mood issues, depression and have healthy relationship\par 6/7/2023: Elaine will  lisa on 6/23 and will adjust to this life change [de-identified] : uses CB exercises to manage impulsive behaviors [de-identified] : Elaine will gain insight into how how depression and loss issues may be a factor in recent suicide attempt [de-identified] : 8/3/2023  [de-identified] : reduction of depression and awareness of self value [de-identified] : Decrease alcohol use and refrain from abuse of alcohol [de-identified] :  8/3/2023 [de-identified] : Elaine continues to use alcohol socially      6/7/2023: Elaine has been abstaining from alcohol in recent weeks  [de-identified] : Mood has stabilized [de-identified] : Goals have been met [de-identified] : Not clinically indicated

## 2023-06-07 NOTE — PLAN
[FreeTextEntry2] : : Elaine has been informed of the fact that as per change in Administrative policy she may soon be transferred  to another therapist  \par Elaine chooses to remain with this therapist for as long as possible\par 6/7/23: Elaine will continue to remain with this therapist at this time in view of her Hospitalization in less than a year as well as suicidality\par This has been discussed with Psychiatrist who is also on board  [Psychodynamic Therapy] : Psychodynamic Therapy  [Skills training (all types)] : Skills training (all types)  [de-identified] : Elaine is informed of the fact that she will not be transferred to a new therapist at this time in view of her past instability, recent [facility] hospitalizations and serious suicide attempts She is very positive and responsive to this fact which seems to be a factor in mood improvement at this time\par Elaine will get  on 6/23 and states she is happy about the impending marriage and reports no issues or conflicts around this plan\par In session Elaine continues to appear worried but is focused, interactive and responsive  She is provided with support and will continue to be seen weekly [FreeTextEntry1] : PLAN: Next session 6/14

## 2023-06-07 NOTE — PHYSICAL EXAM
[Disheveled] : disheveled [Cooperative] : cooperative [Anxious] : anxious [Flat] : flat [Underproductive] : underproductive [Blocked] : blocked [Slowed thinking] : slowed thinking [None Reported] : none reported [WNL] : within normal limits [Average] : average [Mild] : mild [de-identified] : unable to assess

## 2023-06-07 NOTE — REASON FOR VISIT
[Patient] : Patient [FreeTextEntry4] : 11 am [FreeTextEntry5] : 11:30 am [FreeTextEntry1] : Psychotherapy follow up

## 2023-06-07 NOTE — RISK ASSESSMENT
[No, patient denies ideation or behavior] : No, patient denies ideation or behavior [FreeTextEntry8] : patient has been free of SI, back to work, stable, maintains sobriety [Low acute suicide risk] : Low acute suicide risk
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
## 2023-06-07 NOTE — DISCUSSION/SUMMARY
[Date of Last Physical Exam: _____] : Date of Last Physical Exam: [unfilled] [Date of Last Annual Labs: _____] : Date of Last Annual Labs: [unfilled] [Annual Review of Systems Completed?] : Annual Review of Systems Completed: Yes [Tobacco Screening Completed?] : Tobacco Screening Completed: Yes [Date of Last AIMS: _____] : Date of Last AIMS: [unfilled] [Date of Last HbgA1c: _____] : Date of Last HbgA1c: [unfilled] [Date of Last Lipid Profile: _____] : Date of Last Lipid Profile: [unfilled] [Potential impact of patient’s physical health conditions on psychiatric care?] : Potential impact of patient’s physical health conditions on psychiatric care: No [Does patient require any additional health services or referrals?] : Does patient require any additional health services or referrals: No [FreeTextEntry1] : PLAN:   Therapy amd Medication evaluation\par DX: [facility]/ [facility] 2\par Remains symptomatic at times, encourage to abstain from alcohol and take all medication as prescribed.  Will continue medications for mood stability and anxiety.\par \par medication:\par 1. lexapro 20mg and eventually back to 30mg if needed\par 2. klonopin 1mg po BID prn for anxiety \par 3. hydroxyzine 25mg-50mg at bedtime prn insomnia\par 4. abilify 10mg po daily\par 5. wellbutrin xl 300mg po daily (option to go back to 450mg po daily)\par 6. gabapentin 300mg po TID (now per psychiatry again), raise to 400mg po TID per request for mood/anxiety/pain\par \par Follow up in 4 weeks, appears at baseline, patient understands to always reach if needed\par \par Patient has 2 SA, but denies any current SI, intent, or plan.  Safety plan discussed at length, she will reach out to provider or call 911 if any SI, intent, or plan.  She will see therapist weekly and writer every 2-4 weeks, earlier as needed.

## 2023-06-07 NOTE — PLAN
[FreeTextEntry4] : mood is fairly stable\par \par anxiety is chronic, mild\par \par health ok, reminded to update labs, physical\par \par goal to remain stable, free of SI, function well at work

## 2023-06-07 NOTE — CURRENT PSYCHIATRIC SYMPTOMS
[Depressed Mood] : depressed mood [Anhedonia] : no anhedonia [Guilt] : not feeling guilty [Decreased Concentration] : no decrease in concentrating ability [Hyperphagia] : no hyperphagia [Insomnia] : no insomnia disorder [Hypersomnia] : no ~T hypersomnia [Psychomotor Retardation] : no psychomotor retardation [Anorexia] : no anorexia [Euphoria] : no euphoria [Highly Irritable] : no high irritability [Increased Activity] : no increased in activity [Distractibility] : not distracted [Grandeur] : no feelings of grandeur [Buying Sprees] : no buying sprees [Hypersexuality] : denied hypersexuality [Dec Need For Sleep] : no decreased need for sleep [Delusions] : no ~T delusions [Hallucination Auditory] : no auditory hallucinations [Thought Disorder] : ~T a thought disorder was not noted [Excessive Worry] : excessive worry [Ruminations] : ruminations [Obsessions] : no obsessions [Re-experiencing] : no re-experiencing [Restlessness] : no restlessness [Hypochondriasis] : no hypochondriasis [Panic] : no panic disorder [Recent Onset] : no recent onset of confusion [Fluctuating Course] : no fluctuating course [Reversed sleep-wake] : no reversed sleep- wake [Inattentive] : not nattentive [Tremor] : ~T no ~M tremor was seen [Hallucination Visual] : no visual hallucinations [Hallucination Olfactory] : no olfactory hallucinations [Nausea] : no nausea [Hallucination Tactile] : no tactile hallucinations [Hallucination Gustatory] : no gustatory hallucinations [Diaphoresis] : showed no ~M diaphoresis [Vomiting] : no vomiting [Yawning] : no yawning [Mydriasis] : showed no ~M mydriasis [Gastrointestinal Sxs] : no ~T gastrointestinal symptoms [Piloerection] : the hair did not demonstrate piloerection [Rhinorrhea] : no ~M rhinorrhea discharge was seen [de-identified] : less depressed [de-identified] : at times, improved

## 2023-06-08 DIAGNOSIS — F31.81 BIPOLAR II DISORDER: ICD-10-CM

## 2023-06-08 DIAGNOSIS — F41.1 GENERALIZED ANXIETY DISORDER: ICD-10-CM

## 2023-06-08 DIAGNOSIS — G47.00 INSOMNIA, UNSPECIFIED: ICD-10-CM

## 2023-06-14 ENCOUNTER — APPOINTMENT (OUTPATIENT)
Dept: PSYCHIATRY | Facility: CLINIC | Age: 48
End: 2023-06-14

## 2023-06-14 ENCOUNTER — OUTPATIENT (OUTPATIENT)
Dept: OUTPATIENT SERVICES | Facility: HOSPITAL | Age: 48
LOS: 1 days | End: 2023-06-14
Payer: COMMERCIAL

## 2023-06-14 DIAGNOSIS — F31.81 BIPOLAR II DISORDER: ICD-10-CM

## 2023-06-14 DIAGNOSIS — F33.1 MAJOR DEPRESSIVE DISORDER, RECURRENT, MODERATE: ICD-10-CM

## 2023-06-14 DIAGNOSIS — Z90.3 ACQUIRED ABSENCE OF STOMACH [PART OF]: Chronic | ICD-10-CM

## 2023-06-14 PROCEDURE — 90832 PSYTX W PT 30 MINUTES: CPT

## 2023-06-14 NOTE — REASON FOR VISIT
[Patient] : Patient [FreeTextEntry4] : 11:30 am [FreeTextEntry5] : 12 pm [FreeTextEntry1] : Psychotherapy follow up

## 2023-06-14 NOTE — PHYSICAL EXAM
[Cooperative] : cooperative [Euthymic] : euthymic [Flat] : flat [Underproductive] : underproductive [Slowed thinking] : slowed thinking [None Reported] : none reported [WNL] : within normal limits [Average] : average [Mild] : mild [de-identified] : unable to assess

## 2023-06-14 NOTE — DISCUSSION/SUMMARY
[Plan Review] : Plan Review [Able to manage surrounding demands and opportunities] : able to manage surrounding demands and opportunities [Able to exercise self-direction] : able to exercise self-direction [Able to set and pursue goals] : able to set and pursue goals [Adherent to treatment recommendations] : adherent to treatment recommendations [Articulate] : articulate [Attempting to realize their potential] : Attempting to realize their potential [Awareness of substance use issues] : awareness of substance use issues [Cognitively intact] : cognitively intact [Intelligent] : intelligent [Motivated to participate in treatment] : motivated to participate in treatment [Motivated and ready for change] : motivated and ready for change [Health literacy] : health literacy [Motivated to maintain or improve physical health] : motivated to maintain or improve physical health [In good health] : in good health [Has vocational interests or hobbies] : has vocational interests or hobbies [Part of a supportive family] : part of a supportive family [Has a supportive network] : has a supportive network [English fluency] : English fluency [Connected to healthcare] : connected to healthcare [Social supports] : social supports [Mental Health] : Mental Health [Substance Abuse] : Substance Abuse [Occupational/Educational] : Occupational/Educational [Interpersonal Relationships] : Interpersonal Relationships [Continued - Progress made] : Continued - Progress made: [___ times a week] : [unfilled] times a week [Improvement in symptoms as evidenced by:] : Improvement in symptoms as evidenced by: [Other rationale for transition/discharge:] : Other rationale for transition/discharge: [None - Reason others did not participate:] : None - Reason others did not participate:  [Yes] : Yes [Psychiatric Provider/Prescriber] : Psychiatric Provider/Prescriber [Therapist] : Therapist [FreeTextEntry2] : 8/3/2023 [FreeTextEntry3] : 10/13/2021 [FreeTextEntry5] : "I want to get my head on straight" [FreeTextEntry6] : Elaine has many strengths and has been responsive to interventions and compliant with treatment [FreeTextEntry7] : Elaine has a supportive family and network  However she is not financially stable   [FreeTextEntry9] : Elaine is receptive to discussion of spirituality and her belief system   [FreeTextEntry8] : Continues to use alcohol. her abuse of alcohol was a factor in 2 suicide attempts in recent past [de-identified] : Psychiatrist, Dr Murdock [FreeTextEntry1] : No job, financial stressors, some unhealthy relationship choices [FreeTextEntry4] : To manage mood issues, depression and have healthy relationship [de-identified] : Elaine will gain insight into how hwe depression and loss issues may be a factor in recent suicide attempt [de-identified] : uses CB exercises to manage impulsive behaviors [de-identified] : 8/3/2023  [de-identified] : reductioj of depression and awareness of self value [de-identified] : Decrease alcohol use and refrain from abuse of alcohol [de-identified] :  8/3/2023 [de-identified] : Mood has stabilized [de-identified] : Elaine cntinues to use alcohol socially [de-identified] : Goals have been met [de-identified] : Not clinically indicated

## 2023-06-14 NOTE — PLAN
[Psychodynamic Therapy] : Psychodynamic Therapy  [Supportive Therapy] : Supportive Therapy [FreeTextEntry2] : : Elaine has been informed of the fact that as per change in Administrative policy she may soon be transferred  to another therapist  \par Elaine chooses to remain with this therapist for as long as possible\par 6/7/23: Elaine will continue to remain with this therapist at this time in view of her Hospitalization in less than a year as well as suicidality\par This has been discussed with Psychiatrist who is also on board

## 2023-06-14 NOTE — RISK ASSESSMENT
[No, patient denies ideation or behavior] : No, patient denies ideation or behavior [Low acute suicide risk] : Low acute suicide risk [Yes] : Safety Plan completed/updated (for individuals at risk): Yes [FreeTextEntry9] : No risk to self or others [FreeTextEntry3] : Pt reports of risk   Pt feels safe when she is in touch with support persons like tuan and is aware of emergency numbers    ...Elaine is connected with her spirituality and prays when feeling in nee dof comfort

## 2023-06-15 DIAGNOSIS — F31.81 BIPOLAR II DISORDER: ICD-10-CM

## 2023-06-16 ENCOUNTER — APPOINTMENT (OUTPATIENT)
Dept: PAIN MANAGEMENT | Facility: CLINIC | Age: 48
End: 2023-06-16

## 2023-06-21 ENCOUNTER — OUTPATIENT (OUTPATIENT)
Dept: OUTPATIENT SERVICES | Facility: HOSPITAL | Age: 48
LOS: 1 days | End: 2023-06-21
Payer: COMMERCIAL

## 2023-06-21 ENCOUNTER — APPOINTMENT (OUTPATIENT)
Dept: PSYCHIATRY | Facility: CLINIC | Age: 48
End: 2023-06-21

## 2023-06-21 DIAGNOSIS — F31.81 BIPOLAR II DISORDER: ICD-10-CM

## 2023-06-21 DIAGNOSIS — F33.1 MAJOR DEPRESSIVE DISORDER, RECURRENT, MODERATE: ICD-10-CM

## 2023-06-21 DIAGNOSIS — Z90.3 ACQUIRED ABSENCE OF STOMACH [PART OF]: Chronic | ICD-10-CM

## 2023-06-21 PROCEDURE — 90832 PSYTX W PT 30 MINUTES: CPT

## 2023-06-21 NOTE — DISCUSSION/SUMMARY
[Plan Review] : Plan Review [Able to manage surrounding demands and opportunities] : able to manage surrounding demands and opportunities [Able to exercise self-direction] : able to exercise self-direction [Able to set and pursue goals] : able to set and pursue goals [Adherent to treatment recommendations] : adherent to treatment recommendations [Articulate] : articulate [Attempting to realize their potential] : Attempting to realize their potential [Awareness of substance use issues] : awareness of substance use issues [Cognitively intact] : cognitively intact [Intelligent] : intelligent [Motivated to participate in treatment] : motivated to participate in treatment [Motivated and ready for change] : motivated and ready for change [Health literacy] : health literacy [Motivated to maintain or improve physical health] : motivated to maintain or improve physical health [In good health] : in good health [Has vocational interests or hobbies] : has vocational interests or hobbies [Part of a supportive family] : part of a supportive family [Has a supportive network] : has a supportive network [English fluency] : English fluency [Connected to healthcare] : connected to healthcare [Social supports] : social supports [FreeTextEntry2] : 8/3/2023 [FreeTextEntry3] : 10/13/2021 [FreeTextEntry5] : "I want to get my head on straight" [FreeTextEntry6] : Elaine has many strengths and has been responsive to interventions and compliant with treatment [FreeTextEntry7] : Elaine has a supportive family and network  However she is not financially stable   [FreeTextEntry8] : Continues to use alcohol. her abuse of alcohol was a factor in 2 suicide attempts in recent past [FreeTextEntry9] : Elaine is receptive to discussion of spirituality and her belief system   [de-identified] : Psychiatrist, Dr Murdock [Mental Health] : Mental Health [Substance Abuse] : Substance Abuse [Occupational/Educational] : Occupational/Educational [Interpersonal Relationships] : Interpersonal Relationships [Continued - Progress made] : Continued - Progress made: [___ times a week] : [unfilled] times a week [Improvement in symptoms as evidenced by:] : Improvement in symptoms as evidenced by: [Other rationale for transition/discharge:] : Other rationale for transition/discharge: [None - Reason others did not participate:] : None - Reason others did not participate:  [Yes] : Yes [Psychiatric Provider/Prescriber] : Psychiatric Provider/Prescriber [Therapist] : Therapist [FreeTextEntry1] : No job, financial stressors, some unhealthy relationship choices [FreeTextEntry4] : To manage mood issues, depression and have healthy relationship [de-identified] : uses CB exercises to manage impulsive behaviors [de-identified] : Elaine will gain insight into how hwe depression and loss issues may be a factor in recent suicide attempt [de-identified] : 8/3/2023  [de-identified] : reductioj of depression and awareness of self value [de-identified] : Decrease alcohol use and refrain from abuse of alcohol [de-identified] :  8/3/2023 [de-identified] : Elaine cntinues to use alcohol socially [de-identified] : Mood has stabilized [de-identified] : Goals have been met [de-identified] : Not clinically indicated

## 2023-06-21 NOTE — PHYSICAL EXAM
[Cooperative] : cooperative [Euthymic] : euthymic [Flat] : flat [Underproductive] : underproductive [Slowed thinking] : slowed thinking [None Reported] : none reported [WNL] : within normal limits [Average] : average [Mild] : mild [de-identified] : unable to assess

## 2023-06-21 NOTE — PLAN
[FreeTextEntry2] : :  New issue: Elaine is getting  in 2 days\par See Discussion/summary for comprehensive Treatment Plan [Psychodynamic Therapy] : Psychodynamic Therapy  [Supportive Therapy] : Supportive Therapy [de-identified] : Elaine will be  in 2 days and is looking forward to this occasion She states she is feeling confident in her decision and is moving to her [place] house.\par Elaine reports no other changes or concerns and is provided with support with good response to session\par Elaine is alert, oriented and reports abstinence from alcohol abuse\par Medication compliance is reported  [FreeTextEntry1] : PLAN: NExt session is scheduled for July12

## 2023-06-22 DIAGNOSIS — F31.81 BIPOLAR II DISORDER: ICD-10-CM

## 2023-07-05 ENCOUNTER — NON-APPOINTMENT (OUTPATIENT)
Age: 48
End: 2023-07-05

## 2023-07-12 ENCOUNTER — OUTPATIENT (OUTPATIENT)
Dept: OUTPATIENT SERVICES | Facility: HOSPITAL | Age: 48
LOS: 1 days | End: 2023-07-12
Payer: COMMERCIAL

## 2023-07-12 ENCOUNTER — APPOINTMENT (OUTPATIENT)
Dept: PSYCHIATRY | Facility: CLINIC | Age: 48
End: 2023-07-12

## 2023-07-12 DIAGNOSIS — Z90.3 ACQUIRED ABSENCE OF STOMACH [PART OF]: Chronic | ICD-10-CM

## 2023-07-12 DIAGNOSIS — F33.1 MAJOR DEPRESSIVE DISORDER, RECURRENT, MODERATE: ICD-10-CM

## 2023-07-12 PROCEDURE — 90832 PSYTX W PT 30 MINUTES: CPT

## 2023-07-12 NOTE — PHYSICAL EXAM
[Cooperative] : cooperative [Euthymic] : euthymic [Flat] : flat [Clear] : clear [Slowed thinking] : slowed thinking [None Reported] : none reported [WNL] : within normal limits [Average] : average [Mild] : mild [de-identified] : unable to assess

## 2023-07-12 NOTE — PLAN
[FreeTextEntry2] : :  \par See Discussion/summary for comprehensive Treatment Plan\par  [Psychodynamic Therapy] : Psychodynamic Therapy  [de-identified] : Elaine has gotten    however few days after the wedding her  was diagnosed with Cancer\par Elaine is functioning well and continues to work  She states she has no regrets about the marriage and is caring for her ill \par Elaine is provided with support and encouragement and in session is stable, alert, and focused.\par  [FreeTextEntry1] : PLAN   Next week session scheduled for July 19

## 2023-07-12 NOTE — DISCUSSION/SUMMARY
[Plan Review] : Plan Review [Able to manage surrounding demands and opportunities] : able to manage surrounding demands and opportunities [Able to exercise self-direction] : able to exercise self-direction [Able to set and pursue goals] : able to set and pursue goals [Adherent to treatment recommendations] : adherent to treatment recommendations [Articulate] : articulate [Attempting to realize their potential] : Attempting to realize their potential [Awareness of substance use issues] : awareness of substance use issues [Cognitively intact] : cognitively intact [Intelligent] : intelligent [Motivated to participate in treatment] : motivated to participate in treatment [Motivated and ready for change] : motivated and ready for change [Health literacy] : health literacy [Motivated to maintain or improve physical health] : motivated to maintain or improve physical health [In good health] : in good health [Has vocational interests or hobbies] : has vocational interests or hobbies [Part of a supportive family] : part of a supportive family [Has a supportive network] : has a supportive network [English fluency] : English fluency [Connected to healthcare] : connected to healthcare [Social supports] : social supports [FreeTextEntry2] : 8/3/2023 [FreeTextEntry3] : 10/13/2021 [FreeTextEntry5] : "I want to get my head on straight" [FreeTextEntry6] : Elaine has many strengths and has been responsive to interventions and compliant with treatment [FreeTextEntry7] : Elaine has a supportive family and network  However she is not financially stable   [FreeTextEntry8] : Continues to use alcohol. her abuse of alcohol was a factor in 2 suicide attempts in recent past [FreeTextEntry9] : Elaine is receptive to discussion of spirituality and her belief system   [de-identified] : Psychiatrist, Dr Murdock [Mental Health] : Mental Health [Substance Abuse] : Substance Abuse [Occupational/Educational] : Occupational/Educational [Interpersonal Relationships] : Interpersonal Relationships [Continued - Progress made] : Continued - Progress made: [___ times a week] : [unfilled] times a week [Improvement in symptoms as evidenced by:] : Improvement in symptoms as evidenced by: [Other rationale for transition/discharge:] : Other rationale for transition/discharge: [None - Reason others did not participate:] : None - Reason others did not participate:  [Yes] : Yes [Psychiatric Provider/Prescriber] : Psychiatric Provider/Prescriber [Therapist] : Therapist [FreeTextEntry1] : No job, financial stressors, some unhealthy relationship choices [FreeTextEntry4] : To manage mood issues, depression and have healthy relationship [de-identified] : uses CB exercises to manage impulsive behaviors [de-identified] : Elaine will gain insight into how hwe depression and loss issues may be a factor in recent suicide attempt [de-identified] : 8/3/2023  [de-identified] : reductioj of depression and awareness of self value [de-identified] : Decrease alcohol use and refrain from abuse of alcohol [de-identified] :  8/3/2023 [de-identified] : Elaine cntinues to use alcohol socially [de-identified] : Mood has stabilized [de-identified] : Goals have been met [de-identified] : Not clinically indicated

## 2023-07-12 NOTE — REASON FOR VISIT
[FreeTextEntry4] : 11 am [FreeTextEntry5] : 11;30 am [Patient] : Patient [FreeTextEntry1] : Psychotherapy follow up

## 2023-07-13 DIAGNOSIS — F33.1 MAJOR DEPRESSIVE DISORDER, RECURRENT, MODERATE: ICD-10-CM

## 2023-07-15 ENCOUNTER — NON-APPOINTMENT (OUTPATIENT)
Age: 48
End: 2023-07-15

## 2023-07-19 ENCOUNTER — OUTPATIENT (OUTPATIENT)
Dept: OUTPATIENT SERVICES | Facility: HOSPITAL | Age: 48
LOS: 1 days | End: 2023-07-19
Payer: COMMERCIAL

## 2023-07-19 ENCOUNTER — APPOINTMENT (OUTPATIENT)
Dept: PSYCHIATRY | Facility: CLINIC | Age: 48
End: 2023-07-19

## 2023-07-19 DIAGNOSIS — F33.1 MAJOR DEPRESSIVE DISORDER, RECURRENT, MODERATE: ICD-10-CM

## 2023-07-19 DIAGNOSIS — F31.81 BIPOLAR II DISORDER: ICD-10-CM

## 2023-07-19 PROCEDURE — 90832 PSYTX W PT 30 MINUTES: CPT
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
## 2023-07-19 NOTE — REASON FOR VISIT
[Verbal consent obtained from patient/other participant(s)] : Verbal consent for telehealth/telephonic services obtained from patient/other participant(s) [FreeTextEntry4] : 11 am [FreeTextEntry5] : 11:30 am [Patient] : Patient [FreeTextEntry1] : Psychotherapy follow up

## 2023-07-19 NOTE — PHYSICAL EXAM
[Cooperative] : cooperative [Euthymic] : euthymic [Flat] : flat [Irritable] : irritability [Clear] : clear [Slowed thinking] : slowed thinking [None Reported] : none reported [WNL] : within normal limits [Average] : average [Mild] : mild [de-identified] : unable to assess  [de-identified] :  having Cancer surgery and Elaine is irritable and anxious

## 2023-07-19 NOTE — DISCUSSION/SUMMARY
[Plan Review] : Plan Review [Able to manage surrounding demands and opportunities] : able to manage surrounding demands and opportunities [Able to exercise self-direction] : able to exercise self-direction [Able to set and pursue goals] : able to set and pursue goals [Adherent to treatment recommendations] : adherent to treatment recommendations [Articulate] : articulate [Attempting to realize their potential] : Attempting to realize their potential [Awareness of substance use issues] : awareness of substance use issues [Cognitively intact] : cognitively intact [Intelligent] : intelligent [Motivated to participate in treatment] : motivated to participate in treatment [Motivated and ready for change] : motivated and ready for change [Health literacy] : health literacy [Motivated to maintain or improve physical health] : motivated to maintain or improve physical health [In good health] : in good health [Has vocational interests or hobbies] : has vocational interests or hobbies [Part of a supportive family] : part of a supportive family [Has a supportive network] : has a supportive network [English fluency] : English fluency [Connected to healthcare] : connected to healthcare [Social supports] : social supports [FreeTextEntry2] : 8/3/2023 [FreeTextEntry3] : 10/13/2021 [FreeTextEntry5] : "I want to get my head on straight" [FreeTextEntry6] : Elaine has many strengths and has been responsive to interventions and compliant with treatment [FreeTextEntry7] : Elaine has a supportive family and network  However she is not financially stable   [FreeTextEntry8] : Continues to use alcohol. her abuse of alcohol was a factor in 2 suicide attempts in recent past [FreeTextEntry9] : Elaine is receptive to discussion of spirituality and her belief system   [de-identified] : Psychiatrist, Dr Murdock [Mental Health] : Mental Health [Substance Abuse] : Substance Abuse [Occupational/Educational] : Occupational/Educational [Interpersonal Relationships] : Interpersonal Relationships [Continued - Progress made] : Continued - Progress made: [___ times a week] : [unfilled] times a week [Improvement in symptoms as evidenced by:] : Improvement in symptoms as evidenced by: [Other rationale for transition/discharge:] : Other rationale for transition/discharge: [None - Reason others did not participate:] : None - Reason others did not participate:  [Yes] : Yes [Psychiatric Provider/Prescriber] : Psychiatric Provider/Prescriber [Therapist] : Therapist [FreeTextEntry1] : No job, financial stressors, some unhealthy relationship choices [FreeTextEntry4] : To manage mood issues, depression and have healthy relationship [de-identified] : uses CB exercises to manage impulsive behaviors [de-identified] : Elaine will gain insight into how hwe depression and loss issues may be a factor in recent suicide attempt [de-identified] : 8/3/2023  [de-identified] : reductioj of depression and awareness of self value [de-identified] : Decrease alcohol use and refrain from abuse of alcohol [de-identified] :  8/3/2023 [de-identified] : Elaine cntinues to use alcohol socially [de-identified] : Mood has stabilized [de-identified] : Goals have been met [de-identified] : Not clinically indicated

## 2023-07-19 NOTE — RISK ASSESSMENT
[No, patient denies ideation or behavior] : No, patient denies ideation or behavior [FreeTextEntry9] : No risk to self or others eliced

## 2023-07-20 DIAGNOSIS — F31.81 BIPOLAR II DISORDER: ICD-10-CM

## 2023-07-26 ENCOUNTER — APPOINTMENT (OUTPATIENT)
Dept: PSYCHIATRY | Facility: CLINIC | Age: 48
End: 2023-07-26
Payer: COMMERCIAL

## 2023-07-26 ENCOUNTER — OUTPATIENT (OUTPATIENT)
Dept: OUTPATIENT SERVICES | Facility: HOSPITAL | Age: 48
LOS: 1 days | End: 2023-07-26
Payer: COMMERCIAL

## 2023-07-26 DIAGNOSIS — F33.1 MAJOR DEPRESSIVE DISORDER, RECURRENT, MODERATE: ICD-10-CM

## 2023-07-26 DIAGNOSIS — G47.00 INSOMNIA, UNSPECIFIED: ICD-10-CM

## 2023-07-26 DIAGNOSIS — F41.1 GENERALIZED ANXIETY DISORDER: ICD-10-CM

## 2023-07-26 DIAGNOSIS — Z90.3 ACQUIRED ABSENCE OF STOMACH [PART OF]: Chronic | ICD-10-CM

## 2023-07-26 DIAGNOSIS — F31.81 BIPOLAR II DISORDER: ICD-10-CM

## 2023-07-26 PROCEDURE — 90832 PSYTX W PT 30 MINUTES: CPT

## 2023-07-26 PROCEDURE — 99214 OFFICE O/P EST MOD 30 MIN: CPT

## 2023-07-26 PROCEDURE — 99214 OFFICE O/P EST MOD 30 MIN: CPT | Mod: 25
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
## 2023-07-26 NOTE — DISCUSSION/SUMMARY
[Plan Review] : Plan Review [Able to manage surrounding demands and opportunities] : able to manage surrounding demands and opportunities [Able to exercise self-direction] : able to exercise self-direction [Able to set and pursue goals] : able to set and pursue goals [Adherent to treatment recommendations] : adherent to treatment recommendations [Articulate] : articulate [Attempting to realize their potential] : Attempting to realize their potential [Awareness of substance use issues] : awareness of substance use issues [Cognitively intact] : cognitively intact [Intelligent] : intelligent [Motivated to participate in treatment] : motivated to participate in treatment [Motivated and ready for change] : motivated and ready for change [Health literacy] : health literacy [Motivated to maintain or improve physical health] : motivated to maintain or improve physical health [In good health] : in good health [Has vocational interests or hobbies] : has vocational interests or hobbies [Part of a supportive family] : part of a supportive family [Has a supportive network] : has a supportive network [English fluency] : English fluency [Connected to healthcare] : connected to healthcare [Social supports] : social supports [FreeTextEntry2] : 8/3/2023 [FreeTextEntry3] : 10/13/2021 [FreeTextEntry5] : "I want to get my head on straight" [FreeTextEntry6] : Elaine has many strengths and has been responsive to interventions and compliant with treatment [FreeTextEntry7] : Elaine has a supportive family and network  However she is not financially stable   [FreeTextEntry8] : Continues to use alcohol. her abuse of alcohol was a factor in 2 suicide attempts in recent past [FreeTextEntry9] : Elaine is receptive to discussion of spirituality and her belief system   [de-identified] : Psychiatrist, Dr Murdock [Mental Health] : Mental Health [Substance Abuse] : Substance Abuse [Occupational/Educational] : Occupational/Educational [Interpersonal Relationships] : Interpersonal Relationships [Continued - Progress made] : Continued - Progress made: [___ times a week] : [unfilled] times a week [Improvement in symptoms as evidenced by:] : Improvement in symptoms as evidenced by: [Other rationale for transition/discharge:] : Other rationale for transition/discharge: [None - Reason others did not participate:] : None - Reason others did not participate:  [Yes] : Yes [Psychiatric Provider/Prescriber] : Psychiatric Provider/Prescriber [Therapist] : Therapist [FreeTextEntry1] : No job, financial stressors, some unhealthy relationship choices [FreeTextEntry4] : To manage mood issues, depression and have healthy relationship [de-identified] : uses CB exercises to manage impulsive behaviors [de-identified] : Elaine will gain insight into how hwe depression and loss issues may be a factor in recent suicide attempt [de-identified] : 8/3/2023  [de-identified] : reductioj of depression and awareness of self value [de-identified] : Decrease alcohol use and refrain from abuse of alcohol [de-identified] :  8/3/2023 [de-identified] : lEaine cntinues to use alcohol socially [de-identified] : Mood has stabilized [de-identified] : Goals have been met [de-identified] : Not clinically indicated

## 2023-07-26 NOTE — REASON FOR VISIT
[FreeTextEntry4] : 11:30 am [FreeTextEntry5] : 12 noon [Patient] : Patient [FreeTextEntry1] : Psychotherapy follow up
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
## 2023-07-27 DIAGNOSIS — F41.1 GENERALIZED ANXIETY DISORDER: ICD-10-CM

## 2023-07-27 DIAGNOSIS — G47.00 INSOMNIA, UNSPECIFIED: ICD-10-CM

## 2023-07-27 DIAGNOSIS — F31.81 BIPOLAR II DISORDER: ICD-10-CM

## 2023-08-02 ENCOUNTER — APPOINTMENT (OUTPATIENT)
Dept: PSYCHIATRY | Facility: CLINIC | Age: 48
End: 2023-08-02

## 2023-08-02 ENCOUNTER — OUTPATIENT (OUTPATIENT)
Dept: OUTPATIENT SERVICES | Facility: HOSPITAL | Age: 48
LOS: 1 days | End: 2023-08-02
Payer: COMMERCIAL

## 2023-08-02 DIAGNOSIS — Z90.3 ACQUIRED ABSENCE OF STOMACH [PART OF]: Chronic | ICD-10-CM

## 2023-08-02 DIAGNOSIS — F31.81 BIPOLAR II DISORDER: ICD-10-CM

## 2023-08-02 DIAGNOSIS — F33.1 MAJOR DEPRESSIVE DISORDER, RECURRENT, MODERATE: ICD-10-CM

## 2023-08-02 PROCEDURE — 90832 PSYTX W PT 30 MINUTES: CPT

## 2023-08-02 NOTE — RISK ASSESSMENT
[No, patient denies ideation or behavior] : No, patient denies ideation or behavior [Low acute suicide risk] : Low acute suicide risk [FreeTextEntry8] : patient has been free of SI, back to work, stable, maintains sobriety

## 2023-08-02 NOTE — DISCUSSION/SUMMARY
[Plan Review] : Plan Review [Able to manage surrounding demands and opportunities] : able to manage surrounding demands and opportunities [Able to exercise self-direction] : able to exercise self-direction [Able to set and pursue goals] : able to set and pursue goals [Adherent to treatment recommendations] : adherent to treatment recommendations [Articulate] : articulate [Attempting to realize their potential] : Attempting to realize their potential [Awareness of substance use issues] : awareness of substance use issues [Cognitively intact] : cognitively intact [Intelligent] : intelligent [Motivated to participate in treatment] : motivated to participate in treatment [Motivated and ready for change] : motivated and ready for change [Health literacy] : health literacy [Motivated to maintain or improve physical health] : motivated to maintain or improve physical health [In good health] : in good health [Has vocational interests or hobbies] : has vocational interests or hobbies [Part of a supportive family] : part of a supportive family [Has a supportive network] : has a supportive network [English fluency] : English fluency [Connected to healthcare] : connected to healthcare [Social supports] : social supports [FreeTextEntry2] : 8/3/2023 [FreeTextEntry3] : 10/13/2021 [FreeTextEntry5] : "I want to get my head on straight" [FreeTextEntry6] : Elaine has many strengths and has been responsive to interventions and compliant with treatment [FreeTextEntry7] : Elaine has a supportive family and network  However she is not financially stable   [FreeTextEntry8] : Continues to use alcohol. her abuse of alcohol was a factor in 2 suicide attempts in recent past [FreeTextEntry9] : Elaine is receptive to discussion of spirituality and her belief system   [de-identified] : Psychiatrist, Dr Murdock [Mental Health] : Mental Health [Substance Abuse] : Substance Abuse [Occupational/Educational] : Occupational/Educational [Interpersonal Relationships] : Interpersonal Relationships [Continued - Progress made] : Continued - Progress made: [___ times a week] : [unfilled] times a week [Improvement in symptoms as evidenced by:] : Improvement in symptoms as evidenced by: [Other rationale for transition/discharge:] : Other rationale for transition/discharge: [None - Reason others did not participate:] : None - Reason others did not participate:  [Yes] : Yes [Psychiatric Provider/Prescriber] : Psychiatric Provider/Prescriber [Therapist] : Therapist [FreeTextEntry1] : No job, financial stressors, some unhealthy relationship choices [FreeTextEntry4] : To manage mood issues, depression and have healthy relationship [de-identified] : uses CB exercises to manage impulsive behaviors [de-identified] : Elaine will gain insight into how hwe depression and loss issues may be a factor in recent suicide attempt [de-identified] : 8/3/2023  [de-identified] : reductioj of depression and awareness of self value [de-identified] : Decrease alcohol use and refrain from abuse of alcohol [de-identified] :  8/3/2023 [de-identified] : Elaine cntinues to use alcohol socially [de-identified] : Mood has stabilized [de-identified] : Goals have been met [de-identified] : Not clinically indicated
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
## 2023-08-02 NOTE — CURRENT PSYCHIATRIC SYMPTOMS
[Depressed Mood] : depressed mood [Excessive Worry] : excessive worry [Ruminations] : ruminations [Anhedonia] : no anhedonia [Guilt] : not feeling guilty [Decreased Concentration] : no decrease in concentrating ability [Hyperphagia] : no hyperphagia [Insomnia] : no insomnia disorder [Hypersomnia] : no ~T hypersomnia [Psychomotor Retardation] : no psychomotor retardation [Anorexia] : no anorexia [Euphoria] : no euphoria [Highly Irritable] : no high irritability [Increased Activity] : no increased in activity [Distractibility] : not distracted [Grandeur] : no feelings of grandeur [Buying Sprees] : no buying sprees [Hypersexuality] : denied hypersexuality [Dec Need For Sleep] : no decreased need for sleep [Delusions] : no ~T delusions [Hallucination Auditory] : no auditory hallucinations [Thought Disorder] : ~T a thought disorder was not noted [Obsessions] : no obsessions [Re-experiencing] : no re-experiencing [Restlessness] : no restlessness [Hypochondriasis] : no hypochondriasis [Panic] : no panic disorder [Recent Onset] : no recent onset of confusion [Fluctuating Course] : no fluctuating course [Reversed sleep-wake] : no reversed sleep- wake [Inattentive] : not nattentive [Tremor] : ~T no ~M tremor was seen [Hallucination Visual] : no visual hallucinations [Hallucination Olfactory] : no olfactory hallucinations [Nausea] : no nausea [Hallucination Tactile] : no tactile hallucinations [Hallucination Gustatory] : no gustatory hallucinations [Diaphoresis] : showed no ~M diaphoresis [Vomiting] : no vomiting [Yawning] : no yawning [Mydriasis] : showed no ~M mydriasis [Gastrointestinal Sxs] : no ~T gastrointestinal symptoms [Piloerection] : the hair did not demonstrate piloerection [Rhinorrhea] : no ~M rhinorrhea discharge was seen [de-identified] : less depressed [de-identified] : at times, improved
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
## 2023-08-02 NOTE — REASON FOR VISIT
[FreeTextEntry4] : 11:30 am [FreeTextEntry5] : 12:10 Pm [Patient] : Patient [FreeTextEntry1] : Psychotherapy follow up

## 2023-08-02 NOTE — PLAN
[FreeTextEntry2] : :   See Discussion/summary for comprehensive Treatment Plan 8/2/23   Elaine is very depressed and has begun to drink occasionally She has also admitted to stopping her medication due to the move and the marriage,  [Psychodynamic Therapy] : Psychodynamic Therapy  [Psychoeducation] : Psychoeducation  [Skills training (all types)] : Skills training (all types)  [FreeTextEntry1] : Weekly therapy continues and PT  will be evaluated by osychiatrist [de-identified] : PT is very depressed and admits to skipping her medication for 2 weeks due to her move amd marriage. She feels hopeless since she believes her  has been lying to her and she cannot leave as she would be "homeless." Elaine denies any active suicidality at this time and is going out with friends, However she has begun some moderate drinking but denies being drunk or drinking excessively Elaine is referred to Psychiatrist who will assess her and educate her on how to get back on medication In session she is depressed and tearful but well groomed and interactive

## 2023-08-02 NOTE — RISK ASSESSMENT
[No, patient denies ideation or behavior] : No, patient denies ideation or behavior [FreeTextEntry9] : Pt denies any risk to self or others but is very depressed and needs to restart medication [Low acute suicide risk] : Low acute suicide risk [No] : No [Yes] : Safety Plan completed/updated (for individuals at risk): Yes [FreeTextEntry3] : Pt to restart medication immediately

## 2023-08-03 DIAGNOSIS — F31.81 BIPOLAR II DISORDER: ICD-10-CM

## 2023-08-09 ENCOUNTER — APPOINTMENT (OUTPATIENT)
Dept: PSYCHIATRY | Facility: CLINIC | Age: 48
End: 2023-08-09
Payer: COMMERCIAL

## 2023-08-09 ENCOUNTER — OUTPATIENT (OUTPATIENT)
Dept: OUTPATIENT SERVICES | Facility: HOSPITAL | Age: 48
LOS: 1 days | End: 2023-08-09
Payer: COMMERCIAL

## 2023-08-09 DIAGNOSIS — F41.1 GENERALIZED ANXIETY DISORDER: ICD-10-CM

## 2023-08-09 DIAGNOSIS — Z90.3 ACQUIRED ABSENCE OF STOMACH [PART OF]: Chronic | ICD-10-CM

## 2023-08-09 DIAGNOSIS — F31.81 BIPOLAR II DISORDER: ICD-10-CM

## 2023-08-09 DIAGNOSIS — F33.1 MAJOR DEPRESSIVE DISORDER, RECURRENT, MODERATE: ICD-10-CM

## 2023-08-09 DIAGNOSIS — G47.00 INSOMNIA, UNSPECIFIED: ICD-10-CM

## 2023-08-09 PROCEDURE — 99214 OFFICE O/P EST MOD 30 MIN: CPT

## 2023-08-09 PROCEDURE — 90832 PSYTX W PT 30 MINUTES: CPT

## 2023-08-09 PROCEDURE — 99214 OFFICE O/P EST MOD 30 MIN: CPT | Mod: 95

## 2023-08-09 RX ORDER — GABAPENTIN 400 MG/1
400 CAPSULE ORAL 3 TIMES DAILY
Qty: 270 | Refills: 1 | Status: DISCONTINUED | COMMUNITY
Start: 2022-10-31 | End: 2023-08-09
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
## 2023-08-09 NOTE — DISCUSSION/SUMMARY
[Cognitively intact] : cognitively intact [Creative] : creative [Motivated to participate in treatment] : motivated to participate in treatment [Part of a supportive family] : part of a supportive family [Steady employment] : steady employment [Housing stability] : housing stability [Social supports] : social supports [FreeTextEntry2] : 8/9/2024 [FreeTextEntry3] : 10/2021 [FreeTextEntry6] : Pt has let go of many interests since being now in a marriage which she regrets [FreeTextEntry8] : None at this time [FreeTextEntry9] : None reported [de-identified] : psychiatrist [Mental Health] : Mental Health [Substance Abuse] : Substance Abuse [Interpersonal Relationships] : Interpersonal Relationships [Revised - Rationale] : Revised - Rationale: [Continued - Progress made] : Continued - Progress made: [___ times a week] : [unfilled] times a week [Other rationale for transition/discharge:] : Other rationale for transition/discharge: [None - Reason others did not participate:] : None - Reason others did not participate:  [Yes] : Yes [Psychiatric Provider/Prescriber] : Psychiatric Provider/Prescriber [FreeTextEntry1] : In a marriage which she regrets [FreeTextEntry4] : I want to be able to accept my circumstances as I have no choice but to stay in the marriage. [de-identified] : Pt has been very depressed and is in debt. She is not able to leave the marriage due to financial needs [de-identified] : Elaine will consider facing her debt  as she is now working and is not ready to deal with this at this time [de-identified] : 8/9/2024 [de-identified] : No progress [de-identified] : Manage alcohol abuse. [de-identified] : 8/9/2024 [de-identified] : Elaine has been drinking occasionally    [FreeTextEntry5] : PT is angry and depressed She is not taking her medication as prescribed and has been seeing both Psychiatrist as well as this therapist weekly [de-identified] : PT has stabilized and depression is in remission [de-identified] : Not clinically indicated
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
## 2023-08-09 NOTE — PLAN
[FreeTextEntry2] : :   See Discussion/summary for comprehensive Treatment Plan 8/2/23   Elaine is very depressed and has begun to drink occasionally She has also admitted to stopping her medication due to the move and the marriage, 8/9/23: Elaine continues to report taking medication sporadically, missing doses. Treatment Plan Update is done in session today with PT. [Psychodynamic Therapy] : Psychodynamic Therapy  [Psychoeducation] : Psychoeducation  [Skills training (all types)] : Skills training (all types)  [de-identified] : PT is very depressed and admits to continuing to skip some medication doses  She continues to regret her marriage but admits to having no choice due to her debt and financial issues  She looks forward to returning to work this week Elaine denies any active suicidality at this time and is going out with friends, However she has begun some moderate drinking but denies being drunk or drinking excessively Elaine will present today with Psychiatrist for medication session.  In session Elaine is responsive and denies any risk to self or others [FreeTextEntry1] : PLAN: Weekly sessions continue
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
## 2023-08-09 NOTE — PLAN
[FreeTextEntry2] : :   See Discussion/summary for comprehensive Treatment Plan 8/2/23   Elaine is very depressed and has begun to drink occasionally She has also admitted to stopping her medication due to the move and the marriage, 8/9/23: Elaine continues to report taking medication sporadically, missing doses. Treatment Plan Update is done in session today with PT. [Psychodynamic Therapy] : Psychodynamic Therapy  [Psychoeducation] : Psychoeducation  [Skills training (all types)] : Skills training (all types)  [de-identified] : PT is very depressed and admits to continuing to skip some medication doses  She continues to regret her marriage but admits to having no choice due to her debt and financial issues  She looks forward to returning to work this week Elaine denies any active suicidality at this time and is going out with friends, However she has begun some moderate drinking but denies being drunk or drinking excessively Elaine will present today with Psychiatrist for medication session.  In session Elaine is responsive and denies any risk to self or others [FreeTextEntry1] : PLAN: Weekly sessions continue

## 2023-08-09 NOTE — CURRENT PSYCHIATRIC SYMPTOMS
[Depressed Mood] : depressed mood [Excessive Worry] : excessive worry [Ruminations] : ruminations [Anhedonia] : no anhedonia [Guilt] : not feeling guilty [Decreased Concentration] : no decrease in concentrating ability [Hyperphagia] : no hyperphagia [Insomnia] : no insomnia disorder [Hypersomnia] : no ~T hypersomnia [Psychomotor Retardation] : no psychomotor retardation [Anorexia] : no anorexia [Euphoria] : no euphoria [Highly Irritable] : no high irritability [Increased Activity] : no increased in activity [Distractibility] : not distracted [Grandeur] : no feelings of grandeur [Buying Sprees] : no buying sprees [Hypersexuality] : denied hypersexuality [Dec Need For Sleep] : no decreased need for sleep [Delusions] : no ~T delusions [Hallucination Auditory] : no auditory hallucinations [Thought Disorder] : ~T a thought disorder was not noted [Obsessions] : no obsessions [Re-experiencing] : no re-experiencing [Restlessness] : no restlessness [Hypochondriasis] : no hypochondriasis [Panic] : no panic disorder [Recent Onset] : no recent onset of confusion [Fluctuating Course] : no fluctuating course [Reversed sleep-wake] : no reversed sleep- wake [Inattentive] : not nattentive [Tremor] : ~T no ~M tremor was seen [Hallucination Visual] : no visual hallucinations [Hallucination Olfactory] : no olfactory hallucinations [Nausea] : no nausea [Hallucination Tactile] : no tactile hallucinations [Hallucination Gustatory] : no gustatory hallucinations [Diaphoresis] : showed no ~M diaphoresis [Vomiting] : no vomiting [Yawning] : no yawning [Mydriasis] : showed no ~M mydriasis [Gastrointestinal Sxs] : no ~T gastrointestinal symptoms [Piloerection] : the hair did not demonstrate piloerection [Rhinorrhea] : no ~M rhinorrhea discharge was seen [de-identified] : less depressed [de-identified] : at times, improved

## 2023-08-09 NOTE — DISCUSSION/SUMMARY
[Cognitively intact] : cognitively intact [Creative] : creative [Motivated to participate in treatment] : motivated to participate in treatment [Part of a supportive family] : part of a supportive family [Steady employment] : steady employment [Housing stability] : housing stability [Social supports] : social supports [FreeTextEntry2] : 8/9/2024 [FreeTextEntry3] : 10/2021 [FreeTextEntry6] : Pt has let go of many interests since being now in a marriage which she regrets [FreeTextEntry8] : None at this time [FreeTextEntry9] : None reported [de-identified] : psychiatrist [Mental Health] : Mental Health [Substance Abuse] : Substance Abuse [Interpersonal Relationships] : Interpersonal Relationships [Revised - Rationale] : Revised - Rationale: [Continued - Progress made] : Continued - Progress made: [___ times a week] : [unfilled] times a week [Other rationale for transition/discharge:] : Other rationale for transition/discharge: [None - Reason others did not participate:] : None - Reason others did not participate:  [Yes] : Yes [Psychiatric Provider/Prescriber] : Psychiatric Provider/Prescriber [FreeTextEntry1] : In a marriage which she regrets [FreeTextEntry4] : I want to be able to accept my circumstances as I have no choice but to stay in the marriage. [de-identified] : Pt has been very depressed and is in debt. She is not able to leave the marriage due to financial needs [de-identified] : Elaine will consider facing her debt  as she is now working and is not ready to deal with this at this time [de-identified] : 8/9/2024 [de-identified] : No progress [de-identified] : Manage alcohol abuse. [de-identified] : 8/9/2024 [de-identified] : Elaien has been drinking occasionally    [FreeTextEntry5] : PT is angry and depressed She is not taking her medication as prescribed and has been seeing both Psychiatrist as well as this therapist weekly [de-identified] : PT has stabilized and depression is in remission [de-identified] : Not clinically indicated
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
## 2023-08-10 DIAGNOSIS — G47.00 INSOMNIA, UNSPECIFIED: ICD-10-CM

## 2023-08-10 DIAGNOSIS — F41.1 GENERALIZED ANXIETY DISORDER: ICD-10-CM

## 2023-08-16 ENCOUNTER — OUTPATIENT (OUTPATIENT)
Dept: OUTPATIENT SERVICES | Facility: HOSPITAL | Age: 48
LOS: 1 days | End: 2023-08-16
Payer: COMMERCIAL

## 2023-08-16 ENCOUNTER — APPOINTMENT (OUTPATIENT)
Dept: PSYCHIATRY | Facility: CLINIC | Age: 48
End: 2023-08-16

## 2023-08-16 DIAGNOSIS — F33.1 MAJOR DEPRESSIVE DISORDER, RECURRENT, MODERATE: ICD-10-CM

## 2023-08-16 DIAGNOSIS — F31.81 BIPOLAR II DISORDER: ICD-10-CM

## 2023-08-16 PROCEDURE — 90832 PSYTX W PT 30 MINUTES: CPT

## 2023-08-16 NOTE — DISCUSSION/SUMMARY
[Cognitively intact] : cognitively intact [Creative] : creative [Motivated to participate in treatment] : motivated to participate in treatment [Part of a supportive family] : part of a supportive family [Steady employment] : steady employment [Housing stability] : housing stability [Social supports] : social supports [FreeTextEntry2] : 8/9/2024 [FreeTextEntry3] : 10/2021 [FreeTextEntry6] : Pt has let go of many interests since being now in a marriage which she regrets [FreeTextEntry8] : None at this time [FreeTextEntry9] : None reported [de-identified] : psychiatrist [Mental Health] : Mental Health [Substance Abuse] : Substance Abuse [Interpersonal Relationships] : Interpersonal Relationships [Revised - Rationale] : Revised - Rationale: [Continued - Progress made] : Continued - Progress made: [___ times a week] : [unfilled] times a week [Other rationale for transition/discharge:] : Other rationale for transition/discharge: [None - Reason others did not participate:] : None - Reason others did not participate:  [Yes] : Yes [Psychiatric Provider/Prescriber] : Psychiatric Provider/Prescriber [FreeTextEntry1] : In a marriage which she regrets [FreeTextEntry4] : I want to be able to accept my circumstances as I have no choice but to stay in the marriage. [de-identified] : Pt has been very depressed and is in debt. She is not able to leave the marriage due to financial needs [de-identified] : Elaine will consider facing her debt  as she is now working and is not ready to deal with this at this time [de-identified] : 8/9/2024 [de-identified] : No progress [de-identified] : Manage alcohol abuse. [de-identified] : 8/9/2024 [de-identified] : Elaine has been drinking occasionally   8/16/23  Elaine endorses improved mood and abstinence from alcohol past week [FreeTextEntry5] : PT is angry and depressed She is not taking her medication as prescribed and has been seeing both Psychiatrist as well as this therapist weekly 8/16/23  Elaine endorses improved mood and medication compliance. [de-identified] : PT has stabilized and depression is in remission [de-identified] : Not clinically indicated

## 2023-08-16 NOTE — PHYSICAL EXAM
[Well groomed] : well groomed [Cooperative] : cooperative [Euthymic] : euthymic [Full] : full [Irritable] : irritability [Clear] : clear [Linear/Goal Directed] : linear/goal directed [Slowed thinking] : slowed thinking [None Reported] : none reported [WNL] : within normal limits [Average] : average [Mild] : mild [de-identified] : unable to assess  [de-identified] :   Mood is now euthymic, and Pt no longer endorses depressed mood   She reports abstaining from Alcohol.

## 2023-08-16 NOTE — PLAN
[FreeTextEntry2] : :   See Discussion/summary for comprehensive Treatment Plan  . [Psychodynamic Therapy] : Psychodynamic Therapy  [Psychoeducation] : Psychoeducation  [Skills training (all types)] : Skills training (all types)  [de-identified] :  Elaine endorses improvement in mood and functioning. She has returned to work with good response. "I love my job" Elaine reports good compliance with prescribed medication and she reports abstinence from alcohol this past week She is alert, focused and interactive with good response to session Moodis euthymic and affect is bright. [FreeTextEntry1] : PLAN: Next session on 8/23

## 2023-08-23 ENCOUNTER — APPOINTMENT (OUTPATIENT)
Dept: PSYCHIATRY | Facility: CLINIC | Age: 48
End: 2023-08-23
Payer: COMMERCIAL

## 2023-08-23 ENCOUNTER — OUTPATIENT (OUTPATIENT)
Dept: OUTPATIENT SERVICES | Facility: HOSPITAL | Age: 48
LOS: 1 days | End: 2023-08-23
Payer: COMMERCIAL

## 2023-08-23 DIAGNOSIS — F41.1 GENERALIZED ANXIETY DISORDER: ICD-10-CM

## 2023-08-23 DIAGNOSIS — F33.1 MAJOR DEPRESSIVE DISORDER, RECURRENT, MODERATE: ICD-10-CM

## 2023-08-23 DIAGNOSIS — Z90.3 ACQUIRED ABSENCE OF STOMACH [PART OF]: Chronic | ICD-10-CM

## 2023-08-23 DIAGNOSIS — F31.81 BIPOLAR II DISORDER: ICD-10-CM

## 2023-08-23 DIAGNOSIS — G47.00 INSOMNIA, UNSPECIFIED: ICD-10-CM

## 2023-08-23 PROCEDURE — 99214 OFFICE O/P EST MOD 30 MIN: CPT | Mod: 25

## 2023-08-23 PROCEDURE — 99214 OFFICE O/P EST MOD 30 MIN: CPT

## 2023-08-23 PROCEDURE — 90832 PSYTX W PT 30 MINUTES: CPT
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
## 2023-08-23 NOTE — DISCUSSION/SUMMARY
[Date of Last Physical Exam: _____] : Date of Last Physical Exam: [unfilled] [Date of Last Annual Labs: _____] : Date of Last Annual Labs: [unfilled] [Annual Review of Systems Completed?] : Annual Review of Systems Completed: Yes [Tobacco Screening Completed?] : Tobacco Screening Completed: Yes [Date of Last AIMS: _____] : Date of Last AIMS: [unfilled] [Date of Last HbgA1c: _____] : Date of Last HbgA1c: [unfilled] [Date of Last Lipid Profile: _____] : Date of Last Lipid Profile: [unfilled] [Potential impact of patientâ??s physical health conditions on psychiatric care?] : Potential impact of patientâ??s physical health conditions on psychiatric care: No [Does patient require any additional health services or referrals?] : Does patient require any additional health services or referrals: No [FreeTextEntry1] : PLAN:   Therapy amd Medication evaluation DX: OLGA/ BiPolar 2 Remains symptomatic at times, encourage to abstain from alcohol and take all medication as prescribed.  Will continue medications for mood stability and anxiety.  medication: 1. lexapro 20mg and eventually back to 30mg if needed 2. klonopin 1mg po BID prn for anxiety  3. hydroxyzine 25mg-50mg at bedtime prn insomnia 4. abilify 10mg po daily 5. wellbutrin xl 300mg po daily (option to go back to 450mg po daily) 6. gabapentin  600mg po TID per request for mood/anxiety/pain  Follow up in 2 weeks, appears at baseline, patient understands to always reach if needed  Patient has 2 SA, but denies any current SI, intent, or plan.  Safety plan discussed at length, she will reach out to provider or call 911 if any SI, intent, or plan.  She will see therapist weekly and writer every 2-4 weeks, earlier as needed.

## 2023-08-23 NOTE — DISCUSSION/SUMMARY
[Cognitively intact] : cognitively intact [Creative] : creative [Motivated to participate in treatment] : motivated to participate in treatment [Part of a supportive family] : part of a supportive family [Steady employment] : steady employment [Housing stability] : housing stability [Social supports] : social supports [FreeTextEntry2] : 8/9/2024 [FreeTextEntry3] : 10/2021 [FreeTextEntry6] : Pt has let go of many interests since being now in a marriage which she regrets [FreeTextEntry8] : None at this time [FreeTextEntry9] : None reported [de-identified] : psychiatrist [Mental Health] : Mental Health [Substance Abuse] : Substance Abuse [Interpersonal Relationships] : Interpersonal Relationships [Revised - Rationale] : Revised - Rationale: [Continued - Progress made] : Continued - Progress made: [___ times a week] : [unfilled] times a week [Other rationale for transition/discharge:] : Other rationale for transition/discharge: [None - Reason others did not participate:] : None - Reason others did not participate:  [Yes] : Yes [Psychiatric Provider/Prescriber] : Psychiatric Provider/Prescriber [FreeTextEntry1] : In a marriage which she regrets [FreeTextEntry4] : I want to be able to accept my circumstances as I have no choice but to stay in the marriage. [de-identified] : Pt has been very depressed and is in debt. She is not able to leave the marriage due to financial needs [de-identified] : Elaine will consider facing her debt  as she is now working and is not ready to deal with this at this time [de-identified] : 8/9/2024 [de-identified] : No progress [de-identified] : Manage alcohol abuse. [de-identified] : 8/9/2024 [de-identified] : Elaine has been drinking occasionally    [FreeTextEntry5] : PT is angry and depressed She is not taking her medication as prescribed and has been seeing both Psychiatrist as well as this therapist weekly [de-identified] : PT has stabilized and depression is in remission [de-identified] : Not clinically indicated

## 2023-08-23 NOTE — PHYSICAL EXAM
[Well groomed] : well groomed [Cooperative] : cooperative [Euthymic] : euthymic [Full] : full [Clear] : clear [Linear/Goal Directed] : linear/goal directed [Slowed thinking] : slowed thinking [None Reported] : none reported [WNL] : within normal limits [Average] : average [Mild] : mild [de-identified] : unable to assess  [de-identified] :   Mood is now euthymic, and Pt no longer endorses depressed mood   She reports abstaining from Alcohol.

## 2023-08-23 NOTE — CURRENT PSYCHIATRIC SYMPTOMS
[Depressed Mood] : depressed mood [Excessive Worry] : excessive worry [Ruminations] : ruminations [Anhedonia] : no anhedonia [Guilt] : not feeling guilty [Decreased Concentration] : no decrease in concentrating ability [Hyperphagia] : no hyperphagia [Insomnia] : no insomnia disorder [Hypersomnia] : no ~T hypersomnia [Psychomotor Retardation] : no psychomotor retardation [Anorexia] : no anorexia [Euphoria] : no euphoria [Highly Irritable] : no high irritability [Increased Activity] : no increased in activity [Distractibility] : not distracted [Grandeur] : no feelings of grandeur [Buying Sprees] : no buying sprees [Hypersexuality] : denied hypersexuality [Dec Need For Sleep] : no decreased need for sleep [Delusions] : no ~T delusions [Hallucination Auditory] : no auditory hallucinations [Thought Disorder] : ~T a thought disorder was not noted [Obsessions] : no obsessions [Re-experiencing] : no re-experiencing [Restlessness] : no restlessness [Hypochondriasis] : no hypochondriasis [Panic] : no panic disorder [Recent Onset] : no recent onset of confusion [Fluctuating Course] : no fluctuating course [Reversed sleep-wake] : no reversed sleep- wake [Inattentive] : not nattentive [Tremor] : ~T no ~M tremor was seen [Hallucination Visual] : no visual hallucinations [Hallucination Olfactory] : no olfactory hallucinations [Nausea] : no nausea [Hallucination Tactile] : no tactile hallucinations [Hallucination Gustatory] : no gustatory hallucinations [Diaphoresis] : showed no ~M diaphoresis [Vomiting] : no vomiting [Yawning] : no yawning [Mydriasis] : showed no ~M mydriasis [Gastrointestinal Sxs] : no ~T gastrointestinal symptoms [Piloerection] : the hair did not demonstrate piloerection [Rhinorrhea] : no ~M rhinorrhea discharge was seen [de-identified] : less depressed [de-identified] : at times, improved

## 2023-08-23 NOTE — PLAN
[FreeTextEntry2] : :   See Discussion/summary for comprehensive Treatment Plan  . [Psychodynamic Therapy] : Psychodynamic Therapy  [Psychoeducation] : Psychoeducation  [Skills training (all types)] : Skills training (all types)  [de-identified] :  Elaine endorses improvement in mood and functioning. She has returned to work with good response. "I love my job" Elaine reports good compliance with prescribed medication and she reports abstinence from alcohol this past week She is alert, focused and interactive with good response to session Moodis euthymic and affect is bright. [FreeTextEntry1] : PLAN: Next therapy session scheduled for 9/6

## 2023-08-24 DIAGNOSIS — F31.81 BIPOLAR II DISORDER: ICD-10-CM

## 2023-08-24 DIAGNOSIS — F41.1 GENERALIZED ANXIETY DISORDER: ICD-10-CM

## 2023-08-24 DIAGNOSIS — G47.00 INSOMNIA, UNSPECIFIED: ICD-10-CM

## 2023-09-05 ENCOUNTER — APPOINTMENT (OUTPATIENT)
Dept: ORTHOPEDIC SURGERY | Facility: CLINIC | Age: 48
End: 2023-09-05

## 2023-09-06 ENCOUNTER — OUTPATIENT (OUTPATIENT)
Dept: OUTPATIENT SERVICES | Facility: HOSPITAL | Age: 48
LOS: 1 days | End: 2023-09-06
Payer: COMMERCIAL

## 2023-09-06 ENCOUNTER — APPOINTMENT (OUTPATIENT)
Dept: PSYCHIATRY | Facility: CLINIC | Age: 48
End: 2023-09-06
Payer: COMMERCIAL

## 2023-09-06 ENCOUNTER — RX RENEWAL (OUTPATIENT)
Age: 48
End: 2023-09-06

## 2023-09-06 DIAGNOSIS — G47.00 INSOMNIA, UNSPECIFIED: ICD-10-CM

## 2023-09-06 DIAGNOSIS — F33.1 MAJOR DEPRESSIVE DISORDER, RECURRENT, MODERATE: ICD-10-CM

## 2023-09-06 DIAGNOSIS — Z90.3 ACQUIRED ABSENCE OF STOMACH [PART OF]: Chronic | ICD-10-CM

## 2023-09-06 DIAGNOSIS — F41.1 GENERALIZED ANXIETY DISORDER: ICD-10-CM

## 2023-09-06 DIAGNOSIS — F31.81 BIPOLAR II DISORDER: ICD-10-CM

## 2023-09-06 PROCEDURE — 90832 PSYTX W PT 30 MINUTES: CPT

## 2023-09-06 PROCEDURE — 99214 OFFICE O/P EST MOD 30 MIN: CPT

## 2023-09-06 RX ORDER — ESCITALOPRAM OXALATE 10 MG/1
10 TABLET ORAL
Qty: 90 | Refills: 0 | Status: DISCONTINUED | COMMUNITY
Start: 2022-01-24 | End: 2023-09-06

## 2023-09-06 RX ORDER — HYDROXYZINE HYDROCHLORIDE 50 MG/1
50 TABLET ORAL
Qty: 180 | Refills: 1 | Status: DISCONTINUED | COMMUNITY
Start: 2021-10-13 | End: 2023-09-06

## 2023-09-06 NOTE — PHYSICAL EXAM
[Well groomed] : well groomed [Cooperative] : cooperative [Euthymic] : euthymic [Full] : full [Clear] : clear [Linear/Goal Directed] : linear/goal directed [Slowed thinking] : slowed thinking [None Reported] : none reported [WNL] : within normal limits [Average] : average [Mild] : mild [FreeTextEntry8] : mild dysphoria [de-identified] : unable to assess

## 2023-09-06 NOTE — HISTORY OF PRESENT ILLNESS
[No] : no [Yes] : yes [Mood episode] : mood episode [Agitation/ severe anxiety] : agitation/severe anxiety [Perceived burden on family or others] : perceived burden on family or others [Impulsivity] : impulsivity [History of abuse/trauma] : history of abuse/trauma [Anhedonia] : anhedonia [Global insomnia] : global insomnia [Recent loss] : recent loss [Current or pending isolation] : current or pending isolation [Responsibility to family or others] : responsibility to family or others [Identifies reasons for living] : identifies reasons for living [Future oriented] : future oriented [Engaged in work or school] : engaged in work or school [Supportive social network or family] : supportive social network or family [Ability to cope with stress] : ability to cope with stress [None Known] : none known [Residential stability] : residential stability [Employment stability] : employment stability [FreeTextEntry1] : This is a 47 year old patient who presents for medication management.  The patient reports stable mood, fair sleep and  appetite.  The patient denies depression, hypomania,  denies psychosis at this time.  The patient has less anxiety that impairs their daily functioning at times. The patient has passive SI at times, but denies any current active suicidal ideation, homicidal ideation, no auditory or visual hallucinations, or paranoid ideation.  The patient has no medical complaints. The patient reported no alcohol use, and is smoking at this time. I requested the patient's updated physical and labs from their PCP.  Coping skills were discussed and a safety plan was provided.  The patient was educated on the risks, benefits, and alternatives of their psychiatric medications.  The patient will engage in psychotherapy at this time.  The patient consents to ongoing medication management.  Risks, benefits discussed.  Patient was hospitalized at [facility] from 10/30/21 until 11/5/21 after suicide attempt by car and OD.  Patient is on leave from work.   Safety plan discussed at length. Encouraged to abstain from alcohol.  2/23/22:patient called, is more anxious, maintains she is abstaining from alcohol and using klonopin as prescribed.  risks, benefits discussed, raise lexapro 30mg po qam (understands above FDA approved dose but has been effective).  Alternatives, discussed, consider d/c wellbutrin and or adding topamax and or gabapentin, and changing abilify to seroquel in the future, f/u next week Discussed risks and benefits of medication changes, SSRI benefit  better than raising klonopin (also mild depressogenic)  2/28:patient improving, will continue current medication  3/9:called feeling more depressed, anxious, RX for klonopin sent again, last filled 2/25, due for refill.  Risks, benefits discussed, will raise wellbutrin xl 300mg po qam, coping skills, safety plan discussed, will f/u next week, earlier as needed  3/14:mild improvement, intermittent symptoms, continues with therapy, would like to continue current medications  3/28/22 PHQ9 14 HAM-Anxiety 29, patient subjectively reports anxiety, due to TASK requirement, menstrual cycle, and [facility], she prefers to continue current medications, encouraged to use klonopin twice daily  4/11/22: patient still depressed, will raise abilify, and follow up in 2 weeks 4/25:fairly stable, continue current medication 5/9/22:improved, no med changes, PHQ9=13 5/23/22:stable on current medication, no changes 6/13/22:patient presents little dysphoric and anxious about court tomorrow, but over last 3 weeks maintains she has been stable, and is not interested in medication change.  She is motivated to put legal issues behind her.  She denies any side effects and maintains she is using all medications as prescribed. 7/25:will titrate abilify for mood 8/8/22:stable, at baseline 8/29/22:increase Wellbutrin for mood 9/12: patient had only few week supply of wellbutrin and klonopin remianing.  patient was provided a safe taper schedule, and maintain she can't afford medications out of pocket.  She was encourage to go to Medicaid office or [facility] financial at [address]  Also, she was informed of [place] walk in clinic and if worsening depression, or SI to go ER or call 911.  As soon as patient obtains prescription benefits to call provider to resume medications.  She will follow up in 1 week. 9/22 :patient has been more anxious, used extra klonopin, will bridge patient with lorazepam 2mg po BID #12 10/12:patient took 8 klonopin OD and was hospitalized, she was restarted on medications below 10/24:was able to obtain all medications, stable at this time, continue to see weekly as available 10/31:more anxious, will resume gabapentin for anxiety/mood, follow up in 1 week 11/7:improved, provided 3 day supply of lorazepam, then resume klonopin 11/12:educated again there is no safe use of alcohol on current medication, and may be referred to substance use program if needed, or to go to AA, otherwise, will continue current medication, had brighter affect, follow up weekly due to risk 11/21:stable on medications, follow up weekly for now 11/28:educated again on risks of alcohol use with current medication, encouraged again to abstain and seek out AA if needed, she maintains she will not drink this week  12/5:doing well, no changes, at baseline 12/12:no complaints, at baseline, RX sent to [facility] 12/19:patient appears at baseline, no alcohol use reported, referred to Neurology 1/9:stable on current medications, no reported alcohol use 1/23:stable, no changes, follow up in 2 weeks 2/6:stable, no changes 2/27:stable on current medications, will begin seeing every 2 weeks, earlier as needed 3/22:at baseline 4/5:stable, no changes, at baseline 4/19: PA initiated for clonazepam 5/17:stable, at baseline, no changes, will be assigned new therapist, provide lab slip for updated metabolic profile 6/7:continue current therapist for now, raise gabapentin for anxiety 7/26:remains stable at baseline 8/2:patient referred by therapist today as she d/c meds and resumed alcohol,patient provided detailed instructions on how to resume medications, and therpaist will provide saftey plan. 8/9:patient resumed medications per instructions, will titrate gabapentin for anxiety 8/23:feels stable, at baseline 9/6:see med changes per plan to help with insomnia, patient will change to new MD for medication management per administration. [TextBox_32] : Patient had recent SI, OD, but  currently denies SI, intent or plan [TextBox_52] : patient had 2 SA, but denies current SI, intent, or plan [TextBox_35] : No violence reported

## 2023-09-06 NOTE — PLAN
[FreeTextEntry4] : mood is fairly stable  mild insomnia at times, will try trazodone  anxiety is chronic, mild  health ok, reminded to update labs, physical  goal to remain stable, free of SI, function well at work

## 2023-09-06 NOTE — PLAN
[FreeTextEntry2] : :   See Discussion/summary for comprehensive Treatment Plan  . [Psychodynamic Therapy] : Psychodynamic Therapy  [Psychoeducation] : Psychoeducation  [Skills training (all types)] : Skills training (all types)  [de-identified] :  Elaine endorses some depression reactive to feeling displaced since she is living in someone elses space. She admits to missing her former apartment  Elaine has also had 2 drinks at a recent occasion but left her car there due to fearing to drive In sessions going forward, these new issues will be observed and processed. Going forward Elaine will change Psychiatrists from Dr Murdock to Dr Lee and both have been informed and the handoff has been coordinated. [FreeTextEntry1] : PLAN: Next session scheduled for 9/13

## 2023-09-06 NOTE — REASON FOR VISIT
[FreeTextEntry2] : 08/23 ISTOP checked [FreeTextEntry1] : "I am not sleeping well, hydroxyzine isn't helpful."

## 2023-09-06 NOTE — RISK ASSESSMENT
no [No, patient denies ideation or behavior] : No, patient denies ideation or behavior [Low acute suicide risk] : Low acute suicide risk [FreeTextEntry8] : patient has been free of SI, back to work, stable, maintains sobriety

## 2023-09-06 NOTE — CURRENT PSYCHIATRIC SYMPTOMS
[Depressed Mood] : depressed mood [Excessive Worry] : excessive worry [Ruminations] : ruminations [Anhedonia] : no anhedonia [Guilt] : not feeling guilty [Decreased Concentration] : no decrease in concentrating ability [Hyperphagia] : no hyperphagia [Insomnia] : no insomnia disorder [Hypersomnia] : no ~T hypersomnia [Psychomotor Retardation] : no psychomotor retardation [Anorexia] : no anorexia [Euphoria] : no euphoria [Highly Irritable] : no high irritability [Increased Activity] : no increased in activity [Distractibility] : not distracted [Grandeur] : no feelings of grandeur [Buying Sprees] : no buying sprees [Hypersexuality] : denied hypersexuality [Dec Need For Sleep] : no decreased need for sleep [Delusions] : no ~T delusions [Hallucination Auditory] : no auditory hallucinations [Thought Disorder] : ~T a thought disorder was not noted [Obsessions] : no obsessions [Re-experiencing] : no re-experiencing [Restlessness] : no restlessness [Hypochondriasis] : no hypochondriasis [Panic] : no panic disorder [Recent Onset] : no recent onset of confusion [Fluctuating Course] : no fluctuating course [Reversed sleep-wake] : no reversed sleep- wake [Inattentive] : not nattentive [Tremor] : ~T no ~M tremor was seen [Hallucination Visual] : no visual hallucinations [Hallucination Olfactory] : no olfactory hallucinations [Nausea] : no nausea [Hallucination Tactile] : no tactile hallucinations [Hallucination Gustatory] : no gustatory hallucinations [Diaphoresis] : showed no ~M diaphoresis [Vomiting] : no vomiting [Yawning] : no yawning [Mydriasis] : showed no ~M mydriasis [Gastrointestinal Sxs] : no ~T gastrointestinal symptoms [Piloerection] : the hair did not demonstrate piloerection [Rhinorrhea] : no ~M rhinorrhea discharge was seen [de-identified] : less depressed [de-identified] : at times, improved

## 2023-09-06 NOTE — RISK ASSESSMENT
[No, patient denies ideation or behavior] : No, patient denies ideation or behavior [Low acute suicide risk] : Low acute suicide risk [FreeTextEntry9] : No risk to self or others elicited..

## 2023-09-06 NOTE — DISCUSSION/SUMMARY
[Date of Last Physical Exam: _____] : Date of Last Physical Exam: [unfilled] [Date of Last Annual Labs: _____] : Date of Last Annual Labs: [unfilled] [Annual Review of Systems Completed?] : Annual Review of Systems Completed: Yes [Tobacco Screening Completed?] : Tobacco Screening Completed: Yes [Date of Last AIMS: _____] : Date of Last AIMS: [unfilled] [Date of Last HbgA1c: _____] : Date of Last HbgA1c: [unfilled] [Date of Last Lipid Profile: _____] : Date of Last Lipid Profile: [unfilled] [Potential impact of patientâ??s physical health conditions on psychiatric care?] : Potential impact of patientâ??s physical health conditions on psychiatric care: No [Does patient require any additional health services or referrals?] : Does patient require any additional health services or referrals: No [FreeTextEntry1] : PLAN:   Therapy amd Medication evaluation DX: OLGA/ BiPolar 2 Remains symptomatic at times, encourage to abstain from alcohol and take all medication as prescribed.  Will continue medications for mood stability and anxiety.  medication: 1. lexapro 20mg po am 2. klonopin 1mg po BID prn for anxiety  3. hydroxyzine 25mg-50mg at bedtime prn insomnia, d/c 4. abilify 10mg po daily 5. wellbutrin xl 300mg po daily reduced to 150mg po qam 6. gabapentin  600mg po TID per request for mood/anxiety/pain, change to 600mg po qam, and 1200mg at bedtime 7. start trazodone 50mg-100mg at bedtime (monitor for hypomania)  Follow up in 2 weeks, appears at baseline, patient understands to always reach if needed  Patient has 2 SA, but denies any current SI, intent, or plan.  Safety plan discussed at length, she will reach out to provider or call 911 if any SI, intent, or plan.  She will see therapist weekly and writer every 2-4 weeks, earlier as needed.

## 2023-09-06 NOTE — DISCUSSION/SUMMARY
[Cognitively intact] : cognitively intact [Creative] : creative [Motivated to participate in treatment] : motivated to participate in treatment [Part of a supportive family] : part of a supportive family [Steady employment] : steady employment [Housing stability] : housing stability [Social supports] : social supports [FreeTextEntry2] : 8/9/2024 [FreeTextEntry3] : 10/2021 [FreeTextEntry6] : Pt has let go of many interests since being now in a marriage which she regrets [FreeTextEntry8] : None at this time [FreeTextEntry9] : None reported [de-identified] : psychiatrist [Mental Health] : Mental Health [Substance Abuse] : Substance Abuse [Interpersonal Relationships] : Interpersonal Relationships [Revised - Rationale] : Revised - Rationale: [Continued - Progress made] : Continued - Progress made: [___ times a week] : [unfilled] times a week [Other rationale for transition/discharge:] : Other rationale for transition/discharge: [None - Reason others did not participate:] : None - Reason others did not participate:  [Yes] : Yes [Psychiatric Provider/Prescriber] : Psychiatric Provider/Prescriber [FreeTextEntry1] : In a marriage which she regrets [FreeTextEntry4] : I want to be able to accept my circumstances as I have no choice but to stay in the marriage. [de-identified] : Pt has been very depressed and is in debt. She is not able to leave the marriage due to financial needs [de-identified] : Elaine will consider facing her debt  as she is now working and is not ready to deal with this at this time [de-identified] : 8/9/2024 [de-identified] : No progress [de-identified] : Manage alcohol abuse. [de-identified] : 8/9/2024 [de-identified] : Elaine has been drinking occasionally    [FreeTextEntry5] : PT is angry and depressed She is not taking her medication as prescribed and has been seeing both Psychiatrist as well as this therapist weekly [de-identified] : PT has stabilized and depression is in remission [de-identified] : Not clinically indicated

## 2023-09-07 DIAGNOSIS — F33.1 MAJOR DEPRESSIVE DISORDER, RECURRENT, MODERATE: ICD-10-CM

## 2023-09-12 ENCOUNTER — NON-APPOINTMENT (OUTPATIENT)
Age: 48
End: 2023-09-12

## 2023-09-20 ENCOUNTER — NON-APPOINTMENT (OUTPATIENT)
Age: 48
End: 2023-09-20

## 2023-09-20 ENCOUNTER — OUTPATIENT (OUTPATIENT)
Dept: OUTPATIENT SERVICES | Facility: HOSPITAL | Age: 48
LOS: 1 days | End: 2023-09-20
Payer: COMMERCIAL

## 2023-09-20 ENCOUNTER — APPOINTMENT (OUTPATIENT)
Dept: PSYCHIATRY | Facility: CLINIC | Age: 48
End: 2023-09-20

## 2023-09-20 DIAGNOSIS — F33.1 MAJOR DEPRESSIVE DISORDER, RECURRENT, MODERATE: ICD-10-CM

## 2023-09-20 DIAGNOSIS — Z90.3 ACQUIRED ABSENCE OF STOMACH [PART OF]: Chronic | ICD-10-CM

## 2023-09-20 DIAGNOSIS — F31.81 BIPOLAR II DISORDER: ICD-10-CM

## 2023-09-20 PROCEDURE — 90832 PSYTX W PT 30 MINUTES: CPT

## 2023-09-21 DIAGNOSIS — F31.81 BIPOLAR II DISORDER: ICD-10-CM

## 2023-09-27 ENCOUNTER — OUTPATIENT (OUTPATIENT)
Dept: OUTPATIENT SERVICES | Facility: HOSPITAL | Age: 48
LOS: 1 days | End: 2023-09-27
Payer: COMMERCIAL

## 2023-09-27 ENCOUNTER — APPOINTMENT (OUTPATIENT)
Dept: PSYCHIATRY | Facility: CLINIC | Age: 48
End: 2023-09-27

## 2023-09-27 DIAGNOSIS — F33.1 MAJOR DEPRESSIVE DISORDER, RECURRENT, MODERATE: ICD-10-CM

## 2023-09-27 DIAGNOSIS — Z90.3 ACQUIRED ABSENCE OF STOMACH [PART OF]: Chronic | ICD-10-CM

## 2023-09-27 PROCEDURE — 90832 PSYTX W PT 30 MINUTES: CPT

## 2023-09-28 DIAGNOSIS — F33.1 MAJOR DEPRESSIVE DISORDER, RECURRENT, MODERATE: ICD-10-CM

## 2023-10-04 ENCOUNTER — APPOINTMENT (OUTPATIENT)
Dept: PSYCHIATRY | Facility: CLINIC | Age: 48
End: 2023-10-04

## 2023-10-07 ENCOUNTER — NON-APPOINTMENT (OUTPATIENT)
Age: 48
End: 2023-10-07

## 2023-10-09 ENCOUNTER — TRANSCRIPTION ENCOUNTER (OUTPATIENT)
Age: 48
End: 2023-10-09

## 2023-10-10 ENCOUNTER — OUTPATIENT (OUTPATIENT)
Dept: OUTPATIENT SERVICES | Facility: HOSPITAL | Age: 48
LOS: 1 days | End: 2023-10-10
Payer: COMMERCIAL

## 2023-10-10 ENCOUNTER — APPOINTMENT (OUTPATIENT)
Dept: PSYCHIATRY | Facility: CLINIC | Age: 48
End: 2023-10-10
Payer: COMMERCIAL

## 2023-10-10 DIAGNOSIS — F33.1 MAJOR DEPRESSIVE DISORDER, RECURRENT, MODERATE: ICD-10-CM

## 2023-10-10 DIAGNOSIS — Z90.3 ACQUIRED ABSENCE OF STOMACH [PART OF]: Chronic | ICD-10-CM

## 2023-10-10 DIAGNOSIS — F31.81 BIPOLAR II DISORDER: ICD-10-CM

## 2023-10-10 DIAGNOSIS — F41.1 GENERALIZED ANXIETY DISORDER: ICD-10-CM

## 2023-10-10 PROCEDURE — 99214 OFFICE O/P EST MOD 30 MIN: CPT | Mod: 95

## 2023-10-11 DIAGNOSIS — F41.1 GENERALIZED ANXIETY DISORDER: ICD-10-CM

## 2023-10-11 DIAGNOSIS — F31.81 BIPOLAR II DISORDER: ICD-10-CM

## 2023-10-15 ENCOUNTER — NON-APPOINTMENT (OUTPATIENT)
Age: 48
End: 2023-10-15

## 2023-10-18 ENCOUNTER — APPOINTMENT (OUTPATIENT)
Dept: PSYCHIATRY | Facility: CLINIC | Age: 48
End: 2023-10-18

## 2023-10-18 ENCOUNTER — OUTPATIENT (OUTPATIENT)
Dept: OUTPATIENT SERVICES | Facility: HOSPITAL | Age: 48
LOS: 1 days | End: 2023-10-18
Payer: COMMERCIAL

## 2023-10-18 DIAGNOSIS — Z90.3 ACQUIRED ABSENCE OF STOMACH [PART OF]: Chronic | ICD-10-CM

## 2023-10-18 DIAGNOSIS — F31.81 BIPOLAR II DISORDER: ICD-10-CM

## 2023-10-18 PROCEDURE — 90832 PSYTX W PT 30 MINUTES: CPT

## 2023-10-19 DIAGNOSIS — F31.81 BIPOLAR II DISORDER: ICD-10-CM

## 2023-10-23 ENCOUNTER — APPOINTMENT (OUTPATIENT)
Dept: PSYCHIATRY | Facility: CLINIC | Age: 48
End: 2023-10-23

## 2023-10-23 ENCOUNTER — OUTPATIENT (OUTPATIENT)
Dept: OUTPATIENT SERVICES | Facility: HOSPITAL | Age: 48
LOS: 1 days | End: 2023-10-23
Payer: COMMERCIAL

## 2023-10-23 DIAGNOSIS — Z90.3 ACQUIRED ABSENCE OF STOMACH [PART OF]: Chronic | ICD-10-CM

## 2023-10-23 DIAGNOSIS — F31.81 BIPOLAR II DISORDER: ICD-10-CM

## 2023-10-23 PROCEDURE — 90832 PSYTX W PT 30 MINUTES: CPT

## 2023-10-24 DIAGNOSIS — F31.81 BIPOLAR II DISORDER: ICD-10-CM

## 2023-11-01 ENCOUNTER — OUTPATIENT (OUTPATIENT)
Dept: OUTPATIENT SERVICES | Facility: HOSPITAL | Age: 48
LOS: 1 days | End: 2023-11-01
Payer: COMMERCIAL

## 2023-11-01 ENCOUNTER — APPOINTMENT (OUTPATIENT)
Dept: PSYCHIATRY | Facility: CLINIC | Age: 48
End: 2023-11-01

## 2023-11-01 DIAGNOSIS — F31.81 BIPOLAR II DISORDER: ICD-10-CM

## 2023-11-01 DIAGNOSIS — Z90.3 ACQUIRED ABSENCE OF STOMACH [PART OF]: Chronic | ICD-10-CM

## 2023-11-01 PROCEDURE — 90832 PSYTX W PT 30 MINUTES: CPT | Mod: 95

## 2023-11-02 DIAGNOSIS — F31.81 BIPOLAR II DISORDER: ICD-10-CM

## 2023-11-07 ENCOUNTER — OUTPATIENT (OUTPATIENT)
Dept: OUTPATIENT SERVICES | Facility: HOSPITAL | Age: 48
LOS: 1 days | End: 2023-11-07
Payer: COMMERCIAL

## 2023-11-07 ENCOUNTER — APPOINTMENT (OUTPATIENT)
Dept: PSYCHIATRY | Facility: CLINIC | Age: 48
End: 2023-11-07
Payer: COMMERCIAL

## 2023-11-07 DIAGNOSIS — Z90.3 ACQUIRED ABSENCE OF STOMACH [PART OF]: Chronic | ICD-10-CM

## 2023-11-07 DIAGNOSIS — F33.1 MAJOR DEPRESSIVE DISORDER, RECURRENT, MODERATE: ICD-10-CM

## 2023-11-07 PROCEDURE — 99214 OFFICE O/P EST MOD 30 MIN: CPT | Mod: 95

## 2023-11-08 ENCOUNTER — OUTPATIENT (OUTPATIENT)
Dept: OUTPATIENT SERVICES | Facility: HOSPITAL | Age: 48
LOS: 1 days | End: 2023-11-08
Payer: COMMERCIAL

## 2023-11-08 ENCOUNTER — APPOINTMENT (OUTPATIENT)
Dept: PSYCHIATRY | Facility: CLINIC | Age: 48
End: 2023-11-08

## 2023-11-08 DIAGNOSIS — F33.1 MAJOR DEPRESSIVE DISORDER, RECURRENT, MODERATE: ICD-10-CM

## 2023-11-08 DIAGNOSIS — F31.81 BIPOLAR II DISORDER: ICD-10-CM

## 2023-11-08 DIAGNOSIS — Z90.3 ACQUIRED ABSENCE OF STOMACH [PART OF]: Chronic | ICD-10-CM

## 2023-11-08 PROCEDURE — 90832 PSYTX W PT 30 MINUTES: CPT

## 2023-11-09 DIAGNOSIS — F31.81 BIPOLAR II DISORDER: ICD-10-CM

## 2023-11-15 ENCOUNTER — APPOINTMENT (OUTPATIENT)
Dept: PSYCHIATRY | Facility: CLINIC | Age: 48
End: 2023-11-15

## 2023-11-15 ENCOUNTER — OUTPATIENT (OUTPATIENT)
Dept: OUTPATIENT SERVICES | Facility: HOSPITAL | Age: 48
LOS: 1 days | End: 2023-11-15
Payer: COMMERCIAL

## 2023-11-15 DIAGNOSIS — Z90.3 ACQUIRED ABSENCE OF STOMACH [PART OF]: Chronic | ICD-10-CM

## 2023-11-15 DIAGNOSIS — F31.81 BIPOLAR II DISORDER: ICD-10-CM

## 2023-11-15 PROCEDURE — 90832 PSYTX W PT 30 MINUTES: CPT

## 2023-11-16 DIAGNOSIS — F31.81 BIPOLAR II DISORDER: ICD-10-CM

## 2023-11-20 ENCOUNTER — APPOINTMENT (OUTPATIENT)
Dept: PSYCHIATRY | Facility: CLINIC | Age: 48
End: 2023-11-20

## 2023-11-20 ENCOUNTER — OUTPATIENT (OUTPATIENT)
Dept: OUTPATIENT SERVICES | Facility: HOSPITAL | Age: 48
LOS: 1 days | End: 2023-11-20
Payer: COMMERCIAL

## 2023-11-20 DIAGNOSIS — F31.81 BIPOLAR II DISORDER: ICD-10-CM

## 2023-11-20 DIAGNOSIS — Z90.3 ACQUIRED ABSENCE OF STOMACH [PART OF]: Chronic | ICD-10-CM

## 2023-11-20 PROCEDURE — 90832 PSYTX W PT 30 MINUTES: CPT

## 2023-11-21 DIAGNOSIS — F31.81 BIPOLAR II DISORDER: ICD-10-CM

## 2023-11-22 ENCOUNTER — APPOINTMENT (OUTPATIENT)
Dept: ORTHOPEDIC SURGERY | Facility: CLINIC | Age: 48
End: 2023-11-22
Payer: OTHER MISCELLANEOUS

## 2023-11-22 DIAGNOSIS — S39.012D STRAIN OF MUSCLE, FASCIA AND TENDON OF LOWER BACK, SUBSEQUENT ENCOUNTER: ICD-10-CM

## 2023-11-22 DIAGNOSIS — M51.9 UNSPECIFIED THORACIC, THORACOLUMBAR AND LUMBOSACRAL INTERVERTEBRAL DISC DISORDER: ICD-10-CM

## 2023-11-22 DIAGNOSIS — M50.90 CERVICAL DISC DISORDER, UNSPECIFIED, UNSPECIFIED CERVICAL REGION: ICD-10-CM

## 2023-11-22 DIAGNOSIS — M54.12 RADICULOPATHY, CERVICAL REGION: ICD-10-CM

## 2023-11-22 DIAGNOSIS — M77.8 OTHER ENTHESOPATHIES, NOT ELSEWHERE CLASSIFIED: ICD-10-CM

## 2023-11-22 PROCEDURE — 99455 WORK RELATED DISABILITY EXAM: CPT

## 2023-11-23 PROBLEM — M50.90 CERVICAL DISC DISEASE: Status: ACTIVE | Noted: 2022-06-29

## 2023-11-23 PROBLEM — S39.012D LUMBAR STRAIN, SUBSEQUENT ENCOUNTER: Status: ACTIVE | Noted: 2022-08-30

## 2023-11-23 PROBLEM — M54.12 CERVICAL RADICULOPATHY, ACUTE: Status: ACTIVE | Noted: 2022-06-29

## 2023-11-23 PROBLEM — M77.8 TENDINITIS OF BOTH SHOULDERS: Status: ACTIVE | Noted: 2023-06-01

## 2023-11-23 PROBLEM — M51.9 LUMBAR DISC DISEASE: Status: ACTIVE | Noted: 2022-06-29

## 2023-11-27 ENCOUNTER — OUTPATIENT (OUTPATIENT)
Dept: OUTPATIENT SERVICES | Facility: HOSPITAL | Age: 48
LOS: 1 days | End: 2023-11-27
Payer: COMMERCIAL

## 2023-11-27 ENCOUNTER — APPOINTMENT (OUTPATIENT)
Dept: PSYCHIATRY | Facility: CLINIC | Age: 48
End: 2023-11-27

## 2023-11-27 DIAGNOSIS — F31.81 BIPOLAR II DISORDER: ICD-10-CM

## 2023-11-27 DIAGNOSIS — Z90.3 ACQUIRED ABSENCE OF STOMACH [PART OF]: Chronic | ICD-10-CM

## 2023-11-27 PROCEDURE — 90834 PSYTX W PT 45 MINUTES: CPT

## 2023-11-28 DIAGNOSIS — F31.81 BIPOLAR II DISORDER: ICD-10-CM

## 2023-12-04 ENCOUNTER — APPOINTMENT (OUTPATIENT)
Dept: PSYCHIATRY | Facility: CLINIC | Age: 48
End: 2023-12-04

## 2023-12-04 ENCOUNTER — OUTPATIENT (OUTPATIENT)
Dept: OUTPATIENT SERVICES | Facility: HOSPITAL | Age: 48
LOS: 1 days | End: 2023-12-04
Payer: COMMERCIAL

## 2023-12-04 DIAGNOSIS — F31.81 BIPOLAR II DISORDER: ICD-10-CM

## 2023-12-04 DIAGNOSIS — F31.31 BIPOLAR DISORDER, CURRENT EPISODE DEPRESSED, MILD: ICD-10-CM

## 2023-12-04 DIAGNOSIS — Z90.3 ACQUIRED ABSENCE OF STOMACH [PART OF]: Chronic | ICD-10-CM

## 2023-12-04 PROCEDURE — 90834 PSYTX W PT 45 MINUTES: CPT

## 2023-12-05 ENCOUNTER — APPOINTMENT (OUTPATIENT)
Dept: PSYCHIATRY | Facility: CLINIC | Age: 48
End: 2023-12-05
Payer: COMMERCIAL

## 2023-12-05 ENCOUNTER — OUTPATIENT (OUTPATIENT)
Dept: OUTPATIENT SERVICES | Facility: HOSPITAL | Age: 48
LOS: 1 days | End: 2023-12-05
Payer: COMMERCIAL

## 2023-12-05 DIAGNOSIS — F31.31 BIPOLAR DISORDER, CURRENT EPISODE DEPRESSED, MILD: ICD-10-CM

## 2023-12-05 DIAGNOSIS — F41.9 ANXIETY DISORDER, UNSPECIFIED: ICD-10-CM

## 2023-12-05 DIAGNOSIS — Z90.3 ACQUIRED ABSENCE OF STOMACH [PART OF]: Chronic | ICD-10-CM

## 2023-12-05 DIAGNOSIS — F31.81 BIPOLAR II DISORDER: ICD-10-CM

## 2023-12-05 PROCEDURE — 99214 OFFICE O/P EST MOD 30 MIN: CPT | Mod: 95

## 2023-12-05 RX ORDER — GABAPENTIN 300 MG/1
300 CAPSULE ORAL DAILY
Qty: 30 | Refills: 0 | Status: COMPLETED | COMMUNITY
Start: 2023-10-10 | End: 2023-12-05

## 2023-12-05 RX ORDER — BUPROPION HYDROCHLORIDE 150 MG/1
150 TABLET, EXTENDED RELEASE ORAL DAILY
Qty: 90 | Refills: 0 | Status: COMPLETED | COMMUNITY
Start: 2022-08-29 | End: 2023-12-05

## 2023-12-06 DIAGNOSIS — F41.9 ANXIETY DISORDER, UNSPECIFIED: ICD-10-CM

## 2023-12-06 DIAGNOSIS — F31.81 BIPOLAR II DISORDER: ICD-10-CM

## 2023-12-11 ENCOUNTER — APPOINTMENT (OUTPATIENT)
Dept: PSYCHIATRY | Facility: CLINIC | Age: 48
End: 2023-12-11

## 2023-12-11 ENCOUNTER — OUTPATIENT (OUTPATIENT)
Dept: OUTPATIENT SERVICES | Facility: HOSPITAL | Age: 48
LOS: 1 days | End: 2023-12-11
Payer: COMMERCIAL

## 2023-12-11 DIAGNOSIS — F31.81 BIPOLAR II DISORDER: ICD-10-CM

## 2023-12-11 DIAGNOSIS — Z90.3 ACQUIRED ABSENCE OF STOMACH [PART OF]: Chronic | ICD-10-CM

## 2023-12-11 PROCEDURE — 90832 PSYTX W PT 30 MINUTES: CPT

## 2023-12-12 DIAGNOSIS — F31.81 BIPOLAR II DISORDER: ICD-10-CM

## 2023-12-17 ENCOUNTER — NON-APPOINTMENT (OUTPATIENT)
Age: 48
End: 2023-12-17

## 2023-12-20 ENCOUNTER — APPOINTMENT (OUTPATIENT)
Dept: PSYCHIATRY | Facility: CLINIC | Age: 48
End: 2023-12-20

## 2023-12-20 ENCOUNTER — OUTPATIENT (OUTPATIENT)
Dept: OUTPATIENT SERVICES | Facility: HOSPITAL | Age: 48
LOS: 1 days | End: 2023-12-20
Payer: COMMERCIAL

## 2023-12-20 DIAGNOSIS — F31.81 BIPOLAR II DISORDER: ICD-10-CM

## 2023-12-20 PROCEDURE — 90834 PSYTX W PT 45 MINUTES: CPT

## 2023-12-20 NOTE — PHYSICAL EXAM
[Impulsive] : impulsive [Cooperative] : cooperative [Hostile] : hostile [Anxious] : anxious [Angry] : angry [Irritable] : irritable [Labile] : labile [Clear] : clear [Pressured] : pressured [Blocked] : blocked [Depressive] : depressive [None Reported] : none reported [WNL] : within normal limits [Average] : average [Mild] : mild [de-identified] : unable to assess  [de-identified] : Pt evidences sign of decompensation and is angry and depressed

## 2023-12-20 NOTE — REASON FOR VISIT
[Patient] : Patient [FreeTextEntry1] : Psychotherapy. follow up for mood dysregulation and recent abuse of alcohol and Klonapin

## 2023-12-20 NOTE — PLAN
[FreeTextEntry2] : :    New problem: Elaine had drank too much and took Klonapin but was not specific.as to the amount  She stated she "Just wanted to sleep" and was not admitting to wanting to kill herself. Instead she said "IM done and Im Manic I just wanted to sleep  had brought her to ED and she spent 3 days in the Ed. he is now involved in her safety plan and was present in session by phone Elaine admitted to stoping her medications for quite a while but continues to take Gabapentin Plan is to email Psychiatrist to assist with instructing pT on how to resume her medications. [de-identified] : New problem: Elaine had drank too much and took Klonapin but was not specific.as to the amount  She stated she "Just wanted to sleep" and was not admitting to wanting to kill herself. Instead she said "IM done and Im Manic I just wanted to sleep."  had brought her to ED and she spent 3 days in the Ed. he is now involved in her safety plan and was present in session by phone Elaine admitted to stopping her medications for quite a while but continues to take Gabapentin Plan is to email Psychiatrist to assist with instructing pT on how to resume her medications. [FreeTextEntry1] : PLAN: Elaine will be contacted tomorrow and next session is scheduled for 1 week DR JOY, Psychiatrist is contacted by email and hopefully will be in touch with Elaine as soon as possible with instructions on resumption of Meds  Zeyad is her main support person and agreed to help with her recovery from the recent manic episode

## 2023-12-20 NOTE — RISK ASSESSMENT
[No, patient denies ideation or behavior] : No, patient denies ideation or behavior [FreeTextEntry9] : No risk to self or others elicited at this time [Low acute suicide risk] : Low acute suicide risk [FreeTextEntry3] : Safety plan is introduced but pT IS not willing to respond to this at this time. Pt agrees to have he rhusband on speaker phone and she became more calm and receptive to creating a Safety plan with his help.

## 2023-12-21 ENCOUNTER — OUTPATIENT (OUTPATIENT)
Dept: OUTPATIENT SERVICES | Facility: HOSPITAL | Age: 48
LOS: 1 days | End: 2023-12-21
Payer: COMMERCIAL

## 2023-12-21 ENCOUNTER — APPOINTMENT (OUTPATIENT)
Dept: PSYCHIATRY | Facility: CLINIC | Age: 48
End: 2023-12-21
Payer: COMMERCIAL

## 2023-12-21 DIAGNOSIS — F31.81 BIPOLAR II DISORDER: ICD-10-CM

## 2023-12-21 DIAGNOSIS — Z90.3 ACQUIRED ABSENCE OF STOMACH [PART OF]: Chronic | ICD-10-CM

## 2023-12-21 DIAGNOSIS — F41.1 GENERALIZED ANXIETY DISORDER: ICD-10-CM

## 2023-12-21 DIAGNOSIS — G47.00 INSOMNIA, UNSPECIFIED: ICD-10-CM

## 2023-12-21 PROCEDURE — 99213 OFFICE O/P EST LOW 20 MIN: CPT | Mod: 95

## 2023-12-21 NOTE — REASON FOR VISIT
[Telehealth (audio & video) - Individual/Group] : This visit was provided via telehealth using real-time 2-way audio visual technology. [Other Location: e.g. Home (Enter Location, City,State)___] : The provider was located at [unfilled]. [Home] : The patient, [unfilled], was located at home, [unfilled], at the time of the visit. [Verbal consent obtained from patient/other participant(s)] : Verbal consent for telehealth/telephonic services obtained from patient/other participant(s)

## 2023-12-21 NOTE — PHYSICAL EXAM
[Dysphoric] : dysphoric [Normal] : good [Anxious] : no anxious [FreeTextEntry8] : anxious [FreeTextEntry9] : constricted

## 2023-12-21 NOTE — HISTORY OF PRESENT ILLNESS
[No] : no [Yes] : yes [Mood episode] : mood episode [Agitation/ severe anxiety] : agitation/severe anxiety [Perceived burden on family or others] : perceived burden on family or others [Impulsivity] : impulsivity [History of abuse/trauma] : history of abuse/trauma [Anhedonia] : anhedonia [Global insomnia] : global insomnia [Recent loss] : recent loss [Current or pending isolation] : current or pending isolation [Responsibility to family or others] : responsibility to family or others [Identifies reasons for living] : identifies reasons for living [Future oriented] : future oriented [Engaged in work or school] : engaged in work or school [Supportive social network or family] : supportive social network or family [Ability to cope with stress] : ability to cope with stress [None Known] : none known [Residential stability] : residential stability [Employment stability] : employment stability [FreeTextEntry1] : This is a 47 year old patient who presents for medication management.  The patient reports stable mood, fair sleep and  appetite.  The patient denies depression, hypomania,  denies psychosis at this time.  The patient has less anxiety that impairs their daily functioning at times. The patient has passive SI at times, but denies any current active suicidal ideation, homicidal ideation, no auditory or visual hallucinations, or paranoid ideation.  The patient has no medical complaints. The patient reported no alcohol use, and is smoking at this time. I requested the patient's updated physical and labs from their PCP.  Coping skills were discussed and a safety plan was provided.  The patient was educated on the risks, benefits, and alternatives of their psychiatric medications.  The patient will engage in psychotherapy at this time.  The patient consents to ongoing medication management.  Risks, benefits discussed.  Patient was hospitalized at [facility] from 10/30/21 until 11/5/21 after suicide attempt by car and OD.  Patient is on leave from work.   Safety plan discussed at length. Encouraged to abstain from alcohol.  2/23/22:patient called, is more anxious, maintains she is abstaining from alcohol and using klonopin as prescribed.  risks, benefits discussed, raise lexapro 30mg po qam (understands above FDA approved dose but has been effective).  Alternatives, discussed, consider d/c wellbutrin and or adding topamax and or gabapentin, and changing abilify to seroquel in the future, f/u next week Discussed risks and benefits of medication changes, SSRI benefit  better than raising klonopin (also mild depressogenic)  2/28:patient improving, will continue current medication  3/9:called feeling more depressed, anxious, RX for klonopin sent again, last filled 2/25, due for refill.  Risks, benefits discussed, will raise wellbutrin xl 300mg po qam, coping skills, safety plan discussed, will f/u next week, earlier as needed  3/14:mild improvement, intermittent symptoms, continues with therapy, would like to continue current medications  3/28/22 PHQ9 14 HAM-Anxiety 29, patient subjectively reports anxiety, due to TASK requirement, menstrual cycle, and [facility], she prefers to continue current medications, encouraged to use klonopin twice daily  4/11/22: patient still depressed, will raise abilify, and follow up in 2 weeks 4/25:fairly stable, continue current medication 5/9/22:improved, no med changes, PHQ9=13 5/23/22:stable on current medication, no changes 6/13/22:patient presents little dysphoric and anxious about court tomorrow, but over last 3 weeks maintains she has been stable, and is not interested in medication change.  She is motivated to put legal issues behind her.  She denies any side effects and maintains she is using all medications as prescribed. 7/25:will titrate abilify for mood 8/8/22:stable, at baseline 8/29/22:increase Wellbutrin for mood 9/12: patient had only few week supply of wellbutrin and klonopin remianing.  patient was provided a safe taper schedule, and maintain she can't afford medications out of pocket.  She was encourage to go to Medicaid office or [facility] financial at [address]  Also, she was informed of [place] walk in clinic and if worsening depression, or SI to go ER or call 911.  As soon as patient obtains prescription benefits to call provider to resume medications.  She will follow up in 1 week. 9/22 :patient has been more anxious, used extra klonopin, will bridge patient with lorazepam 2mg po BID #12 10/12:patient took 8 klonopin OD and was hospitalized, she was restarted on medications below 10/24:was able to obtain all medications, stable at this time, continue to see weekly as available 10/31:more anxious, will resume gabapentin for anxiety/mood, follow up in 1 week 11/7:improved, provided 3 day supply of lorazepam, then resume klonopin 11/12:educated again there is no safe use of alcohol on current medication, and may be referred to substance use program if needed, or to go to AA, otherwise, will continue current medication, had brighter affect, follow up weekly due to risk 11/21:stable on medications, follow up weekly for now 11/28:educated again on risks of alcohol use with current medication, encouraged again to abstain and seek out AA if needed, she maintains she will not drink this week  12/5:doing well, no changes, at baseline 12/12:no complaints, at baseline, RX sent to [facility] 12/19:patient appears at baseline, no alcohol use reported, referred to Neurology 1/9:stable on current medications, no reported alcohol use 1/23:stable, no changes, follow up in 2 weeks 2/6:stable, no changes 2/27:stable on current medications, will begin seeing every 2 weeks, earlier as needed 3/22:at baseline 4/5:stable, no changes, at baseline 4/19: PA initiated for clonazepam 5/17:stable, at baseline, no changes, will be assigned new therapist, provide lab slip for updated metabolic profile 6/7:continue current therapist for now, raise gabapentin for anxiety 7/26:remains stable at baseline 8/2:patient referred by therapist today as she d/c meds and resumed alcohol,patient provided detailed instructions on how to resume medications, and therpaist will provide saftey plan. 8/9:patient resumed medications per instructions, will titrate gabapentin for anxiety 8/23:feels stable, at baseline 9/6:see med changes per plan to help with insomnia, patient will change to new MD for medication management per administration. 10/10/23: Pt reports she has been a patient for 2 years at the clinic. Pt is compliant with medications, reports she uses Klonopin 1mg two to three times a day. Pt states she uses trazodone PRN. Pt feels even. Pt states she does not feel the Klonopin is helpful for her anxiety anymore. Pt continues to report intermittent anxiety as manifested by sensation of impending doom. Pt states this sensation can last for minutes to hours. Pt also reports intermittent racing heart and shakiness. 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 Pt states her  is 22 years older than her and has hx of heart and kidney problems. Pt reports being stressed out as a caregiver for her  as well as an elderly aunt. Pt states she is also worried about her aunt who pt just received a notification that aunt is in the ER for difficulty breathing. Pt states therapy sessions have been going helpful. Pt has coffee daily. Pt is trying to reduce caffeine use at night but states she does work overnights and sometimes needs to rely on it. Pt works 10:30pm to 6:30AM. Pt states she has went up 1 size. Pt states she got BW done in the past 6 months and states it was normal. Pt reports her night schedule sometimes makes it difficult for her to maintain ADL's. Pt states she used to write poems and draw and paint to help with anxiety. Pt states now she finds her mind wandering even more, Pt cites her dog as her main protective factor.  11/7: Pt reports reduction in anxiety since increasing Gabapentin. Pt reports she was unable to  script for increased Klonopin and did not notify provider as well. Pt did not take Klonopin this past month. Pt reports compliance with all other medications. Pt continues to report ongoing impending sense of doom but is able to function at well and maintain ADL's. Pt denies any active or passive SI/HI/AVH at this time.  12/5: Pt reports she has been a little upset as her dog got hurt. Pt reports she has had her dog for 9 years. Pt reports she self increased her gabapentin which helped her anxiety this month. Pt denies any issues with sleep or appetite. Pt denies any active or passive Si/HI/AVH at this time. Pt reports continued benefit from therapy sessions.  12/21 Pt seen and evaluated. Pt reports she is drunk after consuming 3+ shots at a holiday party earlier today at workplace. Pt presented very intoxicated during interview with slurring speech and intermittently falling asleep. Unable to gain accurate history regarding pt's recent overdose on alcohol and Klonopin. Pt reports she has only been taking gabapentin this month and not her other meds. Pt reports she overdosed on a full bottle of Klonopin. Pt reports "NO" when discussing chemical dependency, AA or any substance use treatment. Provider spoke with pt's partner  who is supportive of pt and treatment and cooperative with treatment team. Will resume session next week due to pt current intoxication and unable to conduct proper evaluation at this time.   [TextBox_32] : Patient had recent SI, OD, but  currently denies SI, intent or plan [TextBox_52] : patient had 2 SA, but denies current SI, intent, or plan [TextBox_35] : No violence reported

## 2023-12-21 NOTE — DISCUSSION/SUMMARY
[Date of Last Physical Exam: _____] : Date of Last Physical Exam: [unfilled] [Date of Last Annual Labs: _____] : Date of Last Annual Labs: [unfilled] [Annual Review of Systems Completed?] : Annual Review of Systems Completed: Yes [Tobacco Screening Completed?] : Tobacco Screening Completed: Yes [Date of Last AIMS: _____] : Date of Last AIMS: [unfilled] [Date of Last HbgA1c: _____] : Date of Last HbgA1c: [unfilled] [Date of Last Lipid Profile: _____] : Date of Last Lipid Profile: [unfilled] [Potential impact of patientâ??s physical health conditions on psychiatric care?] : Potential impact of patient's physical health conditions on psychiatric care: No [Does patient require any additional health services or referrals?] : Does patient require any additional health services or referrals: No [FreeTextEntry1] : PLAN:   Therapy amd Medication evaluation DX: OLGA/ BiPolar 2  Pt seen and evaluated. Due to pt's intoxicated state, was unable to gain accurate history and complete evaluation. No medication adjustments were made. Of note pt reports she has only been compliant with gabapentin this month.  NO prescriptions sent at this time as pt has previous supply. Pt agreed to reschedule to next week to resume session.   medication: 1. lexapro 20mg po am 2.  maintain klonopin 1mg po BID prn for anxiety  (NO SCRIPTS SENT TODAY) 4. abilify 10mg po daily 5. wellbutrin xl  150mg po qam 6. gabapentin  to  1200mg TID 7. continue trazodone 50mg-100mg at bedtime (monitor for hypomania)  Follow up in 1 week  Patient has 2 SA, but denies any current SI, intent, or plan.  Safety plan discussed at length, she will reach out to provider or call 911 if any SI, intent, or plan.  She will see therapist weekly and writer every 2-4 weeks, earlier as needed.

## 2023-12-22 DIAGNOSIS — F31.81 BIPOLAR II DISORDER: ICD-10-CM

## 2023-12-22 DIAGNOSIS — F41.1 GENERALIZED ANXIETY DISORDER: ICD-10-CM

## 2023-12-22 DIAGNOSIS — G47.00 INSOMNIA, UNSPECIFIED: ICD-10-CM

## 2023-12-24 ENCOUNTER — EMERGENCY (EMERGENCY)
Facility: HOSPITAL | Age: 48
LOS: 0 days | Discharge: HOME | End: 2023-12-25
Attending: STUDENT IN AN ORGANIZED HEALTH CARE EDUCATION/TRAINING PROGRAM
Payer: COMMERCIAL

## 2023-12-24 VITALS
SYSTOLIC BLOOD PRESSURE: 133 MMHG | RESPIRATION RATE: 18 BRPM | HEIGHT: 67 IN | HEART RATE: 86 BPM | WEIGHT: 164.91 LBS | DIASTOLIC BLOOD PRESSURE: 72 MMHG | TEMPERATURE: 98 F | OXYGEN SATURATION: 99 %

## 2023-12-24 DIAGNOSIS — M25.512 PAIN IN LEFT SHOULDER: ICD-10-CM
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
## 2023-12-24 DIAGNOSIS — Z88.0 ALLERGY STATUS TO PENICILLIN: ICD-10-CM

## 2023-12-24 DIAGNOSIS — F31.9 BIPOLAR DISORDER, UNSPECIFIED: ICD-10-CM

## 2023-12-24 DIAGNOSIS — Z87.891 PERSONAL HISTORY OF NICOTINE DEPENDENCE: ICD-10-CM

## 2023-12-24 DIAGNOSIS — F41.9 ANXIETY DISORDER, UNSPECIFIED: ICD-10-CM
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
## 2023-12-24 DIAGNOSIS — Z90.3 ACQUIRED ABSENCE OF STOMACH [PART OF]: Chronic | ICD-10-CM

## 2023-12-24 PROCEDURE — 73030 X-RAY EXAM OF SHOULDER: CPT | Mod: 26,LT

## 2023-12-24 PROCEDURE — 96372 THER/PROPH/DIAG INJ SC/IM: CPT

## 2023-12-24 PROCEDURE — 99284 EMERGENCY DEPT VISIT MOD MDM: CPT

## 2023-12-24 PROCEDURE — 73030 X-RAY EXAM OF SHOULDER: CPT | Mod: LT

## 2023-12-24 PROCEDURE — 99283 EMERGENCY DEPT VISIT LOW MDM: CPT | Mod: 25

## 2023-12-24 RX ORDER — KETOROLAC TROMETHAMINE 30 MG/ML
30 SYRINGE (ML) INJECTION ONCE
Refills: 0 | Status: DISCONTINUED | OUTPATIENT
Start: 2023-12-24 | End: 2023-12-24

## 2023-12-24 RX ORDER — ACETAMINOPHEN 500 MG
650 TABLET ORAL ONCE
Refills: 0 | Status: COMPLETED | OUTPATIENT
Start: 2023-12-24 | End: 2023-12-24

## 2023-12-24 RX ADMIN — Medication 650 MILLIGRAM(S): at 23:18

## 2023-12-24 RX ADMIN — Medication 30 MILLIGRAM(S): at 23:58

## 2023-12-24 NOTE — ED PROVIDER NOTE - PATIENT PORTAL LINK FT
You can access the FollowMyHealth Patient Portal offered by [facility] by registering at the following website: [email or web address]. By joining [email or web address]’s FollowMyHealth portal, you will also be able to view your health information using other applications (apps) compatible with our system. You can access the FollowMyHealth Patient Portal offered by [facility] by registering at the following website: [email or web address]. By joining [employer]’s FollowMyHealth portal, you will also be able to view your health information using other applications (apps) compatible with our system.

## 2023-12-24 NOTE — ED PROVIDER NOTE - OBJECTIVE STATEMENT
49 yo f with history of bipolar disorder, anxiety, hx of alcohol and benzo abuse presents to ED due to left shoulder pain. pt states this evening had friends over for [employer] party and was doing arm wresting with one of the friends felt a popping sound from the shoulder and has been having pain by internal rotation of the arm, no deformity, no numbness or weakness of the arm or hand. 47 yo f with history of bipolar disorder, anxiety, hx of alcohol and benzo abuse presents to ED due to left shoulder pain. pt states this evening had friends over for [employer] party and was doing arm wresting with one of the friends felt a popping sound from the shoulder and has been having pain by internal rotation of the arm, no deformity, no numbness or weakness of the arm or hand.

## 2023-12-24 NOTE — ED PROVIDER NOTE - NSFOLLOWUPINSTRUCTIONS_ED_ALL_ED_FT
Our Emergency Department Referral Coordinators will be reaching out to you in the next 24-48 hours from 9:00am to 5:00pm with a follow up appointment. Please expect a phone call from the hospital in that time frame. If you do not receive a call or if you have any questions or concerns, you can reach them at   [phone]      Shoulder Pain    WHAT YOU NEED TO KNOW:    Shoulder pain is a common problem that can affect your daily activities. Pain can be caused by a problem within your shoulder, such as soreness of a tendon or bursa. A tendon is a cord of tough tissue that connects your muscles to your bones. The bursa is a fluid-filled sac that acts as a cushion between a bone and a tendon. Shoulder pain may also be caused by pain that spreads to your shoulder from another part of your body.Shoulder Anatomy    Return to the emergency department if:     You have severe pain.    You cannot move your arm or shoulder.    You have numbness or tingling in your shoulder or arm.    Contact your healthcare provider if:     Your pain gets worse or does not go away with treatment.    You have trouble moving your arm or shoulder.    You have questions or concerns about your condition or care.    Medicines: You may need any of the following:     Acetaminophen decreases pain and fever. It is available without a doctor's order. Ask how much to take and how often to take it. Follow directions. Read the labels of all other medicines you are using to see if they also contain acetaminophen, or ask your doctor or pharmacist. Acetaminophen can cause liver damage if not taken correctly. Do not use more than 4 grams (4,000 milligrams) total of acetaminophen in one day.     NSAIDs, such as ibuprofen, help decrease swelling, pain, and fever. This medicine is available with or without a doctor's order. NSAIDs can cause stomach bleeding or kidney problems in certain people. If you take blood thinner medicine, always ask your healthcare provider if NSAIDs are safe for you. Always read the medicine label and follow directions.    Take your medicine as directed. Contact your healthcare provider if you think your medicine is not helping or if you have side effects. Tell him of her if you are allergic to any medicine. Keep a list of the medicines, vitamins, and herbs you take. Include the amounts, and when and why you take them. Bring the list or the pill bottles to follow-up visits. Carry your medicine list with you in case of an emergency.    Manage your symptoms:     Apply ice on your shoulder for 20 to 30 minutes every 2 hours or as directed. Use an ice pack, or put crushed ice in a plastic bag. Cover it with a towel before you apply it to your shoulder. Ice helps prevent tissue damage and decreases swelling and pain.    Apply heat if ice does not help your symptoms. Apply heat on your shoulder for 20 to 30 minutes every 2 hours for as many days as directed. Heat helps decrease pain and muscle spasms.    Limit activities as directed. Try to avoid repeated overhead movements.    Go to physical or occupational therapy as directed. A physical therapist teaches you exercises to help improve movement and strength, and to decrease pain. An occupational therapist teaches you skills to help with your daily activities.     Prevent shoulder pain:     Maintain a good range of motion in your shoulder. Ask your healthcare provider which exercises you should do on a regular basis after you have healed.     Stretch and strengthen your shoulder. Use proper technique during exercises and sports.    Follow up with your healthcare provider or orthopedist as directed: Write down your questions so you remember to ask them during your visits. Our Emergency Department Referral Coordinators will be reaching out to you in the next 24-48 hours from 9:00am to 5:00pm with a follow up appointment. Please expect a phone call from the hospital in that time frame. If you do not receive a call or if you have any questions or concerns, you can reach them at   [phone]      Shoulder Pain    WHAT YOU NEED TO KNOW:    Shoulder pain is a common problem that can affect your daily activities. Pain can be caused by a problem within your shoulder, such as soreness of a tendon or bursa. A tendon is a cord of tough tissue that connects your muscles to your bones. The bursa is a fluid-filled sac that acts as a cushion between a bone and a tendon. Shoulder pain may also be caused by pain that spreads to your shoulder from another part of your body.Shoulder Anatomy    Return to the emergency department if:     You have severe pain.    You cannot move your arm or shoulder.    You have numbness or tingling in your shoulder or arm.    Contact your healthcare provider if:     Your pain gets worse or does not go away with treatment.    You have trouble moving your arm or shoulder.    You have questions or concerns about your condition or care.    Medicines: You may need any of the following:     Acetaminophen decreases pain and fever. It is available without a doctor's order. Ask how much to take and how often to take it. Follow directions. Read the labels of all other medicines you are using to see if they also contain acetaminophen, or ask your doctor or pharmacist. Acetaminophen can cause liver damage if not taken correctly. Do not use more than 4 grams (4,000 milligrams) total of acetaminophen in one day.     NSAIDs, such as ibuprofen, help decrease swelling, pain, and fever. This medicine is available with or without a doctor's order. NSAIDs can cause stomach bleeding or kidney problems in certain people. If you take blood thinner medicine, always ask your healthcare provider if NSAIDs are safe for you. Always read the medicine label and follow directions.    Take your medicine as directed. Contact your healthcare provider if you think your medicine is not helping or if you have side effects. Tell him of her if you are allergic to any medicine. Keep a list of the medicines, vitamins, and herbs you take. Include the amounts, and when and why you take them. Bring the list or the pill bottles to follow-up visits. Carry your medicine list with you in case of an emergency.    Manage your symptoms:     Apply ice on your shoulder for 20 to 30 minutes every 2 hours or as directed. Use an ice pack, or put crushed ice in a plastic bag. Cover it with a towel before you apply it to your shoulder. Ice helps prevent tissue damage and decreases swelling and pain.    Apply heat if ice does not help your symptoms. Apply heat on your shoulder for 20 to 30 minutes every 2 hours for as many days as directed. Heat helps decrease pain and muscle spasms.    Limit activities as directed. Try to avoid repeated overhead movements.    Go to physical or occupational therapy as directed. A physical therapist teaches you exercises to help improve movement and strength, and to decrease pain. An occupational therapist teaches you skills to help with your daily activities.     Prevent shoulder pain:     Maintain a good range of motion in your shoulder. Ask your healthcare provider which exercises you should do on a regular basis after you have healed.     Stretch and strengthen your shoulder. Use proper technique during exercises and sports.    Follow up with your healthcare provider or orthopedist as directed: Write down your questions so you remember to ask them during your visits.

## 2023-12-24 NOTE — ED PROVIDER NOTE - CLINICAL SUMMARY MEDICAL DECISION MAKING FREE TEXT BOX
47 yo f with history of bipolar disorder, anxiety, hx of alcohol and benzo abuse presents to ED due to left shoulder pain. pt states this evening had friends over for [employer] party and was doing arm wresting with one of the friends felt a popping sound from the shoulder and has been having pain by internal rotation of the arm, no deformity, no numbness or weakness of the arm or hand. limited ROM on left shoulder  x ray negative for fx or dislocation  placed on arm sling and discharged with ortho follow up 49 yo f with history of bipolar disorder, anxiety, hx of alcohol and benzo abuse presents to ED due to left shoulder pain. pt states this evening had friends over for [employer] party and was doing arm wresting with one of the friends felt a popping sound from the shoulder and has been having pain by internal rotation of the arm, no deformity, no numbness or weakness of the arm or hand. limited ROM on left shoulder  x ray negative for fx or dislocation  placed on arm sling and discharged with ortho follow up

## 2023-12-24 NOTE — ED PROVIDER NOTE - PHYSICAL EXAMINATION
CON: appears stated age, pleasant, no acute distress, HENMT: normocephalic, atraumatic, anicteric, no conjunctival injection,  CV: regular rhythm, distal pulses intact, RESP: no acute respiratory distress, no stridor, breathing comfortably on RA , GI:  soft, nontender, no rebound, no guarding, SKIN: no wounds MSK: no deformities, ROM limited in the left shoulder due to pain, NEURO: no gross motor or sensory deficit Psychiatric: appropriate mood, appropriate affect

## 2023-12-24 NOTE — ED ADULT NURSE NOTE - NSFALLUNIVINTERV_ED_ALL_ED
Bed/Stretcher in lowest position, wheels locked, appropriate side rails in place/Call bell, personal items and telephone in reach/Instruct patient to call for assistance before getting out of bed/chair/stretcher/Non-slip footwear applied when patient is off stretcher/[place] to call system/Physically safe environment - no spills, clutter or unnecessary equipment/Purposeful proactive rounding/Room/bathroom lighting operational, light cord in reach Bed/Stretcher in lowest position, wheels locked, appropriate side rails in place/Call bell, personal items and telephone in reach/Instruct patient to call for assistance before getting out of bed/chair/stretcher/Non-slip footwear applied when patient is off stretcher/[place] to call system/Physically safe environment - no spills, clutter or unnecessary equipment/Purposeful proactive rounding/Room/bathroom lighting operational, light cord in reach

## 2023-12-24 NOTE — ED PROVIDER NOTE - NS ED ROS FT
Review of Systems    Constitutional: (-) fever  Eyes/ENT: (-) blurry vision, (-) epistaxis  Cardiovascular: (-) chest pain, (-) syncope  Respiratory: (-) cough, (-) shortness of breath  Gastrointestinal: (-) vomiting, (-) diarrhea  Musculoskeletal: (-) neck pain, (-) back pain, (+) joint pain  Integumentary: (-) rash, (-) edema  Neurological: (-) headache, (-) altered mental status  Psychiatric: (-) hallucinations  Allergic/Immunologic: (-) pruritus  All other systems are negative except as mentioned above

## 2023-12-27 ENCOUNTER — APPOINTMENT (OUTPATIENT)
Dept: ORTHOPEDIC SURGERY | Facility: CLINIC | Age: 48
End: 2023-12-27

## 2023-12-27 ENCOUNTER — NON-APPOINTMENT (OUTPATIENT)
Age: 48
End: 2023-12-27

## 2023-12-27 VITALS — BODY MASS INDEX: 25.11 KG/M2 | HEIGHT: 67 IN | WEIGHT: 160 LBS

## 2023-12-27 DIAGNOSIS — S46.912A STRAIN OF UNSPECIFIED MUSCLE, FASCIA AND TENDON AT SHOULDER AND UPPER ARM LEVEL, LEFT ARM, INITIAL ENCOUNTER: ICD-10-CM

## 2023-12-27 PROCEDURE — 99213 OFFICE O/P EST LOW 20 MIN: CPT | Mod: 1L,25

## 2023-12-27 PROCEDURE — 20610 DRAIN/INJ JOINT/BURSA W/O US: CPT | Mod: 1L

## 2023-12-27 RX ORDER — TIZANIDINE 4 MG/1
4 TABLET ORAL
Qty: 30 | Refills: 1 | Status: ACTIVE | COMMUNITY
Start: 2023-12-27 | End: 1900-01-01

## 2023-12-27 RX ORDER — IBUPROFEN 800 MG/1
800 TABLET ORAL 3 TIMES DAILY
Qty: 90 | Refills: 1 | Status: ACTIVE | COMMUNITY
Start: 2023-12-27 | End: 1900-01-01

## 2023-12-27 NOTE — DATA REVIEWED
[FreeTextEntry1] : X-ray images were obtained at [facility].  Images of the left shoulder reveal no acute fractures, dislocations, bony abnormalities

## 2023-12-27 NOTE — PHYSICAL EXAM
[de-identified] : Physical exam of the left shoulder: -No erythema, edema, ecchymosis or acute deformities.  Skin intact -TTP over trapezius muscles left side and posterior GH joint.  No tenderness of humerus, forearm, hand. -Full range of motion of shoulder in all planes with some pain forward flexion and internal rotation -+2 radial pulse, sensation intact to light touch

## 2023-12-27 NOTE — HISTORY OF PRESENT ILLNESS
[de-identified] : 48-year-old female presents for left shoulder pain.  Left-hand-dominant.  Patient was arm wrestling on 12/24/2023, and she heard a pop in the shoulder and her arm went numb.  Since then she has had shooting pain down her arm into her thumb.  No numbness and tingling.  Patient went to the emergency room subsequently after.  X-rays were taken and read as negative for fractures.  Patient has been taking ibuprofen 800 for pain relief.  Denies any past injuries or surgeries to the shoulder.

## 2023-12-27 NOTE — DISCUSSION/SUMMARY
[de-identified] : Patient has a strain of the left shoulder.  At this time, I advised patient that most of her pain seems muscular in nature.  I am prescribing ibuprofen 800 mg and tizanidine 4 mg.  Tizanidine is a muscle laxer to take at night.  Ibuprofen 800 can be taken 3 times a day with food as it can be tough on the stomach.  Patient was also inquiring about a cortisone injection.  I explained that if her pain is only coming from the muscles this injection may not benefit her but she would like to try anyway.  I inserted a cortisone injection with 3 cc lidocaine 1% and 5 cc dexamethasone 4 mg/mL into the subacromial region of the left shoulder using sterile technique.  Ethyl chloride was used as a numbing agent.  Patient tolerated this procedure well.  Patient was encouraged apply heat to the shoulder and the neck as well.  Will follow-up in approximately 1 month for further assessment, at that point if she still having symptoms and MRI may be warranted.  Patient agrees to above plan all questions were answered today

## 2023-12-28 ENCOUNTER — OUTPATIENT (OUTPATIENT)
Dept: OUTPATIENT SERVICES | Facility: HOSPITAL | Age: 48
LOS: 1 days | End: 2023-12-28
Payer: COMMERCIAL

## 2023-12-28 ENCOUNTER — APPOINTMENT (OUTPATIENT)
Dept: PSYCHIATRY | Facility: CLINIC | Age: 48
End: 2023-12-28
Payer: COMMERCIAL

## 2023-12-28 DIAGNOSIS — F41.9 ANXIETY DISORDER, UNSPECIFIED: ICD-10-CM

## 2023-12-28 DIAGNOSIS — Z90.3 ACQUIRED ABSENCE OF STOMACH [PART OF]: Chronic | ICD-10-CM

## 2023-12-28 DIAGNOSIS — F31.81 BIPOLAR II DISORDER: ICD-10-CM

## 2023-12-28 PROCEDURE — 99214 OFFICE O/P EST MOD 30 MIN: CPT | Mod: 95

## 2023-12-28 RX ORDER — TRAZODONE HYDROCHLORIDE 100 MG/1
100 TABLET ORAL
Qty: 30 | Refills: 0 | Status: COMPLETED | COMMUNITY
Start: 2023-09-06 | End: 2023-12-28

## 2023-12-28 RX ORDER — BUPROPION HYDROCHLORIDE 150 MG/1
150 TABLET, EXTENDED RELEASE ORAL DAILY
Qty: 30 | Refills: 0 | Status: COMPLETED | COMMUNITY
Start: 2021-11-29 | End: 2023-12-28

## 2023-12-28 RX ORDER — ESCITALOPRAM OXALATE 20 MG/1
20 TABLET ORAL DAILY
Qty: 90 | Refills: 0 | Status: COMPLETED | COMMUNITY
Start: 2022-10-12 | End: 2023-12-28

## 2023-12-28 RX ORDER — CLONAZEPAM 1 MG/1
1 TABLET ORAL TWICE DAILY
Qty: 60 | Refills: 0 | Status: COMPLETED | COMMUNITY
Start: 2022-12-19 | End: 2023-12-28

## 2023-12-28 NOTE — HISTORY OF PRESENT ILLNESS
[FreeTextEntry1] : This is a 47 year old patient who presents for medication management.  The patient reports stable mood, fair sleep and  appetite.  The patient denies depression, hypomania,  denies psychosis at this time.  The patient has less anxiety that impairs their daily functioning at times. The patient has passive SI at times, but denies any current active suicidal ideation, homicidal ideation, no auditory or visual hallucinations, or paranoid ideation.  The patient has no medical complaints. The patient reported no alcohol use, and is smoking at this time. I requested the patient's updated physical and labs from their PCP.  Coping skills were discussed and a safety plan was provided.  The patient was educated on the risks, benefits, and alternatives of their psychiatric medications.  The patient will engage in psychotherapy at this time.  The patient consents to ongoing medication management.  Risks, benefits discussed.  Patient was hospitalized at [facility] from 10/30/21 until 11/5/21 after suicide attempt by car and OD.  Patient is on leave from work.   Safety plan discussed at length. Encouraged to abstain from alcohol.  2/23/22:patient called, is more anxious, maintains she is abstaining from alcohol and using klonopin as prescribed.  risks, benefits discussed, raise lexapro 30mg po qam (understands above FDA approved dose but has been effective).  Alternatives, discussed, consider d/c wellbutrin and or adding topamax and or gabapentin, and changing abilify to seroquel in the future, f/u next week Discussed risks and benefits of medication changes, SSRI benefit  better than raising klonopin (also mild depressogenic)  2/28:patient improving, will continue current medication  3/9:called feeling more depressed, anxious, RX for klonopin sent again, last filled 2/25, due for refill.  Risks, benefits discussed, will raise wellbutrin xl 300mg po qam, coping skills, safety plan discussed, will f/u next week, earlier as needed  3/14:mild improvement, intermittent symptoms, continues with therapy, would like to continue current medications  3/28/22 PHQ9 14 HAM-Anxiety 29, patient subjectively reports anxiety, due to TASK requirement, menstrual cycle, [facility] she prefers to continue current medications, encouraged to use klonopin twice daily  4/11/22: patient still depressed, will raise abilify, and follow up in 2 weeks 4/25:fairly stable, continue current medication 5/9/22:improved, no med changes, PHQ9=13 5/23/22:stable on current medication, no changes 6/13/22:patient presents little dysphoric and anxious about court tomorrow, but over last 3 weeks maintains she has been stable, and is not interested in medication change.  She is motivated to put legal issues behind her.  She denies any side effects and maintains she is using all medications as prescribed. 7/25:will titrate abilify for mood 8/8/22:stable, at baseline 8/29/22:increase Wellbutrin for mood 9/12: patient had only few week supply of wellbutrin and klonopin remianing.  patient was provided a safe taper schedule, and maintain she can't afford medications out of pocket.  She was encourage to go to Medicaid office or [facility] financial at [address]  Also, she was informed of [place] walk in clinic and if worsening depression, or SI to go ER or call 911.  As soon as patient obtains prescription benefits to call provider to resume medications.  She will follow up in 1 week. 9/22 :patient has been more anxious, used extra klonopin, will bridge patient with lorazepam 2mg po BID #12 10/12:patient took 8 klonopin OD and was hospitalized, she was restarted on medications below 10/24:was able to obtain all medications, stable at this time, continue to see weekly as available 10/31:more anxious, will resume gabapentin for anxiety/mood, follow up in 1 week 11/7:improved, provided 3 day supply of lorazepam, then resume klonopin 11/12:educated again there is no safe use of alcohol on current medication, and may be referred to substance use program if needed, or to go to AA, otherwise, will continue current medication, had brighter affect, follow up weekly due to risk 11/21:stable on medications, follow up weekly for now 11/28:educated again on risks of alcohol use with current medication, encouraged again to abstain and seek out AA if needed, she maintains she will not drink this week  12/5:doing well, no changes, at baseline 12/12:no complaints, at baseline, RX sent to [facility] 12/19:patient appears at baseline, no alcohol use reported, referred to Neurology 1/9:stable on current medications, no reported alcohol use 1/23:stable, no changes, follow up in 2 weeks 2/6:stable, no changes 2/27:stable on current medications, will begin seeing every 2 weeks, earlier as needed 3/22:at baseline 4/5:stable, no changes, at baseline 4/19: PA initiated for clonazepam 5/17:stable, at baseline, no changes, will be assigned new therapist, provide lab slip for updated metabolic profile 6/7:continue current therapist for now, raise gabapentin for anxiety 7/26:remains stable at baseline 8/2:patient referred by therapist today as she d/c meds and resumed alcohol,patient provided detailed instructions on how to resume medications, and therpaist will provide saftey plan. 8/9:patient resumed medications per instructions, will titrate gabapentin for anxiety 8/23:feels stable, at baseline 9/6:see med changes per plan to help with insomnia, patient will change to new MD for medication management per administration. 10/10/23: Pt reports she has been a patient for 2 years at the clinic. Pt is compliant with medications, reports she uses Klonopin 1mg two to three times a day. Pt states she uses trazodone PRN. Pt feels even. Pt states she does not feel the Klonopin is helpful for her anxiety anymore. Pt continues to report intermittent anxiety as manifested by sensation of impending doom. Pt states this sensation can last for minutes to hours. Pt also reports intermittent racing heart and shakiness. 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 Pt states her  is 22 years older than her and has hx of heart and kidney problems. Pt reports being stressed out as a caregiver for her  as well as an elderly aunt. Pt states she is also worried about her aunt who pt just received a notification that aunt is in the ER for difficulty breathing. Pt states therapy sessions have been going helpful. Pt has coffee daily. Pt is trying to reduce caffeine use at night but states she does work overnights and sometimes needs to rely on it. Pt works 10:30pm to 6:30AM. Pt states she has went up 1 size. Pt states she got BW done in the past 6 months and states it was normal. Pt reports her night schedule sometimes makes it difficult for her to maintain ADL's. Pt states she used to write poems and draw and paint to help with anxiety. Pt states now she finds her mind wandering even more, Pt cites her dog as her main protective factor.  11/7: Pt reports reduction in anxiety since increasing Gabapentin. Pt reports she was unable to  script for increased Klonopin and did not notify provider as well. Pt did not take Klonopin this past month. Pt reports compliance with all other medications. Pt continues to report ongoing impending sense of doom but is able to function at well and maintain ADL's. Pt denies any active or passive SI/HI/AVH at this time.  12/5: Pt reports she has been a little upset as her dog got hurt. Pt reports she has had her dog for 9 years. Pt reports she self increased her gabapentin which helped her anxiety this month. Pt denies any issues with sleep or appetite. Pt denies any active or passive Si/HI/AVH at this time. Pt reports continued benefit from therapy sessions.  12/21 Pt seen and evaluated. Pt reports she is drunk after consuming 3+ shots at a holiday party earlier today at workplace. Pt presented very intoxicated during interview with slurring speech and intermittently falling asleep. Unable to gain accurate history regarding pt's recent overdose on alcohol and Klonopin. Pt reports she has only been taking gabapentin this month and not her other meds. Pt reports she overdosed on a full bottle of Klonopin. Pt reports "NO" when discussing chemical dependency, AA or any substance use treatment. Provider spoke with pt's partner  who is supportive of pt and treatment and cooperative with treatment team. Will resume session next week due to pt current intoxication and unable to conduct proper evaluation at this time.   12/28 Pt stopped her medications except for abilify and gabapentin for the past several months. Pt states she was set off by a fight with her step daughter regarding her not taking care of her dog right. Pt states that her recent hospital visit was due to being triggered about talking about her step daughter with her . Pt states she swallowed a bottle of Klonopin which led to hospitalization.  Pt reprots she has been sleeping 4 hours every other night.   [No] : no [TextBox_32] : Patient had recent SI, OD, but  currently denies SI, intent or plan [Yes] : yes [Mood episode] : mood episode [Agitation/ severe anxiety] : agitation/severe anxiety [Perceived burden on family or others] : perceived burden on family or others [Impulsivity] : impulsivity [History of abuse/trauma] : history of abuse/trauma [Anhedonia] : anhedonia [Global insomnia] : global insomnia [Recent loss] : recent loss [Current or pending isolation] : current or pending isolation [Responsibility to family or others] : responsibility to family or others [Identifies reasons for living] : identifies reasons for living [Future oriented] : future oriented [Engaged in work or school] : engaged in work or school [Supportive social network or family] : supportive social network or family [Ability to cope with stress] : ability to cope with stress [TextBox_52] : patient had 2 SA, but denies current SI, intent, or plan [None Known] : none known [Residential stability] : residential stability [Employment stability] : employment stability [TextBox_35] : No violence reported

## 2023-12-28 NOTE — PLAN
[FreeTextEntry4] : mood is fairly stable anxiety is chronic, mild.  health ok, reminded to update labs, physical  goal to remain stable, free of SI, function well at work

## 2023-12-28 NOTE — DISCUSSION/SUMMARY
[Date of Last Physical Exam: _____] : Date of Last Physical Exam: [unfilled] [Date of Last Annual Labs: _____] : Date of Last Annual Labs: [unfilled] [Annual Review of Systems Completed?] : Annual Review of Systems Completed: Yes [Tobacco Screening Completed?] : Tobacco Screening Completed: Yes [Date of Last AIMS: _____] : Date of Last AIMS: [unfilled] [Date of Last HbgA1c: _____] : Date of Last HbgA1c: [unfilled] [Date of Last Lipid Profile: _____] : Date of Last Lipid Profile: [unfilled] [Potential impact of patient’s physical health conditions on psychiatric care?] : Potential impact of patient's physical health conditions on psychiatric care: No [Does patient require any additional health services or referrals?] : Does patient require any additional health services or referrals: No [FreeTextEntry1] : PLAN:   Therapy amd Medication evaluation DX: OLGA/ BiPolar 2  Pt seen and evaluated.Pt reports she has self discontinued trazodone/lexapro and wellbutrin for months now and has only been taking abilify and gabapentin. Pt endorses initial and middle insomnia. Will d/c trazodone and start doxepin for insomnia. Will also d/c lexapro and wellbutrin as pt has been noncompliant and reports medications were ineffective. Will increase abilify at this time to aid with ongoing racing thoughts. Pt was advised that Klonopin will not be restarted for her at this time given pt's active alcohol use. Pt reports she does not want to seek any substance use treatment at this time for her alcohol use.   medication: 1. D/clexapro 20mg po am 2.  D/c klonopin 1mg po BID prn for anxiety  4. Increase abilify to 15mg po daily 5. D/Cwellbutrin xl  150mg po qam 6. Continue gabapentin   1200mg TID 7. D/C trazodone 50mg-100mg at bedtime 8. Start Doxepin 3mg qhs for insomnia  Follow up in 4 weeks  Patient has 2 SA, but denies any current SI, intent, or plan.  Safety plan discussed at length, she will reach out to provider or call 911 if any SI, intent, or plan.  She will see therapist weekly and writer every 2-4 weeks, earlier as needed.

## 2023-12-28 NOTE — PHYSICAL EXAM
Patient was not available for the therapy session at this time.  Reason not seen: Being off the floor at medical test/procedure (09/09/21 1537).    Re-Attempt Plan: Will re-attempt later today (09/09/21 1537).    Patient was not available for the therapy session at this time.  Reason not seen: Sleeping soundly/unarrousable (09/09/21 1700).    Re-Attempt Plan: Will re-attempt tomorrow (09/09/21 1700).   [Anxious] : no anxious [Dysphoric] : dysphoric [Normal] : good [FreeTextEntry8] : anxious [FreeTextEntry9] : constricted

## 2023-12-29 ENCOUNTER — OUTPATIENT (OUTPATIENT)
Dept: OUTPATIENT SERVICES | Facility: HOSPITAL | Age: 48
LOS: 1 days | End: 2023-12-29
Payer: COMMERCIAL

## 2023-12-29 ENCOUNTER — APPOINTMENT (OUTPATIENT)
Dept: PSYCHIATRY | Facility: CLINIC | Age: 48
End: 2023-12-29

## 2023-12-29 DIAGNOSIS — F31.81 BIPOLAR II DISORDER: ICD-10-CM

## 2023-12-29 DIAGNOSIS — F41.9 ANXIETY DISORDER, UNSPECIFIED: ICD-10-CM

## 2023-12-29 DIAGNOSIS — Z90.3 ACQUIRED ABSENCE OF STOMACH [PART OF]: Chronic | ICD-10-CM

## 2023-12-29 PROCEDURE — 90832 PSYTX W PT 30 MINUTES: CPT

## 2023-12-29 NOTE — RISK ASSESSMENT
[No, patient denies ideation or behavior] : No, patient denies ideation or behavior [FreeTextEntry9] : No risk to self or others elicited at this time [Low acute suicide risk] : Low acute suicide risk [FreeTextEntry3] : Reviewed safety plan and Pt is now clinically stable

## 2023-12-29 NOTE — PHYSICAL EXAM
[Cooperative] : cooperative [Euthymic] : euthymic [Angry] : angry [Full] : full [Clear] : clear [Linear/Goal Directed] : linear/goal directed [None Reported] : none reported [WNL] : within normal limits [Average] : average [Moderate] : moderate [de-identified] : unable to assess  [de-identified] :  Mood and thoughts have stabilized and Elaine appears to have improved

## 2023-12-29 NOTE — REASON FOR VISIT
[Access issues (e.g., transportation, impaired mobility, etc.)] : due to patient's access issues [Starting, patient seen in-person within last 6 months] : Telehealth services are being started as patient has seen in-person within last 6 months. [Telehealth (audio & video) - Individual/Group] : This visit was provided via telehealth using real-time 2-way audio visual technology. [Other Location: e.g. Home (Enter Location, City,State)___] : The provider was located at [unfilled]. [Home] : The patient, [unfilled], was located at home, [unfilled], at the time of the visit. [Verbal consent obtained from patient/other participant(s)] : Verbal consent for telehealth/telephonic services obtained from patient/other participant(s) [FreeTextEntry4] : 3:30 pm [FreeTextEntry5] : 4 pm [Patient] : Patient [FreeTextEntry1] : Psychotherapy follow up for bi polar and alcohol use disorders

## 2023-12-29 NOTE — PLAN
[FreeTextEntry2] :    Problem: Abuse of Alcohol Objective: Pt will consider Treatment for her Alcohol abuse [Cognitive and/or Behavior Therapy] : Cognitive and/or Behavior Therapy  [Supportive Therapy] : Supportive Therapy [de-identified] :  Elaine state she has not drank in the past few days and mood is euthymic.  She appears to be at baseline and has stabilized clinically at this time Elaine is interactive, focused and responsive. Mood is calm and euthymic. Thoughts are linear and future oriented Elaine will spend New Year with  and other family.  Elaine states that she finally has been able to sleep and continue s to be in touch with Psychiatrist, Dr Lee to manage her bIpolar and impulsive behaviors Elaine continues to verbalize that she is "not ready" to address her alcohol use via treatment or 12 step programs. [FreeTextEntry1] : PLAN: Next session scheduled for in person session on 1/3

## 2023-12-30 DIAGNOSIS — F31.81 BIPOLAR II DISORDER: ICD-10-CM

## 2024-01-02 NOTE — BH TREATMENT PLAN - NSTXCOPEINTERPR_PSY_ALL_CORE
How Severe Are Your Spot(S)?: mild What Type Of Note Output Would You Prefer (Optional)?: Standard Output What Is The Reason For Today's Visit?: Full Body Skin Examination What Is The Reason For Today's Visit? (Being Monitored For X): concerning skin lesions on an annual basis Patient will be encouraged to participate in 1-2 psych rehab groups daily to improve coping skills.

## 2024-01-03 ENCOUNTER — APPOINTMENT (OUTPATIENT)
Dept: PSYCHIATRY | Facility: CLINIC | Age: 49
End: 2024-01-03

## 2024-01-03 ENCOUNTER — OUTPATIENT (OUTPATIENT)
Dept: OUTPATIENT SERVICES | Facility: HOSPITAL | Age: 49
LOS: 1 days | End: 2024-01-03
Payer: COMMERCIAL

## 2024-01-03 DIAGNOSIS — F31.81 BIPOLAR II DISORDER: ICD-10-CM

## 2024-01-03 DIAGNOSIS — Z90.3 ACQUIRED ABSENCE OF STOMACH [PART OF]: Chronic | ICD-10-CM

## 2024-01-03 PROCEDURE — 90832 PSYTX W PT 30 MINUTES: CPT

## 2024-01-03 NOTE — PLAN
[FreeTextEntry2] :    Problem: Abuse of Alcohol Objective: Pt will consider Treatment for her Alcohol abuse Progress: Pt has stabilized and reports she has been sober this past week [Cognitive and/or Behavior Therapy] : Cognitive and/or Behavior Therapy  [Supportive Therapy] : Supportive Therapy [de-identified] :  Elaine state she has not drank in the past few days and mood is euthymic.  She appears to be at baseline and has stabilized clinically at this time Elaine is interactive, focused and responsive. Mood is calm and euthymic. Thoughts are linear and future oriented Elaine will spend New Year with  and other family.  Elaine states that she finally has been able to sleep and continue s to be in touch with Psychiatrist, Dr Lee to manage her bIpolar and impulsive behaviors Elaine continues to verbalize that she is "not ready" to address her alcohol use via treatment or 12 step programs. Elaine is now dealing with her 's medical issues and he is back in the Hospital  She is concerned about him but  her mood is clinically stable at this time [FreeTextEntry1] : PLAN: Next session scheduled for 1/8

## 2024-01-03 NOTE — PHYSICAL EXAM
[Cooperative] : cooperative [Euthymic] : euthymic [Full] : full [Clear] : clear [Linear/Goal Directed] : linear/goal directed [None Reported] : none reported [WNL] : within normal limits [Average] : average [Moderate] : moderate [de-identified] : unable to assess  [de-identified] :  Mood and thoughts have stabilized and Elaine appears to have improved

## 2024-01-04 DIAGNOSIS — F31.81 BIPOLAR II DISORDER: ICD-10-CM

## 2024-01-08 ENCOUNTER — APPOINTMENT (OUTPATIENT)
Dept: PSYCHIATRY | Facility: CLINIC | Age: 49
End: 2024-01-08

## 2024-01-08 ENCOUNTER — OUTPATIENT (OUTPATIENT)
Dept: OUTPATIENT SERVICES | Facility: HOSPITAL | Age: 49
LOS: 1 days | End: 2024-01-08
Payer: COMMERCIAL

## 2024-01-08 DIAGNOSIS — F31.81 BIPOLAR II DISORDER: ICD-10-CM

## 2024-01-08 DIAGNOSIS — Z90.3 ACQUIRED ABSENCE OF STOMACH [PART OF]: Chronic | ICD-10-CM

## 2024-01-08 PROCEDURE — 90832 PSYTX W PT 30 MINUTES: CPT

## 2024-01-08 NOTE — PHYSICAL EXAM
[Cooperative] : cooperative [Euthymic] : euthymic [Full] : full [Clear] : clear [Linear/Goal Directed] : linear/goal directed [None Reported] : none reported [WNL] : within normal limits [Average] : average [Moderate] : moderate [de-identified] : unable to assess  [de-identified] :  Mood and thoughts have stabilized and Elaine appears to have improved

## 2024-01-08 NOTE — PLAN
[Cognitive and/or Behavior Therapy] : Cognitive and/or Behavior Therapy  [Supportive Therapy] : Supportive Therapy [FreeTextEntry2] :    Problem: Abuse of Alcohol Objective: Pt will consider Treatment for her Alcohol abuse Progress: Pt has stabilized and reports she has been sober this past week Pt has been drinking  "once in a blue moon" socially [de-identified] :  Elaine state she has drank "once in a blue moon" this past week and mood is bright due to "it was a good week"   Her husbands surprise Birthday party is scheduled for this coming Friday and Elaine looks forward to the event      PT is interactive,  focused and responsive. Mood is calm and euthymic. Thoughts are linear and future oriented Elaine states that she finally has been able to sleep and continue s to be in touch with Psychiatrist, Dr Lee to manage her bIpolar and impulsive behaviors Elaine continues to verbalize that she is "not ready" to address her alcohol use via treatment or 12 step programs.   [FreeTextEntry1] : PLAN: Next session scheduled for 1/22

## 2024-01-09 DIAGNOSIS — F31.81 BIPOLAR II DISORDER: ICD-10-CM

## 2024-01-22 ENCOUNTER — APPOINTMENT (OUTPATIENT)
Dept: PSYCHIATRY | Facility: CLINIC | Age: 49
End: 2024-01-22

## 2024-01-22 ENCOUNTER — OUTPATIENT (OUTPATIENT)
Dept: OUTPATIENT SERVICES | Facility: HOSPITAL | Age: 49
LOS: 1 days | End: 2024-01-22
Payer: COMMERCIAL

## 2024-01-22 DIAGNOSIS — Z90.3 ACQUIRED ABSENCE OF STOMACH [PART OF]: Chronic | ICD-10-CM

## 2024-01-22 DIAGNOSIS — F31.81 BIPOLAR II DISORDER: ICD-10-CM

## 2024-01-22 PROCEDURE — 90834 PSYTX W PT 45 MINUTES: CPT

## 2024-01-22 NOTE — RISK ASSESSMENT
[No, patient denies ideation or behavior] : No, patient denies ideation or behavior [FreeTextEntry9] : No risk to self or others elicited  [Low acute suicide risk] : Low acute suicide risk

## 2024-01-22 NOTE — PHYSICAL EXAM
[Cooperative] : cooperative [Euthymic] : euthymic [Full] : full [Clear] : clear [Linear/Goal Directed] : linear/goal directed [None Reported] : none reported [WNL] : within normal limits [Average] : average [Moderate] : moderate [de-identified] : unable to assess

## 2024-01-22 NOTE — PLAN
[FreeTextEntry2] :    Problem: Abuse of Alcohol Objective: Pt will consider Treatment for her Alcohol abuse Progress: Pt has stabilized and reports she has been sober this past week Pt has been drinking  "once in a blue moon" socially [Cognitive and/or Behavior Therapy] : Cognitive and/or Behavior Therapy  [Supportive Therapy] : Supportive Therapy [de-identified] :  Elaine continues to admit  drinking "once in a blue moon"   She denies any problematic drinking  PT is interactive, focused and responsive.  Elaine admits to general anxiety and difficulty with quiet and nonactivity    Thoughts are linear and future oriented  Elaine is responsive to CB exercises and is encouraged to journal when she is aware of this state as well as how her body is responding. Deep breath exercises are done in session and PT is encouraged to practice daily [FreeTextEntry1] : PLAN: Journal, practice CB exercises RTC 1/29

## 2024-01-23 ENCOUNTER — OUTPATIENT (OUTPATIENT)
Dept: OUTPATIENT SERVICES | Facility: HOSPITAL | Age: 49
LOS: 1 days | End: 2024-01-23
Payer: COMMERCIAL

## 2024-01-23 ENCOUNTER — APPOINTMENT (OUTPATIENT)
Dept: PSYCHIATRY | Facility: CLINIC | Age: 49
End: 2024-01-23
Payer: COMMERCIAL

## 2024-01-23 DIAGNOSIS — F31.81 BIPOLAR II DISORDER: ICD-10-CM

## 2024-01-23 DIAGNOSIS — F41.1 GENERALIZED ANXIETY DISORDER: ICD-10-CM

## 2024-01-23 DIAGNOSIS — Z90.3 ACQUIRED ABSENCE OF STOMACH [PART OF]: Chronic | ICD-10-CM

## 2024-01-23 DIAGNOSIS — F41.9 ANXIETY DISORDER, UNSPECIFIED: ICD-10-CM

## 2024-01-23 DIAGNOSIS — G47.00 INSOMNIA, UNSPECIFIED: ICD-10-CM

## 2024-01-23 PROCEDURE — 99214 OFFICE O/P EST MOD 30 MIN: CPT | Mod: 95

## 2024-01-23 NOTE — REASON FOR VISIT
Unresponsive - The patient is nonverbal and does not respond even when a painful stimulus is applied. [Telehealth (audio & video) - Individual/Group] : This visit was provided via telehealth using real-time 2-way audio visual technology. [Other Location: e.g. Home (Enter Location, City,State)___] : The provider was located at [unfilled]. [Home] : The patient, [unfilled], was located at home, [unfilled], at the time of the visit. [Verbal consent obtained from patient/other participant(s)] : Verbal consent for telehealth/telephonic services obtained from patient/other participant(s)

## 2024-01-23 NOTE — HISTORY OF PRESENT ILLNESS
[No] : no [Yes] : yes [Mood episode] : mood episode [Agitation/ severe anxiety] : agitation/severe anxiety [Perceived burden on family or others] : perceived burden on family or others [Impulsivity] : impulsivity [History of abuse/trauma] : history of abuse/trauma [Anhedonia] : anhedonia [Global insomnia] : global insomnia [Recent loss] : recent loss [Current or pending isolation] : current or pending isolation [Responsibility to family or others] : responsibility to family or others [Identifies reasons for living] : identifies reasons for living [Future oriented] : future oriented [Engaged in work or school] : engaged in work or school [Ability to cope with stress] : ability to cope with stress [Supportive social network or family] : supportive social network or family [None Known] : none known [Residential stability] : residential stability [Employment stability] : employment stability [FreeTextEntry1] : This is a 47 year old patient who presents for medication management.  The patient reports stable mood, fair sleep and  appetite.  The patient denies depression, hypomania,  denies psychosis at this time.  The patient has less anxiety that impairs their daily functioning at times. The patient has passive SI at times, but denies any current active suicidal ideation, homicidal ideation, no auditory or visual hallucinations, or paranoid ideation.  The patient has no medical complaints. The patient reported no alcohol use, and is smoking at this time. I requested the patient's updated physical and labs from their PCP.  Coping skills were discussed and a safety plan was provided.  The patient was educated on the risks, benefits, and alternatives of their psychiatric medications.  The patient will engage in psychotherapy at this time.  The patient consents to ongoing medication management.  Risks, benefits discussed.  Patient was hospitalized at [facility] from 10/30/21 until 11/5/21 after suicide attempt by car and OD.  Patient is on leave from work.   Safety plan discussed at length. Encouraged to abstain from alcohol.  2/23/22:patient called, is more anxious, maintains she is abstaining from alcohol and using klonopin as prescribed.  risks, benefits discussed, raise lexapro 30mg po qam (understands above FDA approved dose but has been effective).  Alternatives, discussed, consider d/c wellbutrin and or adding topamax and or gabapentin, and changing abilify to seroquel in the future, f/u next week Discussed risks and benefits of medication changes, SSRI benefit  better than raising klonopin (also mild depressogenic)  2/28:patient improving, will continue current medication  3/9:called feeling more depressed, anxious, RX for klonopin sent again, last filled 2/25, due for refill.  Risks, benefits discussed, will raise wellbutrin xl 300mg po qam, coping skills, safety plan discussed, will f/u next week, earlier as needed  3/14:mild improvement, intermittent symptoms, continues with therapy, would like to continue current medications  3/28/22 PHQ9 14 HAM-Anxiety 29, patient subjectively reports anxiety, due to TASK requirement, menstrual cycle, [facility], she prefers to continue current medications, encouraged to use klonopin twice daily  4/11/22: patient still depressed, will raise abilify, and follow up in 2 weeks 4/25:fairly stable, continue current medication 5/9/22:improved, no med changes, PHQ9=13 5/23/22:stable on current medication, no changes 6/13/22:patient presents little dysphoric and anxious about court tomorrow, but over last 3 weeks maintains she has been stable, and is not interested in medication change.  She is motivated to put legal issues behind her.  She denies any side effects and maintains she is using all medications as prescribed. 7/25:will titrate abilify for mood 8/8/22:stable, at baseline 8/29/22:increase Wellbutrin for mood 9/12: patient had only few week supply of wellbutrin and klonopin remianing.  patient was provided a safe taper schedule, and maintain she can't afford medications out of pocket.  She was encourage to go to Medicaid office or [facility] financial at [address]  Also, she was informed of [facility] and if worsening depression, or SI to go ER or call 911.  As soon as patient obtains prescription benefits to call provider to resume medications.  She will follow up in 1 week. 9/22 :patient has been more anxious, used extra klonopin, will bridge patient with lorazepam 2mg po BID #12 10/12:patient took 8 klonopin OD and was hospitalized, she was restarted on medications below 10/24:was able to obtain all medications, stable at this time, continue to see weekly as available 10/31:more anxious, will resume gabapentin for anxiety/mood, follow up in 1 week 11/7:improved, provided 3 day supply of lorazepam, then resume klonopin 11/12:educated again there is no safe use of alcohol on current medication, and may be referred to substance use program if needed, or to go to AA, otherwise, will continue current medication, had brighter affect, follow up weekly due to risk 11/21:stable on medications, follow up weekly for now 11/28:educated again on risks of alcohol use with current medication, encouraged again to abstain and seek out AA if needed, she maintains she will not drink this week  12/5:doing well, no changes, at baseline 12/12:no complaints, at baseline, RX sent to [facility] 12/19:patient appears at baseline, no alcohol use reported, referred to Neurology 1/9:stable on current medications, no reported alcohol use 1/23:stable, no changes, follow up in 2 weeks 2/6:stable, no changes 2/27:stable on current medications, will begin seeing every 2 weeks, earlier as needed 3/22:at baseline 4/5:stable, no changes, at baseline 4/19: PA initiated for clonazepam 5/17:stable, at baseline, no changes, will be assigned new therapist, provide lab slip for updated metabolic profile 6/7:continue current therapist for now, raise gabapentin for anxiety 7/26:remains stable at baseline 8/2:patient referred by therapist today as she d/c meds and resumed alcohol,patient provided detailed instructions on how to resume medications, and therpaist will provide saftey plan. 8/9:patient resumed medications per instructions, will titrate gabapentin for anxiety 8/23:feels stable, at baseline 9/6:see med changes per plan to help with insomnia, patient will change to new MD for medication management per administration. 10/10/23: Pt reports she has been a patient for 2 years at the clinic. Pt is compliant with medications, reports she uses Klonopin 1mg two to three times a day. Pt states she uses trazodone PRN. Pt feels even. Pt states she does not feel the Klonopin is helpful for her anxiety anymore. Pt continues to report intermittent anxiety as manifested by sensation of impending doom. Pt states this sensation can last for minutes to hours. Pt also reports intermittent racing heart and shakiness. 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 Pt states her  is 22 years older than her and has hx of heart and kidney problems. Pt reports being stressed out as a caregiver for her  as well as an elderly aunt. Pt states she is also worried about her aunt who pt just received a notification that aunt is in the ER for difficulty breathing. Pt states therapy sessions have been going helpful. Pt has coffee daily. Pt is trying to reduce caffeine use at night but states she does work overnights and sometimes needs to rely on it. Pt works 10:30pm to 6:30AM. Pt states she has went up 1 size. Pt states she got BW done in the past 6 months and states it was normal. Pt reports her night schedule sometimes makes it difficult for her to maintain ADL's. Pt states she used to write poems and draw and paint to help with anxiety. Pt states now she finds her mind wandering even more, Pt cites her dog as her main protective factor.  11/7: Pt reports reduction in anxiety since increasing Gabapentin. Pt reports she was unable to  script for increased Klonopin and did not notify provider as well. Pt did not take Klonopin this past month. Pt reports compliance with all other medications. Pt continues to report ongoing impending sense of doom but is able to function at well and maintain ADL's. Pt denies any active or passive SI/HI/AVH at this time.  12/5: Pt reports she has been a little upset as her dog got hurt. Pt reports she has had her dog for 9 years. Pt reports she self increased her gabapentin which helped her anxiety this month. Pt denies any issues with sleep or appetite. Pt denies any active or passive Si/HI/AVH at this time. Pt reports continued benefit from therapy sessions.  12/21 Pt seen and evaluated. Pt reports she is drunk after consuming 3+ shots at a holiday party earlier today at workplace. Pt presented very intoxicated during interview with slurring speech and intermittently falling asleep. Unable to gain accurate history regarding pt's recent overdose on alcohol and Klonopin. Pt reports she has only been taking gabapentin this month and not her other meds. Pt reports she overdosed on a full bottle of Klonopin. Pt reports "NO" when discussing chemical dependency, AA or any substance use treatment. Provider spoke with pt's partner  who is supportive of pt and treatment and cooperative with treatment team. Will resume session next week due to pt current intoxication and unable to conduct proper evaluation at this time.   12/28 Pt stopped her medications except for abilify and gabapentin for the past several months. Pt states she was set off by a fight with her step daughter regarding her not taking care of her dog right. Pt states that her recent hospital visit was due to being triggered about talking about her step daughter with her . Pt states she swallowed a bottle of Klonopin which led to hospitalization.  Pt reprots she has been sleeping 4 hours every other night.   1/23 Pt reports feeling anxious and on edge during the day however denies any obvious triggers or stressors. Pt reports her sleep has improved alot since starting the Doxepin. Pt reports she uses it 3x weekly on nights when she is off. Pt will take it by 8pm and fall asleep by 9:30pm. Pt will wake up twice during the night and is awake for 15 minutes but is able to fall back asleep. Pt reports getting 8 hours of sleep in a night and feels more well rested. Pt denies any active or passive SI/Hi/AVH at this time.    [TextBox_32] : Patient had recent SI, OD, but  currently denies SI, intent or plan [TextBox_52] : patient had 2 SA, but denies current SI, intent, or plan [TextBox_35] : No violence reported

## 2024-01-23 NOTE — DISCUSSION/SUMMARY
[Date of Last Physical Exam: _____] : Date of Last Physical Exam: [unfilled] [Date of Last Annual Labs: _____] : Date of Last Annual Labs: [unfilled] [Annual Review of Systems Completed?] : Annual Review of Systems Completed: Yes [Tobacco Screening Completed?] : Tobacco Screening Completed: Yes [Date of Last AIMS: _____] : Date of Last AIMS: [unfilled] [Date of Last HbgA1c: _____] : Date of Last HbgA1c: [unfilled] [Date of Last Lipid Profile: _____] : Date of Last Lipid Profile: [unfilled] [Potential impact of patientâ??s physical health conditions on psychiatric care?] : Potential impact of patient's physical health conditions on psychiatric care: No [Does patient require any additional health services or referrals?] : Does patient require any additional health services or referrals: No [FreeTextEntry1] : PLAN:   Therapy amd Medication evaluation DX: OLGA/ BiPolar 2  Pt reports significantly improved sleep since starting doxepin. Will start Propranolol PRN to aid with breakthrough anxiety. Pt in agreement.   medication: 1. Start Propranolol 10mg BID PRN for anxiety  4. Continue abilify  15mg po daily 3. Continue gabapentin   1200mg TID 4 Continue Doxepin 3mg qhs for insomnia 5. Continue therapy  Follow up in 4 weeks  Patient has 2 SA, but denies any current SI, intent, or plan.  Safety plan discussed at length, she will reach out to provider or call 911 if any SI, intent, or plan.  She will see therapist weekly and writer every 2-4 weeks, earlier as needed.

## 2024-01-24 DIAGNOSIS — G47.00 INSOMNIA, UNSPECIFIED: ICD-10-CM

## 2024-01-24 DIAGNOSIS — F31.81 BIPOLAR II DISORDER: ICD-10-CM

## 2024-01-24 DIAGNOSIS — F41.1 GENERALIZED ANXIETY DISORDER: ICD-10-CM

## 2024-01-25 ENCOUNTER — APPOINTMENT (OUTPATIENT)
Dept: ORTHOPEDIC SURGERY | Facility: CLINIC | Age: 49
End: 2024-01-25

## 2024-01-29 ENCOUNTER — APPOINTMENT (OUTPATIENT)
Dept: PSYCHIATRY | Facility: CLINIC | Age: 49
End: 2024-01-29

## 2024-01-29 ENCOUNTER — OUTPATIENT (OUTPATIENT)
Dept: OUTPATIENT SERVICES | Facility: HOSPITAL | Age: 49
LOS: 1 days | End: 2024-01-29
Payer: COMMERCIAL

## 2024-01-29 DIAGNOSIS — Z90.3 ACQUIRED ABSENCE OF STOMACH [PART OF]: Chronic | ICD-10-CM

## 2024-01-29 DIAGNOSIS — F31.81 BIPOLAR II DISORDER: ICD-10-CM

## 2024-01-29 PROCEDURE — 90832 PSYTX W PT 30 MINUTES: CPT

## 2024-01-29 NOTE — RISK ASSESSMENT
[No, patient denies ideation or behavior] : No, patient denies ideation or behavior [FreeTextEntry9] : no risk to self or others elicited [Low acute suicide risk] : Low acute suicide risk [Yes] : Safety Plan completed/updated (for individuals at risk): Yes

## 2024-01-29 NOTE — PLAN
[FreeTextEntry2] :    Problem: Abuse of Alcohol Objective: Pt will consider Treatment for her Alcohol abuse Progress: Pt has stabilized and reports she has been sober this past week Pt has been drinking  "once in a blue moon" socially Progress   1/29/24  Pt reports sobriety this week Mood has been stable [Cognitive and/or Behavior Therapy] : Cognitive and/or Behavior Therapy  [Supportive Therapy] : Supportive Therapy [de-identified] :  PT is interactive, focused and responsive.  Elaine admits to general anxiety and difficulty with quiet and nonactivity    Thoughts are linear and future oriented  Elaine is responsive to CB exercises and is encouraged to journal when she is aware of this state as well as how her body is responding. Deep breath exercises are done in session and PT is encouraged to practice daily Elaine has been responding well to the CB exercises and new imagery is added to help with soothing Patient's anxiety [FreeTextEntry1] : PLAN: Next session scheduled for 2/5 Elaine will practice CB exercises daily

## 2024-01-29 NOTE — PHYSICAL EXAM
[Cooperative] : cooperative [Euthymic] : euthymic [Full] : full [Clear] : clear [Linear/Goal Directed] : linear/goal directed [None Reported] : none reported [WNL] : within normal limits [Average] : average [Moderate] : moderate [de-identified] : unable to assess  [de-identified] : improved mood  Pt reports sobriety

## 2024-01-30 DIAGNOSIS — F31.81 BIPOLAR II DISORDER: ICD-10-CM

## 2024-02-05 ENCOUNTER — APPOINTMENT (OUTPATIENT)
Dept: PSYCHIATRY | Facility: CLINIC | Age: 49
End: 2024-02-05

## 2024-02-05 ENCOUNTER — OUTPATIENT (OUTPATIENT)
Dept: OUTPATIENT SERVICES | Facility: HOSPITAL | Age: 49
LOS: 1 days | End: 2024-02-05
Payer: COMMERCIAL

## 2024-02-05 DIAGNOSIS — Z90.3 ACQUIRED ABSENCE OF STOMACH [PART OF]: Chronic | ICD-10-CM

## 2024-02-05 DIAGNOSIS — F31.81 BIPOLAR II DISORDER: ICD-10-CM

## 2024-02-05 PROCEDURE — 90832 PSYTX W PT 30 MINUTES: CPT

## 2024-02-05 NOTE — PHYSICAL EXAM
[Cooperative] : cooperative [Euthymic] : euthymic [Full] : full [Clear] : clear [Linear/Goal Directed] : linear/goal directed [None Reported] : none reported [WNL] : within normal limits [Average] : average [Moderate] : moderate [de-identified] : unable to assess  [de-identified] : Improved mood  Pt reports sobriety

## 2024-02-06 DIAGNOSIS — F31.81 BIPOLAR II DISORDER: ICD-10-CM

## 2024-02-12 ENCOUNTER — APPOINTMENT (OUTPATIENT)
Dept: PSYCHIATRY | Facility: CLINIC | Age: 49
End: 2024-02-12

## 2024-02-12 ENCOUNTER — OUTPATIENT (OUTPATIENT)
Dept: OUTPATIENT SERVICES | Facility: HOSPITAL | Age: 49
LOS: 1 days | End: 2024-02-12
Payer: COMMERCIAL

## 2024-02-12 DIAGNOSIS — Z90.3 ACQUIRED ABSENCE OF STOMACH [PART OF]: Chronic | ICD-10-CM

## 2024-02-12 DIAGNOSIS — F31.81 BIPOLAR II DISORDER: ICD-10-CM

## 2024-02-12 PROCEDURE — 90832 PSYTX W PT 30 MINUTES: CPT

## 2024-02-12 NOTE — PLAN
[FreeTextEntry2] :   Problem: Addictive shopping /PT IS IN DEBT Objective: PT WILL JOURNAL AND MAKE A PLAN FOR MANAGING DEBT PROBLEM: Abuse of alcohol Objective: Maintain sobriety   [Cognitive and/or Behavior Therapy] : Cognitive and/or Behavior Therapy  [Supportive Therapy] : Supportive Therapy [de-identified] :  PT is interactive, focused and responsive. pt reports sobriety and less anxiety  due to new medication.  Elaine reports she is not tempted to drink due to anxiety being under control     Thoughts are linear and future oriented We explored issues of addictive shopping and ignoring the fact of a large debvt  Elaine is responsive to CB exercises and is encouraged to journal when she is aware of this state as well as how her body is responding. Deep breath exercises are done in session and PT is encouraged to practice daily Elaine is introduced to journaling as a coping tool for creating goals. She is responsive to this intervention Elaine has been responding well to the CB exercises and new imagery is added to help with soothing Patient's anxiety [FreeTextEntry1] : PLAN: Next session scheduled for 2/26

## 2024-02-12 NOTE — PHYSICAL EXAM
[Cooperative] : cooperative [Euthymic] : euthymic [Full] : full [Clear] : clear [Linear/Goal Directed] : linear/goal directed [None Reported] : none reported [WNL] : within normal limits [Average] : average [Moderate] : moderate [de-identified] : unable to assess  [de-identified] : Improved mood  Pt reports sobriety

## 2024-02-13 DIAGNOSIS — F31.81 BIPOLAR II DISORDER: ICD-10-CM

## 2024-02-20 ENCOUNTER — APPOINTMENT (OUTPATIENT)
Dept: PSYCHIATRY | Facility: CLINIC | Age: 49
End: 2024-02-20
Payer: COMMERCIAL

## 2024-02-20 ENCOUNTER — OUTPATIENT (OUTPATIENT)
Dept: OUTPATIENT SERVICES | Facility: HOSPITAL | Age: 49
LOS: 1 days | End: 2024-02-20
Payer: COMMERCIAL

## 2024-02-20 DIAGNOSIS — G47.00 INSOMNIA, UNSPECIFIED: ICD-10-CM

## 2024-02-20 DIAGNOSIS — F31.81 BIPOLAR II DISORDER: ICD-10-CM

## 2024-02-20 DIAGNOSIS — Z90.3 ACQUIRED ABSENCE OF STOMACH [PART OF]: Chronic | ICD-10-CM

## 2024-02-20 DIAGNOSIS — F41.9 ANXIETY DISORDER, UNSPECIFIED: ICD-10-CM

## 2024-02-20 DIAGNOSIS — F41.1 GENERALIZED ANXIETY DISORDER: ICD-10-CM

## 2024-02-20 PROCEDURE — 99213 OFFICE O/P EST LOW 20 MIN: CPT | Mod: 95

## 2024-02-20 NOTE — DISCUSSION/SUMMARY
[Date of Last Physical Exam: _____] : Date of Last Physical Exam: [unfilled] [Date of Last Annual Labs: _____] : Date of Last Annual Labs: [unfilled] [Annual Review of Systems Completed?] : Annual Review of Systems Completed: Yes [Tobacco Screening Completed?] : Tobacco Screening Completed: Yes [Date of Last AIMS: _____] : Date of Last AIMS: [unfilled] [Date of Last HbgA1c: _____] : Date of Last HbgA1c: [unfilled] [Date of Last Lipid Profile: _____] : Date of Last Lipid Profile: [unfilled] [Potential impact of patient’s physical health conditions on psychiatric care?] : Potential impact of patient's physical health conditions on psychiatric care: No [Does patient require any additional health services or referrals?] : Does patient require any additional health services or referrals: No [FreeTextEntry1] : PLAN:   Therapy amd Medication evaluation DX: OLGA/ BiPolar 2  Continue current med regimen due to ongoing benefit, pt in agreement.   medication: 1. Continue Propranolol 10mg BID PRN for anxiety  4. Continue abilify  15mg po daily 3. Continue gabapentin   1200mg TID 4 Continue Doxepin 3mg qhs PRN for insomnia 5. Continue therapy  Follow up in 4 weeks  Patient has 2 SA, but denies any current SI, intent, or plan.  Safety plan discussed at length, she will reach out to provider or call 911 if any SI, intent, or plan.  She will see therapist weekly and writer every 2-4 weeks, earlier as needed.

## 2024-02-20 NOTE — HISTORY OF PRESENT ILLNESS
[FreeTextEntry1] : This is a 47 year old patient who presents for medication management.  The patient reports stable mood, fair sleep and  appetite.  The patient denies depression, hypomania,  denies psychosis at this time.  The patient has less anxiety that impairs their daily functioning at times. The patient has passive SI at times, but denies any current active suicidal ideation, homicidal ideation, no auditory or visual hallucinations, or paranoid ideation.  The patient has no medical complaints. The patient reported no alcohol use, and is smoking at this time. I requested the patient's updated physical and labs from their PCP.  Coping skills were discussed and a safety plan was provided.  The patient was educated on the risks, benefits, and alternatives of their psychiatric medications.  The patient will engage in psychotherapy at this time.  The patient consents to ongoing medication management.  Risks, benefits discussed.  Patient was hospitalized at [facility] from 10/30/21 until 11/5/21 after suicide attempt by car and OD.  Patient is on leave from work.   Safety plan discussed at length. Encouraged to abstain from alcohol.  2/23/22:patient called, is more anxious, maintains she is abstaining from alcohol and using klonopin as prescribed.  risks, benefits discussed, raise lexapro 30mg po qam (understands above FDA approved dose but has been effective).  Alternatives, discussed, consider d/c wellbutrin and or adding topamax and or gabapentin, and changing abilify to seroquel in the future, f/u next week Discussed risks and benefits of medication changes, SSRI benefit  better than raising klonopin (also mild depressogenic)  2/28:patient improving, will continue current medication  3/9:called feeling more depressed, anxious, RX for klonopin sent again, last filled 2/25, due for refill.  Risks, benefits discussed, will raise wellbutrin xl 300mg po qam, coping skills, safety plan discussed, will f/u next week, earlier as needed  3/14:mild improvement, intermittent symptoms, continues with therapy, would like to continue current medications  3/28/22 PHQ9 14 HAM-Anxiety 29, patient subjectively reports anxiety, due to TASK requirement, menstrual cycle, and [facility], she prefers to continue current medications, encouraged to use klonopin twice daily  4/11/22: patient still depressed, will raise abilify, and follow up in 2 weeks 4/25:fairly stable, continue current medication 5/9/22:improved, no med changes, PHQ9=13 5/23/22:stable on current medication, no changes 6/13/22:patient presents little dysphoric and anxious about court tomorrow, but over last 3 weeks maintains she has been stable, and is not interested in medication change.  She is motivated to put legal issues behind her.  She denies any side effects and maintains she is using all medications as prescribed. 7/25:will titrate abilify for mood 8/8/22:stable, at baseline 8/29/22:increase Wellbutrin for mood 9/12: patient had only few week supply of wellbutrin and klonopin remianing.  patient was provided a safe taper schedule, and maintain she can't afford medications out of pocket.  She was encourage to go to Medicaid office or [facility] financial at [address]  Also, she was informed of [facility] and if worsening depression, or SI to go ER or call 911.  As soon as patient obtains prescription benefits to call provider to resume medications.  She will follow up in 1 week. 9/22 :patient has been more anxious, used extra klonopin, will bridge patient with lorazepam 2mg po BID #12 10/12:patient took 8 klonopin OD and was hospitalized, she was restarted on medications below 10/24:was able to obtain all medications, stable at this time, continue to see weekly as available 10/31:more anxious, will resume gabapentin for anxiety/mood, follow up in 1 week 11/7:improved, provided 3 day supply of lorazepam, then resume klonopin 11/12:educated again there is no safe use of alcohol on current medication, and may be referred to substance use program if needed, or to go to AA, otherwise, will continue current medication, had brighter affect, follow up weekly due to risk 11/21:stable on medications, follow up weekly for now 11/28:educated again on risks of alcohol use with current medication, encouraged again to abstain and seek out AA if needed, she maintains she will not drink this week  12/5:doing well, no changes, at baseline 12/12:no complaints, at baseline, RX sent to [facility] 12/19:patient appears at baseline, no alcohol use reported, referred to Neurology 1/9:stable on current medications, no reported alcohol use 1/23:stable, no changes, follow up in 2 weeks 2/6:stable, no changes 2/27:stable on current medications, will begin seeing every 2 weeks, earlier as needed 3/22:at baseline 4/5:stable, no changes, at baseline 4/19: PA initiated for clonazepam 5/17:stable, at baseline, no changes, will be assigned new therapist, provide lab slip for updated metabolic profile 6/7:continue current therapist for now, raise gabapentin for anxiety 7/26:remains stable at baseline 8/2:patient referred by therapist today as she d/c meds and resumed alcohol,patient provided detailed instructions on how to resume medications, and therpaist will provide saftey plan. 8/9:patient resumed medications per instructions, will titrate gabapentin for anxiety 8/23:feels stable, at baseline 9/6:see med changes per plan to help with insomnia, patient will change to new MD for medication management per administration. 10/10/23: Pt reports she has been a patient for 2 years at the clinic. Pt is compliant with medications, reports she uses Klonopin 1mg two to three times a day. Pt states she uses trazodone PRN. Pt feels even. Pt states she does not feel the Klonopin is helpful for her anxiety anymore. Pt continues to report intermittent anxiety as manifested by sensation of impending doom. Pt states this sensation can last for minutes to hours. Pt also reports intermittent racing heart and shakiness. 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 Pt states her  is 22 years older than her and has hx of heart and kidney problems. Pt reports being stressed out as a caregiver for her  as well as an elderly aunt. Pt states she is also worried about her aunt who pt just received a notification that aunt is in the ER for difficulty breathing. Pt states therapy sessions have been going helpful. Pt has coffee daily. Pt is trying to reduce caffeine use at night but states she does work overnights and sometimes needs to rely on it. Pt works 10:30pm to 6:30AM. Pt states she has went up 1 size. Pt states she got BW done in the past 6 months and states it was normal. Pt reports her night schedule sometimes makes it difficult for her to maintain ADL's. Pt states she used to write poems and draw and paint to help with anxiety. Pt states now she finds her mind wandering even more, Pt cites her dog as her main protective factor.  11/7: Pt reports reduction in anxiety since increasing Gabapentin. Pt reports she was unable to  script for increased Klonopin and did not notify provider as well. Pt did not take Klonopin this past month. Pt reports compliance with all other medications. Pt continues to report ongoing impending sense of doom but is able to function at well and maintain ADL's. Pt denies any active or passive SI/HI/AVH at this time.  12/5: Pt reports she has been a little upset as her dog got hurt. Pt reports she has had her dog for 9 years. Pt reports she self increased her gabapentin which helped her anxiety this month. Pt denies any issues with sleep or appetite. Pt denies any active or passive Si/HI/AVH at this time. Pt reports continued benefit from therapy sessions.  12/21 Pt seen and evaluated. Pt reports she is drunk after consuming 3+ shots at a holiday party earlier today at workplace. Pt presented very intoxicated during interview with slurring speech and intermittently falling asleep. Unable to gain accurate history regarding pt's recent overdose on alcohol and Klonopin. Pt reports she has only been taking gabapentin this month and not her other meds. Pt reports she overdosed on a full bottle of Klonopin. Pt reports "NO" when discussing chemical dependency, AA or any substance use treatment. Provider spoke with pt's partner  who is supportive of pt and treatment and cooperative with treatment team. Will resume session next week due to pt current intoxication and unable to conduct proper evaluation at this time.   12/28 Pt stopped her medications except for abilify and gabapentin for the past several months. Pt states she was set off by a fight with her step daughter regarding her not taking care of her dog right. Pt states that her recent hospital visit was due to being triggered about talking about her step daughter with her . Pt states she swallowed a bottle of Klonopin which led to hospitalization.  Pt reprots she has been sleeping 4 hours every other night.   1/23 Pt reports feeling anxious and on edge during the day however denies any obvious triggers or stressors. Pt reports her sleep has improved alot since starting the Doxepin. Pt reports she uses it 3x weekly on nights when she is off. Pt will take it by 8pm and fall asleep by 9:30pm. Pt will wake up twice during the night and is awake for 15 minutes but is able to fall back asleep. Pt reports getting 8 hours of sleep in a night and feels more well rested. Pt denies any active or passive SI/Hi/AVH at this time.   2/20 Pt seen and evaluated. Pt reports immense improvement in breakthrough anxiety since starting Propranolol PRN and reports that it is more effective than previous medication Klonopin. Pt reports benefit from all other medication. Pt reports only rare social use of alcohol and denies any issues or concerns with alcohol use this month. Pt states "I feel like a whole new person". Pt reports good sleep with use of doxepin PRN. Pt denies any active or passive SI/Hi/AVH at this time. Pt reports therapy sessions are going well.   [No] : no [TextBox_32] : Patient had recent SI, OD, but  currently denies SI, intent or plan [Yes] : yes [Mood episode] : mood episode [Agitation/ severe anxiety] : agitation/severe anxiety [Perceived burden on family or others] : perceived burden on family or others [Impulsivity] : impulsivity [History of abuse/trauma] : history of abuse/trauma [Anhedonia] : anhedonia [Global insomnia] : global insomnia [Recent loss] : recent loss [Current or pending isolation] : current or pending isolation [Responsibility to family or others] : responsibility to family or others [Identifies reasons for living] : identifies reasons for living [Future oriented] : future oriented [Engaged in work or school] : engaged in work or school [Supportive social network or family] : supportive social network or family [Ability to cope with stress] : ability to cope with stress [TextBox_52] : patient had 2 SA, but denies current SI, intent, or plan [None Known] : none known [Residential stability] : residential stability [Employment stability] : employment stability [TextBox_35] : No violence reported

## 2024-02-20 NOTE — PHYSICAL EXAM
[Anxious] : no anxious [Dysphoric] : dysphoric [Normal] : good [FreeTextEntry8] : anxious [FreeTextEntry9] : constricted

## 2024-02-21 DIAGNOSIS — F31.81 BIPOLAR II DISORDER: ICD-10-CM

## 2024-02-21 DIAGNOSIS — F41.1 GENERALIZED ANXIETY DISORDER: ICD-10-CM

## 2024-02-21 DIAGNOSIS — G47.00 INSOMNIA, UNSPECIFIED: ICD-10-CM

## 2024-02-26 ENCOUNTER — APPOINTMENT (OUTPATIENT)
Dept: PSYCHIATRY | Facility: CLINIC | Age: 49
End: 2024-02-26

## 2024-02-26 ENCOUNTER — OUTPATIENT (OUTPATIENT)
Dept: OUTPATIENT SERVICES | Facility: HOSPITAL | Age: 49
LOS: 1 days | End: 2024-02-26
Payer: COMMERCIAL

## 2024-02-26 DIAGNOSIS — Z90.3 ACQUIRED ABSENCE OF STOMACH [PART OF]: Chronic | ICD-10-CM

## 2024-02-26 DIAGNOSIS — F31.81 BIPOLAR II DISORDER: ICD-10-CM

## 2024-02-26 PROCEDURE — 90832 PSYTX W PT 30 MINUTES: CPT

## 2024-02-26 NOTE — PLAN
[FreeTextEntry2] :   Problem: Addictive shopping /PT IS IN DEBT Objective: PT WILL JOURNAL AND MAKE A PLAN FOR MANAGING DEBT(Pt is saving money in an unbreakable [employer])  PROBLEM: Abuse of alcohol Objective: Maintain sobriety   [Cognitive and/or Behavior Therapy] : Cognitive and/or Behavior Therapy  [Supportive Therapy] : Supportive Therapy [de-identified] :  PT is interactive, focused and responsive. pt reports sobriety and less anxiety  due to new medication.  Elaine reports she is not tempted to drink due to anxiety being under control     Thoughts are linear and future oriented We explored issues of addictive shopping and ignoring the fact of a large debvt  Elaine is responsive to CB exercises and is encouraged to journal when she is aware of this state as well as how her body is responding. Deep breath exercises are done in session and PT is encouraged to practice daily Elaien is introduced to journaling as a coping tool for creating goals. She is responsive to this intervention Elaine has been responding well to the CB exercises and new imagery is added to help with soothing Patient's anxiety [FreeTextEntry1] : Plan: Next session scheduled for 3/4 Pt will call if any changes or concerns

## 2024-02-26 NOTE — REASON FOR VISIT
[Patient] : Patient [FreeTextEntry1] :  Psychotherapy follow up for anxiety and alcohol abuse (in remission)

## 2024-02-26 NOTE — PHYSICAL EXAM
[Cooperative] : cooperative [Euthymic] : euthymic [Full] : full [Clear] : clear [Linear/Goal Directed] : linear/goal directed [None Reported] : none reported [WNL] : within normal limits [Average] : average [Moderate] : moderate [de-identified] : unable to assess  [de-identified] : Improved mood  Pt reports sobriety

## 2024-02-27 DIAGNOSIS — F31.81 BIPOLAR II DISORDER: ICD-10-CM

## 2024-03-04 ENCOUNTER — APPOINTMENT (OUTPATIENT)
Dept: PSYCHIATRY | Facility: CLINIC | Age: 49
End: 2024-03-04

## 2024-03-04 ENCOUNTER — OUTPATIENT (OUTPATIENT)
Dept: OUTPATIENT SERVICES | Facility: HOSPITAL | Age: 49
LOS: 1 days | End: 2024-03-04
Payer: COMMERCIAL

## 2024-03-04 DIAGNOSIS — Z90.3 ACQUIRED ABSENCE OF STOMACH [PART OF]: Chronic | ICD-10-CM

## 2024-03-04 DIAGNOSIS — F31.81 BIPOLAR II DISORDER: ICD-10-CM

## 2024-03-04 PROCEDURE — 90832 PSYTX W PT 30 MINUTES: CPT

## 2024-03-04 NOTE — PHYSICAL EXAM
[Cooperative] : cooperative [Euthymic] : euthymic [Full] : full [Clear] : clear [Linear/Goal Directed] : linear/goal directed [None Reported] : none reported [WNL] : within normal limits [Average] : average [Moderate] : moderate [de-identified] : unable to assess  [de-identified] : Improved mood  Pt reports sobriety

## 2024-03-04 NOTE — PLAN
[FreeTextEntry2] :   Problem: Addictive shopping /PT IS IN DEBT Objective: PT WILL JOURNAL AND MAKE A PLAN FOR MANAGING DEBT(Pt is saving money in an unbreakable [employer])  PROBLEM: Abuse of alcohol Objective: Maintain sobriety   [Cognitive and/or Behavior Therapy] : Cognitive and/or Behavior Therapy  [Supportive Therapy] : Supportive Therapy [de-identified] :  PT is interactive, focused and responsive. pt reports sobriety and less anxiety  due to new medication. At this time  is in Medical IP due to episode of his cardiac and kidney conditions  Elaine reports she is not tempted to drink due to anxiety being under control     Thoughts are linear and future oriented We explored issues of addictive shopping and ignoring the fact of a large debt  Elaine is responsive to CB exercises and is encouraged to journal when she is aware of this state as well as how her body is responding. Deep breath exercises are done in session and PT is encouraged to practice daily Elaine reports she is doing calming exercises frequently, using deep breath and progressive relaxation.  She uses imagery to decrease stress (grey ball)   [FreeTextEntry1] : Plan: next session scheduled for 1 week, 3/11

## 2024-03-04 NOTE — RISK ASSESSMENT
[No, patient denies ideation or behavior] : No, patient denies ideation or behavior [FreeTextEntry9] : No risk for harm to self or others

## 2024-03-05 DIAGNOSIS — F31.81 BIPOLAR II DISORDER: ICD-10-CM

## 2024-03-11 ENCOUNTER — OUTPATIENT (OUTPATIENT)
Dept: OUTPATIENT SERVICES | Facility: HOSPITAL | Age: 49
LOS: 1 days | End: 2024-03-11
Payer: COMMERCIAL

## 2024-03-11 ENCOUNTER — APPOINTMENT (OUTPATIENT)
Dept: PSYCHIATRY | Facility: CLINIC | Age: 49
End: 2024-03-11

## 2024-03-11 DIAGNOSIS — Z90.3 ACQUIRED ABSENCE OF STOMACH [PART OF]: Chronic | ICD-10-CM

## 2024-03-11 DIAGNOSIS — F31.81 BIPOLAR II DISORDER: ICD-10-CM

## 2024-03-11 PROCEDURE — 90832 PSYTX W PT 30 MINUTES: CPT

## 2024-03-11 NOTE — PLAN
[Cognitive and/or Behavior Therapy] : Cognitive and/or Behavior Therapy  [Supportive Therapy] : Supportive Therapy [Recommended Frequency of Visits: ____] : Recommended frequency of visits: [unfilled] [FreeTextEntry2] :   Problem: Addictive shopping /PT IS IN DEBT Objective: PT WILL JOURNAL AND MAKE A PLAN FOR MANAGING DEBT(Pt is saving money in an unbreakable [employer])  PROBLEM: Abuse of alcohol Objective: Maintain sobriety   [de-identified] :  PT is interactive, focused and responsive. pt reports sobriety and less anxiety  due to new medication. At this time  is in Medical IP due to episode of his cardiac and kidney conditions  Patricia reports she is not tempted to drink due to anxiety being under control     Thoughts are linear and future oriented We explored issues of addictive shopping and ignoring the fact of a large debt  Patricia is responsive to CB exercises and is encouraged to journal when she is aware of this state as well as how her body is responding. Deep breath exercises are done in session and PT is encouraged to practice daily patricia continues to mnge occasional episodes of anxiety and dysthymia with Coping skills and imagery Patricia reports she is doing calming exercises frequently, using deep breath and progressive relaxation.  She uses imagery to decrease stress (long ball)  Patricia is engaged in discussion of how to resolve debt and we explored some options like credit counseling, etc [FreeTextEntry1] : Plan: Next session scheduled for 3/25 due to time constraints

## 2024-03-11 NOTE — ED PROVIDER NOTE - OBJECTIVE STATEMENT
Pt is a 44y/o female presents today for eval of depressed mood x 1 month that has been constant. Pt was started on Zoloft by PCP with no relief of mood. Pt denies SI/HI, Drug/alcohol use. No acute findings

## 2024-03-11 NOTE — PHYSICAL EXAM
[Well groomed] : well groomed [Cooperative] : cooperative [Euthymic] : euthymic [Full] : full [Clear] : clear [Linear/Goal Directed] : linear/goal directed [None Reported] : none reported [WNL] : within normal limits [Average] : average [Moderate] : moderate [de-identified] : unable to assess  [de-identified] : Improved mood  Pt reports sobriety

## 2024-03-12 DIAGNOSIS — F31.81 BIPOLAR II DISORDER: ICD-10-CM

## 2024-03-13 ENCOUNTER — APPOINTMENT (OUTPATIENT)
Dept: ORTHOPEDIC SURGERY | Facility: CLINIC | Age: 49
End: 2024-03-13

## 2024-03-19 ENCOUNTER — OUTPATIENT (OUTPATIENT)
Dept: OUTPATIENT SERVICES | Facility: HOSPITAL | Age: 49
LOS: 1 days | End: 2024-03-19
Payer: COMMERCIAL

## 2024-03-19 ENCOUNTER — APPOINTMENT (OUTPATIENT)
Dept: PSYCHIATRY | Facility: CLINIC | Age: 49
End: 2024-03-19
Payer: COMMERCIAL

## 2024-03-19 DIAGNOSIS — Z90.3 ACQUIRED ABSENCE OF STOMACH [PART OF]: Chronic | ICD-10-CM

## 2024-03-19 DIAGNOSIS — F31.81 BIPOLAR II DISORDER: ICD-10-CM

## 2024-03-19 DIAGNOSIS — F41.9 ANXIETY DISORDER, UNSPECIFIED: ICD-10-CM

## 2024-03-19 PROCEDURE — 99214 OFFICE O/P EST MOD 30 MIN: CPT | Mod: 95
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
## 2024-03-19 NOTE — DISCUSSION/SUMMARY
[Date of Last Physical Exam: _____] : Date of Last Physical Exam: [unfilled] [Date of Last Annual Labs: _____] : Date of Last Annual Labs: [unfilled] [Annual Review of Systems Completed?] : Annual Review of Systems Completed: Yes [Tobacco Screening Completed?] : Tobacco Screening Completed: Yes [Date of Last AIMS: _____] : Date of Last AIMS: [unfilled] [Date of Last HbgA1c: _____] : Date of Last HbgA1c: [unfilled] [Date of Last Lipid Profile: _____] : Date of Last Lipid Profile: [unfilled] [Does patient require any additional health services or referrals?] : Does patient require any additional health services or referrals: No [Potential impact of patient’s physical health conditions on psychiatric care?] : Potential impact of patient's physical health conditions on psychiatric care: No [FreeTextEntry1] : PLAN:   Therapy amd Medication evaluation DX: OLGA/ BiPolar 2  Pt requests to try increase in Doxepin PRN to aid with sleep. Will increase Doxepin PRN and continue all other meds at this time.   medication: 1. Continue Propranolol 10mg BID PRN for anxiety  4. Continue abilify  15mg po daily 3. Continue gabapentin   1200mg TID 4 increase Doxepin to 6mg qhs PRN for insomnia 5. Continue therapy  Follow up in 4 weeks  Patient has 2 SA, but denies any current SI, intent, or plan.  Safety plan discussed at length, she will reach out to provider or call 911 if any SI, intent, or plan.  She will see therapist weekly and writer every 2-4 weeks, earlier as needed.
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
## 2024-03-20 DIAGNOSIS — F41.9 ANXIETY DISORDER, UNSPECIFIED: ICD-10-CM

## 2024-03-20 DIAGNOSIS — F31.81 BIPOLAR II DISORDER: ICD-10-CM

## 2024-03-25 ENCOUNTER — APPOINTMENT (OUTPATIENT)
Dept: PSYCHIATRY | Facility: CLINIC | Age: 49
End: 2024-03-25

## 2024-03-25 ENCOUNTER — OUTPATIENT (OUTPATIENT)
Dept: OUTPATIENT SERVICES | Facility: HOSPITAL | Age: 49
LOS: 1 days | End: 2024-03-25
Payer: COMMERCIAL

## 2024-03-25 DIAGNOSIS — Z90.3 ACQUIRED ABSENCE OF STOMACH [PART OF]: Chronic | ICD-10-CM

## 2024-03-25 DIAGNOSIS — F31.81 BIPOLAR II DISORDER: ICD-10-CM

## 2024-03-25 PROCEDURE — 90832 PSYTX W PT 30 MINUTES: CPT
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
## 2024-03-25 NOTE — PHYSICAL EXAM
[Well groomed] : well groomed [Cooperative] : cooperative [Euthymic] : euthymic [Full] : full [Clear] : clear [Linear/Goal Directed] : linear/goal directed [None Reported] : none reported [WNL] : within normal limits [Average] : average [Moderate] : moderate [de-identified] : unable to assess  [de-identified] : I   Pt reports she is mostly abstaining from alcohol except for 2 glasses of wine yesterday.

## 2024-03-26 DIAGNOSIS — F31.81 BIPOLAR II DISORDER: ICD-10-CM

## 2024-04-03 ENCOUNTER — APPOINTMENT (OUTPATIENT)
Dept: PSYCHIATRY | Facility: CLINIC | Age: 49
End: 2024-04-03

## 2024-04-03 ENCOUNTER — OUTPATIENT (OUTPATIENT)
Dept: OUTPATIENT SERVICES | Facility: HOSPITAL | Age: 49
LOS: 1 days | End: 2024-04-03
Payer: COMMERCIAL

## 2024-04-03 DIAGNOSIS — Z90.3 ACQUIRED ABSENCE OF STOMACH [PART OF]: Chronic | ICD-10-CM

## 2024-04-03 DIAGNOSIS — F31.9 BIPOLAR DISORDER, UNSPECIFIED: ICD-10-CM

## 2024-04-03 DIAGNOSIS — F31.81 BIPOLAR II DISORDER: ICD-10-CM

## 2024-04-03 PROCEDURE — 90832 PSYTX W PT 30 MINUTES: CPT

## 2024-04-03 NOTE — PHYSICAL EXAM
[Well groomed] : well groomed [Cooperative] : cooperative [Euthymic] : euthymic [Full] : full [Clear] : clear [Linear/Goal Directed] : linear/goal directed [None Reported] : none reported [WNL] : within normal limits [Average] : average [Moderate] : moderate [de-identified] : I   Pt reports she is mostly abstaining from alcohol  [de-identified] : unable to assess
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
## 2024-04-04 DIAGNOSIS — F31.9 BIPOLAR DISORDER, UNSPECIFIED: ICD-10-CM

## 2024-04-08 ENCOUNTER — OUTPATIENT (OUTPATIENT)
Dept: OUTPATIENT SERVICES | Facility: HOSPITAL | Age: 49
LOS: 1 days | End: 2024-04-08
Payer: COMMERCIAL

## 2024-04-08 ENCOUNTER — APPOINTMENT (OUTPATIENT)
Dept: PSYCHIATRY | Facility: CLINIC | Age: 49
End: 2024-04-08

## 2024-04-08 DIAGNOSIS — Z90.3 ACQUIRED ABSENCE OF STOMACH [PART OF]: Chronic | ICD-10-CM

## 2024-04-08 DIAGNOSIS — F31.9 BIPOLAR DISORDER, UNSPECIFIED: ICD-10-CM

## 2024-04-08 PROCEDURE — 90832 PSYTX W PT 30 MINUTES: CPT
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
## 2024-04-08 NOTE — PHYSICAL EXAM
[Well groomed] : well groomed [Cooperative] : cooperative [Anxious] : anxious [Constricted] : constricted [Clear] : clear [Linear/Goal Directed] : linear/goal directed [None Reported] : none reported [WNL] : within normal limits [Average] : average [Moderate] : moderate [de-identified] : unable to assess  [de-identified] : I   Pt reports she is mostly abstaining from alcohol

## 2024-04-09 DIAGNOSIS — F31.9 BIPOLAR DISORDER, UNSPECIFIED: ICD-10-CM

## 2024-04-15 ENCOUNTER — APPOINTMENT (OUTPATIENT)
Dept: PSYCHIATRY | Facility: CLINIC | Age: 49
End: 2024-04-15

## 2024-04-15 ENCOUNTER — OUTPATIENT (OUTPATIENT)
Dept: OUTPATIENT SERVICES | Facility: HOSPITAL | Age: 49
LOS: 1 days | End: 2024-04-15
Payer: COMMERCIAL

## 2024-04-15 DIAGNOSIS — F31.9 BIPOLAR DISORDER, UNSPECIFIED: ICD-10-CM

## 2024-04-15 DIAGNOSIS — Z90.3 ACQUIRED ABSENCE OF STOMACH [PART OF]: Chronic | ICD-10-CM

## 2024-04-15 PROCEDURE — 90832 PSYTX W PT 30 MINUTES: CPT

## 2024-04-15 NOTE — PLAN
[FreeTextEntry2] :   Problem: Addictive shopping /PT IS IN DEBT Objective: PT WILL JOURNAL AND MAKE A PLAN FOR MANAGING DEBT(Pt is saving money in an unbreakable [employer])  PROBLEM: Abuse of alcohol Objective: Maintain sobriety Problem: Smoking Pt has reduced her smoking to 1-2 cigarettes per day as opposed to 1 pack per day Pt has substituted vaping for smoking Pt is not willing to stop       [Cognitive and/or Behavior Therapy] : Cognitive and/or Behavior Therapy  [Supportive Therapy] : Supportive Therapy [de-identified] :  PT is interactive, focused and responsive.    PT continues to report abstinence from alcohol and not shopping no addictive shopping this week She continues to Vape daily and is not willing to stop Elaine is alert, focused but states she is feeling "BLAH" Elaine has changed her eating and has lost 5 LBS.  She is provided  with encouragement and progress is validated [FreeTextEntry1] : PLAN: Next session in 1 week. 4/22

## 2024-04-15 NOTE — PHYSICAL EXAM
[Well groomed] : well groomed [Cooperative] : cooperative [Anxious] : anxious [Constricted] : constricted [Clear] : clear [Linear/Goal Directed] : linear/goal directed [None Reported] : none reported [WNL] : within normal limits [Average] : average [Moderate] : moderate [de-identified] : unable to assess  [de-identified] : I   Pt reports she is continuing to abstain from alcohol

## 2024-04-16 ENCOUNTER — APPOINTMENT (OUTPATIENT)
Dept: PSYCHIATRY | Facility: CLINIC | Age: 49
End: 2024-04-16
Payer: COMMERCIAL

## 2024-04-16 ENCOUNTER — OUTPATIENT (OUTPATIENT)
Dept: OUTPATIENT SERVICES | Facility: HOSPITAL | Age: 49
LOS: 1 days | End: 2024-04-16
Payer: COMMERCIAL

## 2024-04-16 DIAGNOSIS — Z90.3 ACQUIRED ABSENCE OF STOMACH [PART OF]: Chronic | ICD-10-CM

## 2024-04-16 DIAGNOSIS — F33.42 MAJOR DEPRESSIVE DISORDER, RECURRENT, IN FULL REMISSION: ICD-10-CM

## 2024-04-16 DIAGNOSIS — F31.81 BIPOLAR II DISORDER: ICD-10-CM

## 2024-04-16 DIAGNOSIS — G47.00 INSOMNIA, UNSPECIFIED: ICD-10-CM

## 2024-04-16 DIAGNOSIS — F41.1 GENERALIZED ANXIETY DISORDER: ICD-10-CM

## 2024-04-16 DIAGNOSIS — F31.9 BIPOLAR DISORDER, UNSPECIFIED: ICD-10-CM

## 2024-04-16 PROCEDURE — 99214 OFFICE O/P EST MOD 30 MIN: CPT | Mod: 95

## 2024-04-16 RX ORDER — DOXEPIN 3 MG/1
3 TABLET, FILM COATED ORAL AT BEDTIME
Qty: 60 | Refills: 0 | Status: COMPLETED | COMMUNITY
Start: 2023-12-28 | End: 2024-04-16

## 2024-04-16 NOTE — DISCUSSION/SUMMARY
[Date of Last Physical Exam: _____] : Date of Last Physical Exam: [unfilled] [Date of Last Annual Labs: _____] : Date of Last Annual Labs: [unfilled] [Annual Review of Systems Completed?] : Annual Review of Systems Completed: Yes [Tobacco Screening Completed?] : Tobacco Screening Completed: Yes [Date of Last AIMS: _____] : Date of Last AIMS: [unfilled] [Date of Last HbgA1c: _____] : Date of Last HbgA1c: [unfilled] [Date of Last Lipid Profile: _____] : Date of Last Lipid Profile: [unfilled] [Potential impact of patient’s physical health conditions on psychiatric care?] : Potential impact of patient's physical health conditions on psychiatric care: No [Does patient require any additional health services or referrals?] : Does patient require any additional health services or referrals: No [FreeTextEntry1] : PLAN:   Therapy amd Medication evaluation DX: OLGA/ BiPolar 2  Will d/c doxepin as pt reports it is no longer effective for sleep. Of note pt continues to endorse significant nicotine vape usage, provider discussed risks of vape usage including potential to affect sleep cycle. Pt reports motivation to reduce vape usage with goal of improving sleep. Continue all other meds at this time due to ongoing benefit.   medication: 1. Continue Propranolol 10mg BID PRN for anxiety  4. Continue abilify  15mg po daily 3. Continue gabapentin   1200mg TID 4 D/C Doxepin 6mg qhs PRN for insomnia 5. Continue therapy  Follow up in 4 weeks  Patient has 2 SA, but denies any current SI, intent, or plan.  Safety plan discussed at length, she will reach out to provider or call 911 if any SI, intent, or plan.  She will see therapist weekly and writer every 2-4 weeks, earlier as needed.
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
## 2024-04-16 NOTE — RESULTS/DATA
[FreeTextEntry1] : This is a 45 year old female, , living alone. She denies any past treatment for mental Health,, She ha d been prescribed Zoloft by PCP but reports negative reaction\par   She presents with Major depressive symptoms. anhedonia, irritability, loss of interest and motivation, insomna. 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 She also reports lonliness but has a good support system. \par  DX:Major depressive episode\par   OLGA\par  /(Ro bi polar2) \par    Elaine requests therapy and medication evaluation oral

## 2024-04-17 DIAGNOSIS — F41.1 GENERALIZED ANXIETY DISORDER: ICD-10-CM

## 2024-04-17 DIAGNOSIS — G47.00 INSOMNIA, UNSPECIFIED: ICD-10-CM

## 2024-04-17 DIAGNOSIS — F31.81 BIPOLAR II DISORDER: ICD-10-CM

## 2024-04-22 ENCOUNTER — APPOINTMENT (OUTPATIENT)
Dept: PSYCHIATRY | Facility: CLINIC | Age: 49
End: 2024-04-22

## 2024-04-22 ENCOUNTER — OUTPATIENT (OUTPATIENT)
Dept: OUTPATIENT SERVICES | Facility: HOSPITAL | Age: 49
LOS: 1 days | End: 2024-04-22
Payer: COMMERCIAL

## 2024-04-22 DIAGNOSIS — F31.81 BIPOLAR II DISORDER: ICD-10-CM

## 2024-04-22 DIAGNOSIS — Z90.3 ACQUIRED ABSENCE OF STOMACH [PART OF]: Chronic | ICD-10-CM

## 2024-04-22 PROCEDURE — 90832 PSYTX W PT 30 MINUTES: CPT

## 2024-04-22 NOTE — PHYSICAL EXAM
[Well groomed] : well groomed [Cooperative] : cooperative [Euthymic] : euthymic [Constricted] : constricted [Clear] : clear [Linear/Goal Directed] : linear/goal directed [None Reported] : none reported [WNL] : within normal limits [Average] : average [Moderate] : moderate [de-identified] : unable to assess  [de-identified] : I   Pt reports she is continuing to abstain from alcohol as well as compulsive shopping
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
## 2024-04-23 DIAGNOSIS — F31.81 BIPOLAR II DISORDER: ICD-10-CM

## 2024-04-29 ENCOUNTER — APPOINTMENT (OUTPATIENT)
Dept: PSYCHIATRY | Facility: CLINIC | Age: 49
End: 2024-04-29

## 2024-04-29 ENCOUNTER — OUTPATIENT (OUTPATIENT)
Dept: OUTPATIENT SERVICES | Facility: HOSPITAL | Age: 49
LOS: 1 days | End: 2024-04-29
Payer: COMMERCIAL

## 2024-04-29 DIAGNOSIS — F43.0 ACUTE STRESS REACTION: ICD-10-CM

## 2024-04-29 DIAGNOSIS — F31.81 BIPOLAR II DISORDER: ICD-10-CM

## 2024-04-29 DIAGNOSIS — Z90.3 ACQUIRED ABSENCE OF STOMACH [PART OF]: Chronic | ICD-10-CM

## 2024-04-29 PROCEDURE — 90832 PSYTX W PT 30 MINUTES: CPT
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
## 2024-04-29 NOTE — PHYSICAL EXAM
[Well groomed] : well groomed [Cooperative] : cooperative [Euthymic] : euthymic [Constricted] : constricted [Clear] : clear [Linear/Goal Directed] : linear/goal directed [None Reported] : none reported [WNL] : within normal limits [Average] : average [Moderate] : moderate [de-identified] : unable to assess  [de-identified] : I   Pt reports she is continuing to abstain from alcohol as well as compulsive shopping

## 2024-04-30 DIAGNOSIS — F31.81 BIPOLAR II DISORDER: ICD-10-CM

## 2024-05-06 ENCOUNTER — OUTPATIENT (OUTPATIENT)
Dept: OUTPATIENT SERVICES | Facility: HOSPITAL | Age: 49
LOS: 1 days | End: 2024-05-06
Payer: COMMERCIAL

## 2024-05-06 ENCOUNTER — APPOINTMENT (OUTPATIENT)
Dept: PSYCHIATRY | Facility: CLINIC | Age: 49
End: 2024-05-06

## 2024-05-06 DIAGNOSIS — F31.81 BIPOLAR II DISORDER: ICD-10-CM

## 2024-05-06 PROCEDURE — 90832 PSYTX W PT 30 MINUTES: CPT

## 2024-05-06 NOTE — RISK ASSESSMENT
[No, patient denies ideation or behavior] : No, patient denies ideation or behavior [FreeTextEntry9] : No +risk to self or others elicited
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
## 2024-05-06 NOTE — PHYSICAL EXAM
[Well groomed] : well groomed [Cooperative] : cooperative [Euthymic] : euthymic [Constricted] : constricted [Clear] : clear [Linear/Goal Directed] : linear/goal directed [None Reported] : none reported [WNL] : within normal limits [Average] : average [Moderate] : moderate [de-identified] : unable to assess  [de-identified] : I   Pt reports she is continuing to abstain from alcohol as well as compulsive shopping

## 2024-05-07 DIAGNOSIS — F31.81 BIPOLAR II DISORDER: ICD-10-CM

## 2024-05-14 ENCOUNTER — OUTPATIENT (OUTPATIENT)
Dept: OUTPATIENT SERVICES | Facility: HOSPITAL | Age: 49
LOS: 1 days | End: 2024-05-14
Payer: COMMERCIAL

## 2024-05-14 ENCOUNTER — APPOINTMENT (OUTPATIENT)
Dept: PSYCHIATRY | Facility: CLINIC | Age: 49
End: 2024-05-14
Payer: COMMERCIAL

## 2024-05-14 DIAGNOSIS — G47.00 INSOMNIA, UNSPECIFIED: ICD-10-CM

## 2024-05-14 DIAGNOSIS — F31.81 BIPOLAR II DISORDER: ICD-10-CM

## 2024-05-14 DIAGNOSIS — F41.1 GENERALIZED ANXIETY DISORDER: ICD-10-CM

## 2024-05-14 DIAGNOSIS — Z90.3 ACQUIRED ABSENCE OF STOMACH [PART OF]: Chronic | ICD-10-CM

## 2024-05-14 PROCEDURE — 99213 OFFICE O/P EST LOW 20 MIN: CPT | Mod: 95

## 2024-05-14 RX ORDER — PROPRANOLOL HYDROCHLORIDE 10 MG/1
10 TABLET ORAL
Qty: 60 | Refills: 2 | Status: ACTIVE | COMMUNITY
Start: 2024-01-23 | End: 1900-01-01

## 2024-05-14 RX ORDER — GABAPENTIN 600 MG/1
600 TABLET, COATED ORAL 3 TIMES DAILY
Qty: 180 | Refills: 2 | Status: ACTIVE | COMMUNITY
Start: 2023-08-09 | End: 1900-01-01

## 2024-05-14 RX ORDER — ARIPIPRAZOLE 15 MG/1
15 TABLET ORAL DAILY
Qty: 30 | Refills: 2 | Status: ACTIVE | COMMUNITY
Start: 2021-11-08 | End: 1900-01-01
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
## 2024-05-14 NOTE — DISCUSSION/SUMMARY
[Date of Last Physical Exam: _____] : Date of Last Physical Exam: [unfilled] [Date of Last Annual Labs: _____] : Date of Last Annual Labs: [unfilled] [Annual Review of Systems Completed?] : Annual Review of Systems Completed: Yes [Tobacco Screening Completed?] : Tobacco Screening Completed: Yes [Date of Last AIMS: _____] : Date of Last AIMS: [unfilled] [Date of Last HbgA1c: _____] : Date of Last HbgA1c: [unfilled] [Date of Last Lipid Profile: _____] : Date of Last Lipid Profile: [unfilled] [Potential impact of patient’s physical health conditions on psychiatric care?] : Potential impact of patient's physical health conditions on psychiatric care: No [Does patient require any additional health services or referrals?] : Does patient require any additional health services or referrals: No [FreeTextEntry1] : PLAN:   Therapy amd Medication evaluation DX: OLGA/ BiPolar 2  Continue current meds due to ongoing benefit on symptoms   medication: 1. Continue Propranolol 10mg BID PRN for anxiety  2. Continue abilify  15mg po daily 3. Continue gabapentin   1200mg TID 4. Follow up in 3 months 5. Continue therapy    Patient has 2 SA, but denies any current SI, intent, or plan.  Safety plan discussed at length, she will reach out to provider or call 911 if any SI, intent, or plan.  She will see therapist weekly and writer every 2-4 weeks, earlier as needed.

## 2024-05-14 NOTE — REASON FOR VISIT
[Telehealth (audio & video) - Individual/Group] : This visit was provided via telehealth using real-time 2-way audio visual technology. [Other Location: e.g. Home (Enter Location, City,State)___] : The provider was located at [unfilled]. [Home] : The patient, [unfilled], was located at home, [unfilled], at the time of the visit. [Verbal consent obtained from patient/other participant(s)] : Verbal consent for telehealth/telephonic services obtained from patient/other participant(s) [Patient] : Patient [FreeTextEntry1] : Psychotherapy follow up for anxiety

## 2024-05-14 NOTE — RISK ASSESSMENT
[No, patient denies ideation or behavior] : No, patient denies ideation or behavior [FreeTextEntry8] : patient has been free of SI, back to work, stable, maintains sobriety [FreeTextEntry9] : No risk to self or others elicited [Low acute suicide risk] : Low acute suicide risk

## 2024-05-14 NOTE — PHYSICAL EXAM
[Well groomed] : well groomed [Cooperative] : cooperative [Constricted] : constricted [Clear] : clear [Linear/Goal Directed] : linear/goal directed [None Reported] : none reported [WNL] : within normal limits [Average] : average [Moderate] : moderate [de-identified] : unable to assess  [de-identified] : I   Pt reports she is continuing to abstain from alcohol  [Anxious] : no anxious [Dysphoric] : dysphoric [Normal] : good [FreeTextEntry8] : anxious [FreeTextEntry9] : constricted
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
## 2024-05-14 NOTE — PLAN
[FreeTextEntry4] : mood is fairly stable anxiety is chronic, mild.  health ok, reminded to update labs, physical  goal to remain stable, free of SI, function well at work [FreeTextEntry2] :   Problem: Addictive shopping /PT IS IN DEBT Objective: PT WILL JOURNAL AND MAKE A PLAN FOR MANAGING DEBT(Pt is saving money in an unbreakable [employer])  PROBLEM: Abuse of alcohol Objective: Maintain sobriety Problem: Smoking Pt has reduced her smoking to 1-2 cigarettes per day as opposed to 1 pack per day Pt has substituted vaping for smoking Pt is not willing to stop       [Cognitive and/or Behavior Therapy] : Cognitive and/or Behavior Therapy  [Supportive Therapy] : Supportive Therapy [de-identified] :  PT is interactive, focused and responsive.    PT continues to report abstinence from alcohol and not shopping no addictive shopping this week She continues to Vape daily and is not willing to stop Elaine is alert, focused but states she is feeling "BLAH" Elaine has changed her eating and has lost 5 LBS.  She is provided  with encouragement and progress is validated [FreeTextEntry1] : PLAN: Next session in 1 week. 4/22

## 2024-05-15 DIAGNOSIS — F41.1 GENERALIZED ANXIETY DISORDER: ICD-10-CM

## 2024-05-15 DIAGNOSIS — F31.81 BIPOLAR II DISORDER: ICD-10-CM

## 2024-05-15 DIAGNOSIS — G47.00 INSOMNIA, UNSPECIFIED: ICD-10-CM

## 2024-05-20 ENCOUNTER — OUTPATIENT (OUTPATIENT)
Dept: OUTPATIENT SERVICES | Facility: HOSPITAL | Age: 49
LOS: 1 days | End: 2024-05-20
Payer: COMMERCIAL

## 2024-05-20 ENCOUNTER — APPOINTMENT (OUTPATIENT)
Dept: PSYCHIATRY | Facility: CLINIC | Age: 49
End: 2024-05-20

## 2024-05-20 DIAGNOSIS — Z90.3 ACQUIRED ABSENCE OF STOMACH [PART OF]: Chronic | ICD-10-CM

## 2024-05-20 DIAGNOSIS — F31.81 BIPOLAR II DISORDER: ICD-10-CM

## 2024-05-20 PROCEDURE — 90832 PSYTX W PT 30 MINUTES: CPT

## 2024-05-20 NOTE — PHYSICAL EXAM
[Well groomed] : well groomed [Cooperative] : cooperative [Euthymic] : euthymic [Constricted] : constricted [Clear] : clear [Linear/Goal Directed] : linear/goal directed [None Reported] : none reported [WNL] : within normal limits [Average] : average [Moderate] : moderate [de-identified] : unable to assess  [de-identified] : I   Pt reports she is continuing to abstain from alcohol as well as compulsive shopping

## 2024-05-20 NOTE — REASON FOR VISIT
[Patient] : Patient [FreeTextEntry1] : Psychotherapy follow up for mood regulation and alcohol use disorder
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
## 2024-05-21 DIAGNOSIS — F31.81 BIPOLAR II DISORDER: ICD-10-CM

## 2024-06-03 ENCOUNTER — APPOINTMENT (OUTPATIENT)
Dept: PSYCHIATRY | Facility: CLINIC | Age: 49
End: 2024-06-03

## 2024-06-03 ENCOUNTER — OUTPATIENT (OUTPATIENT)
Dept: OUTPATIENT SERVICES | Facility: HOSPITAL | Age: 49
LOS: 1 days | End: 2024-06-03
Payer: COMMERCIAL

## 2024-06-03 DIAGNOSIS — F31.81 BIPOLAR II DISORDER: ICD-10-CM

## 2024-06-03 PROCEDURE — 90832 PSYTX W PT 30 MINUTES: CPT
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
## 2024-06-03 NOTE — PHYSICAL EXAM
[Well groomed] : well groomed [Cooperative] : cooperative [Euthymic] : euthymic [Constricted] : constricted [Clear] : clear [Linear/Goal Directed] : linear/goal directed [None Reported] : none reported [WNL] : within normal limits [Average] : average [Moderate] : moderate [de-identified] : unable to assess  [de-identified] : I   Pt reports she is continuing to abstain from alcohol as well as compulsive shopping

## 2024-06-04 DIAGNOSIS — F31.81 BIPOLAR II DISORDER: ICD-10-CM

## 2024-06-10 ENCOUNTER — APPOINTMENT (OUTPATIENT)
Dept: PSYCHIATRY | Facility: CLINIC | Age: 49
End: 2024-06-10

## 2024-06-10 ENCOUNTER — OUTPATIENT (OUTPATIENT)
Dept: OUTPATIENT SERVICES | Facility: HOSPITAL | Age: 49
LOS: 1 days | End: 2024-06-10
Payer: COMMERCIAL

## 2024-06-10 DIAGNOSIS — F31.81 BIPOLAR II DISORDER: ICD-10-CM

## 2024-06-10 DIAGNOSIS — Z90.3 ACQUIRED ABSENCE OF STOMACH [PART OF]: Chronic | ICD-10-CM

## 2024-06-10 PROCEDURE — 90832 PSYTX W PT 30 MINUTES: CPT
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
## 2024-06-10 NOTE — PHYSICAL EXAM
[Well groomed] : well groomed [Cooperative] : cooperative [Euthymic] : euthymic [Constricted] : constricted [Clear] : clear [Linear/Goal Directed] : linear/goal directed [None Reported] : none reported [WNL] : within normal limits [Average] : average [Moderate] : moderate [de-identified] : unable to assess  [de-identified] : I   Pt reports she is continuing to abstain from alcohol as well as compulsive shopping

## 2024-06-11 DIAGNOSIS — F31.81 BIPOLAR II DISORDER: ICD-10-CM

## 2024-06-17 ENCOUNTER — OUTPATIENT (OUTPATIENT)
Dept: OUTPATIENT SERVICES | Facility: HOSPITAL | Age: 49
LOS: 1 days | End: 2024-06-17
Payer: COMMERCIAL

## 2024-06-17 ENCOUNTER — APPOINTMENT (OUTPATIENT)
Dept: PSYCHIATRY | Facility: CLINIC | Age: 49
End: 2024-06-17

## 2024-06-17 DIAGNOSIS — F41.1 GENERALIZED ANXIETY DISORDER: ICD-10-CM

## 2024-06-17 PROCEDURE — 90832 PSYTX W PT 30 MINUTES: CPT
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
## 2024-06-17 NOTE — PHYSICAL EXAM
[Well groomed] : well groomed [Cooperative] : cooperative [Euthymic] : euthymic [Constricted] : constricted [Clear] : clear [Linear/Goal Directed] : linear/goal directed [None Reported] : none reported [WNL] : within normal limits [Average] : average [Moderate] : moderate [de-identified] : unable to assess  [de-identified] : I   Pt reports she is continuing to abstain from alcohol   Pt continues  to report anxiety reactive to her debt which she "Never woe about before"

## 2024-06-18 DIAGNOSIS — F41.1 GENERALIZED ANXIETY DISORDER: ICD-10-CM

## 2024-07-01 ENCOUNTER — OUTPATIENT (OUTPATIENT)
Dept: OUTPATIENT SERVICES | Facility: HOSPITAL | Age: 49
LOS: 1 days | End: 2024-07-01
Payer: COMMERCIAL

## 2024-07-01 ENCOUNTER — APPOINTMENT (OUTPATIENT)
Dept: PSYCHIATRY | Facility: CLINIC | Age: 49
End: 2024-07-01

## 2024-07-01 DIAGNOSIS — F41.1 GENERALIZED ANXIETY DISORDER: ICD-10-CM

## 2024-07-01 PROCEDURE — 90832 PSYTX W PT 30 MINUTES: CPT

## 2024-07-02 DIAGNOSIS — F41.1 GENERALIZED ANXIETY DISORDER: ICD-10-CM

## 2024-07-08 ENCOUNTER — APPOINTMENT (OUTPATIENT)
Dept: PSYCHIATRY | Facility: CLINIC | Age: 49
End: 2024-07-08

## 2024-07-15 ENCOUNTER — OUTPATIENT (OUTPATIENT)
Dept: OUTPATIENT SERVICES | Facility: HOSPITAL | Age: 49
LOS: 1 days | End: 2024-07-15
Payer: COMMERCIAL

## 2024-07-15 ENCOUNTER — APPOINTMENT (OUTPATIENT)
Dept: PSYCHIATRY | Facility: CLINIC | Age: 49
End: 2024-07-15

## 2024-07-15 DIAGNOSIS — F41.1 GENERALIZED ANXIETY DISORDER: ICD-10-CM

## 2024-07-15 PROCEDURE — 90832 PSYTX W PT 30 MINUTES: CPT

## 2024-07-16 DIAGNOSIS — F41.1 GENERALIZED ANXIETY DISORDER: ICD-10-CM

## 2024-08-05 ENCOUNTER — NON-APPOINTMENT (OUTPATIENT)
Age: 49
End: 2024-08-05

## 2024-08-05 ENCOUNTER — APPOINTMENT (OUTPATIENT)
Dept: PSYCHIATRY | Facility: CLINIC | Age: 49
End: 2024-08-05

## 2024-08-08 ENCOUNTER — APPOINTMENT (OUTPATIENT)
Dept: PSYCHIATRY | Facility: CLINIC | Age: 49
End: 2024-08-08

## 2024-08-12 ENCOUNTER — APPOINTMENT (OUTPATIENT)
Dept: PSYCHIATRY | Facility: CLINIC | Age: 49
End: 2024-08-12

## 2024-08-12 NOTE — PHYSICAL EXAM
[Cooperative] : cooperative [Euthymic] : euthymic [Constricted] : constricted [Clear] : clear [Linear/Goal Directed] : linear/goal directed [None Reported] : none reported [WNL] : within normal limits [Average] : average [Moderate] : moderate [FreeTextEntry1] : No video [de-identified] : unable to assess  [de-identified] : I   Pt reports she is continuing to abstain from alcohol    Her anxiety is now activated by an ongoing medical condition which has prevented her from coming in person to sessions

## 2024-08-12 NOTE — DISCUSSION/SUMMARY
[FreeTextEntry1] : Elaine called to cancel on 8/5 and left a voice message. She was called back to RS and a VM was left for PT

## 2024-08-12 NOTE — REASON FOR VISIT
[Access issues (e.g., transportation, impaired mobility, etc.)] : due to patient's access issues [Continuing, patient seen in-person within last 12 months] : Telehealth services are continuing as patient has been seen in-person within last 12 months. [Telephone (audio) - Individual/Group] : This telephonic visit was provided via audio only technology. [No access to tele-video equipment] : Patient lacks access to tele-video equipment [Medical Office: ([facility])___] : The provider was located at the medical office in [unfilled]. [Home] : The patient, [unfilled], was located at home, [unfilled], at the time of the visit. [Verbal consent obtained from patient/other participant(s)] : Verbal consent for telehealth/telephonic services obtained from patient/other participant(s) [FreeTextEntry4] : 3 pm [FreeTextEntry5] : 3:16 pm [Patient] : Patient [FreeTextEntry1] : Psychotherapy follow up for maintenance of stability and general anxiety

## 2024-08-12 NOTE — PLAN
[FreeTextEntry2] :  Problem: "I am  anxious"  Elaine uses CB imagery with breath work daily to guard against anxiety becoming worse Elaine continues to abstain from alcohol and report s she continues to vape and has made this choice.  She has no intention of quitting new issue: ongoing gastric issue which will be hopefully relieved by an upcoming procedure [Cognitive and/or Behavior Therapy] : Cognitive and/or Behavior Therapy  [Supportive Therapy] : Supportive Therapy [de-identified] :  Elaine has not been able to come to in person sessions for the past 2 weeks due to a medical issue   She is scheduled for a procedure on 8/19 after which she hopes she will feel better   In session Elaine is responsive, alert, and focused.  Elaine will have next session in person on Wed, 8/21 [FreeTextEntry1] : PLAN: Next session in person scheduled for 8/21

## 2024-08-20 ENCOUNTER — APPOINTMENT (OUTPATIENT)
Dept: SURGERY | Facility: CLINIC | Age: 49
End: 2024-08-20
Payer: COMMERCIAL

## 2024-08-20 VITALS
WEIGHT: 174 LBS | SYSTOLIC BLOOD PRESSURE: 122 MMHG | BODY MASS INDEX: 27.31 KG/M2 | DIASTOLIC BLOOD PRESSURE: 80 MMHG | TEMPERATURE: 97.1 F | HEART RATE: 79 BPM | HEIGHT: 67 IN

## 2024-08-20 DIAGNOSIS — K60.2 ANAL FISSURE, UNSPECIFIED: ICD-10-CM

## 2024-08-20 PROCEDURE — G2211 COMPLEX E/M VISIT ADD ON: CPT

## 2024-08-20 PROCEDURE — 99203 OFFICE O/P NEW LOW 30 MIN: CPT

## 2024-08-21 ENCOUNTER — APPOINTMENT (OUTPATIENT)
Dept: PSYCHIATRY | Facility: CLINIC | Age: 49
End: 2024-08-21

## 2024-08-21 ENCOUNTER — OUTPATIENT (OUTPATIENT)
Dept: OUTPATIENT SERVICES | Facility: HOSPITAL | Age: 49
LOS: 1 days | End: 2024-08-21
Payer: COMMERCIAL

## 2024-08-21 DIAGNOSIS — F31.9 BIPOLAR DISORDER, UNSPECIFIED: ICD-10-CM

## 2024-08-21 DIAGNOSIS — F41.1 GENERALIZED ANXIETY DISORDER: ICD-10-CM

## 2024-08-21 DIAGNOSIS — Z90.3 ACQUIRED ABSENCE OF STOMACH [PART OF]: Chronic | ICD-10-CM

## 2024-08-21 PROCEDURE — 90832 PSYTX W PT 30 MINUTES: CPT

## 2024-08-21 NOTE — PLAN
[FreeTextEntry2] : Treatment Plan done in todays session with Goals of: Maintain emotional stability and  continue alcohol recovery [Cognitive and/or Behavior Therapy] : Cognitive and/or Behavior Therapy  [Supportive Therapy] : Supportive Therapy [de-identified] :  Elaine has not been able to come to in person sessions for the past 2 weeks due to a medical issue    Elaine has seen a surgeon who has located the problem and is treating her for the medical problem.  She comes in person today and is lethargic funmilayo to continuing to feel some pain and discomfort. She is responsive to session and alert and focused and Treatment Plan Review is composed in session.  I  [FreeTextEntry1] : PLAN: Due to upcoming vacation next session is scheduled for 9/9

## 2024-08-21 NOTE — PHYSICAL EXAM
[Cooperative] : cooperative [Euthymic] : euthymic [Constricted] : constricted [Clear] : clear [Linear/Goal Directed] : linear/goal directed [None Reported] : none reported [WNL] : within normal limits [Average] : average [Moderate] : moderate [de-identified] : unable to assess

## 2024-08-21 NOTE — REASON FOR VISIT
[Patient] : Patient [FreeTextEntry1] : PSYCHOTHERAPY FOLLOW UP FOR MOOD STABILITY AND ALCOHOL RECOVERY

## 2024-08-21 NOTE — DISCUSSION/SUMMARY
[Plan Review] : Plan Review [Able to manage surrounding demands and opportunities] : able to manage surrounding demands and opportunities [Able to set and pursue goals] : able to set and pursue goals [Adherent to treatment recommendations] : adherent to treatment recommendations [Articulate] : articulate [Awareness of substance use issues] : awareness of substance use issues [Cognitively intact] : cognitively intact [Creative] : creative [Motivated to participate in treatment] : motivated to participate in treatment [Health literacy] : health literacy [Motivated to maintain or improve physical health] : motivated to maintain or improve physical health [Part of a supportive marriage] : part of a supportive marriage [Part of a supportive family] : part of a supportive family [Steady employment] : steady employment [Housing stability] : housing stability [English fluency] : English fluency [Connected to healthcare] : connected to healthcare [Social supports] : social supports [FreeTextEntry2] : 8/21/25 [FreeTextEntry3] : 9/2022 [FreeTextEntry6] : Pt is compliant with treatment and medication and making progress [FreeTextEntry7] : pt has good support system [FreeTextEntry8] : None  [FreeTextEntry9] : pt is spiritually connected and uses mindfulness and prayer to calm anxiety [de-identified] : Dr Lee Psychiatrist  [Mental Health] : Mental Health [Continued - Progress made] : Continued - Progress made: [___ times a week] : [unfilled] times a week [Other rationale for transition/discharge:] : Other rationale for transition/discharge: [None - Reason others did not participate:] : None - Reason others did not participate:  [Yes] : Yes [Psychiatric Provider/Prescriber] : Psychiatric Provider/Prescriber [Therapist] : Therapist [FreeTextEntry1] : Bipolar Dx [FreeTextEntry4] : I want to continue to make progress and refrain from alcohol abuse [de-identified] : pt reports she has refrained from alcohol abuse for several months [de-identified] : Elaine will continue to take medication for ongoing mood stabilization [de-identified] : 8/21/25 [de-identified] : Goals are met or Pt has regressed to higher level of care [FreeTextEntry5] : Pt is responsive to session interventions and in agreement with Tx plan She reports stability and refraining from alcohol abuse  Plan is late due to Patient being too ill to come for prior sessions [de-identified] : Not clinically indicated [de-identified] : Dr JOY and Nikki Minor [facility]

## 2024-08-21 NOTE — PHYSICAL EXAM
[Cooperative] : cooperative [Euthymic] : euthymic [Constricted] : constricted [Clear] : clear [Linear/Goal Directed] : linear/goal directed [None Reported] : none reported [WNL] : within normal limits [Average] : average [Moderate] : moderate [de-identified] : unable to assess

## 2024-08-21 NOTE — DISCUSSION/SUMMARY
[Plan Review] : Plan Review [Able to manage surrounding demands and opportunities] : able to manage surrounding demands and opportunities [Able to set and pursue goals] : able to set and pursue goals [Adherent to treatment recommendations] : adherent to treatment recommendations [Articulate] : articulate [Awareness of substance use issues] : awareness of substance use issues [Cognitively intact] : cognitively intact [Creative] : creative [Motivated to participate in treatment] : motivated to participate in treatment [Health literacy] : health literacy [Motivated to maintain or improve physical health] : motivated to maintain or improve physical health [Part of a supportive marriage] : part of a supportive marriage [Part of a supportive family] : part of a supportive family [Steady employment] : steady employment [Housing stability] : housing stability [English fluency] : English fluency [Connected to healthcare] : connected to healthcare [Social supports] : social supports [FreeTextEntry2] : 8/21/25 [FreeTextEntry3] : 9/2022 [FreeTextEntry6] : Pt is compliant with treatment and medication and making progress [FreeTextEntry7] : pt has good support system [FreeTextEntry8] : None  [FreeTextEntry9] : pt is spiritually connected and uses mindfulness and prayer to calm anxiety [de-identified] : Dr Lee Psychiatrist  [Mental Health] : Mental Health [Continued - Progress made] : Continued - Progress made: [___ times a week] : [unfilled] times a week [Other rationale for transition/discharge:] : Other rationale for transition/discharge: [None - Reason others did not participate:] : None - Reason others did not participate:  [Yes] : Yes [Psychiatric Provider/Prescriber] : Psychiatric Provider/Prescriber [Therapist] : Therapist [FreeTextEntry1] : Bipolar Dx [FreeTextEntry4] : I want to continue to make progress and refrain from alcohol abuse [de-identified] : pt reports she has refrained from alcohol abuse for several months [de-identified] : Elaine will continue to take medication for ongoing mood stabilization [de-identified] : 8/21/25 [de-identified] : Goals are met or Pt has regressed to higher level of care [FreeTextEntry5] : Pt is responsive to session interventions and in agreement with Tx plan She reports stability and refraining from alcohol abuse  Plan is late due to Patient being too ill to come for prior sessions [de-identified] : Not clinically indicated [de-identified] : Dr JOY and Nikki Minor [facility]

## 2024-08-22 DIAGNOSIS — F31.9 BIPOLAR DISORDER, UNSPECIFIED: ICD-10-CM

## 2024-08-24 NOTE — ED PROVIDER NOTE - MDM PATIENT STATEMENT FOR ADDL TREATMENT
Attending Attestation (For Attendings USE Only)...
Patient with one or more new problems requiring additional work-up/treatment.

## 2024-08-28 NOTE — ADDENDUM
[FreeTextEntry1] :  I, Dorothy Dai ([facility]) assisted in filling out this chart under the dictation of Tom Rodriguez on 08/21/2024

## 2024-08-28 NOTE — REASON FOR VISIT
[Initial Eval - Existing Diagnosis] : an initial evaluation of an existing diagnosis [FreeTextEntry1] : Anal Fissure

## 2024-08-28 NOTE — END OF VISIT
[FreeTextEntry3] : Documented by Dorothy Dai acting as a scribe for Tom Rodriguez on 08/21/2024   All medical record entries made by the Scribe were at my, Dr. Tom Rodriguez direction and personally dictated by me on 08/21/2024 . I have reviewed the chart and agree that the record accurately reflects my personal performance of the history, physical exam, assessment and plan. I have also personally directed, reviewed, and agreed with the chart.

## 2024-08-28 NOTE — ADDENDUM
[FreeTextEntry1] :  I, Dorothy Dai ([facility]) assisted in filling out this chart under the dictation of Tom Rodriguez on 08/21/2024

## 2024-08-28 NOTE — HISTORY OF PRESENT ILLNESS
[FreeTextEntry1] : Patient, is a 48-year-old female with a PMHx of bipolar disorder, cervical disc disease, anxiety and depression, PSHx of sleeve gastrectomy, who presents for evaluation of anal pain. Patient reports that approximately a few months ago developing pain in the anal canal. She reports bowel movements once per week and has always had significant constipation. She reports pain with each bowel movement. She denies recent fevers, chills, nausea, or vomiting. She denies a family history of colon cancer, rectal cancer, or inflammatory bowel movement. She denies recent fevers, chills, nausea, or vomiting. She denies a family history of colon cancer, rectal cancer, or inflammatory bowel disease. Her last colonoscopy was in June 2024 with Dr. Jodee Chan, which showed one tubular adenoma.

## 2024-08-28 NOTE — PHYSICAL EXAM
[FreeTextEntry1] : General: Awake, Alert, No Acute Distress Respiratory: Normal Respiratory Effort Rectal: External examination shows a posterior midline anal fissure. Palpation reproduces the patient's pain.

## 2024-09-09 ENCOUNTER — APPOINTMENT (OUTPATIENT)
Dept: PSYCHIATRY | Facility: CLINIC | Age: 49
End: 2024-09-09

## 2024-09-16 ENCOUNTER — APPOINTMENT (OUTPATIENT)
Dept: PSYCHIATRY | Facility: CLINIC | Age: 49
End: 2024-09-16

## 2024-09-16 ENCOUNTER — OUTPATIENT (OUTPATIENT)
Dept: OUTPATIENT SERVICES | Facility: HOSPITAL | Age: 49
LOS: 1 days | End: 2024-09-16
Payer: COMMERCIAL

## 2024-09-16 DIAGNOSIS — Z90.3 ACQUIRED ABSENCE OF STOMACH [PART OF]: Chronic | ICD-10-CM

## 2024-09-16 DIAGNOSIS — F31.81 BIPOLAR II DISORDER: ICD-10-CM

## 2024-09-16 PROCEDURE — 90832 PSYTX W PT 30 MINUTES: CPT

## 2024-09-16 NOTE — PLAN
[FreeTextEntry1] : PLAN: Next session scheduled for 9/30 [FreeTextEntry2] :  Maintain emotional stability and  continue alcohol recovery Pt reports good progress [Cognitive and/or Behavior Therapy] : Cognitive and/or Behavior Therapy  [Supportive Therapy] : Supportive Therapy [de-identified] :  Elaine reports ongoing stability and denies abuse of alcohol She reports good sleep and functions well. She looks forward to a "surprise" 49th Birthday party arranged by her  for November. Elaine requests biweekly therapy in view of her ongoing stability.  She continues to use CB imagery for anxiety and reports success in modification of symptoms  Patient is alert, focused and responsive in session.

## 2024-09-16 NOTE — PHYSICAL EXAM
[Cooperative] : cooperative [Euthymic] : euthymic [Constricted] : constricted [Clear] : clear [Linear/Goal Directed] : linear/goal directed [None Reported] : none reported [WNL] : within normal limits [Average] : average [Moderate] : moderate [de-identified] : unable to assess

## 2024-09-16 NOTE — PLAN
[FreeTextEntry1] : PLAN: Next session scheduled for 9/30 [FreeTextEntry2] :  Maintain emotional stability and  continue alcohol recovery Pt reports good progress [Cognitive and/or Behavior Therapy] : Cognitive and/or Behavior Therapy  [Supportive Therapy] : Supportive Therapy [de-identified] :  Elaine reports ongoing stability and denies abuse of alcohol She reports good sleep and functions well. She looks forward to a "surprise" 49th Birthday party arranged by her  for November. Elaine requests biweekly therapy in view of her ongoing stability.  She continues to use CB imagery for anxiety and reports success in modification of symptoms  Patient is alert, focused and responsive in session.

## 2024-09-16 NOTE — PHYSICAL EXAM
[Cooperative] : cooperative [Euthymic] : euthymic [Constricted] : constricted [Clear] : clear [Linear/Goal Directed] : linear/goal directed [None Reported] : none reported [WNL] : within normal limits [Average] : average [Moderate] : moderate [de-identified] : unable to assess

## 2024-09-17 DIAGNOSIS — F31.81 BIPOLAR II DISORDER: ICD-10-CM

## 2024-09-30 ENCOUNTER — APPOINTMENT (OUTPATIENT)
Dept: PSYCHIATRY | Facility: CLINIC | Age: 49
End: 2024-09-30

## 2024-09-30 ENCOUNTER — OUTPATIENT (OUTPATIENT)
Dept: OUTPATIENT SERVICES | Facility: HOSPITAL | Age: 49
LOS: 1 days | End: 2024-09-30
Payer: COMMERCIAL

## 2024-09-30 DIAGNOSIS — F41.1 GENERALIZED ANXIETY DISORDER: ICD-10-CM

## 2024-09-30 DIAGNOSIS — F31.81 BIPOLAR II DISORDER: ICD-10-CM

## 2024-09-30 DIAGNOSIS — Z90.3 ACQUIRED ABSENCE OF STOMACH [PART OF]: Chronic | ICD-10-CM

## 2024-09-30 PROCEDURE — 90832 PSYTX W PT 30 MINUTES: CPT

## 2024-09-30 NOTE — REASON FOR VISIT
[Access issues (e.g., transportation, impaired mobility, etc.)] : due to patient's access issues [Starting, patient seen in-person within last 6 months] : Telehealth services are being started as patient has seen in-person within last 6 months. [Telehealth (audio & video) - Individual/Group] : This visit was provided via telehealth using real-time 2-way audio visual technology. [Medical Office: ([facility])___] : The provider was located at the medical office in [unfilled]. [Home] : The patient, [unfilled], was located at home, [unfilled], at the time of the visit. [Patient's space is appropriate for telehealth and maintains privacy/confidentiality.] : Patient's space is appropriate for telehealth and maintains privacy/confidentiality. [Participant(s) identity verified] : Participant(s) identity verified. [FreeTextEntry4] : 12:55 pm [FreeTextEntry5] : 1:12 pm pm [Patient] : Patient [FreeTextEntry1] : Psychotherapy for mood stability and anxiety reduction

## 2024-09-30 NOTE — PHYSICAL EXAM
[Cooperative] : cooperative [Euthymic] : euthymic [Constricted] : constricted [Clear] : clear [Linear/Goal Directed] : linear/goal directed [None Reported] : none reported [WNL] : within normal limits [Average] : average [Moderate] : moderate [de-identified] : unable to assess

## 2024-09-30 NOTE — PLAN
[FreeTextEntry2] :  Maintain emotional stability and  continue alcohol recovery Pt reports good progress [Cognitive and/or Behavior Therapy] : Cognitive and/or Behavior Therapy  [Supportive Therapy] : Supportive Therapy [de-identified] :  Elaine reports ongoing stability and denies abuse of alcohol She reports good sleep and functions well. She looks forward to a "surprise" 49th Birthday party arranged by her  for November. Elaine requests biweekly therapy in view of her ongoing stability.  She continues to use CB imagery for anxiety and reports success in modification of symptoms  Patient is alert, focused and responsive in session.  No other concerns or changes are elicited at todays session [FreeTextEntry1] : PLAN: Next session scheduled for Tues, 10/15  (virtual session)

## 2024-10-01 DIAGNOSIS — F41.1 GENERALIZED ANXIETY DISORDER: ICD-10-CM

## 2024-10-15 ENCOUNTER — OUTPATIENT (OUTPATIENT)
Dept: OUTPATIENT SERVICES | Facility: HOSPITAL | Age: 49
LOS: 1 days | End: 2024-10-15
Payer: COMMERCIAL

## 2024-10-15 ENCOUNTER — APPOINTMENT (OUTPATIENT)
Dept: PSYCHIATRY | Facility: CLINIC | Age: 49
End: 2024-10-15

## 2024-10-15 DIAGNOSIS — F41.1 GENERALIZED ANXIETY DISORDER: ICD-10-CM

## 2024-10-15 PROCEDURE — 90832 PSYTX W PT 30 MINUTES: CPT

## 2024-10-16 DIAGNOSIS — F41.1 GENERALIZED ANXIETY DISORDER: ICD-10-CM

## 2024-10-22 ENCOUNTER — APPOINTMENT (OUTPATIENT)
Dept: SURGERY | Facility: CLINIC | Age: 49
End: 2024-10-22

## 2024-10-28 ENCOUNTER — APPOINTMENT (OUTPATIENT)
Dept: PSYCHIATRY | Facility: CLINIC | Age: 49
End: 2024-10-28

## 2024-11-05 ENCOUNTER — APPOINTMENT (OUTPATIENT)
Dept: PSYCHIATRY | Facility: CLINIC | Age: 49
End: 2024-11-05

## 2024-11-05 ENCOUNTER — OUTPATIENT (OUTPATIENT)
Dept: OUTPATIENT SERVICES | Facility: HOSPITAL | Age: 49
LOS: 1 days | End: 2024-11-05
Payer: COMMERCIAL

## 2024-11-05 DIAGNOSIS — F41.1 GENERALIZED ANXIETY DISORDER: ICD-10-CM

## 2024-11-05 DIAGNOSIS — Z90.3 ACQUIRED ABSENCE OF STOMACH [PART OF]: Chronic | ICD-10-CM

## 2024-11-05 PROCEDURE — 90832 PSYTX W PT 30 MINUTES: CPT

## 2024-11-06 DIAGNOSIS — F41.1 GENERALIZED ANXIETY DISORDER: ICD-10-CM

## 2024-11-13 ENCOUNTER — OUTPATIENT (OUTPATIENT)
Dept: OUTPATIENT SERVICES | Facility: HOSPITAL | Age: 49
LOS: 1 days | End: 2024-11-13
Payer: COMMERCIAL

## 2024-11-13 ENCOUNTER — APPOINTMENT (OUTPATIENT)
Dept: PSYCHIATRY | Facility: CLINIC | Age: 49
End: 2024-11-13

## 2024-11-13 DIAGNOSIS — Z90.3 ACQUIRED ABSENCE OF STOMACH [PART OF]: Chronic | ICD-10-CM

## 2024-11-13 DIAGNOSIS — F31.70 BIPOLAR DISORDER, CURRENTLY IN REMISSION, MOST RECENT EPISODE UNSPECIFIED: ICD-10-CM

## 2024-11-13 DIAGNOSIS — F41.1 GENERALIZED ANXIETY DISORDER: ICD-10-CM

## 2024-11-13 PROCEDURE — 90832 PSYTX W PT 30 MINUTES: CPT

## 2024-11-14 DIAGNOSIS — F31.70 BIPOLAR DISORDER, CURRENTLY IN REMISSION, MOST RECENT EPISODE UNSPECIFIED: ICD-10-CM

## 2024-11-25 ENCOUNTER — APPOINTMENT (OUTPATIENT)
Dept: PSYCHIATRY | Facility: CLINIC | Age: 49
End: 2024-11-25

## 2024-11-27 ENCOUNTER — APPOINTMENT (OUTPATIENT)
Dept: PSYCHIATRY | Facility: CLINIC | Age: 49
End: 2024-11-27

## 2025-01-21 NOTE — BH INPATIENT PSYCHIATRY DISCHARGE NOTE - NSBHDCRISKMITIGATE_PSY_ALL_CORE
Reason for call:   [x] Refill   [] Prior Auth  [x] Other: not a dup - change of pharmacy      Office:   [] PCP/Provider -   [x] Specialty/Provider - jovany oneil     Medication: Euthyrox 75 MCG tablet    Dose/Frequency: Sig: Take 1 tablet (75 mcg total) by mouth daily    Quantity: 90    Pharmacy: [facility] pharmacy     Does the patient have enough for 3 days?   [x] Yes   [] No - Send as HP to POD     Safety planning/Reduction in access to lethal methods (pills, firearms, etc)/Family/Other social support involvement

## 2025-01-24 NOTE — ED BEHAVIORAL HEALTH ASSESSMENT NOTE - ADULT OR CHILD PROTECTIVE SERVICES INVOLVEMENT
From: Jo Ann Helton  To: Mel Galvan  Sent: 10/29/2021 12:38 PM CDT  Subject: Cholesterol order    Hello,    I am in need of a triglyceride, cholesterol, HDL & LDL level for my wellness program. Is it possible for you to order so I can submit results?     Thank you!   
My signature below certifies that the above stated patient is homebound and upon completion of the Face-To-Face encounter, has the need for intermittent skilled nursing, physical therapy and/or speech or occupational therapy services in their home for their current diagnosis as outlined in their initial plan of care. These services will continue to be monitored by myself or another physician.
No

## 2025-01-29 ENCOUNTER — NON-APPOINTMENT (OUTPATIENT)
Age: 50
End: 2025-01-29

## 2025-03-05 ENCOUNTER — APPOINTMENT (OUTPATIENT)
Dept: SURGERY | Facility: CLINIC | Age: 50
End: 2025-03-05
Payer: COMMERCIAL

## 2025-03-05 ENCOUNTER — NON-APPOINTMENT (OUTPATIENT)
Age: 50
End: 2025-03-05

## 2025-03-05 VITALS
HEIGHT: 67 IN | OXYGEN SATURATION: 98 % | DIASTOLIC BLOOD PRESSURE: 84 MMHG | HEART RATE: 76 BPM | TEMPERATURE: 97 F | BODY MASS INDEX: 27.31 KG/M2 | WEIGHT: 174 LBS | SYSTOLIC BLOOD PRESSURE: 128 MMHG

## 2025-03-05 DIAGNOSIS — K60.2 ANAL FISSURE, UNSPECIFIED: ICD-10-CM

## 2025-03-05 PROCEDURE — 99214 OFFICE O/P EST MOD 30 MIN: CPT

## 2025-03-05 RX ORDER — IBUPROFEN 800 MG/1
800 TABLET, FILM COATED ORAL 3 TIMES DAILY
Qty: 60 | Refills: 2 | Status: ACTIVE | COMMUNITY
Start: 2025-03-05 | End: 1900-01-01

## 2025-03-10 ENCOUNTER — NON-APPOINTMENT (OUTPATIENT)
Age: 50
End: 2025-03-10

## 2025-03-11 ENCOUNTER — OUTPATIENT (OUTPATIENT)
Dept: OUTPATIENT SERVICES | Facility: HOSPITAL | Age: 50
LOS: 1 days | End: 2025-03-11
Payer: COMMERCIAL

## 2025-03-11 VITALS
OXYGEN SATURATION: 100 % | SYSTOLIC BLOOD PRESSURE: 116 MMHG | HEART RATE: 81 BPM | HEIGHT: 67 IN | RESPIRATION RATE: 16 BRPM | DIASTOLIC BLOOD PRESSURE: 83 MMHG | TEMPERATURE: 98 F | WEIGHT: 182.98 LBS

## 2025-03-11 DIAGNOSIS — Z01.818 ENCOUNTER FOR OTHER PREPROCEDURAL EXAMINATION: ICD-10-CM

## 2025-03-11 DIAGNOSIS — Z90.3 ACQUIRED ABSENCE OF STOMACH [PART OF]: Chronic | ICD-10-CM

## 2025-03-11 DIAGNOSIS — K60.2 ANAL FISSURE, UNSPECIFIED: ICD-10-CM

## 2025-03-11 DIAGNOSIS — S02.609A FRACTURE OF MANDIBLE, UNSPECIFIED, INITIAL ENCOUNTER FOR CLOSED FRACTURE: Chronic | ICD-10-CM

## 2025-03-11 LAB
ALBUMIN SERPL ELPH-MCNC: 4.5 G/DL — SIGNIFICANT CHANGE UP (ref 3.5–5.2)
ALP SERPL-CCNC: 104 U/L — SIGNIFICANT CHANGE UP (ref 30–115)
ALT FLD-CCNC: 13 U/L — SIGNIFICANT CHANGE UP (ref 0–41)
ANION GAP SERPL CALC-SCNC: 9 MMOL/L — SIGNIFICANT CHANGE UP (ref 7–14)
AST SERPL-CCNC: 16 U/L — SIGNIFICANT CHANGE UP (ref 0–41)
BASOPHILS # BLD AUTO: 0.06 K/UL — SIGNIFICANT CHANGE UP (ref 0–0.2)
BASOPHILS NFR BLD AUTO: 1 % — SIGNIFICANT CHANGE UP (ref 0–1)
BILIRUB SERPL-MCNC: 0.2 MG/DL — SIGNIFICANT CHANGE UP (ref 0.2–1.2)
BUN SERPL-MCNC: 9 MG/DL — LOW (ref 10–20)
CALCIUM SERPL-MCNC: 9.6 MG/DL — SIGNIFICANT CHANGE UP (ref 8.4–10.5)
CHLORIDE SERPL-SCNC: 105 MMOL/L — SIGNIFICANT CHANGE UP (ref 98–110)
CO2 SERPL-SCNC: 28 MMOL/L — SIGNIFICANT CHANGE UP (ref 17–32)
CREAT SERPL-MCNC: 0.7 MG/DL — SIGNIFICANT CHANGE UP (ref 0.7–1.5)
EGFR: 105 ML/MIN/1.73M2 — SIGNIFICANT CHANGE UP
EGFR: 105 ML/MIN/1.73M2 — SIGNIFICANT CHANGE UP
EOSINOPHIL # BLD AUTO: 0.11 K/UL — SIGNIFICANT CHANGE UP (ref 0–0.7)
EOSINOPHIL NFR BLD AUTO: 1.9 % — SIGNIFICANT CHANGE UP (ref 0–8)
GLUCOSE SERPL-MCNC: 92 MG/DL — SIGNIFICANT CHANGE UP (ref 70–99)
HCT VFR BLD CALC: 39.3 % — SIGNIFICANT CHANGE UP (ref 37–47)
HGB BLD-MCNC: 12.1 G/DL — SIGNIFICANT CHANGE UP (ref 12–16)
IMM GRANULOCYTES NFR BLD AUTO: 0.3 % — SIGNIFICANT CHANGE UP (ref 0.1–0.3)
LYMPHOCYTES # BLD AUTO: 2.11 K/UL — SIGNIFICANT CHANGE UP (ref 1.2–3.4)
LYMPHOCYTES # BLD AUTO: 35.6 % — SIGNIFICANT CHANGE UP (ref 20.5–51.1)
MCHC RBC-ENTMCNC: 26.4 PG — LOW (ref 27–31)
MCHC RBC-ENTMCNC: 30.8 G/DL — LOW (ref 32–37)
MCV RBC AUTO: 85.6 FL — SIGNIFICANT CHANGE UP (ref 81–99)
MONOCYTES # BLD AUTO: 0.46 K/UL — SIGNIFICANT CHANGE UP (ref 0.1–0.6)
MONOCYTES NFR BLD AUTO: 7.8 % — SIGNIFICANT CHANGE UP (ref 1.7–9.3)
NEUTROPHILS # BLD AUTO: 3.17 K/UL — SIGNIFICANT CHANGE UP (ref 1.4–6.5)
NEUTROPHILS NFR BLD AUTO: 53.4 % — SIGNIFICANT CHANGE UP (ref 42.2–75.2)
NRBC BLD AUTO-RTO: 0 /100 WBCS — SIGNIFICANT CHANGE UP (ref 0–0)
PLATELET # BLD AUTO: 329 K/UL — SIGNIFICANT CHANGE UP (ref 130–400)
PMV BLD: 10.8 FL — HIGH (ref 7.4–10.4)
POTASSIUM SERPL-MCNC: 4.4 MMOL/L — SIGNIFICANT CHANGE UP (ref 3.5–5)
POTASSIUM SERPL-SCNC: 4.4 MMOL/L — SIGNIFICANT CHANGE UP (ref 3.5–5)
PROT SERPL-MCNC: 6.9 G/DL — SIGNIFICANT CHANGE UP (ref 6–8)
RBC # BLD: 4.59 M/UL — SIGNIFICANT CHANGE UP (ref 4.2–5.4)
RBC # FLD: 15.6 % — HIGH (ref 11.5–14.5)
SODIUM SERPL-SCNC: 142 MMOL/L — SIGNIFICANT CHANGE UP (ref 135–146)
WBC # BLD: 5.93 K/UL — SIGNIFICANT CHANGE UP (ref 4.8–10.8)
WBC # FLD AUTO: 5.93 K/UL — SIGNIFICANT CHANGE UP (ref 4.8–10.8)

## 2025-03-11 PROCEDURE — 36415 COLL VENOUS BLD VENIPUNCTURE: CPT

## 2025-03-11 PROCEDURE — 80053 COMPREHEN METABOLIC PANEL: CPT

## 2025-03-11 PROCEDURE — 85025 COMPLETE CBC W/AUTO DIFF WBC: CPT

## 2025-03-11 PROCEDURE — 99214 OFFICE O/P EST MOD 30 MIN: CPT | Mod: 25

## 2025-03-11 PROCEDURE — 93005 ELECTROCARDIOGRAM TRACING: CPT

## 2025-03-11 PROCEDURE — 93010 ELECTROCARDIOGRAM REPORT: CPT

## 2025-03-11 NOTE — H&P PST ADULT - TOBACCO USE
Patient presents with:  Sore Throat      HPI:     Guerita Hwang is a 35year old male who presents for evaluation of a chief complaint of sore throat for 2 days that has gradually worsened. The patient denies any difficulty swallowing.   Speech is medina Weight Concern: Not Asked        Special Diet: Not Asked        Back Care: Not Asked        Exercise: Not Asked        Bike Helmet: Not Asked        Seat Belt: Not Asked        Self-Exams: Not Asked    Social History Narrative      Not on file    Social rhonchi, or wheezes    MDM/Assessment/Plan:   Orders for this encounter:    Orders Placed This Encounter      POCT Rapid Strep      POCT Rapid Strep      amoxicillin 875 MG Oral Tab          Sig: Take 1 tablet (875 mg total) by mouth 2 (two) times daily fo Former smoker

## 2025-03-11 NOTE — H&P PST ADULT - REASON FOR ADMISSION
48 y/o female presents to PAST in preparation for fissurectomy and rectal botox injections in [facility] under LSB with Dr. Rodriguez on 3/14/25

## 2025-03-11 NOTE — H&P PST ADULT - PRO TOBACCO TYPE
Conjuntivae and eyelids appear normal,  Sclerae : White without injection
cigarettes/vaping/ e-cigarettes

## 2025-03-11 NOTE — H&P PST ADULT - HEART RATE (BEATS/MIN)
81 Quality 358: Patient-Centered Surgical Risk Assessment And Communication: Documentation of patient-specific risk assessment with a risk calculator based on multi-institutional clinical data, the specific risk calculator used, and communication of risk assessment from risk calculator with the patient or family. Detail Level: Detailed

## 2025-03-11 NOTE — H&P PST ADULT - HISTORY OF PRESENT ILLNESS
50 y/o female presents to PAST in [facility]     Pt reports that in the past 6 months to 1 year she had noticed rectal pain and rectal bleeding with bowel movements. Pt was found that she had a fissure that is non-healing. Pt with rectal pain 7/10, sharp in nature, non-radiating. Pt had tried conservative measures that were not successful Pt now for above procedure.    PATIENT CURRENTLY DENIES CHEST PAIN  SHORTNESS OF BREATH  PALPITATIONS,  DYSURIA, OR UPPER RESPIRATORY INFECTION IN PAST 2 WEEKS  Patient understands instructions and was given the opportunity to ask questions and have them answered.  As per patient, this is their complete medical and surgical history, including medications both prescribed or over the counter.  written and verbal instructions with teach back on chlorhexidine shampoo provided,  pt verbalized understanding with returned demonstration    Anesthesia Alert  NO--Difficult Airway  NO--History of neck surgery or radiation  NO--Limited ROM of neck  NO--History of Malignant hyperthermia  NO--Personal or family history of Pseudocholinesterase deficiency.  NO--Prior Anesthesia Complication  NO--Latex Allergy  NO--Loose teeth  NO--History of Rheumatoid Arthritis  NO--ROB  NO--Bleeding risk  NO--Other_____  Mallampati airway: Class II    [facility] Activity Status Index (DASI)      RESULT SUMMARY:  58.2 points  The higher the score (maximum 58.2), the higher the functional status.    9.89 METs        INPUTS:  Take care of self —> 2.75 = Yes  Walk indoors —> 1.75 = Yes  Walk 1&ndash;2 blocks on level ground —> 2.75 = Yes  Climb a flight of stairs or walk up a hill —> 5.5 = Yes  Run a short distance —> 8 = Yes  Do light work around the house —> 2.7 = Yes  Do moderate work around the house —> 3.5 = Yes  Do heavy work around the house —> 8 = Yes  Do yardwork —> 4.5 = Yes  Have sexual relations —> 5.25 = Yes  Participate in moderate recreational activities —> 6 = Yes  Participate in strenuous sports —> 7.5 = Yes    Revised Cardiac Risk Index for Pre-Operative Risk       RESULT SUMMARY:  0 points  RCRI Score    3.9 %  Risk of major cardiac event      INPUTS:  High-risk surgery —> 0 = No  History of ischemic heart disease —> 0 = No  History of congestive heart failure —> 0 = No  History of cerebrovascular disease —> 0 = No  Pre-operative treatment with insulin —> 0 = No  Pre-operative creatinine >2 mg/dL / 176.8 µmol/L —> 0 = No

## 2025-03-11 NOTE — H&P PST ADULT - NSICDXPASTMEDICALHX_GEN_ALL_CORE_FT
PAST MEDICAL HISTORY:  Anxiety     Bipolar disorder     Depression     History of anal fissures

## 2025-03-12 DIAGNOSIS — K60.2 ANAL FISSURE, UNSPECIFIED: ICD-10-CM

## 2025-03-12 DIAGNOSIS — Z01.818 ENCOUNTER FOR OTHER PREPROCEDURAL EXAMINATION: ICD-10-CM

## 2025-03-13 NOTE — ASU PATIENT PROFILE, ADULT - NSTRANFUSIONOBJECTION_GEN_ALL_CORE_SIUH
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  H/o right THR, left TKR, and left hip ORIF.  Primary functional concern is daily pain in ankles and elbows.   PT Orders:       Hemophilia treatment plan:  Kogenate 3000 units Q 48 hours (Did start Hemlibra at last comp but did not work for him, last dose was in 2021)  Method of logging infusions:  Paper logs    Pharmacokinetics:  Done in 2018, copy in chart, no additional labs needed    Insurance:  Medicare, [employer] of [place]  Pharmacy:  [facility]  MPR:  0.72    Eligible studies:  Subsequent registry    Goals for visit:   He would like to get a flu shot as well as a COVID vaccine booster - prefers Pfizer or Moderna.    Planned procedures or surgeries:  none    Text Consent:  None, patient does not use.  Email Consent:  None, patient does not use.    Confirmation of appointment:  Spoke with Orlin and he confirmed that he will attend appt on  at 10.     Patient has no objection to blood transfusions.

## 2025-03-14 ENCOUNTER — TRANSCRIPTION ENCOUNTER (OUTPATIENT)
Age: 50
End: 2025-03-14

## 2025-03-14 ENCOUNTER — RESULT REVIEW (OUTPATIENT)
Age: 50
End: 2025-03-14

## 2025-03-14 ENCOUNTER — OUTPATIENT (OUTPATIENT)
Dept: OUTPATIENT SERVICES | Facility: HOSPITAL | Age: 50
LOS: 1 days | Discharge: HOME | End: 2025-03-14
Payer: COMMERCIAL

## 2025-03-14 ENCOUNTER — APPOINTMENT (OUTPATIENT)
Dept: SURGERY | Facility: AMBULATORY SURGERY CENTER | Age: 50
End: 2025-03-14

## 2025-03-14 VITALS
OXYGEN SATURATION: 98 % | DIASTOLIC BLOOD PRESSURE: 72 MMHG | SYSTOLIC BLOOD PRESSURE: 114 MMHG | TEMPERATURE: 98 F | HEART RATE: 65 BPM | RESPIRATION RATE: 20 BRPM

## 2025-03-14 VITALS
HEART RATE: 80 BPM | OXYGEN SATURATION: 97 % | HEIGHT: 67 IN | SYSTOLIC BLOOD PRESSURE: 118 MMHG | RESPIRATION RATE: 18 BRPM | WEIGHT: 182.98 LBS | DIASTOLIC BLOOD PRESSURE: 73 MMHG | TEMPERATURE: 98 F

## 2025-03-14 DIAGNOSIS — C21.1 MALIGNANT NEOPLASM OF ANAL CANAL: ICD-10-CM

## 2025-03-14 DIAGNOSIS — K64.8 OTHER HEMORRHOIDS: ICD-10-CM

## 2025-03-14 DIAGNOSIS — K60.2 ANAL FISSURE, UNSPECIFIED: ICD-10-CM

## 2025-03-14 DIAGNOSIS — S02.609A FRACTURE OF MANDIBLE, UNSPECIFIED, INITIAL ENCOUNTER FOR CLOSED FRACTURE: Chronic | ICD-10-CM

## 2025-03-14 DIAGNOSIS — K62.89 OTHER SPECIFIED DISEASES OF ANUS AND RECTUM: ICD-10-CM

## 2025-03-14 DIAGNOSIS — Z87.891 PERSONAL HISTORY OF NICOTINE DEPENDENCE: ICD-10-CM

## 2025-03-14 DIAGNOSIS — Z88.0 ALLERGY STATUS TO PENICILLIN: ICD-10-CM

## 2025-03-14 DIAGNOSIS — Z90.3 ACQUIRED ABSENCE OF STOMACH [PART OF]: Chronic | ICD-10-CM

## 2025-03-14 PROCEDURE — 88342 IMHCHEM/IMCYTCHM 1ST ANTB: CPT | Mod: 26

## 2025-03-14 PROCEDURE — 88341 IMHCHEM/IMCYTCHM EA ADD ANTB: CPT | Mod: 26

## 2025-03-14 PROCEDURE — 88304 TISSUE EXAM BY PATHOLOGIST: CPT

## 2025-03-14 PROCEDURE — C9399: CPT

## 2025-03-14 PROCEDURE — 88342 IMHCHEM/IMCYTCHM 1ST ANTB: CPT

## 2025-03-14 PROCEDURE — 45171 EXC RECT TUM TRANSANAL PART: CPT

## 2025-03-14 PROCEDURE — 88341 IMHCHEM/IMCYTCHM EA ADD ANTB: CPT

## 2025-03-14 PROCEDURE — 88304 TISSUE EXAM BY PATHOLOGIST: CPT | Mod: 26

## 2025-03-14 RX ORDER — SODIUM CHLORIDE 9 G/1000ML
1000 INJECTION, SOLUTION INTRAVENOUS
Refills: 0 | Status: DISCONTINUED | OUTPATIENT
Start: 2025-03-14 | End: 2025-03-14

## 2025-03-14 RX ORDER — OXYCODONE 5 MG/1
5 TABLET ORAL
Qty: 20 | Refills: 0 | Status: ACTIVE | COMMUNITY
Start: 2025-03-14 | End: 1900-01-01

## 2025-03-14 RX ORDER — IBUPROFEN 200 MG
1 TABLET ORAL
Refills: 0 | DISCHARGE

## 2025-03-14 NOTE — ASU DISCHARGE PLAN (ADULT/PEDIATRIC) - FINANCIAL ASSISTANCE
[facility] provides services at a reduced cost to those who are determined to be eligible through [facility]’s financial assistance program. Information regarding [facility]’s financial assistance program can be found by going to [email or web address] or by calling [phone].

## 2025-03-14 NOTE — CHART NOTE - NSCHARTNOTEFT_GEN_A_CORE
PACU ANESTHESIA ADMISSION NOTE      Procedure: Exam under anesthesia, rectum, with lesion excision    Excision, mass, transanal approach      Post op diagnosis:  Anal lesion        ____  Intubated  TV:______       Rate: ______      FiO2: ______    _x___  Patent Airway    _x___  Full return of protective reflexes    _x___  Full recovery from anesthesia / back to baseline status    Vitals:  T(C): 36.5 (03-14-25 @ 15:46), Max: 36.5 (03-14-25 @ 12:57)  HR: 67 (03-14-25 @ 16:30) (63 - 80)  BP: 114/77 (03-14-25 @ 16:30) (99/59 - 131/66)  RR: 18 (03-14-25 @ 16:30) (10 - 18)  SpO2: 98% (03-14-25 @ 16:30) (97% - 100%)    Mental Status:  _x___ Awake   _____ Alert   _____ Drowsy   _____ Sedated    Nausea/Vomiting:  _x___  NO       ______Yes,   See Post - Op Orders         Pain Scale (0-10):  __0___    Treatment: _x___ None    ____ See Post - Op/PCA Orders    Post - Operative Fluids:   __x__ Oral   ____ See Post - Op Orders    Plan: Discharge:   _x___Home       _____Floor     _____Critical Care    _____  Other:_________________    Comments:  No anesthesia issues or complications noted.  Discharge when criteria met.

## 2025-03-14 NOTE — ASU DISCHARGE PLAN (ADULT/PEDIATRIC) - ASU DC SPECIAL INSTRUCTIONSFT
Follow Up with Dr. Rodriguez in two weeks. Please call office for confirmation of your appointment.    Diet: Resume your regular diet as tolerated    Pain: You can take over the counter medications such as Tylenol, and Ibuprofen for pain control. Please adhere to the instructions on the back of the bottle.     If you develop fevers, chills, worsening pain, increased drainage from the wound, foul smelling drainage from the wound, nausea that won't subside, vomiting, or any other symptoms of concern, please call MD for further advice, evaluation, and/or treatment.    Activity: Ambulate and get out of bed as tolerated, and with assistance if feeling weak. You may shower, but do not submerge your incision site under water including swimming or bathing. No heavy lifting greater than ten pounds for six weeks postoperatively. You may remove the dressing after your first bowel movement or 24 hours after surgery, whichever comes first. Replace the gauze prn for oozing from incision site.

## 2025-03-14 NOTE — ASU DISCHARGE PLAN (ADULT/PEDIATRIC) - CARE PROVIDER_API CALL
Tom Rodriguez  Colon/Rectal Surgery  [address] [place] [address]  Phone: [phone]  Fax: [phone]  Follow Up Time: 2 weeks

## 2025-03-14 NOTE — BRIEF OPERATIVE NOTE - NSICDXBRIEFPROCEDURE_GEN_ALL_CORE_FT
PROCEDURES:  Exam under anesthesia, rectum, with lesion excision 14-Mar-2025 16:05:39  Tanner Cobb  Excision, mass, transanal approach 14-Mar-2025 16:06:17  Tanner Cobb

## 2025-03-14 NOTE — BRIEF OPERATIVE NOTE - OPERATION/FINDINGS
Planned for EUA, fissurectomy, rectal botox injection, upon beginning of the procedure, pedunculated rectal mass discovered from posterior anal tissue. Mass resected and excised, incision site closed with running vicryl. Botox not used.

## 2025-03-20 LAB — SURGICAL PATHOLOGY STUDY: SIGNIFICANT CHANGE UP

## 2025-04-01 ENCOUNTER — APPOINTMENT (OUTPATIENT)
Dept: SURGERY | Facility: CLINIC | Age: 50
End: 2025-04-01
Payer: COMMERCIAL

## 2025-04-01 VITALS
OXYGEN SATURATION: 98 % | BODY MASS INDEX: 27.47 KG/M2 | DIASTOLIC BLOOD PRESSURE: 82 MMHG | SYSTOLIC BLOOD PRESSURE: 124 MMHG | HEIGHT: 67 IN | WEIGHT: 175 LBS | TEMPERATURE: 97 F | HEART RATE: 80 BPM

## 2025-04-01 PROBLEM — Z87.19 PERSONAL HISTORY OF OTHER DISEASES OF THE DIGESTIVE SYSTEM: Chronic | Status: ACTIVE | Noted: 2025-03-11

## 2025-04-01 PROCEDURE — 99024 POSTOP FOLLOW-UP VISIT: CPT

## 2025-04-04 ENCOUNTER — LABORATORY RESULT (OUTPATIENT)
Age: 50
End: 2025-04-04

## 2025-04-04 ENCOUNTER — NON-APPOINTMENT (OUTPATIENT)
Age: 50
End: 2025-04-04

## 2025-04-04 ENCOUNTER — APPOINTMENT (OUTPATIENT)
Dept: RADIATION ONCOLOGY | Facility: HOSPITAL | Age: 50
End: 2025-04-04
Payer: COMMERCIAL

## 2025-04-04 ENCOUNTER — OUTPATIENT (OUTPATIENT)
Dept: OUTPATIENT SERVICES | Facility: HOSPITAL | Age: 50
LOS: 1 days | End: 2025-04-04
Payer: COMMERCIAL

## 2025-04-04 VITALS
WEIGHT: 178.5 LBS | RESPIRATION RATE: 16 BRPM | BODY MASS INDEX: 28.02 KG/M2 | HEIGHT: 67 IN | HEART RATE: 101 BPM | DIASTOLIC BLOOD PRESSURE: 77 MMHG | TEMPERATURE: 97.2 F | OXYGEN SATURATION: 97 % | SYSTOLIC BLOOD PRESSURE: 111 MMHG

## 2025-04-04 DIAGNOSIS — C21.0 MALIGNANT NEOPLASM OF ANUS, UNSPECIFIED: ICD-10-CM

## 2025-04-04 DIAGNOSIS — K60.2 ANAL FISSURE, UNSPECIFIED: ICD-10-CM

## 2025-04-04 DIAGNOSIS — N39.0 URINARY TRACT INFECTION, SITE NOT SPECIFIED: ICD-10-CM

## 2025-04-04 DIAGNOSIS — Z90.3 ACQUIRED ABSENCE OF STOMACH [PART OF]: Chronic | ICD-10-CM

## 2025-04-04 DIAGNOSIS — S02.609A FRACTURE OF MANDIBLE, UNSPECIFIED, INITIAL ENCOUNTER FOR CLOSED FRACTURE: Chronic | ICD-10-CM

## 2025-04-04 DIAGNOSIS — R82.90 UNSPECIFIED ABNORMAL FINDINGS IN URINE: ICD-10-CM

## 2025-04-04 LAB
APPEARANCE: CLEAR
BILIRUBIN URINE: NEGATIVE
BLOOD URINE: NEGATIVE
COLOR: YELLOW
GLUCOSE QUALITATIVE U: NEGATIVE MG/DL
KETONES URINE: NEGATIVE MG/DL
LEUKOCYTE ESTERASE URINE: ABNORMAL
NITRITE URINE: NEGATIVE
PH URINE: 6.5
PROTEIN URINE: NEGATIVE MG/DL
SPECIFIC GRAVITY URINE: 1.01
UROBILINOGEN URINE: 0.2 MG/DL

## 2025-04-04 PROCEDURE — 99204 OFFICE O/P NEW MOD 45 MIN: CPT

## 2025-04-04 RX ORDER — NITROFURANTOIN (MONOHYDRATE/MACROCRYSTALS) 25; 75 MG/1; MG/1
100 CAPSULE ORAL
Qty: 10 | Refills: 0 | Status: ACTIVE | COMMUNITY
Start: 2025-04-04 | End: 1900-01-01

## 2025-04-04 RX ORDER — SEMAGLUTIDE 1.34 MG/ML
2 INJECTION, SOLUTION SUBCUTANEOUS
Refills: 0 | Status: ACTIVE | COMMUNITY

## 2025-04-08 ENCOUNTER — NON-APPOINTMENT (OUTPATIENT)
Age: 50
End: 2025-04-08

## 2025-04-08 ENCOUNTER — APPOINTMENT (OUTPATIENT)
Dept: OBGYN | Facility: CLINIC | Age: 50
End: 2025-04-08
Payer: COMMERCIAL

## 2025-04-08 VITALS — HEIGHT: 67 IN | WEIGHT: 176 LBS | BODY MASS INDEX: 27.62 KG/M2

## 2025-04-08 DIAGNOSIS — Z01.419 ENCOUNTER FOR GYNECOLOGICAL EXAMINATION (GENERAL) (ROUTINE) W/OUT ABNORMAL FINDINGS: ICD-10-CM

## 2025-04-08 LAB — BACTERIA UR CULT: ABNORMAL

## 2025-04-08 PROCEDURE — 99386 PREV VISIT NEW AGE 40-64: CPT

## 2025-04-09 ENCOUNTER — OUTPATIENT (OUTPATIENT)
Dept: OUTPATIENT SERVICES | Facility: HOSPITAL | Age: 50
LOS: 1 days | End: 2025-04-09
Payer: COMMERCIAL

## 2025-04-09 ENCOUNTER — APPOINTMENT (OUTPATIENT)
Age: 50
End: 2025-04-09
Payer: COMMERCIAL

## 2025-04-09 ENCOUNTER — NON-APPOINTMENT (OUTPATIENT)
Age: 50
End: 2025-04-09

## 2025-04-09 VITALS
DIASTOLIC BLOOD PRESSURE: 74 MMHG | TEMPERATURE: 97.7 F | RESPIRATION RATE: 16 BRPM | WEIGHT: 177 LBS | HEIGHT: 67 IN | HEART RATE: 99 BPM | SYSTOLIC BLOOD PRESSURE: 119 MMHG | BODY MASS INDEX: 27.78 KG/M2 | OXYGEN SATURATION: 98 %

## 2025-04-09 DIAGNOSIS — S02.609A FRACTURE OF MANDIBLE, UNSPECIFIED, INITIAL ENCOUNTER FOR CLOSED FRACTURE: Chronic | ICD-10-CM

## 2025-04-09 DIAGNOSIS — Z80.7 FAMILY HISTORY OF OTHER MALIGNANT NEOPLASMS OF LYMPHOID, HEMATOPOIETIC AND RELATED TISSUES: ICD-10-CM

## 2025-04-09 DIAGNOSIS — C21.0 MALIGNANT NEOPLASM OF ANUS, UNSPECIFIED: ICD-10-CM

## 2025-04-09 DIAGNOSIS — C44.520 SQUAMOUS CELL CARCINOMA OF ANAL SKIN: ICD-10-CM

## 2025-04-09 DIAGNOSIS — Z90.3 ACQUIRED ABSENCE OF STOMACH [PART OF]: Chronic | ICD-10-CM

## 2025-04-09 DIAGNOSIS — R53.83 OTHER FATIGUE: ICD-10-CM

## 2025-04-09 DIAGNOSIS — F31.81 BIPOLAR II DISORDER: ICD-10-CM

## 2025-04-09 DIAGNOSIS — Z80.43 FAMILY HISTORY OF MALIGNANT NEOPLASM OF TESTIS: ICD-10-CM

## 2025-04-09 DIAGNOSIS — E61.1 IRON DEFICIENCY: ICD-10-CM

## 2025-04-09 LAB
AUTO BASOPHILS #: 0.07 K/UL
AUTO BASOPHILS %: 1.1 %
AUTO EOSINOPHILS #: 0.25 K/UL
AUTO EOSINOPHILS %: 3.9 %
AUTO IMMATURE GRANULOCYTES #: 0.01 K/UL
AUTO LYMPHOCYTES #: 1.47 K/UL
AUTO LYMPHOCYTES %: 22.8 %
AUTO MONOCYTES #: 0.67 K/UL
AUTO MONOCYTES %: 10.4 %
AUTO NEUTROPHILS #: 3.98 K/UL
AUTO NEUTROPHILS %: 61.6 %
AUTO NRBC #: 0 K/UL
HCT VFR BLD CALC: 38.4 %
HGB BLD-MCNC: 12.1 G/DL
IMM GRANULOCYTES NFR BLD AUTO: 0.2 %
MAN DIFF?: NORMAL
MCHC RBC-ENTMCNC: 27.1 PG
MCHC RBC-ENTMCNC: 31.5 G/DL
MCV RBC AUTO: 86.1 FL
PLATELET # BLD AUTO: 320 K/UL
PMV BLD AUTO: 0 /100 WBCS
PMV BLD: 9.4 FL
RBC # BLD: 4.46 M/UL
RBC # FLD: 15.4 %
WBC # FLD AUTO: 6.45 K/UL

## 2025-04-09 PROCEDURE — 99205 OFFICE O/P NEW HI 60 MIN: CPT

## 2025-04-09 PROCEDURE — 82728 ASSAY OF FERRITIN: CPT

## 2025-04-09 PROCEDURE — G2211 COMPLEX E/M VISIT ADD ON: CPT | Mod: NC

## 2025-04-09 PROCEDURE — 82607 VITAMIN B-12: CPT

## 2025-04-09 PROCEDURE — 80074 ACUTE HEPATITIS PANEL: CPT

## 2025-04-09 PROCEDURE — 84443 ASSAY THYROID STIM HORMONE: CPT

## 2025-04-09 PROCEDURE — 83550 IRON BINDING TEST: CPT

## 2025-04-09 PROCEDURE — 83540 ASSAY OF IRON: CPT

## 2025-04-09 PROCEDURE — 85025 COMPLETE CBC W/AUTO DIFF WBC: CPT

## 2025-04-09 PROCEDURE — 82746 ASSAY OF FOLIC ACID SERUM: CPT

## 2025-04-09 PROCEDURE — 80053 COMPREHEN METABOLIC PANEL: CPT

## 2025-04-10 ENCOUNTER — RESULT REVIEW (OUTPATIENT)
Age: 50
End: 2025-04-10

## 2025-04-10 ENCOUNTER — OUTPATIENT (OUTPATIENT)
Dept: OUTPATIENT SERVICES | Facility: HOSPITAL | Age: 50
LOS: 1 days | End: 2025-04-10
Payer: COMMERCIAL

## 2025-04-10 DIAGNOSIS — C21.0 MALIGNANT NEOPLASM OF ANUS, UNSPECIFIED: ICD-10-CM

## 2025-04-10 DIAGNOSIS — Z90.3 ACQUIRED ABSENCE OF STOMACH [PART OF]: Chronic | ICD-10-CM

## 2025-04-10 DIAGNOSIS — S02.609A FRACTURE OF MANDIBLE, UNSPECIFIED, INITIAL ENCOUNTER FOR CLOSED FRACTURE: Chronic | ICD-10-CM

## 2025-04-10 DIAGNOSIS — C44.520 SQUAMOUS CELL CARCINOMA OF ANAL SKIN: ICD-10-CM

## 2025-04-10 LAB
ALBUMIN SERPL ELPH-MCNC: 4.4 G/DL
ALP BLD-CCNC: 109 U/L
ALT SERPL-CCNC: 11 U/L
ANION GAP SERPL CALC-SCNC: 13 MMOL/L
AST SERPL-CCNC: 16 U/L
BILIRUB SERPL-MCNC: 0.4 MG/DL
BUN SERPL-MCNC: 7 MG/DL
CALCIUM SERPL-MCNC: 9.4 MG/DL
CHLORIDE SERPL-SCNC: 105 MMOL/L
CO2 SERPL-SCNC: 23 MMOL/L
CREAT SERPL-MCNC: 0.7 MG/DL
EGFRCR SERPLBLD CKD-EPI 2021: 105 ML/MIN/1.73M2
FERRITIN SERPL-MCNC: 13 NG/ML
FOLATE SERPL-MCNC: 7.7 NG/ML
GLUCOSE BLDC GLUCOMTR-MCNC: 76 MG/DL — SIGNIFICANT CHANGE UP (ref 70–99)
GLUCOSE SERPL-MCNC: 83 MG/DL
HAV IGM SER QL: NONREACTIVE
HBV CORE IGM SER QL: NONREACTIVE
HBV SURFACE AG SER QL: NONREACTIVE
HCV AB SER QL: NONREACTIVE
HCV S/CO RATIO: 0.11 S/CO
HPV HIGH+LOW RISK DNA PNL CVX: NOT DETECTED
IRON SATN MFR SERPL: 11 %
IRON SERPL-MCNC: 53 UG/DL
POTASSIUM SERPL-SCNC: 4.4 MMOL/L
PROT SERPL-MCNC: 7.3 G/DL
SODIUM SERPL-SCNC: 141 MMOL/L
TIBC SERPL-MCNC: 464 UG/DL
TSH SERPL-ACNC: 1.23 UIU/ML
UIBC SERPL-MCNC: 411 UG/DL
VIT B12 SERPL-MCNC: >2000 PG/ML

## 2025-04-10 PROCEDURE — 82962 GLUCOSE BLOOD TEST: CPT

## 2025-04-10 PROCEDURE — 78815 PET IMAGE W/CT SKULL-THIGH: CPT | Mod: PI

## 2025-04-10 PROCEDURE — A9552: CPT

## 2025-04-10 PROCEDURE — 87389 HIV-1 AG W/HIV-1&-2 AB AG IA: CPT

## 2025-04-10 PROCEDURE — 78815 PET IMAGE W/CT SKULL-THIGH: CPT | Mod: 26,PI

## 2025-04-10 PROCEDURE — 36415 COLL VENOUS BLD VENIPUNCTURE: CPT

## 2025-04-10 RX ORDER — FERROUS SULFATE TAB EC 325 MG (65 MG FE EQUIVALENT) 325 (65 FE) MG
325 (65 FE) TABLET DELAYED RESPONSE ORAL DAILY
Qty: 90 | Refills: 0 | Status: ACTIVE | COMMUNITY
Start: 2025-04-10 | End: 1900-01-01

## 2025-04-11 ENCOUNTER — OUTPATIENT (OUTPATIENT)
Dept: OUTPATIENT SERVICES | Facility: HOSPITAL | Age: 50
LOS: 1 days | End: 2025-04-11
Payer: COMMERCIAL

## 2025-04-11 ENCOUNTER — NON-APPOINTMENT (OUTPATIENT)
Age: 50
End: 2025-04-11

## 2025-04-11 DIAGNOSIS — S02.609A FRACTURE OF MANDIBLE, UNSPECIFIED, INITIAL ENCOUNTER FOR CLOSED FRACTURE: Chronic | ICD-10-CM

## 2025-04-11 DIAGNOSIS — C21.0 MALIGNANT NEOPLASM OF ANUS, UNSPECIFIED: ICD-10-CM

## 2025-04-11 DIAGNOSIS — Z90.3 ACQUIRED ABSENCE OF STOMACH [PART OF]: Chronic | ICD-10-CM

## 2025-04-11 PROCEDURE — 77470 SPECIAL RADIATION TREATMENT: CPT

## 2025-04-11 PROCEDURE — 77470 SPECIAL RADIATION TREATMENT: CPT | Mod: 26

## 2025-04-14 DIAGNOSIS — K60.2 ANAL FISSURE, UNSPECIFIED: ICD-10-CM

## 2025-04-14 LAB — HIV1+2 AB SPEC QL IA.RAPID: NONREACTIVE

## 2025-04-14 RX ORDER — ONDANSETRON 8 MG/1
8 TABLET, ORALLY DISINTEGRATING ORAL
Qty: 90 | Refills: 1 | Status: ACTIVE | COMMUNITY
Start: 2025-04-14 | End: 1900-01-01

## 2025-04-15 ENCOUNTER — OUTPATIENT (OUTPATIENT)
Dept: OUTPATIENT SERVICES | Facility: HOSPITAL | Age: 50
LOS: 1 days | End: 2025-04-15

## 2025-04-15 DIAGNOSIS — S02.609A FRACTURE OF MANDIBLE, UNSPECIFIED, INITIAL ENCOUNTER FOR CLOSED FRACTURE: Chronic | ICD-10-CM

## 2025-04-15 DIAGNOSIS — Z90.3 ACQUIRED ABSENCE OF STOMACH [PART OF]: Chronic | ICD-10-CM

## 2025-04-15 LAB — CYTOLOGY CVX/VAG DOC THIN PREP: ABNORMAL

## 2025-04-15 PROCEDURE — 77334 RADIATION TREATMENT AID(S): CPT | Mod: 26

## 2025-04-15 PROCEDURE — 77263 THER RADIOLOGY TX PLNG CPLX: CPT

## 2025-04-16 DIAGNOSIS — K60.2 ANAL FISSURE, UNSPECIFIED: ICD-10-CM

## 2025-04-17 RX ORDER — LOPERAMIDE HYDROCHLORIDE 2 MG/1
2 CAPSULE ORAL
Qty: 240 | Refills: 1 | Status: ACTIVE | COMMUNITY
Start: 2025-04-17 | End: 1900-01-01

## 2025-04-17 RX ORDER — UREA 200 MG/G
20 CREAM TOPICAL TWICE DAILY
Qty: 500 | Refills: 3 | Status: ACTIVE | COMMUNITY
Start: 2025-04-17 | End: 1900-01-01

## 2025-04-17 RX ORDER — CAPECITABINE 500 MG/1
500 TABLET, FILM COATED ORAL
Qty: 180 | Refills: 0 | Status: ACTIVE | COMMUNITY
Start: 2025-04-17 | End: 1900-01-01

## 2025-04-24 ENCOUNTER — OUTPATIENT (OUTPATIENT)
Dept: OUTPATIENT SERVICES | Facility: HOSPITAL | Age: 50
LOS: 1 days | End: 2025-04-24

## 2025-04-24 DIAGNOSIS — S02.609A FRACTURE OF MANDIBLE, UNSPECIFIED, INITIAL ENCOUNTER FOR CLOSED FRACTURE: Chronic | ICD-10-CM

## 2025-04-24 DIAGNOSIS — Z90.3 ACQUIRED ABSENCE OF STOMACH [PART OF]: Chronic | ICD-10-CM

## 2025-04-24 PROCEDURE — 77300 RADIATION THERAPY DOSE PLAN: CPT | Mod: 26

## 2025-04-24 PROCEDURE — 77338 DESIGN MLC DEVICE FOR IMRT: CPT | Mod: 26

## 2025-04-24 PROCEDURE — 77301 RADIOTHERAPY DOSE PLAN IMRT: CPT | Mod: 26

## 2025-04-25 DIAGNOSIS — K60.2 ANAL FISSURE, UNSPECIFIED: ICD-10-CM

## 2025-04-29 ENCOUNTER — NON-APPOINTMENT (OUTPATIENT)
Age: 50
End: 2025-04-29

## 2025-04-29 ENCOUNTER — APPOINTMENT (OUTPATIENT)
Age: 50
End: 2025-04-29

## 2025-04-29 ENCOUNTER — OUTPATIENT (OUTPATIENT)
Dept: OUTPATIENT SERVICES | Facility: HOSPITAL | Age: 50
LOS: 1 days | End: 2025-04-29
Payer: COMMERCIAL

## 2025-04-29 ENCOUNTER — OUTPATIENT (OUTPATIENT)
Dept: OUTPATIENT SERVICES | Facility: HOSPITAL | Age: 50
LOS: 1 days | End: 2025-04-29

## 2025-04-29 VITALS
DIASTOLIC BLOOD PRESSURE: 82 MMHG | SYSTOLIC BLOOD PRESSURE: 121 MMHG | BODY MASS INDEX: 27.74 KG/M2 | TEMPERATURE: 98.1 F | WEIGHT: 177.13 LBS | HEART RATE: 86 BPM | OXYGEN SATURATION: 99 % | RESPIRATION RATE: 16 BRPM

## 2025-04-29 VITALS
SYSTOLIC BLOOD PRESSURE: 107 MMHG | OXYGEN SATURATION: 98 % | HEIGHT: 66 IN | BODY MASS INDEX: 28.31 KG/M2 | WEIGHT: 176.15 LBS | HEART RATE: 82 BPM | DIASTOLIC BLOOD PRESSURE: 73 MMHG | TEMPERATURE: 98 F

## 2025-04-29 DIAGNOSIS — C44.520 SQUAMOUS CELL CARCINOMA OF ANAL SKIN: ICD-10-CM

## 2025-04-29 DIAGNOSIS — K60.2 ANAL FISSURE, UNSPECIFIED: ICD-10-CM

## 2025-04-29 DIAGNOSIS — S02.609A FRACTURE OF MANDIBLE, UNSPECIFIED, INITIAL ENCOUNTER FOR CLOSED FRACTURE: Chronic | ICD-10-CM

## 2025-04-29 DIAGNOSIS — Z90.3 ACQUIRED ABSENCE OF STOMACH [PART OF]: Chronic | ICD-10-CM

## 2025-04-29 LAB
ALBUMIN SERPL ELPH-MCNC: 4.5 G/DL
ALP BLD-CCNC: 96 U/L
ALT SERPL-CCNC: 11 U/L
ANION GAP SERPL CALC-SCNC: 16 MMOL/L
AST SERPL-CCNC: 17 U/L
AUTO BASOPHILS #: 0.04 K/UL
AUTO BASOPHILS %: 0.7 %
AUTO EOSINOPHILS #: 0.14 K/UL
AUTO EOSINOPHILS %: 2.4 %
AUTO IMMATURE GRANULOCYTES #: 0.02 K/UL
AUTO LYMPHOCYTES #: 1.83 K/UL
AUTO LYMPHOCYTES %: 31.1 %
AUTO MONOCYTES #: 0.54 K/UL
AUTO MONOCYTES %: 9.2 %
AUTO NEUTROPHILS #: 3.32 K/UL
AUTO NEUTROPHILS %: 56.3 %
AUTO NRBC #: 0 K/UL
BILIRUB SERPL-MCNC: 0.5 MG/DL
BUN SERPL-MCNC: 8 MG/DL
CALCIUM SERPL-MCNC: 9.6 MG/DL
CHLORIDE SERPL-SCNC: 103 MMOL/L
CO2 SERPL-SCNC: 21 MMOL/L
CREAT SERPL-MCNC: 0.7 MG/DL
EGFRCR SERPLBLD CKD-EPI 2021: 105 ML/MIN/1.73M2
GLUCOSE SERPL-MCNC: 85 MG/DL
HCT VFR BLD CALC: 38 %
HGB BLD-MCNC: 12 G/DL
IMM GRANULOCYTES NFR BLD AUTO: 0.3 %
MAGNESIUM SERPL-MCNC: 2.4 MG/DL
MAN DIFF?: NORMAL
MCHC RBC-ENTMCNC: 27.5 PG
MCHC RBC-ENTMCNC: 31.6 G/DL
MCV RBC AUTO: 87.2 FL
PLATELET # BLD AUTO: 277 K/UL
PMV BLD AUTO: 0 /100 WBCS
PMV BLD: 9.5 FL
POTASSIUM SERPL-SCNC: 4.8 MMOL/L
PROT SERPL-MCNC: 6.9 G/DL
RBC # BLD: 4.36 M/UL
RBC # FLD: 16.3 %
SODIUM SERPL-SCNC: 140 MMOL/L
WBC # FLD AUTO: 5.89 K/UL

## 2025-04-29 PROCEDURE — 96375 TX/PRO/DX INJ NEW DRUG ADDON: CPT

## 2025-04-29 PROCEDURE — 83735 ASSAY OF MAGNESIUM: CPT

## 2025-04-29 PROCEDURE — 96409 CHEMO IV PUSH SNGL DRUG: CPT

## 2025-04-29 PROCEDURE — 85025 COMPLETE CBC W/AUTO DIFF WBC: CPT

## 2025-04-29 PROCEDURE — 80053 COMPREHEN METABOLIC PANEL: CPT

## 2025-04-29 PROCEDURE — 77427 RADIATION TX MANAGEMENT X5: CPT

## 2025-04-29 PROCEDURE — G6002: CPT | Mod: 26

## 2025-04-29 RX ORDER — PALONOSETRON HYDROCHLORIDE 0.05 MG/ML
0.25 INJECTION, SOLUTION INTRAVENOUS ONCE
Refills: 0 | Status: COMPLETED | OUTPATIENT
Start: 2025-04-29 | End: 2025-04-29

## 2025-04-29 RX ORDER — MITOMYCIN 5 MG/10ML
20 INJECTION, POWDER, LYOPHILIZED, FOR SOLUTION INTRAVENOUS ONCE
Refills: 0 | Status: COMPLETED | OUTPATIENT
Start: 2025-04-29 | End: 2025-04-29

## 2025-04-29 RX ORDER — PROCHLORPERAZINE MALEATE 10 MG/1
10 TABLET ORAL EVERY 6 HOURS
Qty: 20 | Refills: 1 | Status: ACTIVE | COMMUNITY
Start: 2025-04-29 | End: 1900-01-01

## 2025-04-29 RX ADMIN — PALONOSETRON HYDROCHLORIDE 0.25 MILLIGRAM(S): 0.05 INJECTION, SOLUTION INTRAVENOUS at 11:34

## 2025-04-29 RX ADMIN — MITOMYCIN 240 MILLIGRAM(S): 5 INJECTION, POWDER, LYOPHILIZED, FOR SOLUTION INTRAVENOUS at 11:35

## 2025-04-30 ENCOUNTER — OUTPATIENT (OUTPATIENT)
Dept: OUTPATIENT SERVICES | Facility: HOSPITAL | Age: 50
LOS: 1 days | End: 2025-04-30

## 2025-04-30 DIAGNOSIS — S02.609A FRACTURE OF MANDIBLE, UNSPECIFIED, INITIAL ENCOUNTER FOR CLOSED FRACTURE: Chronic | ICD-10-CM

## 2025-04-30 DIAGNOSIS — K60.2 ANAL FISSURE, UNSPECIFIED: ICD-10-CM

## 2025-04-30 DIAGNOSIS — Z90.3 ACQUIRED ABSENCE OF STOMACH [PART OF]: Chronic | ICD-10-CM

## 2025-04-30 PROCEDURE — G6002: CPT | Mod: 26

## 2025-05-01 ENCOUNTER — OUTPATIENT (OUTPATIENT)
Dept: OUTPATIENT SERVICES | Facility: HOSPITAL | Age: 50
LOS: 1 days | End: 2025-05-01
Payer: COMMERCIAL

## 2025-05-01 DIAGNOSIS — Z90.3 ACQUIRED ABSENCE OF STOMACH [PART OF]: Chronic | ICD-10-CM

## 2025-05-01 DIAGNOSIS — K60.2 ANAL FISSURE, UNSPECIFIED: ICD-10-CM

## 2025-05-01 DIAGNOSIS — S02.609A FRACTURE OF MANDIBLE, UNSPECIFIED, INITIAL ENCOUNTER FOR CLOSED FRACTURE: Chronic | ICD-10-CM

## 2025-05-01 PROCEDURE — 77386: CPT

## 2025-05-01 PROCEDURE — G6002: CPT | Mod: 26

## 2025-05-02 ENCOUNTER — OUTPATIENT (OUTPATIENT)
Dept: OUTPATIENT SERVICES | Facility: HOSPITAL | Age: 50
LOS: 1 days | End: 2025-05-02

## 2025-05-02 DIAGNOSIS — K60.2 ANAL FISSURE, UNSPECIFIED: ICD-10-CM

## 2025-05-02 DIAGNOSIS — Z90.3 ACQUIRED ABSENCE OF STOMACH [PART OF]: Chronic | ICD-10-CM

## 2025-05-02 DIAGNOSIS — S02.609A FRACTURE OF MANDIBLE, UNSPECIFIED, INITIAL ENCOUNTER FOR CLOSED FRACTURE: Chronic | ICD-10-CM

## 2025-05-05 ENCOUNTER — OUTPATIENT (OUTPATIENT)
Dept: OUTPATIENT SERVICES | Facility: HOSPITAL | Age: 50
LOS: 1 days | End: 2025-05-05

## 2025-05-05 DIAGNOSIS — K60.2 ANAL FISSURE, UNSPECIFIED: ICD-10-CM

## 2025-05-05 DIAGNOSIS — Z90.3 ACQUIRED ABSENCE OF STOMACH [PART OF]: Chronic | ICD-10-CM

## 2025-05-05 DIAGNOSIS — S02.609A FRACTURE OF MANDIBLE, UNSPECIFIED, INITIAL ENCOUNTER FOR CLOSED FRACTURE: Chronic | ICD-10-CM

## 2025-05-05 PROCEDURE — 77014: CPT | Mod: 26

## 2025-05-06 ENCOUNTER — NON-APPOINTMENT (OUTPATIENT)
Age: 50
End: 2025-05-06

## 2025-05-06 ENCOUNTER — OUTPATIENT (OUTPATIENT)
Dept: OUTPATIENT SERVICES | Facility: HOSPITAL | Age: 50
LOS: 1 days | End: 2025-05-06

## 2025-05-06 VITALS
TEMPERATURE: 97.9 F | HEART RATE: 93 BPM | SYSTOLIC BLOOD PRESSURE: 108 MMHG | RESPIRATION RATE: 16 BRPM | OXYGEN SATURATION: 96 % | DIASTOLIC BLOOD PRESSURE: 78 MMHG | WEIGHT: 172.38 LBS | BODY MASS INDEX: 27.82 KG/M2

## 2025-05-06 DIAGNOSIS — C21.0 MALIGNANT NEOPLASM OF ANUS, UNSPECIFIED: ICD-10-CM

## 2025-05-06 DIAGNOSIS — K60.2 ANAL FISSURE, UNSPECIFIED: ICD-10-CM

## 2025-05-06 DIAGNOSIS — Z90.3 ACQUIRED ABSENCE OF STOMACH [PART OF]: Chronic | ICD-10-CM

## 2025-05-06 PROCEDURE — G6002: CPT | Mod: 26

## 2025-05-06 PROCEDURE — 77427 RADIATION TX MANAGEMENT X5: CPT

## 2025-05-07 ENCOUNTER — OUTPATIENT (OUTPATIENT)
Dept: OUTPATIENT SERVICES | Facility: HOSPITAL | Age: 50
LOS: 1 days | End: 2025-05-07

## 2025-05-07 ENCOUNTER — APPOINTMENT (OUTPATIENT)
Age: 50
End: 2025-05-07
Payer: COMMERCIAL

## 2025-05-07 ENCOUNTER — OUTPATIENT (OUTPATIENT)
Dept: OUTPATIENT SERVICES | Facility: HOSPITAL | Age: 50
LOS: 1 days | End: 2025-05-07
Payer: COMMERCIAL

## 2025-05-07 VITALS
HEIGHT: 66 IN | WEIGHT: 171.13 LBS | DIASTOLIC BLOOD PRESSURE: 81 MMHG | HEART RATE: 82 BPM | BODY MASS INDEX: 27.5 KG/M2 | OXYGEN SATURATION: 99 % | TEMPERATURE: 98.2 F | SYSTOLIC BLOOD PRESSURE: 111 MMHG

## 2025-05-07 DIAGNOSIS — Z90.3 ACQUIRED ABSENCE OF STOMACH [PART OF]: Chronic | ICD-10-CM

## 2025-05-07 DIAGNOSIS — C44.520 SQUAMOUS CELL CARCINOMA OF ANAL SKIN: ICD-10-CM

## 2025-05-07 DIAGNOSIS — S02.609A FRACTURE OF MANDIBLE, UNSPECIFIED, INITIAL ENCOUNTER FOR CLOSED FRACTURE: Chronic | ICD-10-CM

## 2025-05-07 PROBLEM — Z51.11 ENCOUNTER FOR CHEMOTHERAPY MANAGEMENT: Status: ACTIVE | Noted: 2025-05-07

## 2025-05-07 LAB
ALBUMIN SERPL ELPH-MCNC: 4.3 G/DL
ALP BLD-CCNC: 96 U/L
ALT SERPL-CCNC: 11 U/L
ANION GAP SERPL CALC-SCNC: 11 MMOL/L
AST SERPL-CCNC: 17 U/L
AUTO BASOPHILS #: 0.02 K/UL
AUTO BASOPHILS %: 0.7 %
AUTO EOSINOPHILS #: 0.05 K/UL
AUTO EOSINOPHILS %: 1.6 %
AUTO IMMATURE GRANULOCYTES #: 0.02 K/UL
AUTO LYMPHOCYTES #: 0.7 K/UL
AUTO LYMPHOCYTES %: 23 %
AUTO MONOCYTES #: 0.13 K/UL
AUTO MONOCYTES %: 4.3 %
AUTO NEUTROPHILS #: 2.13 K/UL
AUTO NEUTROPHILS %: 69.7 %
AUTO NRBC #: 0 K/UL
BILIRUB SERPL-MCNC: 0.5 MG/DL
BUN SERPL-MCNC: 8 MG/DL
CALCIUM SERPL-MCNC: 9.5 MG/DL
CHLORIDE SERPL-SCNC: 101 MMOL/L
CO2 SERPL-SCNC: 25 MMOL/L
CREAT SERPL-MCNC: 0.7 MG/DL
EGFRCR SERPLBLD CKD-EPI 2021: 105 ML/MIN/1.73M2
GLUCOSE SERPL-MCNC: 88 MG/DL
HCT VFR BLD CALC: 37 %
HGB BLD-MCNC: 11.9 G/DL
IMM GRANULOCYTES NFR BLD AUTO: 0.7 %
MAGNESIUM SERPL-MCNC: 2 MG/DL
MAN DIFF?: NORMAL
MCHC RBC-ENTMCNC: 27.9 PG
MCHC RBC-ENTMCNC: 32.2 G/DL
MCV RBC AUTO: 86.9 FL
PLATELET # BLD AUTO: 198 K/UL
PMV BLD AUTO: 0 /100 WBCS
PMV BLD: 9.7 FL
POTASSIUM SERPL-SCNC: 4.1 MMOL/L
PROT SERPL-MCNC: 6.2 G/DL
RBC # BLD: 4.26 M/UL
RBC # FLD: 15.8 %
SODIUM SERPL-SCNC: 137 MMOL/L
WBC # FLD AUTO: 3.05 K/UL

## 2025-05-07 PROCEDURE — G2211 COMPLEX E/M VISIT ADD ON: CPT | Mod: NC

## 2025-05-07 PROCEDURE — 80053 COMPREHEN METABOLIC PANEL: CPT

## 2025-05-07 PROCEDURE — 83735 ASSAY OF MAGNESIUM: CPT

## 2025-05-07 PROCEDURE — 99214 OFFICE O/P EST MOD 30 MIN: CPT

## 2025-05-07 PROCEDURE — 85025 COMPLETE CBC W/AUTO DIFF WBC: CPT

## 2025-05-07 PROCEDURE — G6002: CPT | Mod: 26

## 2025-05-08 ENCOUNTER — OUTPATIENT (OUTPATIENT)
Dept: OUTPATIENT SERVICES | Facility: HOSPITAL | Age: 50
LOS: 1 days | End: 2025-05-08

## 2025-05-08 DIAGNOSIS — K60.2 ANAL FISSURE, UNSPECIFIED: ICD-10-CM

## 2025-05-08 DIAGNOSIS — C44.520 SQUAMOUS CELL CARCINOMA OF ANAL SKIN: ICD-10-CM

## 2025-05-08 DIAGNOSIS — S02.609A FRACTURE OF MANDIBLE, UNSPECIFIED, INITIAL ENCOUNTER FOR CLOSED FRACTURE: Chronic | ICD-10-CM

## 2025-05-08 DIAGNOSIS — Z90.3 ACQUIRED ABSENCE OF STOMACH [PART OF]: Chronic | ICD-10-CM

## 2025-05-08 PROCEDURE — G6002: CPT | Mod: 26

## 2025-05-09 ENCOUNTER — OUTPATIENT (OUTPATIENT)
Dept: OUTPATIENT SERVICES | Facility: HOSPITAL | Age: 50
LOS: 1 days | End: 2025-05-09

## 2025-05-09 DIAGNOSIS — K60.2 ANAL FISSURE, UNSPECIFIED: ICD-10-CM

## 2025-05-09 DIAGNOSIS — S02.609A FRACTURE OF MANDIBLE, UNSPECIFIED, INITIAL ENCOUNTER FOR CLOSED FRACTURE: Chronic | ICD-10-CM

## 2025-05-09 DIAGNOSIS — Z90.3 ACQUIRED ABSENCE OF STOMACH [PART OF]: Chronic | ICD-10-CM

## 2025-05-12 ENCOUNTER — OUTPATIENT (OUTPATIENT)
Dept: OUTPATIENT SERVICES | Facility: HOSPITAL | Age: 50
LOS: 1 days | End: 2025-05-12

## 2025-05-12 DIAGNOSIS — K60.2 ANAL FISSURE, UNSPECIFIED: ICD-10-CM

## 2025-05-12 DIAGNOSIS — Z90.3 ACQUIRED ABSENCE OF STOMACH [PART OF]: Chronic | ICD-10-CM

## 2025-05-12 DIAGNOSIS — S02.609A FRACTURE OF MANDIBLE, UNSPECIFIED, INITIAL ENCOUNTER FOR CLOSED FRACTURE: Chronic | ICD-10-CM

## 2025-05-12 PROCEDURE — 77014: CPT | Mod: 26

## 2025-05-13 ENCOUNTER — APPOINTMENT (OUTPATIENT)
Age: 50
End: 2025-05-13

## 2025-05-13 ENCOUNTER — NON-APPOINTMENT (OUTPATIENT)
Age: 50
End: 2025-05-13

## 2025-05-13 ENCOUNTER — OUTPATIENT (OUTPATIENT)
Dept: OUTPATIENT SERVICES | Facility: HOSPITAL | Age: 50
LOS: 1 days | End: 2025-05-13
Payer: COMMERCIAL

## 2025-05-13 ENCOUNTER — OUTPATIENT (OUTPATIENT)
Dept: OUTPATIENT SERVICES | Facility: HOSPITAL | Age: 50
LOS: 1 days | End: 2025-05-13

## 2025-05-13 VITALS
HEART RATE: 92 BPM | SYSTOLIC BLOOD PRESSURE: 94 MMHG | TEMPERATURE: 97.8 F | BODY MASS INDEX: 28.12 KG/M2 | OXYGEN SATURATION: 97 % | DIASTOLIC BLOOD PRESSURE: 68 MMHG | WEIGHT: 174.25 LBS | RESPIRATION RATE: 16 BRPM

## 2025-05-13 DIAGNOSIS — Z90.3 ACQUIRED ABSENCE OF STOMACH [PART OF]: Chronic | ICD-10-CM

## 2025-05-13 DIAGNOSIS — K60.2 ANAL FISSURE, UNSPECIFIED: ICD-10-CM

## 2025-05-13 DIAGNOSIS — S02.609A FRACTURE OF MANDIBLE, UNSPECIFIED, INITIAL ENCOUNTER FOR CLOSED FRACTURE: Chronic | ICD-10-CM

## 2025-05-13 DIAGNOSIS — C44.520 SQUAMOUS CELL CARCINOMA OF ANAL SKIN: ICD-10-CM

## 2025-05-13 LAB
ALBUMIN SERPL ELPH-MCNC: 3.9 G/DL
ALP BLD-CCNC: 83 U/L
ALT SERPL-CCNC: 10 U/L
ANION GAP SERPL CALC-SCNC: 9 MMOL/L
AST SERPL-CCNC: 13 U/L
AUTO BASOPHILS #: 0.01 K/UL
AUTO BASOPHILS %: 0.4 %
AUTO EOSINOPHILS #: 0.02 K/UL
AUTO EOSINOPHILS %: 0.8 %
AUTO IMMATURE GRANULOCYTES #: 0.01 K/UL
AUTO LYMPHOCYTES #: 0.52 K/UL
AUTO LYMPHOCYTES %: 20.2 %
AUTO MONOCYTES #: 0.25 K/UL
AUTO MONOCYTES %: 9.7 %
AUTO NEUTROPHILS #: 1.76 K/UL
AUTO NEUTROPHILS %: 68.5 %
AUTO NRBC #: 0 K/UL
BILIRUB SERPL-MCNC: 0.2 MG/DL
BUN SERPL-MCNC: 8 MG/DL
CALCIUM SERPL-MCNC: 9.2 MG/DL
CHLORIDE SERPL-SCNC: 106 MMOL/L
CO2 SERPL-SCNC: 27 MMOL/L
CREAT SERPL-MCNC: 0.6 MG/DL
EGFRCR SERPLBLD CKD-EPI 2021: 109 ML/MIN/1.73M2
GLUCOSE SERPL-MCNC: 90 MG/DL
HCT VFR BLD CALC: 32.7 %
HGB BLD-MCNC: 10.8 G/DL
IMM GRANULOCYTES NFR BLD AUTO: 0.4 %
IMMATURE PLATELET FRACTION #: 2.3 K/UL
IMMATURE PLATELET FRACTION %: 2.2 %
MAGNESIUM SERPL-MCNC: 2 MG/DL
MAN DIFF?: NORMAL
MCHC RBC-ENTMCNC: 28.2 PG
MCHC RBC-ENTMCNC: 33 G/DL
MCV RBC AUTO: 85.4 FL
PLATELET # BLD AUTO: 104 K/UL
PMV BLD AUTO: 0 /100 WBCS
PMV BLD: 9.4 FL
POTASSIUM SERPL-SCNC: 3.8 MMOL/L
PROT SERPL-MCNC: 5.6 G/DL
RBC # BLD: 3.83 M/UL
RBC # FLD: 16 %
SODIUM SERPL-SCNC: 142 MMOL/L
WBC # FLD AUTO: 2.57 K/UL

## 2025-05-13 PROCEDURE — 80053 COMPREHEN METABOLIC PANEL: CPT

## 2025-05-13 PROCEDURE — 36415 COLL VENOUS BLD VENIPUNCTURE: CPT

## 2025-05-13 PROCEDURE — 77427 RADIATION TX MANAGEMENT X5: CPT

## 2025-05-13 PROCEDURE — G6002: CPT | Mod: 26

## 2025-05-13 PROCEDURE — 83735 ASSAY OF MAGNESIUM: CPT

## 2025-05-13 PROCEDURE — 85025 COMPLETE CBC W/AUTO DIFF WBC: CPT

## 2025-05-13 RX ORDER — IBUPROFEN 800 MG/1
800 TABLET, FILM COATED ORAL 3 TIMES DAILY
Qty: 90 | Refills: 0 | Status: ACTIVE | COMMUNITY
Start: 2025-05-13 | End: 1900-01-01

## 2025-05-14 ENCOUNTER — OUTPATIENT (OUTPATIENT)
Dept: OUTPATIENT SERVICES | Facility: HOSPITAL | Age: 50
LOS: 1 days | End: 2025-05-14
Payer: COMMERCIAL

## 2025-05-14 ENCOUNTER — APPOINTMENT (OUTPATIENT)
Age: 50
End: 2025-05-14
Payer: COMMERCIAL

## 2025-05-14 ENCOUNTER — OUTPATIENT (OUTPATIENT)
Dept: OUTPATIENT SERVICES | Facility: HOSPITAL | Age: 50
LOS: 1 days | End: 2025-05-14

## 2025-05-14 VITALS
RESPIRATION RATE: 16 BRPM | HEART RATE: 101 BPM | SYSTOLIC BLOOD PRESSURE: 100 MMHG | DIASTOLIC BLOOD PRESSURE: 71 MMHG | BODY MASS INDEX: 28.31 KG/M2 | TEMPERATURE: 98.1 F | HEIGHT: 66 IN | WEIGHT: 176.15 LBS

## 2025-05-14 DIAGNOSIS — R19.7 DIARRHEA, UNSPECIFIED: ICD-10-CM

## 2025-05-14 DIAGNOSIS — S02.609A FRACTURE OF MANDIBLE, UNSPECIFIED, INITIAL ENCOUNTER FOR CLOSED FRACTURE: Chronic | ICD-10-CM

## 2025-05-14 DIAGNOSIS — Z51.11 ENCOUNTER FOR ANTINEOPLASTIC CHEMOTHERAPY: ICD-10-CM

## 2025-05-14 DIAGNOSIS — Z90.3 ACQUIRED ABSENCE OF STOMACH [PART OF]: Chronic | ICD-10-CM

## 2025-05-14 DIAGNOSIS — E61.1 IRON DEFICIENCY: ICD-10-CM

## 2025-05-14 DIAGNOSIS — C21.0 MALIGNANT NEOPLASM OF ANUS, UNSPECIFIED: ICD-10-CM

## 2025-05-14 DIAGNOSIS — F31.81 BIPOLAR II DISORDER: ICD-10-CM

## 2025-05-14 DIAGNOSIS — R53.83 OTHER FATIGUE: ICD-10-CM

## 2025-05-14 DIAGNOSIS — C44.520 SQUAMOUS CELL CARCINOMA OF ANAL SKIN: ICD-10-CM

## 2025-05-14 PROCEDURE — G2211 COMPLEX E/M VISIT ADD ON: CPT | Mod: NC

## 2025-05-14 PROCEDURE — G6002: CPT | Mod: 26

## 2025-05-14 PROCEDURE — 99214 OFFICE O/P EST MOD 30 MIN: CPT

## 2025-05-15 ENCOUNTER — OUTPATIENT (OUTPATIENT)
Dept: OUTPATIENT SERVICES | Facility: HOSPITAL | Age: 50
LOS: 1 days | End: 2025-05-15

## 2025-05-15 DIAGNOSIS — Z90.3 ACQUIRED ABSENCE OF STOMACH [PART OF]: Chronic | ICD-10-CM

## 2025-05-15 DIAGNOSIS — S02.609A FRACTURE OF MANDIBLE, UNSPECIFIED, INITIAL ENCOUNTER FOR CLOSED FRACTURE: Chronic | ICD-10-CM

## 2025-05-15 DIAGNOSIS — K60.2 ANAL FISSURE, UNSPECIFIED: ICD-10-CM

## 2025-05-15 DIAGNOSIS — C44.520 SQUAMOUS CELL CARCINOMA OF ANAL SKIN: ICD-10-CM

## 2025-05-15 PROCEDURE — G6002: CPT | Mod: 26

## 2025-05-16 ENCOUNTER — NON-APPOINTMENT (OUTPATIENT)
Age: 50
End: 2025-05-16

## 2025-05-16 ENCOUNTER — OUTPATIENT (OUTPATIENT)
Dept: OUTPATIENT SERVICES | Facility: HOSPITAL | Age: 50
LOS: 1 days | End: 2025-05-16

## 2025-05-16 DIAGNOSIS — K60.2 ANAL FISSURE, UNSPECIFIED: ICD-10-CM

## 2025-05-16 DIAGNOSIS — S02.609A FRACTURE OF MANDIBLE, UNSPECIFIED, INITIAL ENCOUNTER FOR CLOSED FRACTURE: Chronic | ICD-10-CM

## 2025-05-16 DIAGNOSIS — Z90.3 ACQUIRED ABSENCE OF STOMACH [PART OF]: Chronic | ICD-10-CM

## 2025-05-19 ENCOUNTER — OUTPATIENT (OUTPATIENT)
Dept: OUTPATIENT SERVICES | Facility: HOSPITAL | Age: 50
LOS: 1 days | End: 2025-05-19

## 2025-05-19 DIAGNOSIS — S02.609A FRACTURE OF MANDIBLE, UNSPECIFIED, INITIAL ENCOUNTER FOR CLOSED FRACTURE: Chronic | ICD-10-CM

## 2025-05-19 PROCEDURE — 77014: CPT | Mod: 26

## 2025-05-20 ENCOUNTER — NON-APPOINTMENT (OUTPATIENT)
Age: 50
End: 2025-05-20

## 2025-05-20 ENCOUNTER — OUTPATIENT (OUTPATIENT)
Dept: OUTPATIENT SERVICES | Facility: HOSPITAL | Age: 50
LOS: 1 days | End: 2025-05-20

## 2025-05-20 VITALS
TEMPERATURE: 97.8 F | HEART RATE: 90 BPM | WEIGHT: 173.13 LBS | SYSTOLIC BLOOD PRESSURE: 101 MMHG | DIASTOLIC BLOOD PRESSURE: 71 MMHG | OXYGEN SATURATION: 100 % | RESPIRATION RATE: 16 BRPM | BODY MASS INDEX: 27.94 KG/M2

## 2025-05-20 DIAGNOSIS — S02.609A FRACTURE OF MANDIBLE, UNSPECIFIED, INITIAL ENCOUNTER FOR CLOSED FRACTURE: Chronic | ICD-10-CM

## 2025-05-20 DIAGNOSIS — K60.2 ANAL FISSURE, UNSPECIFIED: ICD-10-CM

## 2025-05-20 DIAGNOSIS — Z90.3 ACQUIRED ABSENCE OF STOMACH [PART OF]: Chronic | ICD-10-CM

## 2025-05-20 PROCEDURE — G6002: CPT | Mod: 26

## 2025-05-20 PROCEDURE — 77427 RADIATION TX MANAGEMENT X5: CPT

## 2025-05-20 RX ORDER — CHLORHEXIDINE GLUCONATE 4 %
325 (65 FE) LIQUID (ML) TOPICAL
Refills: 0 | Status: ACTIVE | COMMUNITY

## 2025-05-21 ENCOUNTER — APPOINTMENT (OUTPATIENT)
Age: 50
End: 2025-05-21

## 2025-05-21 ENCOUNTER — OUTPATIENT (OUTPATIENT)
Dept: OUTPATIENT SERVICES | Facility: HOSPITAL | Age: 50
LOS: 1 days | End: 2025-05-21
Payer: COMMERCIAL

## 2025-05-21 ENCOUNTER — OUTPATIENT (OUTPATIENT)
Dept: OUTPATIENT SERVICES | Facility: HOSPITAL | Age: 50
LOS: 1 days | End: 2025-05-21

## 2025-05-21 DIAGNOSIS — Z90.3 ACQUIRED ABSENCE OF STOMACH [PART OF]: Chronic | ICD-10-CM

## 2025-05-21 DIAGNOSIS — C44.520 SQUAMOUS CELL CARCINOMA OF ANAL SKIN: ICD-10-CM

## 2025-05-21 DIAGNOSIS — K60.2 ANAL FISSURE, UNSPECIFIED: ICD-10-CM

## 2025-05-21 DIAGNOSIS — S02.609A FRACTURE OF MANDIBLE, UNSPECIFIED, INITIAL ENCOUNTER FOR CLOSED FRACTURE: Chronic | ICD-10-CM

## 2025-05-21 PROCEDURE — 85025 COMPLETE CBC W/AUTO DIFF WBC: CPT

## 2025-05-21 PROCEDURE — 83735 ASSAY OF MAGNESIUM: CPT

## 2025-05-21 PROCEDURE — G6002: CPT | Mod: 26

## 2025-05-21 PROCEDURE — 80053 COMPREHEN METABOLIC PANEL: CPT

## 2025-05-21 PROCEDURE — 36415 COLL VENOUS BLD VENIPUNCTURE: CPT

## 2025-05-22 ENCOUNTER — OUTPATIENT (OUTPATIENT)
Dept: OUTPATIENT SERVICES | Facility: HOSPITAL | Age: 50
LOS: 1 days | End: 2025-05-22

## 2025-05-22 DIAGNOSIS — S02.609A FRACTURE OF MANDIBLE, UNSPECIFIED, INITIAL ENCOUNTER FOR CLOSED FRACTURE: Chronic | ICD-10-CM

## 2025-05-22 DIAGNOSIS — Z90.3 ACQUIRED ABSENCE OF STOMACH [PART OF]: Chronic | ICD-10-CM

## 2025-05-22 PROCEDURE — G6002: CPT | Mod: 26

## 2025-05-23 ENCOUNTER — OUTPATIENT (OUTPATIENT)
Dept: OUTPATIENT SERVICES | Facility: HOSPITAL | Age: 50
LOS: 1 days | End: 2025-05-23

## 2025-05-23 DIAGNOSIS — Z90.3 ACQUIRED ABSENCE OF STOMACH [PART OF]: Chronic | ICD-10-CM

## 2025-05-23 DIAGNOSIS — S02.609A FRACTURE OF MANDIBLE, UNSPECIFIED, INITIAL ENCOUNTER FOR CLOSED FRACTURE: Chronic | ICD-10-CM

## 2025-05-23 DIAGNOSIS — K60.2 ANAL FISSURE, UNSPECIFIED: ICD-10-CM

## 2025-05-25 LAB
ALBUMIN SERPL ELPH-MCNC: 4.1 G/DL
ALP BLD-CCNC: 86 U/L
ALT SERPL-CCNC: 12 U/L
ANION GAP SERPL CALC-SCNC: 7 MMOL/L
AST SERPL-CCNC: 14 U/L
AUTO BASOPHILS #: 0.02 K/UL
AUTO BASOPHILS %: 0.8 %
AUTO EOSINOPHILS #: 0.09 K/UL
AUTO EOSINOPHILS %: 3.4 %
AUTO IMMATURE GRANULOCYTES #: 0.02 K/UL
AUTO LYMPHOCYTES #: 0.32 K/UL
AUTO LYMPHOCYTES %: 12.3 %
AUTO MONOCYTES #: 0.41 K/UL
AUTO MONOCYTES %: 15.7 %
AUTO NEUTROPHILS #: 1.75 K/UL
AUTO NEUTROPHILS %: 67 %
AUTO NRBC #: 0 K/UL
BILIRUB SERPL-MCNC: 0.4 MG/DL
BUN SERPL-MCNC: 8 MG/DL
CALCIUM SERPL-MCNC: 8.9 MG/DL
CHLORIDE SERPL-SCNC: 106 MMOL/L
CO2 SERPL-SCNC: 27 MMOL/L
CREAT SERPL-MCNC: 0.6 MG/DL
EGFRCR SERPLBLD CKD-EPI 2021: 109 ML/MIN/1.73M2
GLUCOSE SERPL-MCNC: 92 MG/DL
HCT VFR BLD CALC: 33.7 %
HGB BLD-MCNC: 10.7 G/DL
IMM GRANULOCYTES NFR BLD AUTO: 0.8 %
MAGNESIUM SERPL-MCNC: 2.2 MG/DL
MAN DIFF?: NORMAL
MCHC RBC-ENTMCNC: 28.4 PG
MCHC RBC-ENTMCNC: 31.8 G/DL
MCV RBC AUTO: 89.4 FL
PLATELET # BLD AUTO: 153 K/UL
PMV BLD AUTO: 0 /100 WBCS
PMV BLD: 9.2 FL
POTASSIUM SERPL-SCNC: 4.5 MMOL/L
PROT SERPL-MCNC: 6 G/DL
RBC # BLD: 3.77 M/UL
RBC # FLD: 18.6 %
SODIUM SERPL-SCNC: 140 MMOL/L
WBC # FLD AUTO: 2.61 K/UL

## 2025-05-27 ENCOUNTER — OUTPATIENT (OUTPATIENT)
Dept: OUTPATIENT SERVICES | Facility: HOSPITAL | Age: 50
LOS: 1 days | End: 2025-05-27

## 2025-05-27 ENCOUNTER — NON-APPOINTMENT (OUTPATIENT)
Age: 50
End: 2025-05-27

## 2025-05-27 VITALS
BODY MASS INDEX: 28.14 KG/M2 | OXYGEN SATURATION: 99 % | HEART RATE: 97 BPM | TEMPERATURE: 98.1 F | WEIGHT: 174.38 LBS | SYSTOLIC BLOOD PRESSURE: 96 MMHG | DIASTOLIC BLOOD PRESSURE: 71 MMHG | RESPIRATION RATE: 16 BRPM

## 2025-05-27 DIAGNOSIS — K60.2 ANAL FISSURE, UNSPECIFIED: ICD-10-CM

## 2025-05-27 DIAGNOSIS — S02.609A FRACTURE OF MANDIBLE, UNSPECIFIED, INITIAL ENCOUNTER FOR CLOSED FRACTURE: Chronic | ICD-10-CM

## 2025-05-27 PROCEDURE — 77014: CPT | Mod: 26

## 2025-05-27 RX ORDER — OXYCODONE 5 MG/1
5 TABLET ORAL DAILY
Qty: 10 | Refills: 0 | Status: ACTIVE | COMMUNITY
Start: 2025-05-27 | End: 1900-01-01

## 2025-05-27 RX ORDER — SILVER SULFADIAZINE 10 MG/G
1 CREAM TOPICAL TWICE DAILY
Qty: 1 | Refills: 2 | Status: ACTIVE | COMMUNITY
Start: 2025-05-27 | End: 1900-01-01

## 2025-05-28 ENCOUNTER — OUTPATIENT (OUTPATIENT)
Dept: OUTPATIENT SERVICES | Facility: HOSPITAL | Age: 50
LOS: 1 days | End: 2025-05-28

## 2025-05-28 ENCOUNTER — OUTPATIENT (OUTPATIENT)
Dept: OUTPATIENT SERVICES | Facility: HOSPITAL | Age: 50
LOS: 1 days | End: 2025-05-28
Payer: COMMERCIAL

## 2025-05-28 ENCOUNTER — APPOINTMENT (OUTPATIENT)
Age: 50
End: 2025-05-28

## 2025-05-28 DIAGNOSIS — S02.609A FRACTURE OF MANDIBLE, UNSPECIFIED, INITIAL ENCOUNTER FOR CLOSED FRACTURE: Chronic | ICD-10-CM

## 2025-05-28 DIAGNOSIS — K60.2 ANAL FISSURE, UNSPECIFIED: ICD-10-CM

## 2025-05-28 DIAGNOSIS — C44.520 SQUAMOUS CELL CARCINOMA OF ANAL SKIN: ICD-10-CM

## 2025-05-28 DIAGNOSIS — Z90.3 ACQUIRED ABSENCE OF STOMACH [PART OF]: Chronic | ICD-10-CM

## 2025-05-28 LAB
ALBUMIN SERPL ELPH-MCNC: 4.1 G/DL
ALP BLD-CCNC: 95 U/L
ALT SERPL-CCNC: 19 U/L
ANION GAP SERPL CALC-SCNC: 9 MMOL/L
AST SERPL-CCNC: 19 U/L
AUTO BASOPHILS #: 0.03 K/UL
AUTO BASOPHILS %: 0.8 %
AUTO EOSINOPHILS #: 0.21 K/UL
AUTO EOSINOPHILS %: 5.7 %
AUTO IMMATURE GRANULOCYTES #: 0.02 K/UL
AUTO LYMPHOCYTES #: 0.32 K/UL
AUTO LYMPHOCYTES %: 8.6 %
AUTO MONOCYTES #: 0.43 K/UL
AUTO MONOCYTES %: 11.6 %
AUTO NEUTROPHILS #: 2.69 K/UL
AUTO NEUTROPHILS %: 72.8 %
AUTO NRBC #: 0 K/UL
BILIRUB SERPL-MCNC: 0.4 MG/DL
BUN SERPL-MCNC: 10 MG/DL
CALCIUM SERPL-MCNC: 9 MG/DL
CHLORIDE SERPL-SCNC: 102 MMOL/L
CO2 SERPL-SCNC: 27 MMOL/L
CREAT SERPL-MCNC: 0.7 MG/DL
EGFRCR SERPLBLD CKD-EPI 2021: 105 ML/MIN/1.73M2
GLUCOSE SERPL-MCNC: 90 MG/DL
HCT VFR BLD CALC: 34.2 %
HGB BLD-MCNC: 11.1 G/DL
IMM GRANULOCYTES NFR BLD AUTO: 0.5 %
MAGNESIUM SERPL-MCNC: 2.3 MG/DL
MAN DIFF?: NORMAL
MCHC RBC-ENTMCNC: 29.2 PG
MCHC RBC-ENTMCNC: 32.5 G/DL
MCV RBC AUTO: 90 FL
PLATELET # BLD AUTO: 206 K/UL
PMV BLD AUTO: 0 /100 WBCS
PMV BLD: 8.2 FL
POTASSIUM SERPL-SCNC: 4.5 MMOL/L
PROT SERPL-MCNC: 6.2 G/DL
RBC # BLD: 3.8 M/UL
RBC # FLD: 20.5 %
SODIUM SERPL-SCNC: 138 MMOL/L
WBC # FLD AUTO: 3.7 K/UL

## 2025-05-28 PROCEDURE — 77427 RADIATION TX MANAGEMENT X5: CPT

## 2025-05-28 PROCEDURE — 36415 COLL VENOUS BLD VENIPUNCTURE: CPT

## 2025-05-28 PROCEDURE — G6002: CPT | Mod: 26

## 2025-05-28 PROCEDURE — 85025 COMPLETE CBC W/AUTO DIFF WBC: CPT

## 2025-05-28 PROCEDURE — 80053 COMPREHEN METABOLIC PANEL: CPT

## 2025-05-28 PROCEDURE — 83735 ASSAY OF MAGNESIUM: CPT

## 2025-05-29 ENCOUNTER — OUTPATIENT (OUTPATIENT)
Dept: OUTPATIENT SERVICES | Facility: HOSPITAL | Age: 50
LOS: 1 days | End: 2025-05-29

## 2025-05-29 DIAGNOSIS — K60.2 ANAL FISSURE, UNSPECIFIED: ICD-10-CM

## 2025-05-29 DIAGNOSIS — S02.609A FRACTURE OF MANDIBLE, UNSPECIFIED, INITIAL ENCOUNTER FOR CLOSED FRACTURE: Chronic | ICD-10-CM

## 2025-05-29 DIAGNOSIS — Z90.3 ACQUIRED ABSENCE OF STOMACH [PART OF]: Chronic | ICD-10-CM

## 2025-05-29 DIAGNOSIS — C44.520 SQUAMOUS CELL CARCINOMA OF ANAL SKIN: ICD-10-CM

## 2025-05-29 PROCEDURE — G6002: CPT | Mod: 26

## 2025-05-30 ENCOUNTER — OUTPATIENT (OUTPATIENT)
Dept: OUTPATIENT SERVICES | Facility: HOSPITAL | Age: 50
LOS: 1 days | End: 2025-05-30

## 2025-05-30 DIAGNOSIS — S02.609A FRACTURE OF MANDIBLE, UNSPECIFIED, INITIAL ENCOUNTER FOR CLOSED FRACTURE: Chronic | ICD-10-CM

## 2025-05-30 DIAGNOSIS — K60.2 ANAL FISSURE, UNSPECIFIED: ICD-10-CM

## 2025-06-02 ENCOUNTER — OUTPATIENT (OUTPATIENT)
Dept: OUTPATIENT SERVICES | Facility: HOSPITAL | Age: 50
LOS: 1 days | End: 2025-06-02
Payer: COMMERCIAL

## 2025-06-02 DIAGNOSIS — K60.2 ANAL FISSURE, UNSPECIFIED: ICD-10-CM

## 2025-06-02 DIAGNOSIS — Z90.3 ACQUIRED ABSENCE OF STOMACH [PART OF]: Chronic | ICD-10-CM

## 2025-06-02 DIAGNOSIS — S02.609A FRACTURE OF MANDIBLE, UNSPECIFIED, INITIAL ENCOUNTER FOR CLOSED FRACTURE: Chronic | ICD-10-CM

## 2025-06-02 PROCEDURE — 77386: CPT

## 2025-06-02 PROCEDURE — G6002: CPT | Mod: 26

## 2025-06-03 ENCOUNTER — OUTPATIENT (OUTPATIENT)
Dept: OUTPATIENT SERVICES | Facility: HOSPITAL | Age: 50
LOS: 1 days | End: 2025-06-03

## 2025-06-03 ENCOUNTER — NON-APPOINTMENT (OUTPATIENT)
Age: 50
End: 2025-06-03

## 2025-06-03 VITALS
OXYGEN SATURATION: 99 % | HEART RATE: 87 BPM | BODY MASS INDEX: 28.14 KG/M2 | TEMPERATURE: 98.4 F | RESPIRATION RATE: 16 BRPM | DIASTOLIC BLOOD PRESSURE: 73 MMHG | WEIGHT: 174.38 LBS | SYSTOLIC BLOOD PRESSURE: 103 MMHG

## 2025-06-03 DIAGNOSIS — C21.0 MALIGNANT NEOPLASM OF ANUS, UNSPECIFIED: ICD-10-CM

## 2025-06-03 DIAGNOSIS — S02.609A FRACTURE OF MANDIBLE, UNSPECIFIED, INITIAL ENCOUNTER FOR CLOSED FRACTURE: Chronic | ICD-10-CM

## 2025-06-03 PROCEDURE — 77014: CPT | Mod: 26

## 2025-06-04 ENCOUNTER — OUTPATIENT (OUTPATIENT)
Dept: OUTPATIENT SERVICES | Facility: HOSPITAL | Age: 50
LOS: 1 days | End: 2025-06-04

## 2025-06-04 DIAGNOSIS — S02.609A FRACTURE OF MANDIBLE, UNSPECIFIED, INITIAL ENCOUNTER FOR CLOSED FRACTURE: Chronic | ICD-10-CM

## 2025-06-04 DIAGNOSIS — K60.2 ANAL FISSURE, UNSPECIFIED: ICD-10-CM

## 2025-06-04 DIAGNOSIS — Z90.3 ACQUIRED ABSENCE OF STOMACH [PART OF]: Chronic | ICD-10-CM

## 2025-06-04 PROCEDURE — G6002: CPT | Mod: 26

## 2025-06-05 ENCOUNTER — OUTPATIENT (OUTPATIENT)
Dept: OUTPATIENT SERVICES | Facility: HOSPITAL | Age: 50
LOS: 1 days | End: 2025-06-05

## 2025-06-05 DIAGNOSIS — K60.2 ANAL FISSURE, UNSPECIFIED: ICD-10-CM

## 2025-06-05 DIAGNOSIS — Z90.3 ACQUIRED ABSENCE OF STOMACH [PART OF]: Chronic | ICD-10-CM

## 2025-06-05 DIAGNOSIS — S02.609A FRACTURE OF MANDIBLE, UNSPECIFIED, INITIAL ENCOUNTER FOR CLOSED FRACTURE: Chronic | ICD-10-CM

## 2025-06-05 PROCEDURE — G6002: CPT | Mod: 26

## 2025-06-06 ENCOUNTER — OUTPATIENT (OUTPATIENT)
Dept: OUTPATIENT SERVICES | Facility: HOSPITAL | Age: 50
LOS: 1 days | End: 2025-06-06
Payer: COMMERCIAL

## 2025-06-06 ENCOUNTER — OUTPATIENT (OUTPATIENT)
Dept: OUTPATIENT SERVICES | Facility: HOSPITAL | Age: 50
LOS: 1 days | End: 2025-06-06

## 2025-06-06 ENCOUNTER — APPOINTMENT (OUTPATIENT)
Age: 50
End: 2025-06-06
Payer: COMMERCIAL

## 2025-06-06 VITALS
HEIGHT: 66 IN | WEIGHT: 180.5 LBS | TEMPERATURE: 98.1 F | OXYGEN SATURATION: 98 % | BODY MASS INDEX: 29.01 KG/M2 | DIASTOLIC BLOOD PRESSURE: 71 MMHG | SYSTOLIC BLOOD PRESSURE: 107 MMHG | RESPIRATION RATE: 17 BRPM | HEART RATE: 114 BPM

## 2025-06-06 DIAGNOSIS — S02.609A FRACTURE OF MANDIBLE, UNSPECIFIED, INITIAL ENCOUNTER FOR CLOSED FRACTURE: Chronic | ICD-10-CM

## 2025-06-06 DIAGNOSIS — Z90.3 ACQUIRED ABSENCE OF STOMACH [PART OF]: Chronic | ICD-10-CM

## 2025-06-06 DIAGNOSIS — K60.2 ANAL FISSURE, UNSPECIFIED: ICD-10-CM

## 2025-06-06 DIAGNOSIS — C44.520 SQUAMOUS CELL CARCINOMA OF ANAL SKIN: ICD-10-CM

## 2025-06-06 LAB
AUTO BASOPHILS #: 0.01 K/UL
AUTO BASOPHILS %: 0.3 %
AUTO EOSINOPHILS #: 0.24 K/UL
AUTO EOSINOPHILS %: 6.8 %
AUTO IMMATURE GRANULOCYTES #: 0.01 K/UL
AUTO LYMPHOCYTES #: 0.32 K/UL
AUTO LYMPHOCYTES %: 9 %
AUTO MONOCYTES #: 0.38 K/UL
AUTO MONOCYTES %: 10.7 %
AUTO NEUTROPHILS #: 2.59 K/UL
AUTO NEUTROPHILS %: 72.9 %
AUTO NRBC #: 0 K/UL
HCT VFR BLD CALC: 33.1 %
HGB BLD-MCNC: 11.1 G/DL
IMM GRANULOCYTES NFR BLD AUTO: 0.3 %
MAN DIFF?: NORMAL
MCHC RBC-ENTMCNC: 30.7 PG
MCHC RBC-ENTMCNC: 33.5 G/DL
MCV RBC AUTO: 91.7 FL
PLATELET # BLD AUTO: 174 K/UL
PMV BLD AUTO: 0 /100 WBCS
PMV BLD: 8.5 FL
RBC # BLD: 3.61 M/UL
RBC # FLD: 23 %
WBC # FLD AUTO: 3.55 K/UL

## 2025-06-06 PROCEDURE — 99214 OFFICE O/P EST MOD 30 MIN: CPT

## 2025-06-06 PROCEDURE — G2211 COMPLEX E/M VISIT ADD ON: CPT | Mod: NC

## 2025-06-06 PROCEDURE — 85025 COMPLETE CBC W/AUTO DIFF WBC: CPT

## 2025-06-06 PROCEDURE — 80053 COMPREHEN METABOLIC PANEL: CPT

## 2025-06-07 LAB
ALBUMIN SERPL ELPH-MCNC: 4.4 G/DL
ALP BLD-CCNC: 93 U/L
ALT SERPL-CCNC: 18 U/L
ANION GAP SERPL CALC-SCNC: 11 MMOL/L
AST SERPL-CCNC: 18 U/L
BILIRUB SERPL-MCNC: 0.6 MG/DL
BUN SERPL-MCNC: 11 MG/DL
CALCIUM SERPL-MCNC: 9.2 MG/DL
CHLORIDE SERPL-SCNC: 105 MMOL/L
CO2 SERPL-SCNC: 23 MMOL/L
CREAT SERPL-MCNC: 0.6 MG/DL
EGFRCR SERPLBLD CKD-EPI 2021: 109 ML/MIN/1.73M2
GLUCOSE SERPL-MCNC: 93 MG/DL
POTASSIUM SERPL-SCNC: 4.4 MMOL/L
PROT SERPL-MCNC: 6.4 G/DL
SODIUM SERPL-SCNC: 139 MMOL/L

## 2025-06-10 ENCOUNTER — RX RENEWAL (OUTPATIENT)
Age: 50
End: 2025-06-10

## 2025-06-13 ENCOUNTER — NON-APPOINTMENT (OUTPATIENT)
Age: 50
End: 2025-06-13

## 2025-07-01 ENCOUNTER — APPOINTMENT (OUTPATIENT)
Dept: SURGERY | Facility: CLINIC | Age: 50
End: 2025-07-01

## 2025-07-01 VITALS
OXYGEN SATURATION: 96 % | WEIGHT: 183 LBS | TEMPERATURE: 97 F | SYSTOLIC BLOOD PRESSURE: 116 MMHG | HEART RATE: 98 BPM | BODY MASS INDEX: 29.41 KG/M2 | DIASTOLIC BLOOD PRESSURE: 74 MMHG | HEIGHT: 66 IN

## 2025-07-01 PROCEDURE — 99214 OFFICE O/P EST MOD 30 MIN: CPT

## 2025-07-10 ENCOUNTER — RX RENEWAL (OUTPATIENT)
Age: 50
End: 2025-07-10

## 2025-07-11 ENCOUNTER — APPOINTMENT (OUTPATIENT)
Dept: RADIATION ONCOLOGY | Facility: HOSPITAL | Age: 50
End: 2025-07-11
Payer: COMMERCIAL

## 2025-07-11 VITALS
HEART RATE: 97 BPM | BODY MASS INDEX: 29.55 KG/M2 | OXYGEN SATURATION: 94 % | RESPIRATION RATE: 16 BRPM | TEMPERATURE: 98.1 F | SYSTOLIC BLOOD PRESSURE: 105 MMHG | WEIGHT: 183.06 LBS | DIASTOLIC BLOOD PRESSURE: 66 MMHG

## 2025-07-11 PROCEDURE — 99024 POSTOP FOLLOW-UP VISIT: CPT

## 2025-07-23 ENCOUNTER — APPOINTMENT (OUTPATIENT)
Age: 50
End: 2025-07-23
Payer: COMMERCIAL

## 2025-07-23 VITALS
HEIGHT: 65.75 IN | BODY MASS INDEX: 29.93 KG/M2 | RESPIRATION RATE: 16 BRPM | SYSTOLIC BLOOD PRESSURE: 99 MMHG | DIASTOLIC BLOOD PRESSURE: 68 MMHG | WEIGHT: 184 LBS | OXYGEN SATURATION: 95 % | HEART RATE: 104 BPM | TEMPERATURE: 98.2 F

## 2025-07-23 DIAGNOSIS — R53.83 OTHER FATIGUE: ICD-10-CM

## 2025-07-23 DIAGNOSIS — E61.1 IRON DEFICIENCY: ICD-10-CM

## 2025-07-23 DIAGNOSIS — Z51.11 ENCOUNTER FOR ANTINEOPLASTIC CHEMOTHERAPY: ICD-10-CM

## 2025-07-23 DIAGNOSIS — Z92.3 PERSONAL HISTORY OF IRRADIATION: ICD-10-CM

## 2025-07-23 DIAGNOSIS — M25.50 PAIN IN UNSPECIFIED JOINT: ICD-10-CM

## 2025-07-23 DIAGNOSIS — C21.0 MALIGNANT NEOPLASM OF ANUS, UNSPECIFIED: ICD-10-CM

## 2025-07-23 PROCEDURE — G2211 COMPLEX E/M VISIT ADD ON: CPT | Mod: NC

## 2025-07-23 PROCEDURE — 99214 OFFICE O/P EST MOD 30 MIN: CPT

## 2025-07-24 LAB
ALBUMIN SERPL ELPH-MCNC: 4.1 G/DL
ALP BLD-CCNC: 97 U/L
ALT SERPL-CCNC: 12 U/L
ANION GAP SERPL CALC-SCNC: 12 MMOL/L
AST SERPL-CCNC: 17 U/L
AUTO BASOPHILS #: 0.05 K/UL
AUTO BASOPHILS %: 0.9 %
AUTO EOSINOPHILS #: 0.15 K/UL
AUTO EOSINOPHILS %: 2.7 %
AUTO IMMATURE GRANULOCYTES #: 0.02 K/UL
AUTO LYMPHOCYTES #: 0.62 K/UL
AUTO LYMPHOCYTES %: 11.3 %
AUTO MONOCYTES #: 0.68 K/UL
AUTO MONOCYTES %: 12.3 %
AUTO NEUTROPHILS #: 3.99 K/UL
AUTO NEUTROPHILS %: 72.4 %
AUTO NRBC #: 0 K/UL
BILIRUB SERPL-MCNC: 0.2 MG/DL
BUN SERPL-MCNC: 12 MG/DL
CALCIUM SERPL-MCNC: 9 MG/DL
CHLORIDE SERPL-SCNC: 107 MMOL/L
CO2 SERPL-SCNC: 23 MMOL/L
CREAT SERPL-MCNC: 0.8 MG/DL
CRP SERPL-MCNC: <3 MG/L
EGFRCR SERPLBLD CKD-EPI 2021: 90 ML/MIN/1.73M2
ERYTHROCYTE [SEDIMENTATION RATE] IN BLOOD BY WESTERGREN METHOD: 21 MM/HR
FERRITIN SERPL-MCNC: 12 NG/ML
GLUCOSE SERPL-MCNC: 85 MG/DL
HCT VFR BLD CALC: 34.5 %
HGB BLD-MCNC: 11.3 G/DL
IMM GRANULOCYTES NFR BLD AUTO: 0.4 %
IRON SATN MFR SERPL: 9 %
IRON SERPL-MCNC: 41 UG/DL
MAN DIFF?: NORMAL
MCHC RBC-ENTMCNC: 31.6 PG
MCHC RBC-ENTMCNC: 32.8 G/DL
MCV RBC AUTO: 96.4 FL
PLATELET # BLD AUTO: 265 K/UL
PMV BLD AUTO: 0 /100 WBCS
PMV BLD: 9.3 FL
POTASSIUM SERPL-SCNC: 4.7 MMOL/L
PROT SERPL-MCNC: 6.1 G/DL
RBC # BLD: 3.58 M/UL
RBC # FLD: 17.4 %
SODIUM SERPL-SCNC: 142 MMOL/L
T4 FREE SERPL-MCNC: 0.9 NG/DL
TIBC SERPL-MCNC: 449 UG/DL
TSH SERPL-ACNC: 2.49 UIU/ML
UIBC SERPL-MCNC: 408 UG/DL
WBC # FLD AUTO: 5.51 K/UL

## 2025-07-25 LAB — ANA TITR SER: NEGATIVE

## 2025-08-04 ENCOUNTER — APPOINTMENT (OUTPATIENT)
Dept: SURGERY | Facility: CLINIC | Age: 50
End: 2025-08-04
Payer: COMMERCIAL

## 2025-08-04 VITALS
BODY MASS INDEX: 29.76 KG/M2 | SYSTOLIC BLOOD PRESSURE: 126 MMHG | TEMPERATURE: 97 F | WEIGHT: 183 LBS | HEART RATE: 82 BPM | DIASTOLIC BLOOD PRESSURE: 78 MMHG | HEIGHT: 65.75 IN | OXYGEN SATURATION: 98 %

## 2025-08-04 DIAGNOSIS — C21.0 MALIGNANT NEOPLASM OF ANUS, UNSPECIFIED: ICD-10-CM

## 2025-08-04 PROCEDURE — 99203 OFFICE O/P NEW LOW 30 MIN: CPT

## 2025-08-19 ENCOUNTER — APPOINTMENT (OUTPATIENT)
Age: 50
End: 2025-08-19

## 2025-08-19 ENCOUNTER — RX RENEWAL (OUTPATIENT)
Age: 50
End: 2025-08-19

## 2025-08-26 ENCOUNTER — APPOINTMENT (OUTPATIENT)
Age: 50
End: 2025-08-26
Payer: COMMERCIAL

## 2025-08-26 ENCOUNTER — LABORATORY RESULT (OUTPATIENT)
Age: 50
End: 2025-08-26

## 2025-08-26 VITALS
OXYGEN SATURATION: 98 % | BODY MASS INDEX: 30.57 KG/M2 | HEART RATE: 92 BPM | RESPIRATION RATE: 16 BRPM | SYSTOLIC BLOOD PRESSURE: 101 MMHG | WEIGHT: 188 LBS | TEMPERATURE: 97.7 F | DIASTOLIC BLOOD PRESSURE: 70 MMHG

## 2025-08-26 DIAGNOSIS — C21.0 MALIGNANT NEOPLASM OF ANUS, UNSPECIFIED: ICD-10-CM

## 2025-08-26 DIAGNOSIS — Z92.3 PERSONAL HISTORY OF IRRADIATION: ICD-10-CM

## 2025-08-26 DIAGNOSIS — R53.83 OTHER FATIGUE: ICD-10-CM

## 2025-08-26 DIAGNOSIS — E61.1 IRON DEFICIENCY: ICD-10-CM

## 2025-08-26 DIAGNOSIS — Z51.11 ENCOUNTER FOR ANTINEOPLASTIC CHEMOTHERAPY: ICD-10-CM

## 2025-08-26 DIAGNOSIS — N39.0 URINARY TRACT INFECTION, SITE NOT SPECIFIED: ICD-10-CM

## 2025-08-26 LAB
ALBUMIN SERPL ELPH-MCNC: 4.3 G/DL
ALP BLD-CCNC: 94 U/L
ALT SERPL-CCNC: 14 U/L
ANION GAP SERPL CALC-SCNC: 13 MMOL/L
APPEARANCE: CLEAR
AST SERPL-CCNC: 21 U/L
AUTO BASOPHILS #: 0.03 K/UL
AUTO BASOPHILS %: 0.5 %
AUTO EOSINOPHILS #: 0.05 K/UL
AUTO EOSINOPHILS %: 0.9 %
AUTO IMMATURE GRANULOCYTES #: 0.04 K/UL
AUTO LYMPHOCYTES #: 0.56 K/UL
AUTO LYMPHOCYTES %: 9.9 %
AUTO MONOCYTES #: 0.41 K/UL
AUTO MONOCYTES %: 7.3 %
AUTO NEUTROPHILS #: 4.55 K/UL
AUTO NEUTROPHILS %: 80.7 %
AUTO NRBC #: 0 K/UL
BILIRUB SERPL-MCNC: 0.3 MG/DL
BILIRUBIN URINE: NEGATIVE
BLOOD URINE: NEGATIVE
BUN SERPL-MCNC: 10 MG/DL
CALCIUM SERPL-MCNC: 9.2 MG/DL
CHLORIDE SERPL-SCNC: 107 MMOL/L
CO2 SERPL-SCNC: 22 MMOL/L
COLOR: YELLOW
CREAT SERPL-MCNC: 0.7 MG/DL
EGFRCR SERPLBLD CKD-EPI 2021: 105 ML/MIN/1.73M2
GLUCOSE QUALITATIVE U: NEGATIVE MG/DL
GLUCOSE SERPL-MCNC: 87 MG/DL
HCT VFR BLD CALC: 36.2 %
HGB BLD-MCNC: 12 G/DL
IMM GRANULOCYTES NFR BLD AUTO: 0.7 %
KETONES URINE: ABNORMAL MG/DL
LEUKOCYTE ESTERASE URINE: ABNORMAL
MAN DIFF?: NORMAL
MCHC RBC-ENTMCNC: 31.5 PG
MCHC RBC-ENTMCNC: 33.1 G/DL
MCV RBC AUTO: 95 FL
NITRITE URINE: NEGATIVE
PH URINE: 7
PLATELET # BLD AUTO: 238 K/UL
PMV BLD AUTO: 0 /100 WBCS
PMV BLD: 9.3 FL
POTASSIUM SERPL-SCNC: 5 MMOL/L
PROT SERPL-MCNC: 6.2 G/DL
PROTEIN URINE: NORMAL MG/DL
RBC # BLD: 3.81 M/UL
RBC # FLD: 14.6 %
SODIUM SERPL-SCNC: 142 MMOL/L
SPECIFIC GRAVITY URINE: 1.02
UROBILINOGEN URINE: 1 MG/DL
WBC # FLD AUTO: 5.64 K/UL

## 2025-08-26 PROCEDURE — G2211 COMPLEX E/M VISIT ADD ON: CPT | Mod: NC

## 2025-08-26 PROCEDURE — 99215 OFFICE O/P EST HI 40 MIN: CPT

## 2025-08-26 RX ORDER — NITROFURANTOIN (MONOHYDRATE/MACROCRYSTALS) 25; 75 MG/1; MG/1
100 CAPSULE ORAL TWICE DAILY
Qty: 6 | Refills: 0 | Status: COMPLETED | COMMUNITY
Start: 2025-08-26 | End: 2025-08-29

## 2025-08-29 LAB — BACTERIA UR CULT: ABNORMAL

## 2025-09-02 ENCOUNTER — OUTPATIENT (OUTPATIENT)
Dept: OUTPATIENT SERVICES | Facility: HOSPITAL | Age: 50
LOS: 1 days | End: 2025-09-02
Payer: COMMERCIAL

## 2025-09-02 ENCOUNTER — RESULT REVIEW (OUTPATIENT)
Age: 50
End: 2025-09-02

## 2025-09-02 DIAGNOSIS — C21.0 MALIGNANT NEOPLASM OF ANUS, UNSPECIFIED: ICD-10-CM

## 2025-09-02 DIAGNOSIS — S02.609A FRACTURE OF MANDIBLE, UNSPECIFIED, INITIAL ENCOUNTER FOR CLOSED FRACTURE: Chronic | ICD-10-CM

## 2025-09-02 DIAGNOSIS — Z90.3 ACQUIRED ABSENCE OF STOMACH [PART OF]: Chronic | ICD-10-CM

## 2025-09-02 LAB — GLUCOSE BLDC GLUCOMTR-MCNC: 88 MG/DL — SIGNIFICANT CHANGE UP (ref 70–99)

## 2025-09-02 PROCEDURE — 78815 PET IMAGE W/CT SKULL-THIGH: CPT | Mod: PS

## 2025-09-02 PROCEDURE — 78815 PET IMAGE W/CT SKULL-THIGH: CPT | Mod: 26,PS

## 2025-09-02 PROCEDURE — 82962 GLUCOSE BLOOD TEST: CPT

## 2025-09-02 PROCEDURE — A9552: CPT

## 2025-09-03 DIAGNOSIS — C21.0 MALIGNANT NEOPLASM OF ANUS, UNSPECIFIED: ICD-10-CM
